# Patient Record
Sex: MALE | Race: BLACK OR AFRICAN AMERICAN | NOT HISPANIC OR LATINO | Employment: FULL TIME | URBAN - METROPOLITAN AREA
[De-identification: names, ages, dates, MRNs, and addresses within clinical notes are randomized per-mention and may not be internally consistent; named-entity substitution may affect disease eponyms.]

---

## 2017-08-18 ENCOUNTER — HOSPITAL ENCOUNTER (OUTPATIENT)
Dept: OTHER | Facility: HOSPITAL | Age: 37
Setting detail: SPECIMEN
Discharge: HOME | End: 2017-08-18
Attending: NURSE PRACTITIONER | Admitting: NURSE PRACTITIONER

## 2017-08-18 LAB
ALBUMIN SERPL-MCNC: 4.3 G/DL (ref 3.5–4.8)
ALBUMIN/GLOB SERPL: 1.8 {RATIO} (ref 1–1.7)
ALP SERPL-CCNC: 72 IU/L (ref 32–91)
ALT SERPL-CCNC: 21 IU/L (ref 17–63)
ANION GAP SERPL CALC-SCNC: 14 MMOL/L (ref 10–20)
AST SERPL-CCNC: 25 IU/L (ref 15–41)
BILIRUB SERPL-MCNC: 0.6 MG/DL (ref 0.3–1.2)
BUN SERPL-MCNC: 13 MG/DL (ref 8–20)
BUN/CREAT SERPL: 11.8 (ref 6.2–20.3)
CALCIUM SERPL-MCNC: 9.3 MG/DL (ref 8.9–10.3)
CHLORIDE SERPL-SCNC: 105 MMOL/L (ref 101–111)
CHOLEST SERPL-MCNC: 182 MG/DL
CHOLEST/HDLC SERPL: 5.1 {RATIO}
CONV CO2: 26 MMOL/L (ref 22–32)
CONV LDL CHOLESTEROL DIRECT: 128 MG/DL (ref 0–100)
CONV TOTAL PROTEIN: 6.7 G/DL (ref 6.1–7.9)
CREAT UR-MCNC: 1.1 MG/DL (ref 0.7–1.2)
GLOBULIN UR ELPH-MCNC: 2.4 G/DL (ref 2.5–3.8)
GLUCOSE SERPL-MCNC: 98 MG/DL (ref 65–99)
HDLC SERPL-MCNC: 36 MG/DL
LDLC/HDLC SERPL: 3.6 {RATIO}
LIPID INTERPRETATION: ABNORMAL
POTASSIUM SERPL-SCNC: 4 MMOL/L (ref 3.6–5.1)
SODIUM SERPL-SCNC: 141 MMOL/L (ref 136–144)
TRIGL SERPL-MCNC: 137 MG/DL
VLDLC SERPL CALC-MCNC: 18.1 MG/DL

## 2018-10-31 ENCOUNTER — OFFICE VISIT (OUTPATIENT)
Dept: FAMILY MEDICINE CLINIC | Facility: CLINIC | Age: 38
End: 2018-10-31

## 2018-10-31 VITALS
WEIGHT: 234 LBS | DIASTOLIC BLOOD PRESSURE: 82 MMHG | BODY MASS INDEX: 33.5 KG/M2 | HEIGHT: 70 IN | HEART RATE: 65 BPM | OXYGEN SATURATION: 98 % | TEMPERATURE: 97.8 F | SYSTOLIC BLOOD PRESSURE: 140 MMHG

## 2018-10-31 DIAGNOSIS — I10 ESSENTIAL HYPERTENSION: Primary | ICD-10-CM

## 2018-10-31 PROCEDURE — 99203 OFFICE O/P NEW LOW 30 MIN: CPT | Performed by: NURSE PRACTITIONER

## 2018-10-31 RX ORDER — LISINOPRIL 10 MG/1
10 TABLET ORAL DAILY
Qty: 90 TABLET | Refills: 0 | Status: SHIPPED | OUTPATIENT
Start: 2018-10-31 | End: 2019-02-01 | Stop reason: SDUPTHER

## 2018-10-31 RX ORDER — HYDROCHLOROTHIAZIDE 12.5 MG/1
12.5 TABLET ORAL DAILY
Qty: 90 TABLET | Refills: 0 | Status: SHIPPED | OUTPATIENT
Start: 2018-10-31 | End: 2019-02-01 | Stop reason: SDUPTHER

## 2018-10-31 RX ORDER — LISINOPRIL 10 MG/1
TABLET ORAL
COMMUNITY
Start: 2018-09-21 | End: 2018-10-31 | Stop reason: SDUPTHER

## 2018-10-31 RX ORDER — HYDROCHLOROTHIAZIDE 12.5 MG/1
TABLET ORAL
COMMUNITY
Start: 2018-09-20 | End: 2018-10-31 | Stop reason: SDUPTHER

## 2018-10-31 NOTE — PROGRESS NOTES
Subjective   Jc Brannon is a 38 y.o. male.     Chief Complaint   Patient presents with   • Medication Problem     New pt. to establish care   Blood Pressure medication concerens     Mr. Brannon presents today to establish care at this practice. Mr. Brannon is mentally disabled and father is present in the office, but did not accompany him into the exam room. He was seeing a physician in [place], [place] but has moved to [place] recently. He needs refills on his antihypertensive medications. He reports taking HCTA 12.5 mg daily and Lisinopril 10 mg daily. He reports that one of his medications was recently increase to double the amount but does not know which one. He reports a medical history of hyperlipidemia and is not currently on any medications.        I have reviewed the patient's medical history in detail and updated the computerized patient record.    The following portions of the patient's history were reviewed and updated as appropriate: allergies, current medications, past family history, past medical history, past social history, past surgical history and problem list.       Current Outpatient Prescriptions:   •  hydrochlorothiazide (HYDRODIURIL) 12.5 MG tablet, Take 1 tablet by mouth Daily., Disp: 90 tablet, Rfl: 0  •  lisinopril (PRINIVIL,ZESTRIL) 10 MG tablet, Take 1 tablet by mouth Daily., Disp: 90 tablet, Rfl: 0    Review of Systems   Constitutional: Negative.    HENT: Negative.    Eyes: Negative.    Respiratory: Negative.    Cardiovascular: Negative.    Gastrointestinal: Negative.    Musculoskeletal: Negative.    Neurological: Negative.    Psychiatric/Behavioral: Negative.        Objective    Vitals:    10/31/18 0910   BP: 140/82   Pulse: 65   Temp: 97.8 °F (36.6 °C)   SpO2: 98%     Physical Exam   Constitutional: He is oriented to person, place, and time. He appears well-developed and well-nourished.   HENT:   Head: Normocephalic.   Right Ear: External ear normal.   Left Ear: External  ear normal.   Mouth/Throat: Oropharynx is clear and moist.   Neck: Normal range of motion. Neck supple. No JVD present. No tracheal deviation present. No thyromegaly present.   Cardiovascular: Normal rate, regular rhythm, normal heart sounds and intact distal pulses.    Pulmonary/Chest: Effort normal and breath sounds normal.   Musculoskeletal: Normal range of motion. He exhibits no edema.   Lymphadenopathy:     He has no cervical adenopathy.   Neurological: He is alert and oriented to person, place, and time.   Skin: Skin is warm and dry.   Psychiatric: He has a normal mood and affect. His behavior is normal. Judgment and thought content normal.   He has mental disability but is highly functioning.    Vitals reviewed.        Assessment/Plan   Jc was seen today for medication problem.    Diagnoses and all orders for this visit:    Essential hypertension  -     hydrochlorothiazide (HYDRODIURIL) 12.5 MG tablet; Take 1 tablet by mouth Daily.  -     lisinopril (PRINIVIL,ZESTRIL) 10 MG tablet; Take 1 tablet by mouth Daily.    1. I have asked for his medical records from his previous PCP to clarify his medication changes.   2. I called and verified his medications with at the [facility] in [place]. The HCTZ and the Lisinopril are what they have listed, last refill in September. I have reordered both medications for 90 days.   3. He is to return in January/February to follow up on his blood pressure control.

## 2019-02-01 ENCOUNTER — OFFICE VISIT (OUTPATIENT)
Dept: FAMILY MEDICINE CLINIC | Facility: CLINIC | Age: 39
End: 2019-02-01

## 2019-02-01 VITALS
RESPIRATION RATE: 18 BRPM | HEIGHT: 70 IN | DIASTOLIC BLOOD PRESSURE: 84 MMHG | HEART RATE: 68 BPM | WEIGHT: 234 LBS | OXYGEN SATURATION: 98 % | TEMPERATURE: 98.2 F | BODY MASS INDEX: 33.5 KG/M2 | SYSTOLIC BLOOD PRESSURE: 130 MMHG

## 2019-02-01 DIAGNOSIS — I10 ESSENTIAL HYPERTENSION: ICD-10-CM

## 2019-02-01 DIAGNOSIS — Z13.220 SCREENING FOR HYPERLIPIDEMIA: Primary | ICD-10-CM

## 2019-02-01 PROCEDURE — 99213 OFFICE O/P EST LOW 20 MIN: CPT | Performed by: NURSE PRACTITIONER

## 2019-02-01 RX ORDER — HYDROCHLOROTHIAZIDE 12.5 MG/1
12.5 TABLET ORAL DAILY
Qty: 90 TABLET | Refills: 0 | Status: SHIPPED | OUTPATIENT
Start: 2019-02-01 | End: 2020-04-10

## 2019-02-01 RX ORDER — LISINOPRIL 10 MG/1
10 TABLET ORAL DAILY
Qty: 90 TABLET | Refills: 0 | Status: SHIPPED | OUTPATIENT
Start: 2019-02-01

## 2019-02-01 NOTE — PROGRESS NOTES
"Lamar Brannon is a 38 y.o. male.     Chief Complaint   Patient presents with   • Hypertension     Hypertension   This is a chronic problem. The current episode started more than 1 year ago. The problem has been gradually improving since onset. The problem is controlled. Pertinent negatives include no anxiety, chest pain, headaches, palpitations, peripheral edema or shortness of breath. There are no associated agents to hypertension. Risk factors for coronary artery disease include male gender and obesity. Current antihypertension treatment includes ACE inhibitors and diuretics. The current treatment provides moderate improvement. There are no compliance problems.       I have reviewed the patient's medical history in detail and updated the computerized patient record.    The following portions of the patient's history were reviewed and updated as appropriate: allergies, current medications, past family history, past medical history, past social history, past surgical history and problem list.       Current Outpatient Medications:   •  hydrochlorothiazide (HYDRODIURIL) 12.5 MG tablet, Take 1 tablet by mouth Daily., Disp: 90 tablet, Rfl: 0  •  lisinopril (PRINIVIL,ZESTRIL) 10 MG tablet, Take 1 tablet by mouth Daily., Disp: 90 tablet, Rfl: 0    Review of Systems   Constitutional: Negative.    Respiratory: Negative.  Negative for shortness of breath.    Cardiovascular: Negative.  Negative for chest pain and palpitations.   Musculoskeletal: Negative.    Skin: Negative.    Neurological: Negative for headache.        Vitals:    02/01/19 1525   BP: 130/84   BP Location: Left arm   Patient Position: Sitting   Cuff Size: Adult   Pulse: 68   Resp: 18   Temp: 98.2 °F (36.8 °C)   TempSrc: Oral   SpO2: 98%   Weight: 106 kg (234 lb)   Height: 177.8 cm (70\")       Objective   Physical Exam   Constitutional: He is oriented to person, place, and time. He appears well-developed and well-nourished.   Neck: Normal range " of motion. Neck supple.   Cardiovascular: Normal rate, regular rhythm and normal heart sounds.   Pulmonary/Chest: Effort normal and breath sounds normal.   Lymphadenopathy:     He has no cervical adenopathy.   Neurological: He is alert and oriented to person, place, and time.   Skin: Skin is warm and dry.   Psychiatric:   No acute distress   Vitals reviewed.        Assessment/Plan   Jc was seen today for hypertension.    Diagnoses and all orders for this visit:    Screening for hyperlipidemia  -     Lipid Panel With / Chol / HDL Ratio; Future    Essential hypertension  -     hydrochlorothiazide (HYDRODIURIL) 12.5 MG tablet; Take 1 tablet by mouth Daily.  -     lisinopril (PRINIVIL,ZESTRIL) 10 MG tablet; Take 1 tablet by mouth Daily.  -     CBC (No Diff); Future  -     Comprehensive Metabolic Panel    1. Hypertension. His blood pressure today is 130/84. He is to continue with HCTZ 12.5 mg daily and Lisinopril 10 mg daily.  2. He is to return in 6 months for an annual physical exam with fasting labs the week before.

## 2019-07-16 DIAGNOSIS — Z12.5 SCREENING PSA (PROSTATE SPECIFIC ANTIGEN): ICD-10-CM

## 2019-07-16 DIAGNOSIS — Z80.42 FAMILY HX OF PROSTATE CANCER: ICD-10-CM

## 2019-07-16 DIAGNOSIS — Z13.220 SCREENING FOR HYPERLIPIDEMIA: ICD-10-CM

## 2019-07-16 DIAGNOSIS — I10 ESSENTIAL HYPERTENSION: Primary | ICD-10-CM

## 2019-07-21 ENCOUNTER — RESULTS ENCOUNTER (OUTPATIENT)
Dept: FAMILY MEDICINE CLINIC | Facility: CLINIC | Age: 39
End: 2019-07-21

## 2019-07-21 DIAGNOSIS — I10 ESSENTIAL HYPERTENSION: ICD-10-CM

## 2019-07-21 DIAGNOSIS — Z80.42 FAMILY HX OF PROSTATE CANCER: ICD-10-CM

## 2019-07-21 DIAGNOSIS — Z12.5 SCREENING PSA (PROSTATE SPECIFIC ANTIGEN): ICD-10-CM

## 2019-07-21 DIAGNOSIS — Z13.220 SCREENING FOR HYPERLIPIDEMIA: ICD-10-CM

## 2019-07-28 ENCOUNTER — RESULTS ENCOUNTER (OUTPATIENT)
Dept: FAMILY MEDICINE CLINIC | Facility: CLINIC | Age: 39
End: 2019-07-28

## 2019-07-28 DIAGNOSIS — Z13.220 SCREENING FOR HYPERLIPIDEMIA: ICD-10-CM

## 2019-07-28 LAB
ALBUMIN SERPL-MCNC: 4.5 G/DL (ref 3.5–5.5)
ALBUMIN/GLOB SERPL: 1.8 {RATIO} (ref 1.2–2.2)
ALP SERPL-CCNC: 74 IU/L (ref 39–117)
ALT SERPL-CCNC: 17 IU/L (ref 0–44)
AST SERPL-CCNC: 18 IU/L (ref 0–40)
BILIRUB SERPL-MCNC: 0.6 MG/DL (ref 0–1.2)
BUN SERPL-MCNC: 12 MG/DL (ref 6–24)
BUN/CREAT SERPL: 12 (ref 9–20)
CALCIUM SERPL-MCNC: 9.1 MG/DL (ref 8.7–10.2)
CHLORIDE SERPL-SCNC: 102 MMOL/L (ref 96–106)
CHOLEST SERPL-MCNC: 159 MG/DL (ref 100–199)
CO2 SERPL-SCNC: 24 MMOL/L (ref 20–29)
CREAT SERPL-MCNC: 1.03 MG/DL (ref 0.76–1.27)
ERYTHROCYTE [DISTWIDTH] IN BLOOD BY AUTOMATED COUNT: 12.3 % (ref 12.3–15.4)
GLOBULIN SER CALC-MCNC: 2.5 G/DL (ref 1.5–4.5)
GLUCOSE SERPL-MCNC: 107 MG/DL (ref 65–99)
HCT VFR BLD AUTO: 40.1 % (ref 37.5–51)
HDLC SERPL-MCNC: 38 MG/DL
HGB BLD-MCNC: 13.8 G/DL (ref 13–17.7)
LDLC SERPL CALC-MCNC: 110 MG/DL (ref 0–99)
MCH RBC QN AUTO: 31.6 PG (ref 26.6–33)
MCHC RBC AUTO-ENTMCNC: 34.4 G/DL (ref 31.5–35.7)
MCV RBC AUTO: 92 FL (ref 79–97)
PLATELET # BLD AUTO: 249 X10E3/UL (ref 150–450)
POTASSIUM SERPL-SCNC: 4.3 MMOL/L (ref 3.5–5.2)
PROT SERPL-MCNC: 7 G/DL (ref 6–8.5)
PSA SERPL-MCNC: 0.6 NG/ML (ref 0–4)
RBC # BLD AUTO: 4.37 X10E6/UL (ref 4.14–5.8)
SODIUM SERPL-SCNC: 141 MMOL/L (ref 134–144)
TRIGL SERPL-MCNC: 53 MG/DL (ref 0–149)
VLDLC SERPL CALC-MCNC: 11 MG/DL (ref 5–40)
WBC # BLD AUTO: 2.6 X10E3/UL (ref 3.4–10.8)

## 2019-08-02 ENCOUNTER — OFFICE VISIT (OUTPATIENT)
Dept: FAMILY MEDICINE CLINIC | Facility: CLINIC | Age: 39
End: 2019-08-02

## 2019-08-02 VITALS
WEIGHT: 233.3 LBS | BODY MASS INDEX: 33.4 KG/M2 | DIASTOLIC BLOOD PRESSURE: 82 MMHG | RESPIRATION RATE: 18 BRPM | SYSTOLIC BLOOD PRESSURE: 128 MMHG | TEMPERATURE: 98.3 F | HEART RATE: 68 BPM | HEIGHT: 70 IN | OXYGEN SATURATION: 98 %

## 2019-08-02 DIAGNOSIS — I10 ESSENTIAL HYPERTENSION: Primary | ICD-10-CM

## 2019-08-02 DIAGNOSIS — D72.819 LEUKOPENIA, UNSPECIFIED TYPE: ICD-10-CM

## 2019-08-02 PROCEDURE — 99213 OFFICE O/P EST LOW 20 MIN: CPT | Performed by: NURSE PRACTITIONER

## 2019-08-02 NOTE — PROGRESS NOTES
"Subjective   Jc Brannon is a 39 y.o. male.     Chief Complaint   Patient presents with   • Hypertension     Hypertension   This is a chronic problem. The current episode started more than 1 year ago. The problem is controlled. Pertinent negatives include no blurred vision, headaches, malaise/fatigue, palpitations, peripheral edema or shortness of breath. There are no associated agents to hypertension. Risk factors for coronary artery disease include male gender. Current antihypertension treatment includes ACE inhibitors and diuretics. The current treatment provides significant improvement. There are no compliance problems.       I have reviewed the patient's medical history in detail and updated the computerized patient record.    The following portions of the patient's history were reviewed and updated as appropriate: allergies, current medications, past family history, past medical history, past social history, past surgical history and problem list.       Current Outpatient Medications:   •  hydrochlorothiazide (HYDRODIURIL) 12.5 MG tablet, Take 1 tablet by mouth Daily., Disp: 90 tablet, Rfl: 0  •  lisinopril (PRINIVIL,ZESTRIL) 10 MG tablet, Take 1 tablet by mouth Daily., Disp: 90 tablet, Rfl: 0    Review of Systems   Constitutional: Negative for malaise/fatigue.   Eyes: Negative for blurred vision.   Respiratory: Negative for shortness of breath.    Cardiovascular: Negative for palpitations.        Vitals:    08/02/19 1535   BP: 128/82   BP Location: Right arm   Patient Position: Sitting   Cuff Size: Adult   Pulse: 68   Resp: 18   Temp: 98.3 °F (36.8 °C)   TempSrc: Oral   SpO2: 98%   Weight: 106 kg (233 lb 4.8 oz)   Height: 177.8 cm (70\")       Objective   Physical Exam   Constitutional: He is oriented to person, place, and time. He appears well-developed and well-nourished.   Cardiovascular: Normal rate, regular rhythm, normal heart sounds and intact distal pulses.   Pulmonary/Chest: Effort normal and " breath sounds normal.   Neurological: He is alert and oriented to person, place, and time.   Skin: Skin is warm and dry.   Psychiatric:   No acute distress   Vitals reviewed.        Assessment/Plan   Jc was seen today for hypertension.    Diagnoses and all orders for this visit:    Essential hypertension    Leukopenia, unspecified type  -     CBC & Differential; Future      Mr. Brannon presents today to follow up on his blood pressure with lab review. He appears to be doing well.  His blood pressure 128/82. He is to continue taking HCTZ 12.5 mg daily and Lisinopril 10 mg daily.   I have reviewed his labs with him today. His labs are essentially normal. His HDL is low and LDL is high, and his fasting glucose is 107.   His WBC is 2.6, he does have a history of Leukopenia. I will do another CBC in 6 months.

## 2019-10-24 DIAGNOSIS — I10 ESSENTIAL HYPERTENSION: ICD-10-CM

## 2019-10-25 RX ORDER — HYDROCHLOROTHIAZIDE 12.5 MG/1
TABLET ORAL
Qty: 30 TABLET | Refills: 4 | Status: SHIPPED | OUTPATIENT
Start: 2019-10-25 | End: 2020-02-06 | Stop reason: SDUPTHER

## 2019-12-11 ENCOUNTER — OFFICE VISIT (OUTPATIENT)
Dept: FAMILY MEDICINE CLINIC | Facility: CLINIC | Age: 39
End: 2019-12-11

## 2019-12-11 VITALS
WEIGHT: 233 LBS | BODY MASS INDEX: 33.36 KG/M2 | RESPIRATION RATE: 18 BRPM | OXYGEN SATURATION: 98 % | HEIGHT: 70 IN | HEART RATE: 68 BPM | SYSTOLIC BLOOD PRESSURE: 130 MMHG | DIASTOLIC BLOOD PRESSURE: 80 MMHG

## 2019-12-11 DIAGNOSIS — S39.012A STRAIN OF LUMBAR REGION, INITIAL ENCOUNTER: Primary | ICD-10-CM

## 2019-12-11 PROCEDURE — 99213 OFFICE O/P EST LOW 20 MIN: CPT | Performed by: NURSE PRACTITIONER

## 2019-12-11 NOTE — PATIENT INSTRUCTIONS
Muscle Strain  A muscle strain is an injury that happens when a muscle is stretched longer than normal. This can happen during a fall, sports, or lifting. This can tear some muscle fibers. Usually, recovery from muscle strain takes 1-2 weeks. Complete healing normally takes 5-6 weeks.  This condition is first treated with PRICE therapy. This involves:  · Protecting your muscle from being injured again.  · Resting your injured muscle.  · Icing your injured muscle.  · Applying pressure (compression) to your injured muscle. This may be done with a splint or elastic bandage.  · Raising (elevating) your injured muscle.  Your doctor may also recommend medicine for pain.  Follow these instructions at home:  If you have a splint:  · Wear the splint as told by your doctor. Take it off only as told by your doctor.  · Loosen the splint if your fingers or toes tingle, get numb, or turn cold and blue.  · Keep the splint clean.  · If the splint is not waterproof:  ? Do not let it get wet.  ? Cover it with a watertight covering when you take a bath or a shower.  Managing pain, stiffness, and swelling    · If directed, put ice on your injured area.  ? If you have a removable splint, take it off as told by your doctor.  ? Put ice in a plastic bag.  ? Place a towel between your skin and the bag.  ? Leave the ice on for 20 minutes, 2-3 times a day.  · Move your fingers or toes often. This helps to avoid stiffness and lessen swelling.  · Raise your injured area above the level of your heart while you are sitting or lying down.  · Wear an elastic bandage as told by your doctor. Make sure it is not too tight.  General instructions  · Take over-the-counter and prescription medicines only as told by your doctor.  · Limit your activity. Rest your injured muscle as told by your doctor. Your doctor may say that gentle movements are okay.  · If physical therapy was prescribed, do exercises as told by your doctor.  · Do not put pressure on any  part of the splint until it is fully hardened. This may take many hours.  · Do not use any products that contain nicotine or tobacco, such as cigarettes and e-cigarettes. These can delay bone healing. If you need help quitting, ask your doctor.  · Warm up before you exercise. This helps to prevent more muscle strains.  · Ask your doctor when it is safe to drive if you have a splint.  · Keep all follow-up visits as told by your doctor. This is important.  Contact a doctor if:  · You have more pain or swelling in your injured area.  Get help right away if:  · You have any of these problems in your injured area:  ? You have numbness.  ? You have tingling.  ? You lose a lot of strength.  Summary  · A muscle strain is an injury that happens when a muscle is stretched longer than normal.  · This condition is first treated with PRICE therapy. This includes protecting, resting, icing, adding pressure, and raising your injury.  · Limit your activity. Rest your injured muscle as told by your doctor. Your doctor may say that gentle movements are okay.  · Warm up before you exercise. This helps to prevent more muscle strains.  This information is not intended to replace advice given to you by your health care provider. Make sure you discuss any questions you have with your health care provider.  Document Released: 09/26/2009 Document Revised: 01/24/2018 Document Reviewed: 01/24/2018  [employer] Interactive Patient Education © 2019 [employer]

## 2019-12-11 NOTE — PROGRESS NOTES
"Subjective   Jc Brannon is a 39 y.o. male.     Chief Complaint   Patient presents with   • Muscle Pain     Back      Back Pain   This is a new problem. The current episode started today. The problem occurs intermittently. The problem is unchanged. The pain is present in the lumbar spine. The quality of the pain is described as aching. The pain does not radiate. The pain is at a severity of 5/10. The pain is mild. The pain is the same all the time. The symptoms are aggravated by position. Pertinent negatives include no leg pain, tingling or weakness. He has tried nothing for the symptoms.      I have reviewed the patient's medical history in detail and updated the computerized patient record.    The following portions of the patient's history were reviewed and updated as appropriate: allergies, current medications, past family history, past medical history, past social history and past surgical history.       Current Outpatient Medications:   •  hydrochlorothiazide (HYDRODIURIL) 12.5 MG tablet, Take 1 tablet by mouth Daily., Disp: 90 tablet, Rfl: 0  •  hydroCHLOROthiazide (HYDRODIURIL) 12.5 MG tablet, TAKE 1 TABLET BY MOUTH ONCE DAILY, Disp: 30 tablet, Rfl: 4  •  lisinopril (PRINIVIL,ZESTRIL) 10 MG tablet, Take 1 tablet by mouth Daily., Disp: 90 tablet, Rfl: 0    Review of Systems   Constitutional: Negative.    Respiratory: Negative.    Cardiovascular: Negative.    Musculoskeletal: Positive for back pain.   Skin: Negative.    Neurological: Negative.  Negative for tingling and weakness.        Vitals:    12/11/19 1540   BP: 130/80   BP Location: Left arm   Patient Position: Sitting   Cuff Size: Adult   Pulse: 68   Resp: 18   SpO2: 98%   Weight: 106 kg (233 lb)   Height: 177.8 cm (70\")       Objective   Physical Exam   Constitutional: He is oriented to person, place, and time. He appears well-developed and well-nourished.   Musculoskeletal:        Lumbar back: He exhibits decreased range of motion and tenderness. " He exhibits no swelling, no edema, no pain and no spasm.   Neurological: He is alert and oriented to person, place, and time.   Skin: Skin is warm and dry.   Psychiatric:   No acute distress   Vitals reviewed.        Assessment/Plan   Jc was seen today for muscle pain.    Diagnoses and all orders for this visit:    Strain of lumbar region, initial encounter      Mr. Brannon presents today with concerns about back pain. He has been encouraged to take Ibuprofen for his pain, and alternate heat with ice. He has also been encouraged to stay active so that he does not have back stiffness.

## 2020-01-26 ENCOUNTER — RESULTS ENCOUNTER (OUTPATIENT)
Dept: FAMILY MEDICINE CLINIC | Facility: CLINIC | Age: 40
End: 2020-01-26

## 2020-01-26 DIAGNOSIS — D72.819 LEUKOPENIA, UNSPECIFIED TYPE: ICD-10-CM

## 2020-02-05 ENCOUNTER — OFFICE VISIT (OUTPATIENT)
Dept: FAMILY MEDICINE CLINIC | Facility: CLINIC | Age: 40
End: 2020-02-05

## 2020-02-05 VITALS
WEIGHT: 232.1 LBS | DIASTOLIC BLOOD PRESSURE: 76 MMHG | RESPIRATION RATE: 18 BRPM | HEIGHT: 70 IN | SYSTOLIC BLOOD PRESSURE: 138 MMHG | OXYGEN SATURATION: 99 % | TEMPERATURE: 97.5 F | HEART RATE: 62 BPM | BODY MASS INDEX: 33.23 KG/M2

## 2020-02-05 DIAGNOSIS — I10 ESSENTIAL HYPERTENSION: Primary | ICD-10-CM

## 2020-02-05 PROCEDURE — 99213 OFFICE O/P EST LOW 20 MIN: CPT | Performed by: NURSE PRACTITIONER

## 2020-02-05 NOTE — PROGRESS NOTES
"Lamar Brannon is a 39 y.o. male.     Chief Complaint   Patient presents with   • Hypertension     Hypertension   This is a chronic problem. The current episode started more than 1 year ago. The problem has been waxing and waning since onset. Pertinent negatives include no anxiety, chest pain, headaches, malaise/fatigue, palpitations, peripheral edema or shortness of breath. There are no associated agents to hypertension. Risk factors for coronary artery disease include obesity and male gender. Past treatments include diuretics and ACE inhibitors. Current antihypertension treatment includes ACE inhibitors and diuretics. The current treatment provides moderate improvement. There are no compliance problems.       I have reviewed the patient's medical history in detail and updated the computerized patient record.    The following portions of the patient's history were reviewed and updated as appropriate: allergies, current medications, past family history, past medical history, past social history, past surgical history and problem list.      Current Outpatient Medications:   •  hydrochlorothiazide (HYDRODIURIL) 12.5 MG tablet, Take 1 tablet by mouth Daily., Disp: 90 tablet, Rfl: 0  •  lisinopril (PRINIVIL,ZESTRIL) 10 MG tablet, Take 1 tablet by mouth Daily., Disp: 90 tablet, Rfl: 0     Review of Systems   Constitutional: Negative for malaise/fatigue.   Respiratory: Negative for shortness of breath.    Cardiovascular: Negative for chest pain and palpitations.       Objective    Vitals:    02/05/20 1515 02/05/20 1530   BP: 140/88 138/76   BP Location: Left arm    Patient Position: Sitting    Cuff Size: Large Adult    Pulse: 62    Resp: 18    Temp: 97.5 °F (36.4 °C)    TempSrc: Oral    SpO2: 99%    Weight: 105 kg (232 lb 1.6 oz)    Height: 177.8 cm (70\")      Physical Exam   Constitutional: He is oriented to person, place, and time. He appears well-developed and well-nourished.   Cardiovascular: Normal " rate, regular rhythm, normal heart sounds and intact distal pulses.   Pulmonary/Chest: Effort normal and breath sounds normal.   Neurological: He is alert and oriented to person, place, and time.   Skin: Skin is warm and dry.   Psychiatric:   No acute distress   Vitals reviewed.        Assessment/Plan   Jc was seen today for hypertension.    Diagnoses and all orders for this visit:    Essential hypertension    Mr. Peterson presents today to follow up on his blood pressure. His blood pressure today in the office is 140/88 and 138/76. He is to continue taking Lisinopril 10 mg daily and HCTZ 12.5 mg daily.   He is to continue to eat a low carb diet and exercise daily.   Follow up in June for an annual physical exam with fasting labs the week before.

## 2020-02-13 LAB
BASOPHILS # BLD AUTO: 0 X10E3/UL (ref 0–0.2)
BASOPHILS NFR BLD AUTO: 0 %
EOSINOPHIL # BLD AUTO: 0.1 X10E3/UL (ref 0–0.4)
EOSINOPHIL NFR BLD AUTO: 2 %
ERYTHROCYTE [DISTWIDTH] IN BLOOD BY AUTOMATED COUNT: 12.8 % (ref 11.6–15.4)
HCT VFR BLD AUTO: 40.4 % (ref 37.5–51)
HGB BLD-MCNC: 13.5 G/DL (ref 13–17.7)
IMM GRANULOCYTES # BLD AUTO: 0 X10E3/UL (ref 0–0.1)
IMM GRANULOCYTES NFR BLD AUTO: 0 %
LYMPHOCYTES # BLD AUTO: 2.6 X10E3/UL (ref 0.7–3.1)
LYMPHOCYTES NFR BLD AUTO: 59 %
MCH RBC QN AUTO: 30.8 PG (ref 26.6–33)
MCHC RBC AUTO-ENTMCNC: 33.4 G/DL (ref 31.5–35.7)
MCV RBC AUTO: 92 FL (ref 79–97)
MONOCYTES # BLD AUTO: 0.5 X10E3/UL (ref 0.1–0.9)
MONOCYTES NFR BLD AUTO: 10 %
NEUTROPHILS # BLD AUTO: 1.3 X10E3/UL (ref 1.4–7)
NEUTROPHILS NFR BLD AUTO: 29 %
PLATELET # BLD AUTO: 245 X10E3/UL (ref 150–450)
RBC # BLD AUTO: 4.38 X10E6/UL (ref 4.14–5.8)
WBC # BLD AUTO: 4.5 X10E3/UL (ref 3.4–10.8)

## 2020-02-14 ENCOUNTER — TELEPHONE (OUTPATIENT)
Dept: FAMILY MEDICINE CLINIC | Facility: CLINIC | Age: 40
End: 2020-02-14

## 2020-02-14 NOTE — TELEPHONE ENCOUNTER
I called and LMTRC yesterday morning. He called back in the afternoon and s/w KK.       ----- Message from WIL Patino sent at 2/13/2020  8:48 AM EST -----  Please call him and let him know his labs are normal

## 2020-04-09 DIAGNOSIS — I10 ESSENTIAL HYPERTENSION: ICD-10-CM

## 2020-04-10 RX ORDER — HYDROCHLOROTHIAZIDE 12.5 MG/1
TABLET ORAL
Qty: 30 TABLET | Refills: 3 | Status: SHIPPED | OUTPATIENT
Start: 2020-04-10 | End: 2020-08-17

## 2020-05-19 DIAGNOSIS — D72.819 LEUKOPENIA, UNSPECIFIED TYPE: ICD-10-CM

## 2020-05-19 DIAGNOSIS — I10 ESSENTIAL HYPERTENSION: Primary | ICD-10-CM

## 2020-05-19 DIAGNOSIS — E78.00 ELEVATED LDL CHOLESTEROL LEVEL: ICD-10-CM

## 2020-05-25 ENCOUNTER — RESULTS ENCOUNTER (OUTPATIENT)
Dept: FAMILY MEDICINE CLINIC | Facility: CLINIC | Age: 40
End: 2020-05-25

## 2020-05-25 DIAGNOSIS — E78.00 ELEVATED LDL CHOLESTEROL LEVEL: ICD-10-CM

## 2020-05-25 DIAGNOSIS — D72.819 LEUKOPENIA, UNSPECIFIED TYPE: ICD-10-CM

## 2020-05-25 DIAGNOSIS — I10 ESSENTIAL HYPERTENSION: ICD-10-CM

## 2020-05-27 LAB
ALBUMIN SERPL-MCNC: 4.9 G/DL (ref 3.5–5.2)
ALBUMIN/GLOB SERPL: 2.1 G/DL
ALP SERPL-CCNC: 67 U/L (ref 39–117)
ALT SERPL-CCNC: 22 U/L (ref 1–41)
AST SERPL-CCNC: 19 U/L (ref 1–40)
BILIRUB SERPL-MCNC: 0.6 MG/DL (ref 0.2–1.2)
BUN SERPL-MCNC: 15 MG/DL (ref 6–20)
BUN/CREAT SERPL: 13.2 (ref 7–25)
CALCIUM SERPL-MCNC: 9.3 MG/DL (ref 8.6–10.5)
CHLORIDE SERPL-SCNC: 103 MMOL/L (ref 98–107)
CHOLEST SERPL-MCNC: 175 MG/DL (ref 0–200)
CHOLEST/HDLC SERPL: 4.61 {RATIO}
CO2 SERPL-SCNC: 26.7 MMOL/L (ref 22–29)
CREAT SERPL-MCNC: 1.14 MG/DL (ref 0.76–1.27)
ERYTHROCYTE [DISTWIDTH] IN BLOOD BY AUTOMATED COUNT: 11.8 % (ref 12.3–15.4)
GLOBULIN SER CALC-MCNC: 2.3 GM/DL
GLUCOSE SERPL-MCNC: 106 MG/DL (ref 65–99)
HCT VFR BLD AUTO: 41.7 % (ref 37.5–51)
HDLC SERPL-MCNC: 38 MG/DL (ref 40–60)
HGB BLD-MCNC: 13.7 G/DL (ref 13–17.7)
LDLC SERPL CALC-MCNC: 113 MG/DL (ref 0–100)
MCH RBC QN AUTO: 30.9 PG (ref 26.6–33)
MCHC RBC AUTO-ENTMCNC: 32.9 G/DL (ref 31.5–35.7)
MCV RBC AUTO: 94.1 FL (ref 79–97)
PLATELET # BLD AUTO: 223 10*3/MM3 (ref 140–450)
POTASSIUM SERPL-SCNC: 3.9 MMOL/L (ref 3.5–5.2)
PROT SERPL-MCNC: 7.2 G/DL (ref 6–8.5)
RBC # BLD AUTO: 4.43 10*6/MM3 (ref 4.14–5.8)
SODIUM SERPL-SCNC: 140 MMOL/L (ref 136–145)
TRIGL SERPL-MCNC: 122 MG/DL (ref 0–150)
VLDLC SERPL CALC-MCNC: 24.4 MG/DL
WBC # BLD AUTO: 2.91 10*3/MM3 (ref 3.4–10.8)

## 2020-06-01 ENCOUNTER — OFFICE VISIT (OUTPATIENT)
Dept: FAMILY MEDICINE CLINIC | Facility: CLINIC | Age: 40
End: 2020-06-01

## 2020-06-01 VITALS
SYSTOLIC BLOOD PRESSURE: 136 MMHG | OXYGEN SATURATION: 99 % | HEART RATE: 77 BPM | HEIGHT: 70 IN | BODY MASS INDEX: 31.92 KG/M2 | WEIGHT: 223 LBS | DIASTOLIC BLOOD PRESSURE: 80 MMHG | TEMPERATURE: 98.7 F

## 2020-06-01 DIAGNOSIS — Z00.00 ANNUAL PHYSICAL EXAM: Primary | ICD-10-CM

## 2020-06-01 PROCEDURE — 99396 PREV VISIT EST AGE 40-64: CPT | Performed by: NURSE PRACTITIONER

## 2020-06-01 NOTE — PROGRESS NOTES
Subjective   Jc Brannon is a 39 y.o. male.     Chief Complaint   Patient presents with   • Annual Exam     Mr. Brannon presents today for his annual physical exam with lab review. He appears to be doing well and voices no concerns today.        I have reviewed the patient's medical history in detail and updated the computerized patient record.    The following portions of the patient's history were reviewed and updated as appropriate: allergies, current medications, past family history, past medical history, past social history, past surgical history and problem list.      Current Outpatient Medications:   •  hydroCHLOROthiazide (HYDRODIURIL) 12.5 MG tablet, TAKE ONE TABLET BY MOUTH DAILY, Disp: 30 tablet, Rfl: 3  •  lisinopril (PRINIVIL,ZESTRIL) 10 MG tablet, Take 1 tablet by mouth Daily., Disp: 90 tablet, Rfl: 0     Review of Systems   Constitutional: Negative.    Respiratory: Negative.    Cardiovascular: Negative.    Gastrointestinal: Negative.    Musculoskeletal: Negative.    Neurological: Negative.    All other systems reviewed and are negative.      Objective    Vitals:    06/01/20 1540   BP: 136/80   Pulse: 77   Temp: 98.7 °F (37.1 °C)   SpO2: 99%     Physical Exam   Constitutional: He is oriented to person, place, and time. He appears well-developed and well-nourished.   HENT:   Right Ear: Tympanic membrane normal.   Left Ear: Tympanic membrane normal.   Neck: Normal range of motion. Neck supple. No thyromegaly present.   Cardiovascular: Normal rate, regular rhythm and normal heart sounds.   Pulmonary/Chest: Effort normal and breath sounds normal.   Lymphadenopathy:     He has no cervical adenopathy.   Neurological: He is alert and oriented to person, place, and time.   Skin: Skin is warm and dry.   Psychiatric:   No acute distress   Vitals reviewed.        Assessment/Plan   Jc was seen today for annual exam.    Diagnoses and all orders for this visit:    Annual physical exam      Mr. Brannon'  physical assessment is within normal parameters today.   His blood pressure is stable at 136/80.   I have reviewed his labs with him today. All of his labs are essentially normal. His fasting glucose is slightly elevated and so is his LDL. His HDL is low.   His is to continue all medications as prescribed.  We discussed eating a healthy low carb diet and getting regular exercise.   He is to follow up as needed and in 1 year for his next annual exam.

## 2020-08-15 DIAGNOSIS — I10 ESSENTIAL HYPERTENSION: ICD-10-CM

## 2020-08-17 RX ORDER — HYDROCHLOROTHIAZIDE 12.5 MG/1
TABLET ORAL
Qty: 30 TABLET | Refills: 10 | Status: SHIPPED | OUTPATIENT
Start: 2020-08-17 | End: 2021-07-15

## 2021-03-22 ENCOUNTER — TELEPHONE (OUTPATIENT)
Dept: FAMILY MEDICINE CLINIC | Facility: CLINIC | Age: 41
End: 2021-03-22

## 2021-03-31 ENCOUNTER — OFFICE VISIT (OUTPATIENT)
Dept: FAMILY MEDICINE CLINIC | Facility: CLINIC | Age: 41
End: 2021-03-31

## 2021-03-31 VITALS
OXYGEN SATURATION: 98 % | TEMPERATURE: 98 F | WEIGHT: 238 LBS | HEIGHT: 70 IN | BODY MASS INDEX: 34.07 KG/M2 | SYSTOLIC BLOOD PRESSURE: 148 MMHG | HEART RATE: 83 BPM | DIASTOLIC BLOOD PRESSURE: 88 MMHG

## 2021-03-31 DIAGNOSIS — R25.1 TREMOR OF BOTH HANDS: Primary | ICD-10-CM

## 2021-03-31 PROCEDURE — 99212 OFFICE O/P EST SF 10 MIN: CPT | Performed by: NURSE PRACTITIONER

## 2021-03-31 NOTE — PROGRESS NOTES
"Chief Complaint  Tremors (in both hands)    Subjective          Jc Brannon presents to [facility]  Mr. Brannon presents today with concerns that he has tremors in both hands. He reports that a month or so ago while he was at the eye doctor his father noticed that Jc's hands were shaking. He has not had any other episodes since then.       I have reviewed the patient's medical history in detail and updated the computerized patient record.    Current Outpatient Medications:   •  hydroCHLOROthiazide (HYDRODIURIL) 12.5 MG tablet, TAKE ONE TABLET BY MOUTH DAILY, Disp: 30 tablet, Rfl: 10  •  lisinopril (PRINIVIL,ZESTRIL) 10 MG tablet, Take 1 tablet by mouth Daily., Disp: 90 tablet, Rfl: 0     Objective   Vital Signs:   /88 (BP Location: Right arm, Patient Position: Sitting, Cuff Size: Adult)   Pulse 83   Temp 98 °F (36.7 °C) (Infrared)   Ht 177.8 cm (70\")   Wt 108 kg (238 lb)   SpO2 98%   BMI 34.15 kg/m²     Physical Exam  Constitutional:       Appearance: Normal appearance.   Cardiovascular:      Rate and Rhythm: Normal rate and regular rhythm.      Pulses: Normal pulses.      Heart sounds: Normal heart sounds.   Pulmonary:      Effort: Pulmonary effort is normal.      Breath sounds: Normal breath sounds.   Musculoskeletal:      Right hand: Normal.      Left hand: Normal.   Skin:     General: Skin is warm and dry.   Neurological:      Mental Status: He is alert and oriented to person, place, and time.        Result Review :                 Assessment and Plan    Diagnoses and all orders for this visit:    1. Tremor of both hands (Primary)    Mr. Brannon appears to be doing well today.  His exam did not reveal any noticeable tremors and he did not demonstrate any tremors with the use of his fine motor skills.   I have discussed with him that sometimes people shake for no reason and that also some people have what is called essential tremors and are not caused by a serious " illness. I asked him to ask his parents if anyone in his family had tremors as they got older. He said he will.     Follow Up   Return for Next scheduled follow up.  Patient was given instructions and counseling regarding his condition or for health maintenance advice. Please see specific information pulled into the AVS if appropriate.

## 2021-05-14 DIAGNOSIS — Z11.59 ENCOUNTER FOR HEPATITIS C SCREENING TEST FOR LOW RISK PATIENT: ICD-10-CM

## 2021-05-14 DIAGNOSIS — Z00.00 ANNUAL PHYSICAL EXAM: Primary | ICD-10-CM

## 2021-06-08 ENCOUNTER — OFFICE VISIT (OUTPATIENT)
Dept: FAMILY MEDICINE CLINIC | Facility: CLINIC | Age: 41
End: 2021-06-08

## 2021-06-08 VITALS
WEIGHT: 229 LBS | HEART RATE: 86 BPM | DIASTOLIC BLOOD PRESSURE: 88 MMHG | OXYGEN SATURATION: 98 % | BODY MASS INDEX: 32.78 KG/M2 | HEIGHT: 70 IN | SYSTOLIC BLOOD PRESSURE: 142 MMHG

## 2021-06-08 DIAGNOSIS — Z00.00 ANNUAL PHYSICAL EXAM: Primary | ICD-10-CM

## 2021-06-08 PROCEDURE — 99396 PREV VISIT EST AGE 40-64: CPT | Performed by: NURSE PRACTITIONER

## 2021-06-08 NOTE — PROGRESS NOTES
"Chief Complaint  Annual Exam (Labs?)    Subjective          Jc Brnanon presents to [facility]  Mr. Brannon presents today for his annual physical exam with lab review.         Current Outpatient Medications:   •  hydroCHLOROthiazide (HYDRODIURIL) 12.5 MG tablet, TAKE ONE TABLET BY MOUTH DAILY, Disp: 30 tablet, Rfl: 10  •  lisinopril (PRINIVIL,ZESTRIL) 10 MG tablet, Take 1 tablet by mouth Daily., Disp: 90 tablet, Rfl: 0     Review of Systems   Constitutional: Negative.    HENT: Negative.    Eyes: Negative.    Respiratory: Negative.    Cardiovascular: Negative.    Gastrointestinal: Negative.    Genitourinary: Negative.    Musculoskeletal: Negative.    Skin: Negative.    Neurological: Negative.    Psychiatric/Behavioral: Negative.      Objective   Vital Signs:   /88 (BP Location: Right arm, Patient Position: Sitting, Cuff Size: Adult)   Pulse 86   Ht 177.8 cm (70\")   Wt 104 kg (229 lb)   SpO2 98%   BMI 32.86 kg/m²       Physical Exam  Vitals reviewed.   Constitutional:       Appearance: Normal appearance.   HENT:      Right Ear: Tympanic membrane normal.      Left Ear: Tympanic membrane normal.   Neck:      Vascular: No carotid bruit.   Cardiovascular:      Rate and Rhythm: Normal rate and regular rhythm.      Pulses: Normal pulses.      Heart sounds: Normal heart sounds.   Pulmonary:      Effort: Pulmonary effort is normal.      Breath sounds: Normal breath sounds.   Abdominal:      General: Bowel sounds are normal.      Palpations: Abdomen is soft.   Musculoskeletal:      Cervical back: Normal range of motion. No rigidity or tenderness.      Right lower leg: No edema.      Left lower leg: No edema.   Lymphadenopathy:      Cervical: No cervical adenopathy.   Skin:     General: Skin is warm and dry.   Neurological:      Mental Status: He is alert and oriented to person, place, and time.   Psychiatric:      Comments: No acute distress        Result Review :     Common labs  "   Common Labsle 5/25/21 5/25/21 5/25/21    0848 0848 0848   Glucose  97    BUN  12    Creatinine  1.09    eGFR Non  Am  74    eGFR African Am  90    Sodium  138    Potassium  3.8    Chloride  102    Calcium  9.4    Total Protein  6.8    Albumin  4.60    Total Bilirubin  0.6    Alkaline Phosphatase  77    AST (SGOT)  28    ALT (SGPT)  41    WBC 3.11 (A)     Hemoglobin 14.0     Hematocrit 43.1     Platelets 213     Total Cholesterol   159   Triglycerides   98   HDL Cholesterol   41   LDL Cholesterol    100   (A) Abnormal value       Comments are available for some flowsheets but are not being displayed.                     Assessment and Plan    Diagnoses and all orders for this visit:    1. Annual physical exam (Primary)    Mr. Brannon appears to be doing well today.  His physical exam is within normal parameters for him today.  I have reviewed his labs with him today and answered all of his questions. I have reassured him that his labs are within goal and that he has nothing to worry about.   His leukopenia is improving.     Follow Up   Return in about 1 year (around 6/8/2022), or if symptoms worsen or fail to improve, for Annual physical, Fasting labs 1 week prior to next f/u.  Patient was given instructions and counseling regarding his condition or for health maintenance advice. Please see specific information pulled into the AVS if appropriate.

## 2021-07-15 DIAGNOSIS — I10 ESSENTIAL HYPERTENSION: ICD-10-CM

## 2021-07-16 RX ORDER — HYDROCHLOROTHIAZIDE 12.5 MG/1
TABLET ORAL
Qty: 30 TABLET | Refills: 10 | Status: SHIPPED | OUTPATIENT
Start: 2021-07-16 | End: 2022-05-23

## 2021-07-19 ENCOUNTER — OFFICE VISIT (OUTPATIENT)
Dept: FAMILY MEDICINE CLINIC | Facility: CLINIC | Age: 41
End: 2021-07-19

## 2021-07-19 VITALS
WEIGHT: 238 LBS | BODY MASS INDEX: 34.07 KG/M2 | HEIGHT: 70 IN | DIASTOLIC BLOOD PRESSURE: 82 MMHG | SYSTOLIC BLOOD PRESSURE: 132 MMHG | OXYGEN SATURATION: 96 % | HEART RATE: 91 BPM

## 2021-07-19 DIAGNOSIS — M25.469 KNEE SWELLING: Primary | ICD-10-CM

## 2021-07-19 PROCEDURE — 99213 OFFICE O/P EST LOW 20 MIN: CPT | Performed by: NURSE PRACTITIONER

## 2021-07-19 NOTE — PROGRESS NOTES
"Chief Complaint  Knee Pain (LT swelling when lifting weights)    Subjective          Jc Brannon presents to [facility]  Knee Pain   The incident occurred at the gym. There was no injury mechanism. The pain is present in the left knee. The patient is experiencing no pain. Associated symptoms include muscle weakness. Pertinent negatives include no inability to bear weight, loss of motion, loss of sensation, numbness or tingling. He reports no foreign bodies present. Nothing aggravates the symptoms. He has tried nothing for the symptoms. The treatment provided no relief.     I have reviewed the patient's medical history in detail and updated the computerized patient record.      Current Outpatient Medications:   •  hydroCHLOROthiazide (HYDRODIURIL) 12.5 MG tablet, TAKE ONE TABLET BY MOUTH DAILY, Disp: 30 tablet, Rfl: 10  •  lisinopril (PRINIVIL,ZESTRIL) 10 MG tablet, Take 1 tablet by mouth Daily., Disp: 90 tablet, Rfl: 0     Objective   Vital Signs:   /82 (BP Location: Right arm, Patient Position: Sitting, Cuff Size: Adult)   Pulse 91   Ht 177.8 cm (70\")   Wt 108 kg (238 lb)   SpO2 96%   BMI 34.15 kg/m²       Physical Exam  Vitals reviewed.   Constitutional:       Appearance: Normal appearance.   Cardiovascular:      Rate and Rhythm: Normal rate and regular rhythm.      Pulses: Normal pulses.      Heart sounds: Normal heart sounds.   Pulmonary:      Effort: Pulmonary effort is normal.      Breath sounds: Normal breath sounds.   Skin:     General: Skin is warm and dry.   Neurological:      Mental Status: He is alert and oriented to person, place, and time.   Psychiatric:      Comments: No acute distress        Result Review :                 Assessment and Plan    Diagnoses and all orders for this visit:    1. Knee swelling (Primary)    Mr. West is concerned today because he was having some left knee swelling after working out at the GYM 2 to 3 weeks ago. The swelling " has since resolved. He has decided not to go back to the GYM.    We will monitor his knee for now since he is no longer symptomatic with edema or pain. He may take NSAIDS for the pain/edema.    Follow Up   Return if symptoms worsen or fail to improve, for Next scheduled follow up.  Patient was given instructions and counseling regarding his condition or for health maintenance advice. Please see specific information pulled into the AVS if appropriate.

## 2022-02-22 ENCOUNTER — OFFICE VISIT (OUTPATIENT)
Dept: FAMILY MEDICINE CLINIC | Facility: CLINIC | Age: 42
End: 2022-02-22

## 2022-02-22 VITALS
HEART RATE: 74 BPM | DIASTOLIC BLOOD PRESSURE: 96 MMHG | WEIGHT: 239 LBS | BODY MASS INDEX: 34.22 KG/M2 | OXYGEN SATURATION: 99 % | HEIGHT: 70 IN | SYSTOLIC BLOOD PRESSURE: 162 MMHG

## 2022-02-22 DIAGNOSIS — Z73.6 LIMITATION OF ACTIVITIES DUE TO MUSCULOSKELETAL DISORDER: Primary | ICD-10-CM

## 2022-02-22 DIAGNOSIS — M79.9 LIMITATION OF ACTIVITIES DUE TO MUSCULOSKELETAL DISORDER: Primary | ICD-10-CM

## 2022-02-22 PROCEDURE — 99213 OFFICE O/P EST LOW 20 MIN: CPT | Performed by: NURSE PRACTITIONER

## 2022-02-22 NOTE — PROGRESS NOTES
"Chief Complaint  Work Related Problems (restrictions for areas of work that he feels unsafe)    Subjective          Jc Brannon presents to [facility]  Mr. 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 He has had 2 foot fractures due to equipment sliding off the truck while loading the object to the conveyer belt. He is concerned that this could happen again.     I have reviewed the patient's medical history in detail and updated the computerized patient record.    Current Outpatient Medications:   •  hydroCHLOROthiazide (HYDRODIURIL) 12.5 MG tablet, TAKE ONE TABLET BY MOUTH DAILY, Disp: 30 tablet, Rfl: 10  •  lisinopril (PRINIVIL,ZESTRIL) 10 MG tablet, Take 1 tablet by mouth Daily., Disp: 90 tablet, Rfl: 0     Objective   Vital Signs:   /96   Pulse 74   Ht 177.8 cm (70\")   Wt 108 kg (239 lb)   SpO2 99%   BMI 34.29 kg/m²     Physical Exam  Vitals reviewed.   Constitutional:       Appearance: Normal appearance.   Cardiovascular:      Rate and Rhythm: Normal rate and regular rhythm.      Pulses: Normal pulses.      Heart sounds: Normal heart sounds.   Pulmonary:      Effort: Pulmonary effort is normal.      Breath sounds: Normal breath sounds.   Skin:     General: Skin is warm and dry.   Neurological:      Mental Status: He is alert and oriented to person, place, and time.        Result Review :                 Assessment and Plan    Diagnoses and all orders for this visit:    1. Limitation of activities due to musculoskeletal disorder (Primary)  -     Ambulatory Referral to Occupational Medicine    Mr. Brannon appears to be doing well today.  I am referring him to Occupational Medicine for further evaluations to see if he needs any weight lifting restrictions at work. Mr. Brannon is agreeable to be evaluated.     Follow Up   No follow-ups on file.  Patient was given instructions and counseling regarding " his condition or for health maintenance advice. Please see specific information pulled into the AVS if appropriate.

## 2022-05-21 DIAGNOSIS — I10 ESSENTIAL HYPERTENSION: ICD-10-CM

## 2022-05-23 RX ORDER — HYDROCHLOROTHIAZIDE 12.5 MG/1
TABLET ORAL
Qty: 30 TABLET | Refills: 5 | Status: SHIPPED | OUTPATIENT
Start: 2022-05-23 | End: 2022-11-22

## 2022-05-25 DIAGNOSIS — D72.819 LEUKOPENIA, UNSPECIFIED TYPE: Primary | ICD-10-CM

## 2022-05-25 DIAGNOSIS — I10 ESSENTIAL HYPERTENSION: ICD-10-CM

## 2022-05-25 DIAGNOSIS — Z00.00 ANNUAL PHYSICAL EXAM: ICD-10-CM

## 2022-05-27 ENCOUNTER — OFFICE VISIT (OUTPATIENT)
Dept: FAMILY MEDICINE CLINIC | Facility: CLINIC | Age: 42
End: 2022-05-27

## 2022-05-27 VITALS
HEART RATE: 80 BPM | HEIGHT: 70 IN | OXYGEN SATURATION: 98 % | DIASTOLIC BLOOD PRESSURE: 86 MMHG | BODY MASS INDEX: 33.07 KG/M2 | SYSTOLIC BLOOD PRESSURE: 128 MMHG | WEIGHT: 231 LBS

## 2022-05-27 DIAGNOSIS — F41.9 ANXIETY: Primary | ICD-10-CM

## 2022-05-27 DIAGNOSIS — Z02.89 ENCOUNTER TO OBTAIN EXCUSE FROM WORK: ICD-10-CM

## 2022-05-27 PROCEDURE — 99212 OFFICE O/P EST SF 10 MIN: CPT | Performed by: NURSE PRACTITIONER

## 2022-05-27 NOTE — PROGRESS NOTES
"Chief Complaint  No chief complaint on file.    Subjective          Jc Brannon presents to [facility]  Mr. Brannon presents today accompanied by his father. 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 He has a history of foot fracture x 2 when he has previously worked in this area. This increases his anxiety and he is easily distracted due to the increased anxiety.     I have reviewed the patient's medical history in detail and updated the computerized patient record.    Current Outpatient Medications:   •  hydroCHLOROthiazide (HYDRODIURIL) 12.5 MG tablet, TAKE ONE TABLET BY MOUTH DAILY, Disp: 30 tablet, Rfl: 5  •  lisinopril (PRINIVIL,ZESTRIL) 10 MG tablet, Take 1 tablet by mouth Daily., Disp: 90 tablet, Rfl: 0     Objective   Vital Signs:  /86 (BP Location: Right arm, Patient Position: Sitting, Cuff Size: Adult)   Pulse 80   Ht 177.8 cm (70\")   Wt 105 kg (231 lb)   SpO2 98%   BMI 33.15 kg/m²         Physical Exam  Vitals reviewed.   Constitutional:       Appearance: Normal appearance.   Neurological:      Mental Status: He is alert and oriented to person, place, and time.        Result Review :                 Assessment and Plan    Diagnoses and all orders for this visit:    1. Anxiety (Primary)    2. Encounter to obtain excuse from work    Mr. Brannon appears to be doing well today.  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 I have provided him with a letter excusing him from working in that area.            Follow Up   Return if symptoms worsen or fail to improve, for Next scheduled follow up.  Patient was given instructions and counseling regarding his condition or for health maintenance advice. Please see specific information pulled into the AVS if appropriate.       "

## 2022-06-10 ENCOUNTER — OFFICE VISIT (OUTPATIENT)
Dept: FAMILY MEDICINE CLINIC | Facility: CLINIC | Age: 42
End: 2022-06-10

## 2022-06-10 VITALS
SYSTOLIC BLOOD PRESSURE: 130 MMHG | WEIGHT: 230 LBS | HEART RATE: 77 BPM | BODY MASS INDEX: 32.93 KG/M2 | OXYGEN SATURATION: 97 % | DIASTOLIC BLOOD PRESSURE: 82 MMHG | HEIGHT: 70 IN

## 2022-06-10 DIAGNOSIS — Z00.00 ANNUAL PHYSICAL EXAM: Primary | ICD-10-CM

## 2022-06-10 PROCEDURE — 99396 PREV VISIT EST AGE 40-64: CPT | Performed by: NURSE PRACTITIONER

## 2022-06-10 NOTE — PROGRESS NOTES
"Chief Complaint  Annual Exam (& review labs)    Subjective        Jc Brannon presents to [facility]  Mr Brannon presents today for an annual physical exam with lab review.     I have reviewed the patient's medical history in detail and updated the computerized patient record.    Current Outpatient Medications:   •  hydroCHLOROthiazide (HYDRODIURIL) 12.5 MG tablet, TAKE ONE TABLET BY MOUTH DAILY, Disp: 30 tablet, Rfl: 5  •  lisinopril (PRINIVIL,ZESTRIL) 10 MG tablet, Take 1 tablet by mouth Daily., Disp: 90 tablet, Rfl: 0     Review of Systems   Constitutional: Negative.    HENT: Negative.    Eyes: Negative.    Respiratory: Negative.    Cardiovascular: Negative.    Gastrointestinal: Negative.    Genitourinary: Negative.    Musculoskeletal: Negative.    Skin: Negative.    Neurological: Negative.    Psychiatric/Behavioral: Negative.      Objective   Vital Signs:  /82 (BP Location: Left arm, Patient Position: Sitting, Cuff Size: Adult)   Pulse 77   Ht 177.8 cm (70\")   Wt 104 kg (230 lb)   SpO2 97%   BMI 33.00 kg/m²   Estimated body mass index is 33 kg/m² as calculated from the following:    Height as of this encounter: 177.8 cm (70\").    Weight as of this encounter: 104 kg (230 lb).          Physical Exam  Vitals reviewed.   Constitutional:       Appearance: Normal appearance.   HENT:      Right Ear: Tympanic membrane normal.      Left Ear: Tympanic membrane normal.   Neck:      Vascular: No carotid bruit.   Cardiovascular:      Rate and Rhythm: Normal rate and regular rhythm.      Pulses: Normal pulses.      Heart sounds: Normal heart sounds.   Pulmonary:      Effort: Pulmonary effort is normal.      Breath sounds: Normal breath sounds.   Abdominal:      General: Bowel sounds are normal.      Palpations: Abdomen is soft.   Musculoskeletal:         General: Normal range of motion.      Cervical back: Normal range of motion and neck supple.   Skin:     General: Skin is warm " and dry.   Neurological:      Mental Status: He is alert and oriented to person, place, and time.   Psychiatric:      Comments: No acute distress.         Result Review :    Common labs    Common Labsle 6/4/22 6/4/22 6/4/22    0818 0818 0818   Glucose  101 (A)    BUN  12    Creatinine  1.06    Sodium  140    Potassium  3.7    Chloride  100    Calcium  9.2    Total Protein  7.2    Albumin  5.0    Total Bilirubin  0.8    Alkaline Phosphatase  76    AST (SGOT)  20    ALT (SGPT)  20    WBC 3.0 (A)     Hemoglobin 14.1     Hematocrit 42.4     Platelets 230     Total Cholesterol   177   Triglycerides   57   HDL Cholesterol   42   LDL Cholesterol    124 (A)   (A) Abnormal value                      Assessment and Plan   Diagnoses and all orders for this visit:    1. Annual physical exam (Primary)    Mr. Brannon appears to be doing well today.  His physical exam is within normal parameters for him today.  I have reviewed his labs with him today, which are essentially normal.  He is to continue all medications as prescribed.          Follow Up   Return in about 1 year (around 6/10/2023), or if symptoms worsen or fail to improve, for Annual physical, Fasting labs 1 week prior to next f/u.  Patient was given instructions and counseling regarding his condition or for health maintenance advice. Please see specific information pulled into the AVS if appropriate.

## 2022-07-22 ENCOUNTER — OFFICE VISIT (OUTPATIENT)
Dept: FAMILY MEDICINE CLINIC | Facility: CLINIC | Age: 42
End: 2022-07-22

## 2022-07-22 VITALS
BODY MASS INDEX: 32.35 KG/M2 | WEIGHT: 226 LBS | HEIGHT: 70 IN | HEART RATE: 86 BPM | OXYGEN SATURATION: 99 % | SYSTOLIC BLOOD PRESSURE: 152 MMHG | DIASTOLIC BLOOD PRESSURE: 88 MMHG

## 2022-09-20 ENCOUNTER — OFFICE VISIT (OUTPATIENT)
Dept: FAMILY MEDICINE CLINIC | Facility: CLINIC | Age: 42
End: 2022-09-20

## 2022-09-20 VITALS
SYSTOLIC BLOOD PRESSURE: 138 MMHG | HEART RATE: 76 BPM | HEIGHT: 70 IN | BODY MASS INDEX: 32.21 KG/M2 | WEIGHT: 225 LBS | DIASTOLIC BLOOD PRESSURE: 88 MMHG | OXYGEN SATURATION: 98 %

## 2022-09-20 DIAGNOSIS — M79.605 PAIN IN LEFT LEG: Primary | ICD-10-CM

## 2022-09-20 PROCEDURE — 99213 OFFICE O/P EST LOW 20 MIN: CPT | Performed by: NURSE PRACTITIONER

## 2022-09-20 NOTE — PROGRESS NOTES
"Chief Complaint  Pain (Leg pain)    Subjective        Jc Brannon presents to [facility]  History of Present Illness  Woke up last week with left leg feeling tight and tender. He denies any injury. Since last week the tightness and soreness has resolved.     I have reviewed the patient's medical history in detail and updated the computerized patient record.    Current Outpatient Medications:   •  hydroCHLOROthiazide (HYDRODIURIL) 12.5 MG tablet, TAKE ONE TABLET BY MOUTH DAILY, Disp: 30 tablet, Rfl: 5  •  lisinopril (PRINIVIL,ZESTRIL) 10 MG tablet, Take 1 tablet by mouth Daily., Disp: 90 tablet, Rfl: 0     Objective   Vital Signs:  /88 (BP Location: Right arm, Patient Position: Sitting, Cuff Size: Adult)   Pulse 76   Ht 177.8 cm (70\")   Wt 102 kg (225 lb)   SpO2 98%   BMI 32.28 kg/m²   Estimated body mass index is 32.28 kg/m² as calculated from the following:    Height as of this encounter: 177.8 cm (70\").    Weight as of this encounter: 102 kg (225 lb).          Physical Exam  Constitutional:       Appearance: Normal appearance.   Cardiovascular:      Rate and Rhythm: Normal rate and regular rhythm.      Pulses: Normal pulses.      Heart sounds: Normal heart sounds.   Musculoskeletal:         General: No swelling, tenderness, deformity or signs of injury. Normal range of motion.      Right lower leg: No edema.      Left lower leg: No edema.   Skin:     General: Skin is warm and dry.   Neurological:      Mental Status: He is alert and oriented to person, place, and time.   Psychiatric:      Comments: No acute distress        Result Review :                Assessment and Plan   Diagnoses and all orders for this visit:    1. Pain in left leg (Primary)    Mr. Brannon appears to be doing well today.  He presents with left lower leg pain which has resolved.  The examination of his left lower leg does not s/s of DVT or muscle spasm.           Follow Up   Return if symptoms " worsen or fail to improve, for Next scheduled follow up.  Patient was given instructions and counseling regarding his condition or for health maintenance advice. Please see specific information pulled into the AVS if appropriate.

## 2022-11-22 ENCOUNTER — TELEPHONE (OUTPATIENT)
Dept: FAMILY MEDICINE CLINIC | Facility: CLINIC | Age: 42
End: 2022-11-22

## 2022-11-22 DIAGNOSIS — I10 ESSENTIAL HYPERTENSION: ICD-10-CM

## 2022-11-22 RX ORDER — HYDROCHLOROTHIAZIDE 12.5 MG/1
TABLET ORAL
Qty: 30 TABLET | Refills: 5 | Status: SHIPPED | OUTPATIENT
Start: 2022-11-22

## 2023-02-02 ENCOUNTER — TELEPHONE (OUTPATIENT)
Dept: FAMILY MEDICINE CLINIC | Facility: CLINIC | Age: 43
End: 2023-02-02

## 2023-02-02 NOTE — TELEPHONE ENCOUNTER
Caller: Jc Brannon    Relationship to patient: Self    Best call back number: [phone]    Patient is needing: ASKING WHEN HE CAN GET 5TH COVID SHOT. IF NO ANSWER, LEAVE MESSAGE ON VOICE MAIL

## 2023-02-08 ENCOUNTER — TELEPHONE (OUTPATIENT)
Dept: FAMILY MEDICINE CLINIC | Facility: CLINIC | Age: 43
End: 2023-02-08
Payer: COMMERCIAL

## 2023-02-08 NOTE — TELEPHONE ENCOUNTER
PATIENT CALLED AND WAS CONCERNED BECAUSE HE PUT VASELINE ON HIS FOOT AND HE HAD A COUPLE SORES ON THE FOOT AFRAID IT WOULD HURT IT I TOLD HIM IT WAS OK AND SHOULD NOT AFFECT IT BUT TO LET US KNOW IF IT SWELLS AND TURNS RED OR PAINFUL HE SEEMED TO BE IN UNDERSTANDING

## 2023-02-27 ENCOUNTER — TELEPHONE (OUTPATIENT)
Dept: FAMILY MEDICINE CLINIC | Facility: CLINIC | Age: 43
End: 2023-02-27
Payer: COMMERCIAL

## 2023-02-27 NOTE — TELEPHONE ENCOUNTER
Caller: Jc Brannon    Relationship to patient: Self    Best call back number: [phone]    Patient is needing: PATIENT ASKS IF [facility] APPROVES FOR HIM TO GET 5TH COVID VACCINE.  PLEASE ADVISE

## 2023-05-24 DIAGNOSIS — I10 ESSENTIAL HYPERTENSION: ICD-10-CM

## 2023-05-25 RX ORDER — HYDROCHLOROTHIAZIDE 12.5 MG/1
TABLET ORAL
Qty: 30 TABLET | Refills: 5 | Status: SHIPPED | OUTPATIENT
Start: 2023-05-25

## 2023-06-02 DIAGNOSIS — Z00.00 ANNUAL PHYSICAL EXAM: Primary | ICD-10-CM

## 2023-06-02 DIAGNOSIS — D72.819 LEUKOPENIA, UNSPECIFIED TYPE: ICD-10-CM

## 2023-06-10 LAB
ALBUMIN SERPL-MCNC: 4.8 G/DL (ref 4–5)
ALBUMIN/GLOB SERPL: 2 {RATIO} (ref 1.2–2.2)
ALP SERPL-CCNC: 79 IU/L (ref 44–121)
ALT SERPL-CCNC: 19 IU/L (ref 0–44)
AST SERPL-CCNC: 19 IU/L (ref 0–40)
BILIRUB SERPL-MCNC: 0.6 MG/DL (ref 0–1.2)
BUN SERPL-MCNC: 13 MG/DL (ref 6–24)
BUN/CREAT SERPL: 12 (ref 9–20)
CALCIUM SERPL-MCNC: 9.2 MG/DL (ref 8.7–10.2)
CHLORIDE SERPL-SCNC: 100 MMOL/L (ref 96–106)
CHOLEST SERPL-MCNC: 172 MG/DL (ref 100–199)
CHOLEST/HDLC SERPL: 4.1 RATIO (ref 0–5)
CO2 SERPL-SCNC: 27 MMOL/L (ref 20–29)
CREAT SERPL-MCNC: 1.1 MG/DL (ref 0.76–1.27)
EGFRCR SERPLBLD CKD-EPI 2021: 85 ML/MIN/1.73
ERYTHROCYTE [DISTWIDTH] IN BLOOD BY AUTOMATED COUNT: 11.7 % (ref 11.6–15.4)
GLOBULIN SER CALC-MCNC: 2.4 G/DL (ref 1.5–4.5)
GLUCOSE SERPL-MCNC: 103 MG/DL (ref 70–99)
HCT VFR BLD AUTO: 42.1 % (ref 37.5–51)
HDLC SERPL-MCNC: 42 MG/DL
HGB BLD-MCNC: 14.3 G/DL (ref 13–17.7)
LDLC SERPL CALC-MCNC: 117 MG/DL (ref 0–99)
MCH RBC QN AUTO: 31 PG (ref 26.6–33)
MCHC RBC AUTO-ENTMCNC: 34 G/DL (ref 31.5–35.7)
MCV RBC AUTO: 91 FL (ref 79–97)
PLATELET # BLD AUTO: 207 X10E3/UL (ref 150–450)
POTASSIUM SERPL-SCNC: 3.7 MMOL/L (ref 3.5–5.2)
PROT SERPL-MCNC: 7.2 G/DL (ref 6–8.5)
RBC # BLD AUTO: 4.61 X10E6/UL (ref 4.14–5.8)
SODIUM SERPL-SCNC: 141 MMOL/L (ref 134–144)
TRIGL SERPL-MCNC: 67 MG/DL (ref 0–149)
VLDLC SERPL CALC-MCNC: 13 MG/DL (ref 5–40)
WBC # BLD AUTO: 3 X10E3/UL (ref 3.4–10.8)

## 2023-06-29 ENCOUNTER — TELEPHONE (OUTPATIENT)
Dept: FAMILY MEDICINE CLINIC | Facility: CLINIC | Age: 43
End: 2023-06-29

## 2023-07-28 ENCOUNTER — TELEPHONE (OUTPATIENT)
Dept: GASTROENTEROLOGY | Facility: CLINIC | Age: 43
End: 2023-07-28
Payer: COMMERCIAL

## 2023-07-28 DIAGNOSIS — Z12.11 ENCOUNTER FOR SCREENING FOR MALIGNANT NEOPLASM OF COLON: Primary | ICD-10-CM

## 2023-07-28 RX ORDER — SODIUM CHLORIDE, SODIUM LACTATE, POTASSIUM CHLORIDE, CALCIUM CHLORIDE 600; 310; 30; 20 MG/100ML; MG/100ML; MG/100ML; MG/100ML
30 INJECTION, SOLUTION INTRAVENOUS CONTINUOUS
OUTPATIENT
Start: 2023-07-28

## 2023-07-28 NOTE — TELEPHONE ENCOUNTER
Screening colonoscopy-- no personal hx of polyps-- family hx of polyps and colon ca-- no ASA or blood thinners-- medications:    hydroCHLOROthiazide (HYDRODIURIL) 12.5 MG tablet       OA form scanned into media

## 2023-08-17 ENCOUNTER — TELEPHONE (OUTPATIENT)
Dept: GASTROENTEROLOGY | Facility: CLINIC | Age: 43
End: 2023-08-17
Payer: COMMERCIAL

## 2023-08-17 NOTE — TELEPHONE ENCOUNTER
Hub staff attempted to follow warm transfer process and was unsuccessful     Caller: Jc Brannon    Relationship to patient: Self    Best call back number: [phone]     Patient is needing: PT RETURNING CALL TO SCHEDULE SCOPE. CALL BACK ANYTIME. OK TO M.

## 2023-08-17 NOTE — TELEPHONE ENCOUNTER
Hub staff attempted to follow warm transfer process and was unsuccessful     Caller: Jc Brannon    Relationship to patient: Self    Best call back number: [phone]     Patient is needing: PT RETURNING CALL TO SCHEDULE SCOPE. CALL BACK ANYTIME. OK TO M.

## 2023-08-23 ENCOUNTER — TELEPHONE (OUTPATIENT)
Dept: GASTROENTEROLOGY | Facility: CLINIC | Age: 43
End: 2023-08-23
Payer: COMMERCIAL

## 2023-08-24 ENCOUNTER — TELEPHONE (OUTPATIENT)
Dept: GASTROENTEROLOGY | Facility: CLINIC | Age: 43
End: 2023-08-24

## 2023-08-24 NOTE — TELEPHONE ENCOUNTER
Hub staff attempted to follow warm transfer process and was unsuccessful     Caller: Jc Brannon    Relationship to patient: Self    Best call back number: [phone]     Patient is needing: RETURNING CALL FROM [facility] TO SCHEDULE SCOPE. PLEASE CALL BACK ASAP TO ADVISE WITH SCHEDULING.

## 2023-08-25 ENCOUNTER — TELEPHONE (OUTPATIENT)
Dept: GASTROENTEROLOGY | Facility: CLINIC | Age: 43
End: 2023-08-25
Payer: COMMERCIAL

## 2023-08-25 NOTE — TELEPHONE ENCOUNTER
Hub staff attempted to follow warm transfer process and was unsuccessful     Caller: Jc Brannon    Relationship to patient: Self    Best call back number: [phone]     Patient is needing: PT RETURNING CALL TO SCHEDULE SCOPE.

## 2023-08-25 NOTE — TELEPHONE ENCOUNTER
Jc Brannon     Relationship to patient: Self     Best call back number: [phone]      Patient is needing: PT RETURNING CALL TO SCHEDULE SCOPE.

## 2023-08-28 ENCOUNTER — TELEPHONE (OUTPATIENT)
Dept: GASTROENTEROLOGY | Facility: CLINIC | Age: 43
End: 2023-08-28
Payer: COMMERCIAL

## 2023-08-31 ENCOUNTER — TELEPHONE (OUTPATIENT)
Dept: GASTROENTEROLOGY | Facility: CLINIC | Age: 43
End: 2023-08-31

## 2023-08-31 NOTE — TELEPHONE ENCOUNTER
Hub staff attempted to follow warm transfer process and was unsuccessful     Caller: Jc Brannon    Relationship to patient: Self    Best call back number: [phone]     Patient is needing: PT RETURNING CALL TO SCHEDULE SCOPE.

## 2023-08-31 NOTE — TELEPHONE ENCOUNTER
Hub staff attempted to follow warm transfer process and was unsuccessful     Caller: Jc Brannon    Relationship to patient: Self    Best call back number: [phone]     Patient is needing: PT RETURNING CALL TO SCHEDULE SCOPE.

## 2023-09-01 ENCOUNTER — TELEPHONE (OUTPATIENT)
Dept: GASTROENTEROLOGY | Facility: CLINIC | Age: 43
End: 2023-09-01

## 2023-09-01 NOTE — TELEPHONE ENCOUNTER
Hub staff attempted to follow warm transfer process and was unsuccessful      Caller: Jc Brannon     Relationship to patient: Self     Best call back number: [phone]      Patient is needing: RETURNED CALL FROM [facility] TO SCHEDULE SCOPE. PLEASE CALL BACK ASAP.

## 2023-09-01 NOTE — TELEPHONE ENCOUNTER
Hub staff attempted to follow warm transfer process and was unsuccessful     Caller: Jc Brannon    Relationship to patient: Self    Best call back number: [phone]     Patient is needing: RETURNED CALL FROM [facility] TO SCHEDULE SCOPE. PLEASE CALL BACK ASAP.

## 2023-09-05 NOTE — TELEPHONE ENCOUNTER
Hub staff attempted to follow warm transfer process and was unsuccessful     Caller: Jc Brannon    Relationship to patient: Self    Best call back number: [phone]     Patient is needing: PT CALLING TO SCHEDULE SCOPE CALL BACK ANYTIME BUT FRIDAY IS BETTER FOR PT.  OK TO LVM.

## 2023-09-14 ENCOUNTER — TELEPHONE (OUTPATIENT)
Dept: FAMILY MEDICINE CLINIC | Facility: CLINIC | Age: 43
End: 2023-09-14
Payer: COMMERCIAL

## 2023-09-14 ENCOUNTER — TELEPHONE (OUTPATIENT)
Dept: GASTROENTEROLOGY | Facility: CLINIC | Age: 43
End: 2023-09-14
Payer: COMMERCIAL

## 2023-09-14 NOTE — TELEPHONE ENCOUNTER
LVM for patient to call (confirm if paperwork for colonoscopy was completed and/or if patient intends to get procedure - not showing he has responded to any msgs)  - HUB transfer to Evelyn @ [facility].

## 2023-09-15 ENCOUNTER — TELEPHONE (OUTPATIENT)
Dept: GASTROENTEROLOGY | Facility: CLINIC | Age: 43
End: 2023-09-15
Payer: COMMERCIAL

## 2023-09-18 ENCOUNTER — TELEPHONE (OUTPATIENT)
Dept: FAMILY MEDICINE CLINIC | Facility: CLINIC | Age: 43
End: 2023-09-18
Payer: COMMERCIAL

## 2023-09-18 NOTE — TELEPHONE ENCOUNTER
Spoke to patient 9-14-23, he returned his paperwork to [facility] approx 6 weeks ago and tried to reach their office for an appointment but has never heard back from them.  I sent a chat to their office for an update but has not gotten a response back yet.

## 2023-09-21 ENCOUNTER — TELEPHONE (OUTPATIENT)
Dept: GASTROENTEROLOGY | Facility: CLINIC | Age: 43
End: 2023-09-21
Payer: COMMERCIAL

## 2023-09-22 ENCOUNTER — TELEPHONE (OUTPATIENT)
Dept: GASTROENTEROLOGY | Facility: CLINIC | Age: 43
End: 2023-09-22
Payer: COMMERCIAL

## 2023-09-29 ENCOUNTER — TELEPHONE (OUTPATIENT)
Dept: FAMILY MEDICINE CLINIC | Facility: CLINIC | Age: 43
End: 2023-09-29
Payer: COMMERCIAL

## 2023-09-29 NOTE — TELEPHONE ENCOUNTER
LVM for patient to call Evelyn @ [facility] regarding contact for colonoscopy scheduling.  Hub please transfer.  Thanks, Evelyn

## 2023-10-03 ENCOUNTER — TELEPHONE (OUTPATIENT)
Dept: GASTROENTEROLOGY | Facility: CLINIC | Age: 43
End: 2023-10-03
Payer: COMMERCIAL

## 2023-10-04 ENCOUNTER — TELEPHONE (OUTPATIENT)
Dept: GASTROENTEROLOGY | Facility: CLINIC | Age: 43
End: 2023-10-04
Payer: COMMERCIAL

## 2023-10-06 ENCOUNTER — TELEPHONE (OUTPATIENT)
Dept: GASTROENTEROLOGY | Facility: CLINIC | Age: 43
End: 2023-10-06

## 2023-10-06 NOTE — TELEPHONE ENCOUNTER
Hub staff attempted to follow warm transfer process and was unsuccessful     Caller: Jc Brannon    Relationship to patient: Self    Best call back number: [phone]    Patient is needing: PATIENT RETURNING A CALL FROM [facility] ABOUT GETTING HIS SCOPE SCHEDULED. PLEASE CALL PATIENT BACK.

## 2023-10-09 ENCOUNTER — TELEPHONE (OUTPATIENT)
Dept: GASTROENTEROLOGY | Facility: CLINIC | Age: 43
End: 2023-10-09
Payer: COMMERCIAL

## 2023-10-09 ENCOUNTER — TELEPHONE (OUTPATIENT)
Dept: FAMILY MEDICINE CLINIC | Facility: CLINIC | Age: 43
End: 2023-10-09
Payer: COMMERCIAL

## 2023-10-09 NOTE — TELEPHONE ENCOUNTER
Jc still trying to reach gastro - told him to call and leave msg only able to get  phone calls after 4 pm - he is at work.

## 2023-10-09 NOTE — TELEPHONE ENCOUNTER
Hub staff attempted to follow warm transfer process and was unsuccessful     Caller: Jc Brannon    Relationship to patient: Self    Best call back number: [phone]    Patient is needing: RETURNING MISSED CALLS TO SCHEDULE SCOPE. PLEASE RETURN PTS CALL  BEST TIME TO REACH PT IS 3PM OR LATER DAILY

## 2023-10-09 NOTE — TELEPHONE ENCOUNTER
Hub staff attempted to follow warm transfer process and was unsuccessful      Caller: Jc Brannon     Relationship to patient: Self     Best call back number: [phone]     Patient is needing: PATIENT RETURNING A CALL FROM [facility] ABOUT GETTING HIS SCOPE SCHEDULED. PLEASE CALL PATIENT BACK.

## 2023-10-09 NOTE — TELEPHONE ENCOUNTER
Hub staff attempted to follow warm transfer process and was unsuccessful      Caller: Jc Brannon     Relationship to patient: Self     Best call back number: [phone]     Patient is needing: RETURNING MISSED CALLS TO SCHEDULE SCOPE. PLEASE RETURN PTS CALL  BEST TIME TO REACH PT IS 3PM OR LATER DAILY

## 2023-10-10 ENCOUNTER — TELEPHONE (OUTPATIENT)
Dept: GASTROENTEROLOGY | Facility: CLINIC | Age: 43
End: 2023-10-10
Payer: COMMERCIAL

## 2023-10-10 PROBLEM — Z12.11 ENCOUNTER FOR SCREENING FOR MALIGNANT NEOPLASM OF COLON: Status: ACTIVE | Noted: 2023-07-28

## 2023-10-10 NOTE — TELEPHONE ENCOUNTER
VINOD HAYS PT SCHEDULED 12/19/23@7:00AM [employer] PREP PACKET MAILED TO ADDRESS ON FILE VERIFIED BY PATIENT..OK FOR HUB TO READ

## 2023-10-12 ENCOUNTER — TELEPHONE (OUTPATIENT)
Dept: GASTROENTEROLOGY | Facility: CLINIC | Age: 43
End: 2023-10-12
Payer: COMMERCIAL

## 2023-10-12 NOTE — TELEPHONE ENCOUNTER
Provider is on call on 12/19 which is the date this was sarah on pt needs appt on date provider is scoping lvm to sarah procedure     Provider is on call on 12/19 and cannot do this scope on that day

## 2023-10-17 ENCOUNTER — TELEPHONE (OUTPATIENT)
Dept: GASTROENTEROLOGY | Facility: CLINIC | Age: 43
End: 2023-10-17
Payer: COMMERCIAL

## 2023-10-17 NOTE — TELEPHONE ENCOUNTER
PT IS CALLING YOU BACK TO RESCHEDULE THE PT'S 10/19 PROCEDURE. HE WOULD LIKE A CALL BACK. THANK YOU

## 2023-10-18 ENCOUNTER — TELEPHONE (OUTPATIENT)
Dept: GASTROENTEROLOGY | Facility: CLINIC | Age: 43
End: 2023-10-18
Payer: COMMERCIAL

## 2023-10-23 ENCOUNTER — TELEPHONE (OUTPATIENT)
Dept: GASTROENTEROLOGY | Facility: CLINIC | Age: 43
End: 2023-10-23
Payer: COMMERCIAL

## 2023-10-23 ENCOUNTER — TELEPHONE (OUTPATIENT)
Dept: GASTROENTEROLOGY | Facility: CLINIC | Age: 43
End: 2023-10-23

## 2023-10-23 NOTE — TELEPHONE ENCOUNTER
Caller: Jc Brannon    Relationship: Self    Best call back number: [phone]    What is the best time to reach you: ANYTIME    Who are you requesting to speak with (clinical staff, provider,  specific staff member):   What was the call regarding: PATIENT IS NEEDING TO RESCHEDULE HIS COLONOSCOPY WHICH WAS SCHEDULED ON 12/19/23 AND HE WOULD LIKE IT TO BE SCOOTED UP OR AT LEAST DONE IN 2023. PLEASE CALL PATIENT BACK.

## 2023-10-23 NOTE — TELEPHONE ENCOUNTER
Caller: Jc Brannon    Relationship: Self    Best call back number: [phone]    What is the best time to reach you: ANYTIME    Who are you requesting to speak with (clinical staff, provider,  specific staff member):   What was the call regarding: PATIENT IS NEEDING TO RESCHEDULE HIS COLONOSCOPY WHICH WAS SCHEDULED ON 12/19/23 AND HE WOULD LIKE IT TO BE SCOOTED UP OR AT LEAST DONE IN 2023. PLEASE CALL PATIENT BACK.

## 2023-10-26 ENCOUNTER — TELEPHONE (OUTPATIENT)
Dept: FAMILY MEDICINE CLINIC | Facility: CLINIC | Age: 43
End: 2023-10-26
Payer: COMMERCIAL

## 2023-10-26 NOTE — TELEPHONE ENCOUNTER
Patient said he was scheduled for colonoscopy on 12-19-23 but has received a call it must be rescheduled. I told patient to continue calling and leaving msg to new appointment.  Patient and office are missing each others calls.  Patient must have call after 4:00 - best time to reach him after work.

## 2023-10-26 NOTE — TELEPHONE ENCOUNTER
Hub staff attempted to follow warm transfer process and was unsuccessful     Caller: Jc Brannon    Relationship to patient: Self    Best call back number: [phone]    Patient is needing: PATIENT IS RETURNING CALL TO SCHEDULE SCOPE.  PLEASE CALL BACK AS SOON AS POSSIBLE.

## 2023-10-27 ENCOUNTER — TELEPHONE (OUTPATIENT)
Dept: GASTROENTEROLOGY | Facility: CLINIC | Age: 43
End: 2023-10-27

## 2023-10-27 NOTE — TELEPHONE ENCOUNTER
Hub staff attempted to follow warm transfer process and was unsuccessful      Caller: Jc Brannon     Relationship to patient: Self     Best call back number: [phone]     Patient is needing: PATIENT IS RETURNING CALL TO SCHEDULE SCOPE.  PLEASE CALL BACK AS SOON AS POSSIBLE.

## 2023-10-31 ENCOUNTER — TELEPHONE (OUTPATIENT)
Dept: GASTROENTEROLOGY | Facility: CLINIC | Age: 43
End: 2023-10-31

## 2023-10-31 NOTE — TELEPHONE ENCOUNTER
Caller: DEE RIGGS    Relationship to patient:     Best call back number: [phone]    Chief complaint: NEEDS TO RESCHEDULE COLONOSCOPY    Type of visit: COLONOSCOPY    Requested date: REQUESTING JANUARY     If rescheduling, when is the original appointment: 12.19    Additional notes

## 2023-11-01 ENCOUNTER — TELEPHONE (OUTPATIENT)
Dept: GASTROENTEROLOGY | Facility: CLINIC | Age: 43
End: 2023-11-01
Payer: COMMERCIAL

## 2023-11-01 NOTE — TELEPHONE ENCOUNTER
Caller: DEE RIGGS     Relationship to patient:      Best call back number: [phone]     Chief complaint: NEEDS TO RESCHEDULE COLONOSCOPY     Type of visit: COLONOSCOPY     Requested date: REQUESTING JANUARY      If rescheduling, when is the original appointment: 12.19     Additional notes

## 2023-11-10 ENCOUNTER — OFFICE VISIT (OUTPATIENT)
Dept: FAMILY MEDICINE CLINIC | Facility: CLINIC | Age: 43
End: 2023-11-10
Payer: COMMERCIAL

## 2023-11-10 VITALS
HEIGHT: 70 IN | OXYGEN SATURATION: 99 % | SYSTOLIC BLOOD PRESSURE: 138 MMHG | DIASTOLIC BLOOD PRESSURE: 84 MMHG | BODY MASS INDEX: 32.25 KG/M2 | WEIGHT: 225.3 LBS | HEART RATE: 87 BPM

## 2023-11-10 DIAGNOSIS — R21 RASH: Primary | ICD-10-CM

## 2023-11-10 PROCEDURE — 99212 OFFICE O/P EST SF 10 MIN: CPT | Performed by: NURSE PRACTITIONER

## 2023-11-10 RX ORDER — AMOXICILLIN 500 MG/1
CAPSULE ORAL
COMMUNITY
Start: 2023-11-07

## 2023-11-10 RX ORDER — IBUPROFEN 800 MG/1
TABLET ORAL
COMMUNITY
Start: 2023-11-07

## 2023-11-10 RX ORDER — TRAMADOL HYDROCHLORIDE 50 MG/1
TABLET ORAL
COMMUNITY
Start: 2023-11-07

## 2023-11-10 NOTE — PROGRESS NOTES
"Chief Complaint  Rash (Abdomen, back, lower legs.)    Subjective        Jc Brannon presents to [facility]  Rash  This is a new problem. The current episode started in the past 7 days. The affected locations include the torso, back, abdomen and groin. The rash is characterized by redness and itchiness. Associated with: was using deoderant on all of his body, Past treatments include moisturizer. The treatment provided significant relief.       I have reviewed the patient's medical history in detail and updated the computerized patient record.       Current Outpatient Medications:     amoxicillin (AMOXIL) 500 MG capsule, , Disp: , Rfl:     hydroCHLOROthiazide (HYDRODIURIL) 12.5 MG tablet, TAKE ONE TABLET BY MOUTH DAILY, Disp: 30 tablet, Rfl: 5    ibuprofen (ADVIL,MOTRIN) 800 MG tablet, , Disp: , Rfl:     lisinopril (PRINIVIL,ZESTRIL) 10 MG tablet, Take 1 tablet by mouth Daily., Disp: 90 tablet, Rfl: 0    silver sulfadiazine (SILVADENE, SSD) 1 % cream, , Disp: , Rfl:     traMADol (ULTRAM) 50 MG tablet, , Disp: , Rfl:      Objective   Vital Signs:  /84 (BP Location: Right arm, Patient Position: Sitting, Cuff Size: Adult)   Pulse 87   Ht 177.8 cm (70\")   Wt 102 kg (225 lb 4.8 oz)   SpO2 99%   BMI 32.33 kg/m²   Estimated body mass index is 32.33 kg/m² as calculated from the following:    Height as of this encounter: 177.8 cm (70\").    Weight as of this encounter: 102 kg (225 lb 4.8 oz).       Physical Exam  Vitals reviewed.   Constitutional:       Appearance: Normal appearance.   Musculoskeletal:         General: Normal range of motion.   Skin:     General: Skin is warm and dry.      Findings: Rash (is resolving,) present.   Neurological:      Mental Status: He is alert and oriented to person, place, and time.   Psychiatric:      Comments: No acute        Result Review :                   Assessment and Plan   Diagnoses and all orders for this visit:    1. Rash " (Primary)    Mr. Brannon does not appear to be in any acute distress.  His rash is resolving. He is to continue to use the moisturizing lotion as recommended by his pharmacist.          Follow Up   Return if symptoms worsen or fail to improve, for Next scheduled follow up.  Patient was given instructions and counseling regarding his condition or for health maintenance advice. Please see specific information pulled into the AVS if appropriate.

## 2023-11-14 ENCOUNTER — TELEPHONE (OUTPATIENT)
Dept: GASTROENTEROLOGY | Facility: CLINIC | Age: 43
End: 2023-11-14
Payer: COMMERCIAL

## 2023-11-21 ENCOUNTER — TELEPHONE (OUTPATIENT)
Dept: GASTROENTEROLOGY | Facility: CLINIC | Age: 43
End: 2023-11-21

## 2023-11-21 ENCOUNTER — OFFICE VISIT (OUTPATIENT)
Dept: FAMILY MEDICINE CLINIC | Facility: CLINIC | Age: 43
End: 2023-11-21
Payer: COMMERCIAL

## 2023-11-21 VITALS
DIASTOLIC BLOOD PRESSURE: 86 MMHG | HEART RATE: 85 BPM | BODY MASS INDEX: 32.3 KG/M2 | SYSTOLIC BLOOD PRESSURE: 144 MMHG | OXYGEN SATURATION: 98 % | WEIGHT: 225.6 LBS | HEIGHT: 70 IN

## 2023-11-21 DIAGNOSIS — F41.8 ANXIETY ABOUT HEALTH: Primary | ICD-10-CM

## 2023-11-21 PROCEDURE — 99212 OFFICE O/P EST SF 10 MIN: CPT | Performed by: NURSE PRACTITIONER

## 2023-11-21 NOTE — PROGRESS NOTES
"Chief Complaint  f/u on procedure on foot    Subjective        Jc Brannon presents to [facility]  History of Present Illness  Was told by the person who does his pedicures that he has a nail fungus and he should see someone for it      I have reviewed the patient's medical history in detail and updated the computerized patient record.     Current Outpatient Medications:     hydroCHLOROthiazide (HYDRODIURIL) 12.5 MG tablet, TAKE ONE TABLET BY MOUTH DAILY, Disp: 30 tablet, Rfl: 5    ibuprofen (ADVIL,MOTRIN) 800 MG tablet, , Disp: , Rfl:     lisinopril (PRINIVIL,ZESTRIL) 10 MG tablet, Take 1 tablet by mouth Daily., Disp: 90 tablet, Rfl: 0    silver sulfadiazine (SILVADENE, SSD) 1 % cream, , Disp: , Rfl:     traMADol (ULTRAM) 50 MG tablet, , Disp: , Rfl:        Objective   Vital Signs:  /86 (BP Location: Right arm, Patient Position: Sitting, Cuff Size: Adult)   Pulse 85   Ht 177.8 cm (70\")   Wt 102 kg (225 lb 9.6 oz)   SpO2 98%   BMI 32.37 kg/m²   Estimated body mass index is 32.37 kg/m² as calculated from the following:    Height as of this encounter: 177.8 cm (70\").    Weight as of this encounter: 102 kg (225 lb 9.6 oz).       Physical Exam  Vitals reviewed.   Constitutional:       Appearance: Normal appearance.   Skin:     General: Skin is warm and dry.      Comments: Skin is very dry, no nail fungus noted.    Neurological:      Mental Status: He is alert and oriented to person, place, and time.        Result Review :                   Assessment and Plan   Diagnoses and all orders for this visit:    1. Anxiety about health (Primary)    Mr. Brannon does not appear to be in any acute distress.  He does not have a nail fungus or fungus around his nail beds. I have explained that he is a dark skin man and that it is normal for him. We also discussed his dry skin and I have reassured him that he does not have a rash. He can use lotion to help relieve his dry skin.      "     Follow Up   No follow-ups on file.  Patient was given instructions and counseling regarding his condition or for health maintenance advice. Please see specific information pulled into the AVS if appropriate.

## 2023-11-24 DIAGNOSIS — I10 ESSENTIAL HYPERTENSION: ICD-10-CM

## 2023-11-27 RX ORDER — HYDROCHLOROTHIAZIDE 12.5 MG/1
12.5 TABLET ORAL DAILY
Qty: 30 TABLET | Refills: 5 | Status: SHIPPED | OUTPATIENT
Start: 2023-11-27

## 2023-12-07 ENCOUNTER — TELEPHONE (OUTPATIENT)
Dept: GASTROENTEROLOGY | Facility: CLINIC | Age: 43
End: 2023-12-07

## 2023-12-11 NOTE — TELEPHONE ENCOUNTER
Hub staff attempted to follow warm transfer process and was unsuccessful     Caller: Jc Brannon    Relationship to patient: Self    Best call back number: [phone]    Patient is needing: PATIENT NEEDING TO SCHEDULE SCOPE.  PLEASE REACH OUT TO SCHEDULE. THANK YOU

## 2023-12-12 ENCOUNTER — TELEPHONE (OUTPATIENT)
Dept: GASTROENTEROLOGY | Facility: CLINIC | Age: 43
End: 2023-12-12

## 2023-12-12 NOTE — TELEPHONE ENCOUNTER
Hub staff attempted to follow warm transfer process and was unsuccessful      Caller: Jc Brannon     Relationship to patient: Self     Best call back number: [phone]     Patient is needing: PATIENT NEEDING TO SCHEDULE SCOPE.  PLEASE REACH OUT TO SCHEDULE. THANK YOU

## 2023-12-12 NOTE — TELEPHONE ENCOUNTER
Caller: Jc Brannon    Relationship: Self    Best call back number: [phone]    What is the best time to reach you: ANYTIME    Who are you requesting to speak with (clinical staff, provider,  specific staff member): FRONT    What was the call regarding: PATIENT WAS SCHEDULED ON 12/19/23 FOR A COLONOSCOPY AND HAD TO BE CANCELED DUE TO DOBOZI BEING OUT. PLEASE CALL PATIENT BACK TO RESCHEDULE.

## 2023-12-12 NOTE — TELEPHONE ENCOUNTER
Hub staff attempted to follow warm transfer process and was unsuccessful      Caller: Jc Brannon     Relationship to patient: Self     Best call back number: [phone]     Patient is needing: PATIENT NEEDING TO SCHEDULE SCOPE.  PLEASE REACH OUT TO SCHEDULE. THANK YOU

## 2023-12-12 NOTE — TELEPHONE ENCOUNTER
Caller: Jc Brannon    Relationship: Self    Best call back number: [phone]    What is the best time to reach you: ANYTIME    Who are you requesting to speak with (clinical staff, provider,  specific staff member): FRONT    What was the call regarding: PATIENT WAS SCHEDULED ON 12/19/23 FOR A COLONOSCOPY AND HAD TO BE CANCELED DUE TO DOBOZI BEING OUT. PLEASE CALL PATIENT BACK TO RESCHEDULE.

## 2023-12-19 ENCOUNTER — TELEPHONE (OUTPATIENT)
Dept: GASTROENTEROLOGY | Facility: CLINIC | Age: 43
End: 2023-12-19

## 2023-12-19 ENCOUNTER — OFFICE VISIT (OUTPATIENT)
Dept: FAMILY MEDICINE CLINIC | Facility: CLINIC | Age: 43
End: 2023-12-19
Payer: COMMERCIAL

## 2023-12-19 VITALS
HEIGHT: 70 IN | OXYGEN SATURATION: 98 % | HEART RATE: 80 BPM | DIASTOLIC BLOOD PRESSURE: 108 MMHG | BODY MASS INDEX: 31.73 KG/M2 | WEIGHT: 221.6 LBS | SYSTOLIC BLOOD PRESSURE: 170 MMHG

## 2023-12-19 DIAGNOSIS — L50.9 URTICARIA: Primary | ICD-10-CM

## 2023-12-19 PROCEDURE — 99213 OFFICE O/P EST LOW 20 MIN: CPT | Performed by: NURSE PRACTITIONER

## 2023-12-19 RX ORDER — HYDROXYZINE PAMOATE 25 MG/1
25 CAPSULE ORAL 3 TIMES DAILY PRN
Qty: 90 CAPSULE | Refills: 0 | Status: SHIPPED | OUTPATIENT
Start: 2023-12-19

## 2023-12-19 NOTE — TELEPHONE ENCOUNTER
Caller: Jc Brannon     Relationship: Self     Best call back number: [phone]     What is the best time to reach you: ANYTIME PATIENT DOES NOT GO BACK TO WORK UNTIL NEXT TUESDAY.      Who are you requesting to speak with (clinical staff, provider,  specific staff member): FRONT     What was the call regarding: PATIENT WAS SCHEDULED ON 12/19/23 FOR A COLONOSCOPY AND HAD TO BE CANCELED DUE TO DOBOZI BEING OUT. PLEASE CALL PATIENT BACK TO RESCHEDULE.

## 2023-12-19 NOTE — PROGRESS NOTES
"Chief Complaint  HIVES    Subjective        Jc Brannon presents to [facility]  Urticaria  This is a chronic problem. The current episode started 1 to 4 weeks ago. The problem has been waxing and waning since onset. The affected locations include the left axilla, left arm, right axilla, right arm and chest. The rash is characterized by itchiness. (anxiety) Treatments tried: went to urgent care 3 weeks ago was given benadry. The treatment provided mild relief.       I have reviewed the patient's medical history in detail and updated the computerized patient record.       Current Outpatient Medications:     hydroCHLOROthiazide (HYDRODIURIL) 12.5 MG tablet, TAKE 1 TABLET BY MOUTH DAILY, Disp: 30 tablet, Rfl: 5    ibuprofen (ADVIL,MOTRIN) 800 MG tablet, , Disp: , Rfl:     lisinopril (PRINIVIL,ZESTRIL) 10 MG tablet, Take 1 tablet by mouth Daily., Disp: 90 tablet, Rfl: 0    silver sulfadiazine (SILVADENE, SSD) 1 % cream, , Disp: , Rfl:     traMADol (ULTRAM) 50 MG tablet, , Disp: , Rfl:     hydrOXYzine pamoate (VISTARIL) 25 MG capsule, Take 1 capsule by mouth 3 (Three) Times a Day As Needed for Anxiety or Itching., Disp: 90 capsule, Rfl: 0     Objective   Vital Signs:  BP (!) 170/108 (BP Location: Left arm, Patient Position: Sitting, Cuff Size: Adult)   Pulse 80   Ht 177.8 cm (70\")   Wt 101 kg (221 lb 9.6 oz)   SpO2 98%   BMI 31.80 kg/m²   Estimated body mass index is 31.8 kg/m² as calculated from the following:    Height as of this encounter: 177.8 cm (70\").    Weight as of this encounter: 101 kg (221 lb 9.6 oz).       Physical Exam  Vitals reviewed.   Skin:     General: Skin is warm and dry.   Neurological:      Mental Status: He is alert and oriented to person, place, and time.   Psychiatric:         Mood and Affect: Mood is anxious.         Speech: Speech is rapid and pressured.         Behavior: Behavior is hyperactive.         Thought Content: Thought content is paranoid.       "   Cognition and Memory: Cognition is impaired.        Result Review :                   Assessment and Plan   Diagnoses and all orders for this visit:    1. Urticaria (Primary)    Other orders  -     hydrOXYzine pamoate (VISTARIL) 25 MG capsule; Take 1 capsule by mouth 3 (Three) Times a Day As Needed for Anxiety or Itching.  Dispense: 90 capsule; Refill: 0    Mr. Brannon presents today with concerns that he has hives. I do not see any hives on his body. We have discussed that he needs to stop using scented body wash and deodorant. He also needs to use a hydrating body lotion daily to keep his skin for itching. I have prescribed hydroxyzine 25 mg to take 3 times a day as needed for itching. This will also help with his anxiety and his blood pressure which is elevated due to his anxiety.          Follow Up   Return if symptoms worsen or fail to improve, for Next scheduled follow up.  Patient was given instructions and counseling regarding his condition or for health maintenance advice. Please see specific information pulled into the AVS if appropriate.

## 2023-12-19 NOTE — TELEPHONE ENCOUNTER
Caller: Jc Brannon     Relationship: Self     Best call back number: [phone]     What is the best time to reach you: ANYTIME PATIENT DOES NOT GO BACK TO WORK UNTIL NEXT TUESDAY.      Who are you requesting to speak with (clinical staff, provider,  specific staff member): FRONT     What was the call regarding: PATIENT WAS SCHEDULED ON 12/19/23 FOR A COLONOSCOPY AND HAD TO BE CANCELED DUE TO DOBOZI BEING OUT. PLEASE CALL PATIENT BACK TO RESCHEDULE.

## 2023-12-19 NOTE — PATIENT INSTRUCTIONS
Unscented Dove deodorant and body wash. Use Eucerine lotion to your body to stop the dryness and itching.

## 2023-12-21 ENCOUNTER — TELEPHONE (OUTPATIENT)
Dept: GASTROENTEROLOGY | Facility: CLINIC | Age: 43
End: 2023-12-21
Payer: COMMERCIAL

## 2023-12-21 DIAGNOSIS — Z12.11 ENCOUNTER FOR SCREENING FOR MALIGNANT NEOPLASM OF COLON: Primary | ICD-10-CM

## 2023-12-22 ENCOUNTER — TELEPHONE (OUTPATIENT)
Dept: GASTROENTEROLOGY | Facility: CLINIC | Age: 43
End: 2023-12-22
Payer: COMMERCIAL

## 2023-12-22 NOTE — TELEPHONE ENCOUNTER
COLONOSCOPY on 4/23/2024  arrive at  9:30 . Sent prep instructions to pt mail address ....[email or web address]

## 2024-01-05 ENCOUNTER — TELEPHONE (OUTPATIENT)
Dept: GASTROENTEROLOGY | Facility: CLINIC | Age: 44
End: 2024-01-05
Payer: COMMERCIAL

## 2024-01-05 NOTE — TELEPHONE ENCOUNTER
Caller: Jc Brannon    Relationship: Self    Best call back number: [phone]      What was the call regarding: PT IS NEEDING ANOTHER COPY OF COLONOSCOPY PAPERWORK TO BE SENT IN THE MAIL. ADDRESS ON FILE VERIFIED

## 2024-04-05 ENCOUNTER — TELEPHONE (OUTPATIENT)
Dept: FAMILY MEDICINE CLINIC | Facility: CLINIC | Age: 44
End: 2024-04-05
Payer: COMMERCIAL

## 2024-04-05 NOTE — TELEPHONE ENCOUNTER
4-5-24/fady kim, moved physical from 7-5-24 to 7-8-24 @ 2:00 PM. If date is not convenient, call to reschedule appt.

## 2024-04-17 ENCOUNTER — TELEPHONE (OUTPATIENT)
Dept: GASTROENTEROLOGY | Facility: CLINIC | Age: 44
End: 2024-04-17

## 2024-04-17 NOTE — TELEPHONE ENCOUNTER
Hub staff attempted to follow warm transfer process and was unsuccessful     Caller: DEE RIGGS    Relationship to patient: SELF     Best call back number: [phone]    Patient is needing: PT IS WANTING TO VERIFY HIS MEDICATIONS BEFORE HIS COLONOSCOPY ON 4/23/24 AND HE HAS OTHER QUESTIONS PLEASE CALL PT AND ADVISE

## 2024-04-23 ENCOUNTER — ANESTHESIA EVENT (OUTPATIENT)
Dept: GASTROENTEROLOGY | Facility: HOSPITAL | Age: 44
End: 2024-04-23
Payer: COMMERCIAL

## 2024-04-23 ENCOUNTER — HOSPITAL ENCOUNTER (OUTPATIENT)
Facility: HOSPITAL | Age: 44
Setting detail: HOSPITAL OUTPATIENT SURGERY
Discharge: HOME | End: 2024-04-23
Attending: INTERNAL MEDICINE | Admitting: INTERNAL MEDICINE
Payer: COMMERCIAL

## 2024-04-23 ENCOUNTER — ANESTHESIA (OUTPATIENT)
Dept: GASTROENTEROLOGY | Facility: HOSPITAL | Age: 44
End: 2024-04-23
Payer: COMMERCIAL

## 2024-04-23 VITALS
DIASTOLIC BLOOD PRESSURE: 90 MMHG | WEIGHT: 219 LBS | HEART RATE: 100 BPM | HEIGHT: 69 IN | SYSTOLIC BLOOD PRESSURE: 130 MMHG | BODY MASS INDEX: 32.44 KG/M2 | RESPIRATION RATE: 16 BRPM | OXYGEN SATURATION: 96 %

## 2024-04-23 DIAGNOSIS — Z12.11 ENCOUNTER FOR SCREENING FOR MALIGNANT NEOPLASM OF COLON: ICD-10-CM

## 2024-04-23 PROCEDURE — 25010000002 PROPOFOL 1000 MG/100ML EMULSION: Performed by: NURSE ANESTHETIST, CERTIFIED REGISTERED

## 2024-04-23 PROCEDURE — 25010000002 PROPOFOL 200 MG/20ML EMULSION: Performed by: NURSE ANESTHETIST, CERTIFIED REGISTERED

## 2024-04-23 PROCEDURE — 45380 COLONOSCOPY AND BIOPSY: CPT | Performed by: INTERNAL MEDICINE

## 2024-04-23 PROCEDURE — 25810000003 LACTATED RINGERS PER 1000 ML: Performed by: INTERNAL MEDICINE

## 2024-04-23 PROCEDURE — 88305 TISSUE EXAM BY PATHOLOGIST: CPT | Performed by: INTERNAL MEDICINE

## 2024-04-23 RX ORDER — PROPOFOL 10 MG/ML
INJECTION, EMULSION INTRAVENOUS AS NEEDED
Status: DISCONTINUED | OUTPATIENT
Start: 2024-04-23 | End: 2024-04-23 | Stop reason: SURG

## 2024-04-23 RX ORDER — LIDOCAINE HYDROCHLORIDE 20 MG/ML
INJECTION, SOLUTION INFILTRATION; PERINEURAL AS NEEDED
Status: DISCONTINUED | OUTPATIENT
Start: 2024-04-23 | End: 2024-04-23 | Stop reason: SURG

## 2024-04-23 RX ORDER — PROPOFOL 10 MG/ML
INJECTION, EMULSION INTRAVENOUS CONTINUOUS PRN
Status: DISCONTINUED | OUTPATIENT
Start: 2024-04-23 | End: 2024-04-23 | Stop reason: SURG

## 2024-04-23 RX ORDER — SODIUM CHLORIDE, SODIUM LACTATE, POTASSIUM CHLORIDE, CALCIUM CHLORIDE 600; 310; 30; 20 MG/100ML; MG/100ML; MG/100ML; MG/100ML
30 INJECTION, SOLUTION INTRAVENOUS CONTINUOUS PRN
Status: DISCONTINUED | OUTPATIENT
Start: 2024-04-23 | End: 2024-04-23 | Stop reason: HOSPADM

## 2024-04-23 RX ADMIN — PROPOFOL 200 MCG/KG/MIN: 10 INJECTION, EMULSION INTRAVENOUS at 10:57

## 2024-04-23 RX ADMIN — LIDOCAINE HYDROCHLORIDE 60 MG: 20 INJECTION, SOLUTION INFILTRATION; PERINEURAL at 10:57

## 2024-04-23 RX ADMIN — SODIUM CHLORIDE, POTASSIUM CHLORIDE, SODIUM LACTATE AND CALCIUM CHLORIDE 30 ML/HR: 600; 310; 30; 20 INJECTION, SOLUTION INTRAVENOUS at 10:12

## 2024-04-23 RX ADMIN — PROPOFOL INJECTABLE EMULSION 100 MG: 10 INJECTION, EMULSION INTRAVENOUS at 10:57

## 2024-04-23 NOTE — ANESTHESIA POSTPROCEDURE EVALUATION
Patient: Jc Brannon    Procedure Summary       Date: 04/23/24 Room / Location: [facility] ENDOSCOPY 10 / [facility] ENDOSCOPY    Anesthesia Start: 1051 Anesthesia Stop: 1121    Procedure: COLONOSCOPY into cecum with biopsy Diagnosis:       Encounter for screening for malignant neoplasm of colon      (Encounter for screening for malignant neoplasm of colon [Z12.11])    Surgeons: David Tena MD Provider: Aris Madison MD    Anesthesia Type: MAC ASA Status: 2            Anesthesia Type: MAC    Vitals  Vitals Value Taken Time   /90 04/23/24 1140   Temp     Pulse 100 04/23/24 1140   Resp 16 04/23/24 1140   SpO2 96 % 04/23/24 1140           Post Anesthesia Care and Evaluation    Patient location during evaluation: PACU  Patient participation: complete - patient participated  Level of consciousness: awake and alert  Pain management: adequate    Airway patency: patent  Anesthetic complications: No anesthetic complications    Cardiovascular status: acceptable  Respiratory status: acceptable  Hydration status: acceptable    Comments: --------------------            04/23/24               1140     --------------------   BP:       130/90     Pulse:     100       Resp:       16       SpO2:      96%      --------------------

## 2024-04-23 NOTE — ANESTHESIA PREPROCEDURE EVALUATION
Anesthesia Evaluation     Patient summary reviewed and Nursing notes reviewed   NPO Solid Status: > 8 hours  NPO Liquid Status: > 2 hours           Airway   Mallampati: II  TM distance: >3 FB  Neck ROM: full  No difficulty expected  Dental - normal exam     Pulmonary - negative pulmonary ROS and normal exam   Cardiovascular - normal exam    (+) hypertension, cardiac stents , hyperlipidemia      Neuro/Psych- negative ROS  GI/Hepatic/Renal/Endo - negative ROS     Musculoskeletal (-) negative ROS    Abdominal    Substance History - negative use     OB/GYN          Other                    Anesthesia Plan    ASA 2     MAC     intravenous induction     Anesthetic plan, risks, benefits, and alternatives have been provided, discussed and informed consent has been obtained with: patient.    CODE STATUS:

## 2024-04-23 NOTE — DISCHARGE INSTRUCTIONS
For the next 24 hours patient needs to be with a responsible adult.    For 24 hours DO NOT drive, operate machinery, appliances, drink alcohol, make important decisions or sign legal documents.    Start with a light or bland diet if you are feeling sick to your stomach otherwise advance to regular diet as tolerated.    Follow recommendations on procedure report if provided by your doctor.    Call Dr Tena for problems [phone]    Problems may include but not limited to: large amounts of bleeding, trouble breathing, repeated vomiting, severe unrelieved pain, fever or chills.

## 2024-04-23 NOTE — H&P
"[facility]  Pre Procedure History & Physical    Chief Complaint:   Time for my colonoscopy    Subjective     HPI:   43 y.o. male presenting to endoscopy unit today for screening colonoscopy.    Past Medical History:   Past Medical History:   Diagnosis Date    Anemia     Hyperlipidemia     Hypertension     Mental disability without special needs        Family History:  Family History   Problem Relation Age of Onset    Cancer Mother         Stomach    Cancer Father         prostate    Glaucoma Father     Hypertension Maternal Aunt     Heart disease Maternal Aunt     Hypertension Maternal Uncle     Glaucoma Paternal Grandfather        Social History:   reports that he has never smoked. He has never been exposed to tobacco smoke. He has never used smokeless tobacco. He reports that he does not drink alcohol and does not use drugs.    Medications:   Medications Prior to Admission   Medication Sig Dispense Refill Last Dose    hydroCHLOROthiazide (HYDRODIURIL) 12.5 MG tablet TAKE 1 TABLET BY MOUTH DAILY 30 tablet 5 4/23/2024 at 0500    ibuprofen (ADVIL,MOTRIN) 800 MG tablet    Past Week    traMADol (ULTRAM) 50 MG tablet    Past Week    hydrOXYzine pamoate (VISTARIL) 25 MG capsule Take 1 capsule by mouth 3 (Three) Times a Day As Needed for Anxiety or Itching. 90 capsule 0 More than a month    lisinopril (PRINIVIL,ZESTRIL) 10 MG tablet Take 1 tablet by mouth Daily. 90 tablet 0 More than a month    silver sulfadiazine (SILVADENE, SSD) 1 % cream    More than a month       Allergies:  Latex      Objective     Blood pressure 150/92, pulse 76, resp. rate 12, height 175.3 cm (69\"), weight 99.3 kg (219 lb), SpO2 98%.  Physical Exam:   General: patient awake, alert and cooperative    Assessment & Plan     Diagnosis:  Encounter for screening for colon cancer    Anticipated Surgical Procedure:  Colonoscopy    The risks, benefits, and alternatives of this procedure have been discussed with the patient or the " responsible party- the patient understands and agrees to proceed.

## 2024-04-24 ENCOUNTER — TELEPHONE (OUTPATIENT)
Dept: GASTROENTEROLOGY | Facility: CLINIC | Age: 44
End: 2024-04-24
Payer: COMMERCIAL

## 2024-04-24 DIAGNOSIS — K63.89 COLONIC MASS: Primary | ICD-10-CM

## 2024-04-24 NOTE — TELEPHONE ENCOUNTER
Hub staff attempted to follow warm transfer process and was unsuccessful     Caller: Jc Riggs    Relationship to patient: Father    Best call back number: [phone]    Patient is needing: HAD C/S 04/23/24, AND WAS ADVISED THERE WAS A MASS IN COLON.  JC RIGGS SR. WANTED TO SEE TIME FRAME FOR WHEN THEY WOULD KNOW IF MASS WOULD SPREAD OR NOT.

## 2024-04-24 NOTE — TELEPHONE ENCOUNTER
David Tena MD Stowers, Sharon G, RN  Caller: Unspecified (Today, 10:32 AM)  CT C/A/P has been ordered as has referral to colorectal surgery, should hear something about both in next few days.

## 2024-04-28 DIAGNOSIS — C20 RECTAL ADENOCARCINOMA: Primary | ICD-10-CM

## 2024-04-29 ENCOUNTER — TELEPHONE (OUTPATIENT)
Dept: GASTROENTEROLOGY | Facility: CLINIC | Age: 44
End: 2024-04-29

## 2024-04-29 ENCOUNTER — TELEPHONE (OUTPATIENT)
Dept: SURGERY | Facility: CLINIC | Age: 44
End: 2024-04-29
Payer: COMMERCIAL

## 2024-04-29 LAB
LAB AP CASE REPORT: NORMAL
LAB AP DIAGNOSIS COMMENT: NORMAL
Lab: NORMAL
PATH REPORT.ADDENDUM SPEC: NORMAL
PATH REPORT.FINAL DX SPEC: NORMAL
PATH REPORT.GROSS SPEC: NORMAL

## 2024-04-29 NOTE — TELEPHONE ENCOUNTER
Caller: DEE RIGGS    Relationship: SELF    Best call back number: [phone],    [phone] DAD'S NUMBER      What is the best time to reach you:     Who are you requesting to speak with (clinical staff, provider,  specific staff member): CLINICAL    Do you know the name of the person who called: RUI    What was the call regarding: ABOUT MASS AND STEPS MOVING FORWARD    Is it okay if the provider responds through MyChart: NO

## 2024-04-29 NOTE — TELEPHONE ENCOUNTER
Unable to contact patient to inform CT and MRI are scheduled for 5/2 at 430 pm at [address].  Left detail message to call back.

## 2024-04-30 ENCOUNTER — TELEPHONE (OUTPATIENT)
Dept: GASTROENTEROLOGY | Facility: CLINIC | Age: 44
End: 2024-04-30
Payer: COMMERCIAL

## 2024-04-30 NOTE — TELEPHONE ENCOUNTER
----- Message from Edelmira H sent at 4/30/2024 10:49 AM EDT -----  Per Dr. Tena;   Regarding: Results  Please advise patient's father Dr Isaac's office has been trying to reach them to schedule imaging and office visit with Dr Isaac.  The biopsies of the colon mass did confirm cancer as I suspected.  Please provide him with Dr Isaac's office number if needed.

## 2024-04-30 NOTE — TELEPHONE ENCOUNTER
Called patient. He states he has talked with Dr. Isaac's office but will have his father call them. He does have a CT scan scheduled for 5/3 and CT scan ordered.     Called patient's father to confirm the above. He states they do have the appointments and have talked with Dr. Isaac's staff.

## 2024-05-02 ENCOUNTER — HOSPITAL ENCOUNTER (OUTPATIENT)
Dept: CT IMAGING | Facility: HOSPITAL | Age: 44
Discharge: HOME | End: 2024-05-02
Payer: COMMERCIAL

## 2024-05-02 ENCOUNTER — HOSPITAL ENCOUNTER (OUTPATIENT)
Dept: MRI IMAGING | Facility: HOSPITAL | Age: 44
Discharge: HOME | End: 2024-05-02
Payer: COMMERCIAL

## 2024-05-02 DIAGNOSIS — C20 RECTAL ADENOCARCINOMA: ICD-10-CM

## 2024-05-02 DIAGNOSIS — K63.89 COLONIC MASS: ICD-10-CM

## 2024-05-02 LAB — CREAT BLDA-MCNC: 1.1 MG/DL (ref 0.6–1.3)

## 2024-05-02 PROCEDURE — 0 GADOBENATE DIMEGLUMINE 529 MG/ML SOLUTION: Performed by: SURGERY

## 2024-05-02 PROCEDURE — 74177 CT ABD & PELVIS W/CONTRAST: CPT

## 2024-05-02 PROCEDURE — 71260 CT THORAX DX C+: CPT

## 2024-05-02 PROCEDURE — A9577 INJ MULTIHANCE: HCPCS | Performed by: SURGERY

## 2024-05-02 PROCEDURE — 72197 MRI PELVIS W/O & W/DYE: CPT

## 2024-05-02 PROCEDURE — 0 DIATRIZOATE MEGLUMINE & SODIUM PER 1 ML: Performed by: INTERNAL MEDICINE

## 2024-05-02 PROCEDURE — 25510000001 IOPAMIDOL 61 % SOLUTION: Performed by: INTERNAL MEDICINE

## 2024-05-02 PROCEDURE — 82565 ASSAY OF CREATININE: CPT

## 2024-05-02 RX ADMIN — GADOBENATE DIMEGLUMINE 20 ML: 529 INJECTION, SOLUTION INTRAVENOUS at 18:36

## 2024-05-02 RX ADMIN — DIATRIZOATE MEGLUMINE AND DIATRIZOATE SODIUM 30 ML: 660; 100 LIQUID ORAL; RECTAL at 15:50

## 2024-05-02 RX ADMIN — IOPAMIDOL 85 ML: 612 INJECTION, SOLUTION INTRAVENOUS at 17:00

## 2024-05-03 ENCOUNTER — LAB (OUTPATIENT)
Dept: LAB | Facility: HOSPITAL | Age: 44
End: 2024-05-03
Payer: COMMERCIAL

## 2024-05-03 ENCOUNTER — OFFICE VISIT (OUTPATIENT)
Dept: SURGERY | Facility: CLINIC | Age: 44
End: 2024-05-03
Payer: COMMERCIAL

## 2024-05-03 VITALS
BODY MASS INDEX: 32.32 KG/M2 | SYSTOLIC BLOOD PRESSURE: 130 MMHG | DIASTOLIC BLOOD PRESSURE: 90 MMHG | HEIGHT: 69 IN | WEIGHT: 218.2 LBS

## 2024-05-03 DIAGNOSIS — C20 RECTAL ADENOCARCINOMA: Primary | ICD-10-CM

## 2024-05-03 PROBLEM — Z12.11 ENCOUNTER FOR SCREENING FOR MALIGNANT NEOPLASM OF COLON: Status: RESOLVED | Noted: 2023-07-28 | Resolved: 2024-05-03

## 2024-05-03 PROCEDURE — 36415 COLL VENOUS BLD VENIPUNCTURE: CPT | Performed by: SURGERY

## 2024-05-03 NOTE — H&P (VIEW-ONLY)
Colorectal & General Surgery  Consultation    Patient: Jc Brannon Jr.  YOB: 1980  MRN: [id number]      Assessment  Jc Brannon Jr. is a 43 y.o. 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     My recommendation is to proceed with total neoadjuvant therapy with induction chemotherapy followed by chemoradiation.    From a local regional standpoint, he has a significant tumor that straddles the peritoneal reflection in his mid rectum with significant mesorectal lymphadenopathy.  Given this presentation, he would benefit from total neoadjuvant therapy prior to undergoing resection of his primary tumor with total mesorectal excision.    From a systemic standpoint, CT scan of the chest, abdomen, pelvis is still pending a final read from the radiologist, but there appear to be a few tiny liver lesions that are indeterminate for metastatic disease.  I have ordered an MRI of the abdomen to further evaluate the liver.  Regardless of that outcome, I am in favor of induction chemotherapy, which would be the appropriate step whether these lesions are metastatic disease or not.    From a functional standpoint, he has no symptoms concerning for obstruction at this time and does not require diversion prior to beginning systemic therapy.  No surgical indication is present at this time.    I had an extensive conversation with Jc Mendenhall and Jc Seals today about all of this.  They are very anxious and concerned about his diagnosis, reasonably so.  I do think that it will take a few visits with our multidisciplinary team before they truly understand the nature of the disease, the treatment algorithm, and the next steps.  However, they are very motivated to do everything they can to treat this cancer.    I have placed referrals as listed below, ordered an MRI, ordered circulating tumor, NGS, hereditary, and routine labs.    I also discussed the role of a port  for systemic chemotherapy.  We discussed the risk, benefits, alternatives the procedure.  Given that they were relatively overwhelmed during our visit today, I have not scheduled port placement and will await consultation with the medical oncology team.  We will be able to get him in for a port relatively quickly after that appointment so as to not delay his therapy.    Plan  Referral to medical oncology  Referral to radiation oncology  Referral to oncology nurse navigator  MRI abdomen with and without contrast to evaluate indeterminate liver lesions on CT scan  [facility], [employer], and [employer] sent today  CBC, CMP, CEA, and prealbumin ordered today  Will plan for port placement once he sees medical oncology  Follow-up in the office to be determined by clinical course once systemic therapy initiated    Referring Provider: David Tena MD     Reason for Consultation: Rectal cancer    History of Present Illness   Jc Brannon Jr. is a 43 y.o. male who presents to the office today for initial consultation regarding his newly diagnosed rectal cancer.    He denies any symptoms of his rectal cancer, including medic easy, melena, tenesmus, changes in his bowel habits, unintentional weight loss, rectal pain, incontinence.  He says that he was scheduled for his routine colorectal cancer screening at age 43 and during that examination was found to have a rectal mass.  This was biopsy-proven rectal adenocarcinoma, invasive.  CT scan of the chest, abdomen, pelvis as well as MRI of the pelvis were subsequently completed for staging.    He has no prior history of abdominal surgery.    He does have family history of colon cancer in his uncle, prostate cancer in his father, and stomach cancer in his mother.  There is no known genetic abnormality within his family lineage.    Past Medical History   Past Medical History:   Diagnosis Date    Anemia     Hyperlipidemia     Hypertension     Mental disability without  special needs         Past Surgical History   Past Surgical History:   Procedure Laterality Date    COLONOSCOPY N/A 4/23/2024    Procedure: COLONOSCOPY into cecum with biopsy;  Surgeon: David Tena MD;  Location: [facility] ENDOSCOPY;  Service: Gastroenterology;  Laterality: N/A;  rectal mass    LEG SURGERY Left     ACL       Social History  Social History     Socioeconomic History    Marital status: Single   Tobacco Use    Smoking status: Never     Passive exposure: Never    Smokeless tobacco: Never   Vaping Use    Vaping status: Never Used   Substance and Sexual Activity    Alcohol use: No    Drug use: No    Sexual activity: Defer       Family History  Family History   Problem Relation Age of Onset    Cancer Mother         Stomach    Cancer Father         prostate    Glaucoma Father     Hypertension Maternal Aunt     Heart disease Maternal Aunt     Hypertension Maternal Uncle     Glaucoma Paternal Grandfather        Colorectal cancer family history: Colon cancer in his uncle    Review of Systems  Negative except as documented in the HPI.     Allergies  Allergies   Allergen Reactions    Latex Rash       Medications    Current Outpatient Medications:     hydroCHLOROthiazide (HYDRODIURIL) 12.5 MG tablet, TAKE 1 TABLET BY MOUTH DAILY, Disp: 30 tablet, Rfl: 5    traMADol (ULTRAM) 50 MG tablet, , Disp: , Rfl:   No current facility-administered medications for this visit.    Vital Signs  Vitals:    05/03/24 0949   BP: 130/90        Physical Exam  Constitutional: Resting comfortably, no acute distress  Neck: Supple, trachea midline  Respiratory: No increased work of breathing, Symmetric excursion  Cardiovascular: Well pefursed, no jugular venous distention evident   Abdominal: Soft, non-tender, non-distended  Lymphatics: No cervical or suprascapular adenopathy  Skin: Warm, dry, no rash on visualized skin surfaces  Musculoskeletal: Symmetric strength, no obvious gross abnormalities  Psychiatric: Alert and oriented  ×3, distressed  Rectal examination deferred due to his significant distress during the appointment today.    Laboratory Results  I have personally reviewed CBC performed on April 30, 2024 outside facility demonstrates WBC 3, hemoglobin 13, platelets 233.  CMP with creatinine 1.03, potassium 3.4, bicarb 31, albumin 4.7, AST 21, ALT 25, total bilirubin 0.6.  INR 1.1.    I have also personally reviewed the pathology result from the rectal biopsy which demonstrates invasive moderately differentiated adenocarcinoma.    Radiology  I have personally reviewed CT scan of the chest, abdomen, pelvis, formal read pending from radiologist, but on my review demonstrates no obvious metastasis within the chest.  Rectal tumors evident at the peritoneal reflection and below with significant mesorectal lymphadenopathy.  There are scattered indeterminate lesions within the liver as well as 1 hepatic cyst.  Indeterminant whether these are metastatic lesions at this time given the limits of CT imaging capability.    I have also personally reviewed the MRI of his pelvis, which demonstrates a relatively large rectal tumor within his mid rectum straddling the peritoneal reflection.  There is significant mesorectal lymphadenopathy.  Formal read of the MRI is also pending.    Endoscopy  I have personally reviewed colonoscopy report from Dr. Hair performed on April 23, 2024, which demonstrates rectal mass reportedly 10 cm from the anal verge.    I spent 90 minutes caring for Jc on this date of service. This time includes time spent by me in the following activities:preparing for the visit, reviewing tests, obtaining and/or reviewing a separately obtained history, performing a medically appropriate examination and/or evaluation , counseling and educating the patient/family/caregiver, ordering medications, tests, or procedures, referring and communicating with other health care professionals , documenting information in the medical record,  independently interpreting results and communicating that information with the patient/family/caregiver, and care coordination     See Isaac MD  Colorectal & General Surgery  [facility]    [address], [address]  [place], [place], [address]  P: [phone]  F: [phone]

## 2024-05-03 NOTE — PROGRESS NOTES
Colorectal & General Surgery  Consultation    Patient: Jc Brannon Jr.  YOB: 1980  MRN: [id number]      Assessment  Jc Brannon Jr. is a 43 y.o. 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     My recommendation is to proceed with total neoadjuvant therapy with induction chemotherapy followed by chemoradiation.    From a local regional standpoint, he has a significant tumor that straddles the peritoneal reflection in his mid rectum with significant mesorectal lymphadenopathy.  Given this presentation, he would benefit from total neoadjuvant therapy prior to undergoing resection of his primary tumor with total mesorectal excision.    From a systemic standpoint, CT scan of the chest, abdomen, pelvis is still pending a final read from the radiologist, but there appear to be a few tiny liver lesions that are indeterminate for metastatic disease.  I have ordered an MRI of the abdomen to further evaluate the liver.  Regardless of that outcome, I am in favor of induction chemotherapy, which would be the appropriate step whether these lesions are metastatic disease or not.    From a functional standpoint, he has no symptoms concerning for obstruction at this time and does not require diversion prior to beginning systemic therapy.  No surgical indication is present at this time.    I had an extensive conversation with Jc Mendenhall and Jc Seals today about all of this.  They are very anxious and concerned about his diagnosis, reasonably so.  I do think that it will take a few visits with our multidisciplinary team before they truly understand the nature of the disease, the treatment algorithm, and the next steps.  However, they are very motivated to do everything they can to treat this cancer.    I have placed referrals as listed below, ordered an MRI, ordered circulating tumor, NGS, hereditary, and routine labs.    I also discussed the role of a port  for systemic chemotherapy.  We discussed the risk, benefits, alternatives the procedure.  Given that they were relatively overwhelmed during our visit today, I have not scheduled port placement and will await consultation with the medical oncology team.  We will be able to get him in for a port relatively quickly after that appointment so as to not delay his therapy.    Plan  Referral to medical oncology  Referral to radiation oncology  Referral to oncology nurse navigator  MRI abdomen with and without contrast to evaluate indeterminate liver lesions on CT scan  [facility] Signatera, [employer], and [employer] sent today  CBC, CMP, CEA, and prealbumin ordered today  Will plan for port placement once he sees medical oncology  Follow-up in the office to be determined by clinical course once systemic therapy initiated    Referring Provider: David Tena MD     Reason for Consultation: Rectal cancer    History of Present Illness   Jc Brannon Jr. is a 43 y.o. male who presents to the office today for initial consultation regarding his newly diagnosed rectal cancer.    He denies any symptoms of his rectal cancer, including medic easy, melena, tenesmus, changes in his bowel habits, unintentional weight loss, rectal pain, incontinence.  He says that he was scheduled for his routine colorectal cancer screening at age 43 and during that examination was found to have a rectal mass.  This was biopsy-proven rectal adenocarcinoma, invasive.  CT scan of the chest, abdomen, pelvis as well as MRI of the pelvis were subsequently completed for staging.    He has no prior history of abdominal surgery.    He does have family history of colon cancer in his uncle, prostate cancer in his father, and stomach cancer in his mother.  There is no known genetic abnormality within his family lineage.    Past Medical History   Past Medical History:   Diagnosis Date    Anemia     Hyperlipidemia     Hypertension     Mental disability without  special needs         Past Surgical History   Past Surgical History:   Procedure Laterality Date    COLONOSCOPY N/A 4/23/2024    Procedure: COLONOSCOPY into cecum with biopsy;  Surgeon: David Tena MD;  Location: [facility] ENDOSCOPY;  Service: Gastroenterology;  Laterality: N/A;  rectal mass    LEG SURGERY Left     ACL       Social History  Social History     Socioeconomic History    Marital status: Single   Tobacco Use    Smoking status: Never     Passive exposure: Never    Smokeless tobacco: Never   Vaping Use    Vaping status: Never Used   Substance and Sexual Activity    Alcohol use: No    Drug use: No    Sexual activity: Defer       Family History  Family History   Problem Relation Age of Onset    Cancer Mother         Stomach    Cancer Father         prostate    Glaucoma Father     Hypertension Maternal Aunt     Heart disease Maternal Aunt     Hypertension Maternal Uncle     Glaucoma Paternal Grandfather        Colorectal cancer family history: Colon cancer in his uncle    Review of Systems  Negative except as documented in the HPI.     Allergies  Allergies   Allergen Reactions    Latex Rash       Medications    Current Outpatient Medications:     hydroCHLOROthiazide (HYDRODIURIL) 12.5 MG tablet, TAKE 1 TABLET BY MOUTH DAILY, Disp: 30 tablet, Rfl: 5    traMADol (ULTRAM) 50 MG tablet, , Disp: , Rfl:   No current facility-administered medications for this visit.    Vital Signs  Vitals:    05/03/24 0949   BP: 130/90        Physical Exam  Constitutional: Resting comfortably, no acute distress  Neck: Supple, trachea midline  Respiratory: No increased work of breathing, Symmetric excursion  Cardiovascular: Well pefursed, no jugular venous distention evident   Abdominal: Soft, non-tender, non-distended  Lymphatics: No cervical or suprascapular adenopathy  Skin: Warm, dry, no rash on visualized skin surfaces  Musculoskeletal: Symmetric strength, no obvious gross abnormalities  Psychiatric: Alert and oriented  ×3, distressed  Rectal examination deferred due to his significant distress during the appointment today.    Laboratory Results  I have personally reviewed CBC performed on April 30, 2024 outside facility demonstrates WBC 3, hemoglobin 13, platelets 233.  CMP with creatinine 1.03, potassium 3.4, bicarb 31, albumin 4.7, AST 21, ALT 25, total bilirubin 0.6.  INR 1.1.    I have also personally reviewed the pathology result from the rectal biopsy which demonstrates invasive moderately differentiated adenocarcinoma.    Radiology  I have personally reviewed CT scan of the chest, abdomen, pelvis, formal read pending from radiologist, but on my review demonstrates no obvious metastasis within the chest.  Rectal tumors evident at the peritoneal reflection and below with significant mesorectal lymphadenopathy.  There are scattered indeterminate lesions within the liver as well as 1 hepatic cyst.  Indeterminant whether these are metastatic lesions at this time given the limits of CT imaging capability.    I have also personally reviewed the MRI of his pelvis, which demonstrates a relatively large rectal tumor within his mid rectum straddling the peritoneal reflection.  There is significant mesorectal lymphadenopathy.  Formal read of the MRI is also pending.    Endoscopy  I have personally reviewed colonoscopy report from Dr. Hair performed on April 23, 2024, which demonstrates rectal mass reportedly 10 cm from the anal verge.    I spent 90 minutes caring for Jc on this date of service. This time includes time spent by me in the following activities:preparing for the visit, reviewing tests, obtaining and/or reviewing a separately obtained history, performing a medically appropriate examination and/or evaluation , counseling and educating the patient/family/caregiver, ordering medications, tests, or procedures, referring and communicating with other health care professionals , documenting information in the medical record,  independently interpreting results and communicating that information with the patient/family/caregiver, and care coordination     See Isaac MD  Colorectal & General Surgery  [facility]    [address], [address]  [place], [place], [address]  P: [phone]  F: [phone]

## 2024-05-07 ENCOUNTER — PATIENT OUTREACH (OUTPATIENT)
Dept: OTHER | Facility: HOSPITAL | Age: 44
End: 2024-05-07
Payer: COMMERCIAL

## 2024-05-07 LAB
LAB AP CASE REPORT: NORMAL
LAB AP DIAGNOSIS COMMENT: NORMAL
Lab: NORMAL
Lab: NORMAL
PATH REPORT.ADDENDUM SPEC: NORMAL
PATH REPORT.FINAL DX SPEC: NORMAL
PATH REPORT.GROSS SPEC: NORMAL

## 2024-05-08 ENCOUNTER — TELEPHONE (OUTPATIENT)
Dept: SURGERY | Facility: CLINIC | Age: 44
End: 2024-05-08
Payer: COMMERCIAL

## 2024-05-10 ENCOUNTER — TELEPHONE (OUTPATIENT)
Dept: RADIATION ONCOLOGY | Facility: HOSPITAL | Age: 44
End: 2024-05-10
Payer: COMMERCIAL

## 2024-05-10 NOTE — PROGRESS NOTES
Rectal Cancer    REASON FOR CONSULTATION:   Rectal adenocarcinoma                             REQUESTING PHYSICIAN: Ori Isaac,*  RECORDS OBTAINED:  Records of the patients history including those from the electronic medical record were reviewed and summarized in detail.    HISTORY OF PRESENT ILLNESS:  The patient is a 43 y.o. year old male with recently diagnosed rectal adenocarcinoma who presents to our clinic to establish care.  He underwent a screening colonoscopy in April 2024 due to strong family history of cancers.  Colonoscopy revealed an infiltrative nonobstructive medium-sized mass found in proximal rectum (10 to 12 cm from anal verge). Biopsy confirmed an infiltrating moderately differentiated adenocarcinoma.  MRI pelvis done on 5/2/2024 showed an approximately 4.5 cm circumferential low rectal mass, approximately 5 mm proximal to anorectal junction, extending 8 mm in the mesorectal fat.  Appears to be extension to MRF along the right wall and abutment of right levator ani muscles.  Several enlarged perirectal nodes, largest 2 x 1.2 cm.  At least 4 enhancing suspicious nodes.  cT3 cN2 MRF positive.  No definitive sphincter involvement.  CT chest abdomen pelvis revealed a 1.7 cm right hepatic lobe lesion and several low density liver lesions concerning for cyst versus metastatic disease.  MRI abdomen done on 5/16/2024 revealed T2 hyperintense nonenhancing liver lesions consistent with hepatic cyst and PUD cystic hamartomas.  No evidence of liver mets..  He has established care with colorectal surgeon Dr. Ori Isaac, and radiation oncologist Dr. Kolton Noyola, and is planned to undergo port placement on 24 May 2024    Today, he is here accompanied by his father.  He is quite anxious during his visit.  Denies any blood in stools, unintentional weight loss, fever, night sweats, fatigue.  His current job involves a lot of physical labor.  He denies any other concerns or complaints during this  visit    Family history significant for prostate cancer in father at 63, paternal uncle with colon cancer at 57, other paternal uncle with metastatic cancer, type unknown in his 50s, other paternal uncle with possible colon cancer and DVT/PE in his 70s.        Past Medical History:   Diagnosis Date    Anemia     Hyperlipidemia     Hypertension     Mental disability without special needs      Past Surgical History:   Procedure Laterality Date    COLONOSCOPY N/A 4/23/2024    Procedure: COLONOSCOPY into cecum with biopsy;  Surgeon: David Tena MD;  Location: [facility] ENDOSCOPY;  Service: Gastroenterology;  Laterality: N/A;  rectal mass    LEG SURGERY Left     ACL       MEDICATIONS    Current Outpatient Medications:     hydroCHLOROthiazide (HYDRODIURIL) 12.5 MG tablet, TAKE 1 TABLET BY MOUTH DAILY, Disp: 30 tablet, Rfl: 5    traMADol (ULTRAM) 50 MG tablet, , Disp: , Rfl:     ALLERGIES:     Allergies   Allergen Reactions    Latex Rash       SOCIAL HISTORY:       Social History     Socioeconomic History    Marital status: Single   Tobacco Use    Smoking status: Never     Passive exposure: Never    Smokeless tobacco: Never   Vaping Use    Vaping status: Never Used   Substance and Sexual Activity    Alcohol use: No    Drug use: No    Sexual activity: Defer         FAMILY HISTORY:  Family History   Problem Relation Age of Onset    Cancer Mother         Stomach    Cancer Father         prostate    Glaucoma Father     Hypertension Maternal Aunt     Heart disease Maternal Aunt     Hypertension Maternal Uncle     Glaucoma Paternal Grandfather        REVIEW OF SYSTEMS:  Review of Systems   Constitutional: Negative.    HENT: Negative.     Respiratory: Negative.     Cardiovascular: Negative.    Gastrointestinal: Negative.    Genitourinary: Negative.    Musculoskeletal: Negative.    Skin: Negative.    Neurological: Negative.    Hematological: Negative.               Vitals:    05/20/24 1044   BP: 166/91   Pulse: 95   Resp:  "16   Temp: 98 °F (36.7 °C)   TempSrc: Oral   SpO2: 96%   Weight: 98.3 kg (216 lb 12.8 oz)   Height: 175.3 cm (69.02\")   PainSc: 0-No pain         5/20/2024    10:47 AM   Current Status   ECOG score 0      PHYSICAL EXAM:    CONSTITUTIONAL:  Vital signs reviewed.  No distress, looks comfortable.  EYES:  Conjunctiva and lids unremarkable.  PERRLA  EARS,NOSE,MOUTH,THROAT:  Ears and nose appear unremarkable.  Lips, teeth, gums appear unremarkable.  RESPIRATORY:  Normal respiratory effort.  Lungs clear to auscultation bilaterally.  CARDIOVASCULAR:  Normal S1, S2.  No murmurs rubs or gallops.  No significant lower extremity edema.  GASTROINTESTINAL: Abdomen appears unremarkable.  Nontender.  No hepatomegaly.  No splenomegaly.  LYMPHATIC:  No cervical, supraclavicular, axillary lymphadenopathy.  NEURO: cranial nerves 2-12 grossly intact.  No focal deficits.  Appears to have equal strength all 4 extremities.  MUSCULOSKELETAL:  Unremarkable digits/nails.  No cyanosis or clubbing.  No apparent joint deformities.  SKIN:  Warm.  No rashes.  PSYCHIATRIC:  Normal judgment and insight.  Normal mood and affect.     RECENT LABS:        WBC   Date Value Ref Range Status   05/20/2024 3.22 (L) 3.40 - 10.80 10*3/mm3 Final   06/09/2023 3.0 (L) 3.4 - 10.8 x10E3/uL Final   06/04/2022 3.0 (L) 3.4 - 10.8 x10E3/uL Final   05/25/2021 3.11 (L) 3.40 - 10.80 10*3/mm3 Final   05/26/2020 2.91 (L) 3.40 - 10.80 10*3/mm3 Final   02/12/2020 4.5 3.4 - 10.8 x10E3/uL Final   07/27/2019 2.6 (L) 3.4 - 10.8 x10E3/uL Final     Comment:     **Verified by repeat analysis**     Hemoglobin   Date Value Ref Range Status   05/20/2024 13.4 13.0 - 17.7 g/dL Final   06/09/2023 14.3 13.0 - 17.7 g/dL Final   06/04/2022 14.1 13.0 - 17.7 g/dL Final   05/25/2021 14.0 13.0 - 17.7 g/dL Final   05/26/2020 13.7 13.0 - 17.7 g/dL Final   02/12/2020 13.5 13.0 - 17.7 g/dL Final   07/27/2019 13.8 13.0 - 17.7 g/dL Final     Platelets   Date Value Ref Range Status   05/20/2024 218 140 " - 450 10*3/mm3 Final   06/09/2023 207 150 - 450 x10E3/uL Final   06/04/2022 230 150 - 450 x10E3/uL Final   05/25/2021 213 140 - 450 10*3/mm3 Final   05/26/2020 223 140 - 450 10*3/mm3 Final   02/12/2020 245 150 - 450 x10E3/uL Final   07/27/2019 249 150 - 450 x10E3/uL Final     Path  04/23/2024  Final Diagnosis  1. Rectum, mass, biopsy:  A. Invasive moderately differentiated adenocarcinoma (see comment).  Electronically signed by Didi Villasenor MD on 4/25/2024 at 1107  .  Comment  Definitive depth of invasion cannot be ascertained in this fragmented specimen. The block will be forwarded to [facility] lab for  MSI studies with those results to follow separately as an addendum.  Gross Description  1. Large Intestine, Rectum.  The specimen is received in formalin labeled with the patient's name and further designated 'rectal mass biopsy' are  multiple small fragments of gray-tan tissue. The specimen is submitted for embedding as received.            Imaging  5/2/2024 CT chest abdomen pelvis with contrast  IMPRESSION:   1. Sigmorectal mass with numerous surrounding lymph nodes, raising suspicion for metastatic lymph nodes.     2. Hepatic cyst, and numerous liver low densities that are too small to characterize; these lesions could also be cysts, but in this clinical setting, possibility of any metastatic liver lesions is not excluded. Interval follow-up is advised to characterize change.     3. No pulmonary mass    05/02/2024 MRI pelvis w and wo contrast  IMPRESSION:     1. Sigmorectal mass with numerous surrounding lymph nodes, raising suspicion for metastatic lymph nodes.     2. Hepatic cyst, and numerous liver low densities that are too small to characterize; these lesions could also be cysts, but in this clinical setting, possibility of any metastatic liver lesions is not excluded. Interval follow-up is advised to characterize change.     3. No pulmonary mass    05/16/2024 MRI abdomen  MRI ABDOMEN W WO CONTRAST-      INDICATION: Concern for liver metastasis. Rectal cancer.     COMPARISON: CT abdomen/pelvis May 2, 2024     TECHNIQUE: MRI of the abdomen with T1, T2 and diffusion weighted  sequences. Postcontrast dynamic phase imaging.     FINDINGS:      Lung bases: Unremarkable.     ABDOMEN: Multiple T2 hyperintense, T1 hypointense, nonenhancing hepatic  cysts. For example, T2 hyperintense nonenhancing cyst in segment 5/8 of  the right hepatic lobe, series 8, axial image 21, measures 1.9 cm.  Additional smaller T2 hyperintense, T1 hypointense, nonenhancing hepatic  cysts/biliary cystic hamartomas. No solid appearing liver mass. Normal  gallbladder. No biliary ductal dilatation. Spleen is normal in size. No  pancreatic mass or pancreatic ductal dilatation seen. No adrenal  nodules. No renal mass or hydronephrosis.     Bowel: No obstruction.     Abdominal wall: Normal.     Retroperitoneum: No lymphadenopathy.     Vasculature: Patent. No abdominal aortic aneurysm.     Osseous structures: No bone lesion seen.     IMPRESSION:  T2 hyperintense nonenhancing liver lesions are consistent with hepatic  cysts and biliary cystic hamartomas. No convincing liver metastasis  identified    Assessment & Plan   Jc Brannon Jr., 43 y.o. male  with newly diagnosed rectal adenocarcinoma here to establish care.    *Rectal adenocarcinoma (cT3 cN2 cM0), LUCAS  -4/23/2024 rectal biopsy-invasive moderately differentiated adenocarcinoma, LUCAS.  -5/2/2024 MRI pelvis: Approximately 4.5 cm circumferential low rectal mass, approximately 5 mm proximal to anorectal junction, extending 8 mm in the mesorectal fat.  Appears to be extension to MRF along the right wall and abutment of right levator ani muscles.  Several enlarged perirectal nodes, largest 2 x 1.2 cm.  At least 4 enhancing suspicious nodes.  cT3 cN2 MRF positive.  No definitive sphincter involvement  - 5/2/2024 CT chest abdomen pelvis: 1.7 cm right hepatic cyst, and numerous low-density liver  lesions-cyst versus possible metastatic lesions.  No lung mass  -5/16/2024 MRI abdomen: T2 hyperintense nonenhancing liver lesions consistent with liver cyst and biliary cystic hamartomas.  No evidence of liver mets  -Plan for total neoadjuvant chemotherapy with FOLFIRINOX.    *Benign ethnic neutropenia  - White cell count ranged between 2.6-3.2 since at least July 2019.    -Most recent ANC from 5/20/2024 low at 1.4.  -Will plan for G-CSF support with chemotherapy.  With discontinue 5-FU bolus to prevent myelosuppression    *Anxiety  -Will refer to behavioral oncology    Plan  -I have reviewed the diagnosis, staging, and next steps in management with the patient in detail  -I have communicated with both Dr. Isaac as well as Dr. Noyola regarding the plan via secure chat  -Plan for total neoadjuvant chemotherapy with FOLFIRINOX followed by chemoradiation followed by surgery  - I have reviewed the possible side effects of chemotherapy including but not limited to nausea, vomiting, alopecia, cold neuropathy, cardiac vasospasm with 5-FU, diarrhea, neutropenic fever.  Recommended seeking medical attention if he were to spike a fever of 100.4  -He is scheduled for port placement with Dr. Isaac on 24 May 2024  -He will be scheduled for chemo education, and a referral to behavioral oncology will be placed  -Reminded him to get CEA drawn

## 2024-05-13 ENCOUNTER — CONSULT (OUTPATIENT)
Dept: RADIATION ONCOLOGY | Facility: HOSPITAL | Age: 44
End: 2024-05-13
Payer: COMMERCIAL

## 2024-05-13 VITALS
HEART RATE: 88 BPM | OXYGEN SATURATION: 99 % | SYSTOLIC BLOOD PRESSURE: 165 MMHG | BODY MASS INDEX: 31.96 KG/M2 | WEIGHT: 216.4 LBS | DIASTOLIC BLOOD PRESSURE: 108 MMHG

## 2024-05-13 DIAGNOSIS — C20 RECTAL ADENOCARCINOMA: Primary | ICD-10-CM

## 2024-05-13 PROCEDURE — G0463 HOSPITAL OUTPT CLINIC VISIT: HCPCS | Performed by: RADIOLOGY

## 2024-05-13 NOTE — PROGRESS NOTES
Radiation Oncology Consult Note    Name: Jc Brannon Jr.  YOB: 1980  MRN #: [id number]  Date of Service: 5/13/2024  Referring Provider: Ori Isaac MD  [address] [place] [address]  Primary Care Provider: Elvira Crooks APRN        DIAGNOSIS: Clinical stage III, T3N2M0 adenocarcinoma of the rectum    CHIEF COMPLAINT: Abnormal colonoscopy                                 REQUESTING PHYSICIAN:  Ori Isaac Md  [address] [place] [address]    RECORDS OBTAINED:  Records of the patients history including those obtained from the referring provider were reviewed and summarized in detail.    HISTORY OF PRESENT ILLNESS:  Jc Brannon Jr. is a 43 y.o. male who gives a history of cancer in his family.  Specifically, patient's father had prostate cancer treated with definitive radiotherapy in [facility].  Patient's uncle had colon cancer.  Patient lost his only brother to complications related to diabetes.  Mr. Brannon has no symptoms related to his current illness.  He denies any blood per rectum, obstructive symptoms, weight loss or pain.  He underwent screening colonoscopy because of a strong family history of cancer including his father.  Colonoscopy findings by  are described below.  Biopsy confirmed an infiltrating moderately differentiated adenocarcinoma.  Staging studies have demonstrated pericolonic and perirectal adenopathy consistent with local spread of disease.  There was some very small nondescript findings in the liver and these will be further evaluated with  abdominal MRI which is currently scheduled for 5/24/2024.    Both Jc Brannon Sr. in [place] met with Dr. Ori Isaac to discuss definitive treatment going forward.  It is assumed that patient will require full dose chemotherapy as well as chemoradiotherapy to the pelvis prior to surgical resection.  I am seeing Mr. Jc Brannon Jr. in this  regard today.    The following portions of the patient's history were reviewed and updated as appropriate: allergies, current medications, past family history, past medical history, past social history, past surgical history and problem list. Reviewed with the patient and remain unchanged.    PAST MEDICAL HISTORY:  he  has a past medical history of Anemia, Hyperlipidemia, Hypertension, and Mental disability without special needs.  MEDICATIONS:   Current Outpatient Medications:     hydroCHLOROthiazide (HYDRODIURIL) 12.5 MG tablet, TAKE 1 TABLET BY MOUTH DAILY, Disp: 30 tablet, Rfl: 5    traMADol (ULTRAM) 50 MG tablet, , Disp: , Rfl:   ALLERGIES:   Allergies   Allergen Reactions    Latex Rash     PAST SURGICAL HISTORY: he has a past surgical history that includes Leg Surgery (Left) and Colonoscopy (N/A, 4/23/2024).    -The perianal and digital rectal examinations were normal. Findings: - An infiltrative non-obstructing medium-sized mass was found in the proximal rectum (`10--12cm from anal verge) No bleeding was present. Biopsies were taken with a cold forceps for histology      PREVIOUS RADIOTHERAPY OR CHEMOTHERAPY: No  FAMILY HISTORY: his family history includes Cancer in his father and mother; Glaucoma in his father and paternal grandfather; Heart disease in his maternal aunt; Hypertension in his maternal aunt and maternal uncle.  SOCIAL HISTORY: he  reports that he has never smoked. He has never been exposed to tobacco smoke. He has never used smokeless tobacco. He reports that he does not drink alcohol and does not use drugs.  PAIN AND PAIN MANAGEMENT:   Vitals:    05/13/24 0953 05/13/24 0958   BP: (!) 175/105 (!) 165/108   Pulse: 88    SpO2: 99%    Weight: 98.2 kg (216 lb 6.4 oz)    PainSc: 0-No pain      NUTRITIONAL STATUS:  Otherwise no issues  KPS: 100: Normal; no evidence of disease  ECOG: (0) Fully active, able to carry on all predisease performance without restriction         Review of Systems:   Review of  Systems   All other systems reviewed and are negative.         Objective     Vitals:  Vitals:    05/13/24 0953 05/13/24 0958   BP: (!) 175/105 (!) 165/108   Pulse: 88    SpO2: 99%    Weight: 98.2 kg (216 lb 6.4 oz)    PainSc: 0-No pain          PHYSICAL EXAM:  Physical Exam  Constitutional:       Appearance: Normal appearance.   HENT:      Head: Normocephalic.      Nose: Nose normal.      Mouth/Throat:      Mouth: Mucous membranes are moist.   Eyes:      Extraocular Movements: Extraocular movements intact.      Pupils: Pupils are equal, round, and reactive to light.   Cardiovascular:      Rate and Rhythm: Normal rate and regular rhythm.   Pulmonary:      Effort: Pulmonary effort is normal.      Breath sounds: Normal breath sounds.   Abdominal:      General: Abdomen is flat. Bowel sounds are normal.      Palpations: Abdomen is soft.   Genitourinary:     Comments: Rectal examination today demonstrates no mucosal abnormalities.  The prostate is small without nodularity.  Musculoskeletal:         General: Normal range of motion.      Cervical back: Normal range of motion.   Skin:     General: Skin is warm.   Neurological:      General: No focal deficit present.      Mental Status: He is alert and oriented to person, place, and time.   Psychiatric:         Mood and Affect: Mood normal.         Behavior: Behavior normal.          PERTINENT IMAGING/PATHOLOGY/LABS (Medical Decision Making):     COORDINATION OF CARE: A copy of this note is sent to the referring provider.    PATHOLOGY (Reviewed):   Final Diagnosis   1. Rectum, mass, biopsy:               A. Invasive moderately differentiated adenocarcinoma (see comment).   Electronically signed by Didi Villasenor MD on 4/25/2024 at 1107   Comment    Definitive depth of invasion cannot be ascertained in this fragmented specimen. The block will be forwarded to [facility] for MSI studies with those results to follow separately as an addendum.   Gross Description    1. Large  Intestine, Rectum.  The specimen is received in formalin labeled with the patient's name and further designated 'rectal mass biopsy' are multiple small fragments of gray-tan tissue. The specimen is submitted for embedding as received.        IMAGING (Reviewed):   CT CHEST W CONTRAST DIAGNOSTIC-, CT ABDOMEN PELVIS W CONTRAST-     INDICATIONS: Distal colon mass. Radiation dose reduction techniques were  utilized, including automated exposure control and exposure modulation  based on body size.     TECHNIQUE: Enhanced CT of the chest, abdomen, pelvis     COMPARISON: None available     FINDINGS:     Chest CT:     The heart size is normal without pericardial effusion. A few  subcentimeter short axis mediastinal and biaxillary lymph nodes are seen  that are not significant by size criteria.     The airways appear clear.     No pleural effusion or pneumothorax.     The lungs show no focal pulmonary consolidation or mass.   Scarring/atelectasis is apparent in both lungs.     Abdomen pelvis CT:     A 1.7 cm right hepatic cyst is noted. Numerous small subcentimeter liver  low densities are present that are too small to characterize, could be  cysts, or potentially metastatic disease, interval follow-up can  characterize change.     The gallbladder is partly contracted.     Otherwise unremarkable appearance of the liver, spleen, adrenal glands,  pancreas, kidneys, bladder.     No bowel obstruction. The sigmorectal mass is better characterized on  the MRI exam of same date (please see separate report). Some stranding  is apparent in the fat around the lesion, numerous surrounding lymph  nodes are present that could be metastatic lymph nodes. For example, on  axial image 165, a 1.9 x 1.6 cm pericolonic lymph node is present. As  another example, on axial image 170, left perirectal lymph node measures  0.7 x 1.2 cm.     No free intraperitoneal gas or free fluid. Bilateral inguinal hernias of  fat are present.     Scattered small  mesenteric and para-aortic lymph nodes are seen that are  not significant by size criteria.     Abdominal aorta is not aneurysmal.      Degenerative changes are seen in the spine. Chronic appearing deformity  of the left scapula. No acute fracture is identified.     IMPRESSION:  1. Sigmorectal mass with numerous surrounding lymph nodes, raising  suspicion for metastatic lymph nodes.     2. Hepatic cyst, and numerous liver low densities that are too small to  characterize; these lesions could also be cysts, but in this clinical  setting, possibility of any metastatic liver lesions is not excluded.  Interval follow-up is advised to characterize change.     3. No pulmonary mass.     This report was finalized on 5/7/2024 2:17 PM by Dr. Gustavo Kim M.D on Workstation: [employer]    PELVIC MRI WITHOUT AND WITH IV CONTRAST: RECTAL MRI - (preliminary report)     HISTORY: 43-year-old male with recent diagnosis of rectal  adenocarcinoma. Initial staging.     TECHNIQUE: Routine rectal MRI was performed without and with IV  contrast. Compared with CT pelvis 05/02/2024.     FINDINGS: There is a circumferential solid low rectal mass which  measures approximately 4.5 cm in craniocaudad span and the inferior  margin is approximately 5 mm proximal to the anorectal junction.     The tumor extends approximately 8 mm into the mesorectal fat  predominantly along the right wall of the mass. There appears to be  extension to the mesorectal fascia along the right wall and abutment of  the right levator ani muscles. There are several enlarged perirectal  nodes and the largest is presacral measuring approximately 2.0 x 1.2 cm.  There are at least 4 enhancing suspicious nodes. There is no definite  extramural vascular invasion.     IMPRESSION:  1. Primary Tumor Location: Low rectal.     2. MRI Stage: T3 N2 MRF positive.     3. No definite sphincter involvement.       LABS (Reviewed):  Hematology WBC   Date Value Ref Range Status    06/09/2023 3.0 (L) 3.4 - 10.8 x10E3/uL Final     RBC   Date Value Ref Range Status   06/09/2023 4.61 4.14 - 5.80 x10E6/uL Final     Hemoglobin   Date Value Ref Range Status   06/09/2023 14.3 13.0 - 17.7 g/dL Final     Hematocrit   Date Value Ref Range Status   06/09/2023 42.1 37.5 - 51.0 % Final     Platelets   Date Value Ref Range Status   06/09/2023 207 150 - 450 x10E3/uL Final      Chemistry   Lab Results   Component Value Date    GLUCOSE 103 (H) 06/09/2023    BUN 13 06/09/2023    CREATININE 1.10 05/02/2024    EGFRIFNONA 74 05/25/2021    EGFRIFAFRI 90 05/25/2021    BCR 12 06/09/2023    K 3.7 06/09/2023    CO2 27 06/09/2023    CALCIUM 9.2 06/09/2023    PROTENTOTREF 7.2 06/09/2023    ALBUMIN 4.8 06/09/2023    LABIL2 2.0 06/09/2023    AST 19 06/09/2023    ALT 19 06/09/2023       Assessment & Plan     ASSESSMENT :Clinical stage III, T2N1MX adenocarcinoma of the rectum    Diagnoses and all orders for this visit:    1. Rectal adenocarcinoma (Primary)         PLAN:   Currently, Mr. Brannon has a pending MRI of the abdomen to look more closely at the liver.  The CT scan demonstrated an obvious 1.8 cm cyst in the right lobe but there were numerous other small hypoechoic areas which will need to be investigated.  That study is currently scheduled for 5/24/2024 but we will have that moved up as that is simply too long a delay.    Assuming no metastatic disease, Mr. Brannon will certainly a candidate for total neoadjuvant therapy.  He is meeting with medical oncology next week.  He will need a port placement and our plans will be to see him upon completion of combination chemotherapy to proceed with chemoradiotherapy to the pelvis.  We have discussed various side effects and complications associated with chemoradiotherapy in detail and patient understands and accepts these things.      I spent 60 minutes caring for Jc on this date of service. This time includes time spent by me in the following activities:preparing for  the visit, reviewing tests, obtaining and/or reviewing a separately obtained history, performing a medically appropriate examination and/or evaluation , ordering medications, tests, or procedures, referring and communicating with other health care professionals , and independently interpreting results and communicating that information with the patient/family/caregiver       CC: Ori Isaac* Elvira Crooks, WIL Noyola MD  5/13/2024  8:21 AM EDT

## 2024-05-15 ENCOUNTER — PREP FOR SURGERY (OUTPATIENT)
Age: 44
End: 2024-05-15
Payer: COMMERCIAL

## 2024-05-15 ENCOUNTER — TELEPHONE (OUTPATIENT)
Dept: SURGERY | Facility: CLINIC | Age: 44
End: 2024-05-15
Payer: COMMERCIAL

## 2024-05-15 DIAGNOSIS — C20 RECTAL ADENOCARCINOMA: Primary | ICD-10-CM

## 2024-05-16 ENCOUNTER — HOSPITAL ENCOUNTER (OUTPATIENT)
Dept: MRI IMAGING | Facility: HOSPITAL | Age: 44
Discharge: HOME | End: 2024-05-16
Admitting: SURGERY
Payer: COMMERCIAL

## 2024-05-16 DIAGNOSIS — C20 RECTAL ADENOCARCINOMA: ICD-10-CM

## 2024-05-16 LAB
Lab: NEGATIVE
Lab: NORMAL

## 2024-05-16 PROCEDURE — 74183 MRI ABD W/O CNTR FLWD CNTR: CPT

## 2024-05-16 PROCEDURE — 0 GADOBENATE DIMEGLUMINE 529 MG/ML SOLUTION: Performed by: SURGERY

## 2024-05-16 PROCEDURE — A9577 INJ MULTIHANCE: HCPCS | Performed by: SURGERY

## 2024-05-16 RX ADMIN — GADOBENATE DIMEGLUMINE 20 ML: 529 INJECTION, SOLUTION INTRAVENOUS at 10:37

## 2024-05-20 ENCOUNTER — TELEPHONE (OUTPATIENT)
Dept: ONCOLOGY | Facility: CLINIC | Age: 44
End: 2024-05-20

## 2024-05-20 ENCOUNTER — CONSULT (OUTPATIENT)
Dept: ONCOLOGY | Facility: CLINIC | Age: 44
End: 2024-05-20
Payer: COMMERCIAL

## 2024-05-20 ENCOUNTER — LAB (OUTPATIENT)
Dept: LAB | Facility: HOSPITAL | Age: 44
End: 2024-05-20
Payer: COMMERCIAL

## 2024-05-20 VITALS
WEIGHT: 216.8 LBS | RESPIRATION RATE: 16 BRPM | BODY MASS INDEX: 32.11 KG/M2 | HEIGHT: 69 IN | OXYGEN SATURATION: 96 % | DIASTOLIC BLOOD PRESSURE: 91 MMHG | TEMPERATURE: 98 F | HEART RATE: 95 BPM | SYSTOLIC BLOOD PRESSURE: 166 MMHG

## 2024-05-20 DIAGNOSIS — D70.8 BENIGN ETHNIC NEUTROPENIA: ICD-10-CM

## 2024-05-20 DIAGNOSIS — C20 RECTAL ADENOCARCINOMA: Primary | ICD-10-CM

## 2024-05-20 DIAGNOSIS — R79.89 ABNORMAL CBC: Primary | ICD-10-CM

## 2024-05-20 DIAGNOSIS — F41.9 ANXIETY: ICD-10-CM

## 2024-05-20 LAB
BASOPHILS # BLD AUTO: 0.01 10*3/MM3 (ref 0–0.2)
BASOPHILS NFR BLD AUTO: 0.3 % (ref 0–1.5)
DEPRECATED RDW RBC AUTO: 44 FL (ref 37–54)
EOSINOPHIL # BLD AUTO: 0.17 10*3/MM3 (ref 0–0.4)
EOSINOPHIL NFR BLD AUTO: 5.3 % (ref 0.3–6.2)
ERYTHROCYTE [DISTWIDTH] IN BLOOD BY AUTOMATED COUNT: 12.7 % (ref 12.3–15.4)
HCT VFR BLD AUTO: 40.4 % (ref 37.5–51)
HGB BLD-MCNC: 13.4 G/DL (ref 13–17.7)
IMM GRANULOCYTES # BLD AUTO: 0.03 10*3/MM3 (ref 0–0.05)
IMM GRANULOCYTES NFR BLD AUTO: 0.9 % (ref 0–0.5)
LYMPHOCYTES # BLD AUTO: 1.14 10*3/MM3 (ref 0.7–3.1)
LYMPHOCYTES NFR BLD AUTO: 35.4 % (ref 19.6–45.3)
MCH RBC QN AUTO: 31.1 PG (ref 26.6–33)
MCHC RBC AUTO-ENTMCNC: 33.2 G/DL (ref 31.5–35.7)
MCV RBC AUTO: 93.7 FL (ref 79–97)
MONOCYTES # BLD AUTO: 0.43 10*3/MM3 (ref 0.1–0.9)
MONOCYTES NFR BLD AUTO: 13.4 % (ref 5–12)
NEUTROPHILS NFR BLD AUTO: 1.44 10*3/MM3 (ref 1.7–7)
NEUTROPHILS NFR BLD AUTO: 44.7 % (ref 42.7–76)
NRBC BLD AUTO-RTO: 0 /100 WBC (ref 0–0.2)
PLATELET # BLD AUTO: 218 10*3/MM3 (ref 140–450)
PMV BLD AUTO: 10.6 FL (ref 6–12)
RBC # BLD AUTO: 4.31 10*6/MM3 (ref 4.14–5.8)
WBC NRBC COR # BLD AUTO: 3.22 10*3/MM3 (ref 3.4–10.8)

## 2024-05-20 PROCEDURE — 99204 OFFICE O/P NEW MOD 45 MIN: CPT | Performed by: STUDENT IN AN ORGANIZED HEALTH CARE EDUCATION/TRAINING PROGRAM

## 2024-05-20 PROCEDURE — 85025 COMPLETE CBC W/AUTO DIFF WBC: CPT

## 2024-05-20 PROCEDURE — 36415 COLL VENOUS BLD VENIPUNCTURE: CPT

## 2024-05-20 NOTE — DISCHARGE INSTRUCTIONS
POST OP RECOMMENDATIONS  Dr. See Isaac  [phone]  Discharge Instructions for Port Placement    Go home, rest and take it easy today; however, you should get up and move about several times today to reduce the risk of developing a blood clot in your legs.   You may experience some dizziness or memory loss from the anesthesia. This may last for the next 24 hours. Someone should plan on staying with you for the first 24 hours for your safety.   Do not make any important legal decisions or sign any legal papers for the next 24 hours.   Eat and drink lightly today. Start off with liquids, jello, soup, crackers or other bland foods at first. You may advance your diet tomorrow as tolerated as long as you do not experience any nausea or vomiting.   If present, you may remove your outer dressings in 3 days. The white tapes called steri-strips should stay in place. They will fall off on their own in 1-2 weeks. Do not worry if they come off sooner. Otherwise, glue covering your incisions will fall off in the next 1-2 weeks.  You may notice some bleeding/drainage on your outer dressings. A little bloody drainage is normal. If the bleeding/drainage is such that the bandage cannot absorb it, remove the dressing, apply clean gauze and apply firm pressure for a full 15 minutes. If the bleeding continues, please call me.   You may shower tomorrow. No tub baths until your incisions are completely healed.   You will have some pain and discomfort after surgery.  You will not be totally pain free, but tylenol and ibuprofen should make the pain more tolerable.  Unless you have been previously instructed to not take tylenol or ibuprofen, you can alternate these medications every 4 hours and take as instructed on each bottle. Please take any ibuprofen with food to avoid nausea and GI upset. If you are having severe pain that cannot be controlled by OTC pain medicine, please contact me.   You have also received a prescription for an  anti-nausea medicine. Please take this as prescribed for any nausea or vomiting. Nausea could be a result of the anesthesia. If you experience severe nausea and vomiting that cannot be controlled by the nausea medicine, please call me.   No driving for 24 hours and until you fell comfortable making sudden movements with your neck and arms.   If the port is to be used within 10-14 days of placement, no surgical follow-up is needed. Otherwise, you should call the office at [phone] to schedule a follow-up in about 1 week.   Remember to contact me for any of the following:   Fever > 101 degrees  Severe pain that cannot be controlled by taking your pain pills  Severe nausea or vomiting that cannot be controlled by taking your nausea pills  Significant bleeding of your incisions  Drainage that has a bad smell or is yellow or green in appearance  Any other questions or concerns

## 2024-05-20 NOTE — PAT
[facility] AA Review: Okay to proceed with scheduled surgery on 5/24/24-no EKG needed per Dr. Moe.     No answer on attempt to contact patient,  for patient with the following information:    - Nothing to eat after midnight the night before your procedure, water and black coffee okay up to 2 hours before arrival time.  - You will need to have someone drive you home after your PROCEDURE and remain with you for 24 hours after the PROCEDURE  - The date of your PROCEDURE, your ride will need to remain on site at [facility].  - Remove all jewelry and leave any valuables at home before arriving on the date of your procedure. Only bring your ID and insurance card.  - If you use tobacco, do not use on the day of surgery.   - You will need to arrive at 0900 on 5/24/24 for your PROCEDURE at [facility] located at [address] [place] [address], [address], [address].  -Please be aware that arrival times may be subject to change up until the day of surgery.   - Please contact [facility] PREOP at: [phone]  to confirm your medical history and home medications.

## 2024-05-21 NOTE — PRE-PROCEDURE NOTE
Patient returned call. Patient instructed to arrive at 9:00 AM on 5/24/24. Instructed nothing to eat or drink after midnight the night before his procedure. Patient instructed not to take hydrochlorothiazide the morning of his procedure. Verbalized understanding.

## 2024-05-23 ENCOUNTER — PATIENT OUTREACH (OUTPATIENT)
Dept: OTHER | Facility: HOSPITAL | Age: 44
End: 2024-05-23
Payer: COMMERCIAL

## 2024-05-23 DIAGNOSIS — I10 ESSENTIAL HYPERTENSION: ICD-10-CM

## 2024-05-23 RX ORDER — HYDROCHLOROTHIAZIDE 12.5 MG/1
12.5 TABLET ORAL DAILY
Qty: 30 TABLET | Refills: 5 | Status: SHIPPED | OUTPATIENT
Start: 2024-05-23

## 2024-05-23 NOTE — PROGRESS NOTES
Nurse Initial Navigator Assessment: Received referral from Dr. Isaac to follow patient regarding diagnosis and psychosocial support. Call placed to patient to complete initial assessment. Introduced self and explained role. Left voice message. Awaiting call back. Patient has new diagnosis of rectal cancer. Packet mailed to patient's house. Patient does not have an advanced directive on file. Provided navigation letter; Cancer Care Supportive Services; NCCN patient guidelines for rectal cancer; advanced care planning; [employer]; [employer] and online resources. No additional needs noted at this time. Encouraged patient to call with any questions or concerns. Continue to follow.…Clementine Tian RN, Oncology Nurse Navigator

## 2024-05-24 ENCOUNTER — ANESTHESIA EVENT (OUTPATIENT)
Age: 44
End: 2024-05-24
Payer: COMMERCIAL

## 2024-05-24 ENCOUNTER — ANESTHESIA (OUTPATIENT)
Age: 44
End: 2024-05-24
Payer: COMMERCIAL

## 2024-05-24 ENCOUNTER — APPOINTMENT (OUTPATIENT)
Age: 44
End: 2024-05-24
Payer: COMMERCIAL

## 2024-05-24 ENCOUNTER — HOSPITAL ENCOUNTER (OUTPATIENT)
Age: 44
Setting detail: HOSPITAL OUTPATIENT SURGERY
Discharge: HOME | End: 2024-05-24
Attending: SURGERY | Admitting: SURGERY
Payer: COMMERCIAL

## 2024-05-24 VITALS
SYSTOLIC BLOOD PRESSURE: 142 MMHG | RESPIRATION RATE: 15 BRPM | HEART RATE: 79 BPM | TEMPERATURE: 97.9 F | DIASTOLIC BLOOD PRESSURE: 96 MMHG | OXYGEN SATURATION: 100 %

## 2024-05-24 DIAGNOSIS — C20 RECTAL ADENOCARCINOMA: ICD-10-CM

## 2024-05-24 PROCEDURE — 25010000002 PROPOFOL 10 MG/ML EMULSION

## 2024-05-24 PROCEDURE — 36561 INSERT TUNNELED CV CATH: CPT | Performed by: SURGERY

## 2024-05-24 PROCEDURE — 25810000003 LACTATED RINGERS PER 1000 ML: Performed by: ANESTHESIOLOGY

## 2024-05-24 PROCEDURE — 25010000002 CEFOXITIN PER 1 G: Performed by: SURGERY

## 2024-05-24 PROCEDURE — 25010000002 HEPARIN (PORCINE) PER 1000 UNITS: Performed by: SURGERY

## 2024-05-24 PROCEDURE — 25010000002 GLYCOPYRROLATE 1 MG/5ML SOLUTION

## 2024-05-24 PROCEDURE — 77001 FLUOROGUIDE FOR VEIN DEVICE: CPT | Performed by: SURGERY

## 2024-05-24 PROCEDURE — 76937 US GUIDE VASCULAR ACCESS: CPT | Performed by: SURGERY

## 2024-05-24 PROCEDURE — C1788 PORT, INDWELLING, IMP: HCPCS | Performed by: SURGERY

## 2024-05-24 DEVICE — SMARTPORT PLASTIC LOW PROFILE PORT WITH VORTEX TECHNOLOGY
Type: IMPLANTABLE DEVICE | Site: CLAVICLE | Status: FUNCTIONAL
Brand: BIOFLO VORTEX

## 2024-05-24 RX ORDER — GLYCOPYRROLATE 0.2 MG/ML
INJECTION INTRAMUSCULAR; INTRAVENOUS AS NEEDED
Status: DISCONTINUED | OUTPATIENT
Start: 2024-05-24 | End: 2024-05-24 | Stop reason: SURG

## 2024-05-24 RX ORDER — SODIUM CHLORIDE, SODIUM LACTATE, POTASSIUM CHLORIDE, CALCIUM CHLORIDE 600; 310; 30; 20 MG/100ML; MG/100ML; MG/100ML; MG/100ML
9 INJECTION, SOLUTION INTRAVENOUS CONTINUOUS
Status: DISCONTINUED | OUTPATIENT
Start: 2024-05-24 | End: 2024-05-24 | Stop reason: HOSPADM

## 2024-05-24 RX ORDER — ACETAMINOPHEN 500 MG
1000 TABLET ORAL ONCE
Status: COMPLETED | OUTPATIENT
Start: 2024-05-24 | End: 2024-05-24

## 2024-05-24 RX ORDER — HYDROMORPHONE HYDROCHLORIDE 1 MG/ML
0.5 INJECTION, SOLUTION INTRAMUSCULAR; INTRAVENOUS; SUBCUTANEOUS
Status: DISCONTINUED | OUTPATIENT
Start: 2024-05-24 | End: 2024-05-24 | Stop reason: HOSPADM

## 2024-05-24 RX ORDER — LIDOCAINE HYDROCHLORIDE 20 MG/ML
INJECTION, SOLUTION INFILTRATION; PERINEURAL AS NEEDED
Status: DISCONTINUED | OUTPATIENT
Start: 2024-05-24 | End: 2024-05-24 | Stop reason: SURG

## 2024-05-24 RX ORDER — ONDANSETRON 2 MG/ML
4 INJECTION INTRAMUSCULAR; INTRAVENOUS ONCE AS NEEDED
Status: DISCONTINUED | OUTPATIENT
Start: 2024-05-24 | End: 2024-05-24 | Stop reason: HOSPADM

## 2024-05-24 RX ORDER — HYDRALAZINE HYDROCHLORIDE 20 MG/ML
5 INJECTION INTRAMUSCULAR; INTRAVENOUS
Status: DISCONTINUED | OUTPATIENT
Start: 2024-05-24 | End: 2024-05-24 | Stop reason: HOSPADM

## 2024-05-24 RX ORDER — FENTANYL CITRATE 50 UG/ML
50 INJECTION, SOLUTION INTRAMUSCULAR; INTRAVENOUS
Status: DISCONTINUED | OUTPATIENT
Start: 2024-05-24 | End: 2024-05-24 | Stop reason: HOSPADM

## 2024-05-24 RX ORDER — DIPHENHYDRAMINE HYDROCHLORIDE 50 MG/ML
12.5 INJECTION INTRAMUSCULAR; INTRAVENOUS
Status: DISCONTINUED | OUTPATIENT
Start: 2024-05-24 | End: 2024-05-24 | Stop reason: HOSPADM

## 2024-05-24 RX ORDER — BUPIVACAINE HYDROCHLORIDE AND EPINEPHRINE 5; 5 MG/ML; UG/ML
INJECTION, SOLUTION EPIDURAL; INTRACAUDAL; PERINEURAL AS NEEDED
Status: DISCONTINUED | OUTPATIENT
Start: 2024-05-24 | End: 2024-05-24 | Stop reason: HOSPADM

## 2024-05-24 RX ORDER — MIDAZOLAM HYDROCHLORIDE 1 MG/ML
1 INJECTION INTRAMUSCULAR; INTRAVENOUS
Status: DISCONTINUED | OUTPATIENT
Start: 2024-05-24 | End: 2024-05-24 | Stop reason: HOSPADM

## 2024-05-24 RX ORDER — FAMOTIDINE 10 MG/ML
20 INJECTION, SOLUTION INTRAVENOUS ONCE
Status: COMPLETED | OUTPATIENT
Start: 2024-05-24 | End: 2024-05-24

## 2024-05-24 RX ORDER — SODIUM CHLORIDE 0.9 % (FLUSH) 0.9 %
3 SYRINGE (ML) INJECTION EVERY 12 HOURS SCHEDULED
Status: DISCONTINUED | OUTPATIENT
Start: 2024-05-24 | End: 2024-05-24 | Stop reason: HOSPADM

## 2024-05-24 RX ORDER — PROPOFOL 10 MG/ML
VIAL (ML) INTRAVENOUS AS NEEDED
Status: DISCONTINUED | OUTPATIENT
Start: 2024-05-24 | End: 2024-05-24 | Stop reason: SURG

## 2024-05-24 RX ORDER — HYDROCODONE BITARTRATE AND ACETAMINOPHEN 5; 325 MG/1; MG/1
1 TABLET ORAL ONCE AS NEEDED
Status: DISCONTINUED | OUTPATIENT
Start: 2024-05-24 | End: 2024-05-24 | Stop reason: HOSPADM

## 2024-05-24 RX ORDER — LABETALOL HYDROCHLORIDE 5 MG/ML
5 INJECTION, SOLUTION INTRAVENOUS
Status: DISCONTINUED | OUTPATIENT
Start: 2024-05-24 | End: 2024-05-24 | Stop reason: HOSPADM

## 2024-05-24 RX ORDER — SODIUM CHLORIDE 0.9 % (FLUSH) 0.9 %
3-10 SYRINGE (ML) INJECTION AS NEEDED
Status: DISCONTINUED | OUTPATIENT
Start: 2024-05-24 | End: 2024-05-24 | Stop reason: HOSPADM

## 2024-05-24 RX ORDER — DEXMEDETOMIDINE HYDROCHLORIDE 100 UG/ML
INJECTION, SOLUTION INTRAVENOUS AS NEEDED
Status: DISCONTINUED | OUTPATIENT
Start: 2024-05-24 | End: 2024-05-24 | Stop reason: SURG

## 2024-05-24 RX ADMIN — PROPOFOL 50 MG: 10 INJECTION, EMULSION INTRAVENOUS at 08:03

## 2024-05-24 RX ADMIN — FAMOTIDINE 20 MG: 10 INJECTION, SOLUTION INTRAVENOUS at 07:48

## 2024-05-24 RX ADMIN — LIDOCAINE HYDROCHLORIDE 60 MG: 20 INJECTION, SOLUTION INFILTRATION; PERINEURAL at 08:03

## 2024-05-24 RX ADMIN — CEFOXITIN SODIUM 2 G: 2 POWDER, FOR SOLUTION INTRAVENOUS at 07:42

## 2024-05-24 RX ADMIN — SODIUM CHLORIDE, POTASSIUM CHLORIDE, SODIUM LACTATE AND CALCIUM CHLORIDE 9 ML/HR: 600; 310; 30; 20 INJECTION, SOLUTION INTRAVENOUS at 07:48

## 2024-05-24 RX ADMIN — PROPOFOL 150 MCG/KG/MIN: 10 INJECTION, EMULSION INTRAVENOUS at 08:03

## 2024-05-24 RX ADMIN — DEXMEDETOMIDINE HCL 10 MCG: 100 INJECTION INTRAVENOUS at 08:03

## 2024-05-24 RX ADMIN — GLYCOPYRROLATE 0.1 MG: 0.2 INJECTION INTRAMUSCULAR; INTRAVENOUS at 08:03

## 2024-05-24 RX ADMIN — ACETAMINOPHEN 1000 MG: 500 TABLET ORAL at 07:47

## 2024-05-24 NOTE — ANESTHESIA POSTPROCEDURE EVALUATION
Patient: Jc Brannon Jr.    Procedure Summary       Date: 05/24/24 Room / Location: [facility] ASC OR 03 / [facility] MAIN OR    Anesthesia Start: 0759 Anesthesia Stop: 0849    Procedure: INSERTION VENOUS ACCESS DEVICE Diagnosis:       Rectal adenocarcinoma      (Rectal adenocarcinoma [C20])    Surgeons: Ori Isaac MD Provider: Luan Morales MD    Anesthesia Type: MAC ASA Status: 3            Anesthesia Type: MAC    Vitals  Vitals Value Taken Time   /96 05/24/24 0916   Temp 36.6 °C (97.9 °F) 05/24/24 0849   Pulse 78 05/24/24 0928   Resp 15 05/24/24 0902   SpO2 99 % 05/24/24 0928   Vitals shown include unfiled device data.        Post Anesthesia Care and Evaluation    Patient location during evaluation: bedside  Patient participation: complete - patient participated  Level of consciousness: awake  Pain management: adequate    Airway patency: patent  Anesthetic complications: No anesthetic complications    Cardiovascular status: acceptable  Respiratory status: acceptable  Hydration status: acceptable    Comments: */96   Pulse 79   Temp 36.6 °C (97.9 °F) (Temporal)   Resp 15   SpO2 100%

## 2024-05-24 NOTE — OP NOTE
Colorectal & General Surgery  Operative Report    PREOPERATIVE DIAGNOSIS:  Rectal adenocarcinoma    POSTOPERATIVE DIAGNOSIS:  Same    PROCEDURE:  Powerport placement with fluoroscopic guidance  Ultrasound guided access of the right internal jugular vein    DATE OF PROCEDURE:   05/24/24     SURGEON:  See Isaac MD    ASSISTANT:  None    ANESTHESIA:  MAC    EBL:  Minimal    SPECIMEN:  None    DESCRIPTION:  All risks, benefits, and alternatives were explained and informed consent was obtained.  The patient was taken to the operating room under the care of the nursing staff.  The patient was placed on the operating room table in the supine position where anesthesia was induced.  The patient was then prepped and draped in the usual sterile fashion.  Local anesthesic was administered.  The right internal jugular vein was accessed with an 18-gauge needle under ultrasound guiddance, and a guidewire was inserted under fluoroscopic guidance into the superior vena cava.  A subcutaneous pocket was then created with electrocautery on the right chest wall. The port was placed in the pocket and secured in two points with 2-0 PDS. The tubing was then tunneled from the subcutaneous pocket to the location of the wire. The vein dilator and sheath were introduced, and the dilator and guidewire were removed.  The port tubing was inserted to the cavoatrial junction, and position of tip was confirmed with fluoroscopic guidance.  Venous blood was easily aspirated from the port, and the port was flushed with heparinized saline. There was good hemostasis noted. The skin was then closed with 3-0 Vicryl deep dermal and 4-0 monocryl subcuticular sutures. Dermabond was placed over the wound. The patient tolerated the procedure well and was transferred to the recovery area in stable condition. Recovery room CXR was ordered to confirm placement.    See Isaac M.D.  Colorectal & General Surgery  [facility] [address], [address]  [place], [place], [address]  P: [phone]  F: [phone]

## 2024-05-24 NOTE — ANESTHESIA PREPROCEDURE EVALUATION
Anesthesia Evaluation     no history of anesthetic complications:   NPO Solid Status: > 8 hours  NPO Liquid Status: > 2 hours           Airway   Mallampati: II  Neck ROM: full  no difficulty expected  Dental - normal exam     Pulmonary - negative pulmonary ROS and normal exam   (-) COPD, asthma, sleep apnea, not a smoker    PE comment: nonlabored  Cardiovascular - normal exam  Exercise tolerance: good (4-7 METS)    Rhythm: regular  Rate: normal    (+) hypertension, hyperlipidemia  (-) valvular problems/murmurs, past MI, CAD, dysrhythmias, angina      Neuro/Psych- negative ROS  (-) seizures, TIA, CVA  GI/Hepatic/Renal/Endo - negative ROS   (-) GERD, liver disease, no renal disease, diabetes, no thyroid disorder    Musculoskeletal (-) negative ROS    Abdominal    Substance History      OB/GYN          Other   blood dyscrasia anemia,   history of cancer (rectal cancer)    ROS/Med Hx Other: Rectal adenocarcinoma [C20]              Anesthesia Plan    ASA 3     MAC       Anesthetic plan, risks, benefits, and alternatives have been provided, discussed and informed consent has been obtained with: patient.    CODE STATUS:

## 2024-05-24 NOTE — LETTER
May 24, 2024     Patient: Jc Brannon .   YOB: 1980   Date of Visit: 5/15/2024       To Whom It May Concern:    It is my medical opinion that Jc Brannon may return to work on 05/29/2024 .           Sincerely,          Maegan VAIL RN

## 2024-05-28 ENCOUNTER — TELEPHONE (OUTPATIENT)
Dept: ONCOLOGY | Facility: CLINIC | Age: 44
End: 2024-05-28
Payer: COMMERCIAL

## 2024-05-28 ENCOUNTER — TELEPHONE (OUTPATIENT)
Dept: SURGERY | Facility: CLINIC | Age: 44
End: 2024-05-28

## 2024-05-28 NOTE — TELEPHONE ENCOUNTER
Caller: Jc Brannon Jr.    Relationship: Self    Best call back number: [phone] (home)       What form or medical record are you requesting: FMLA, COMPLETE HEALTHCARE PROVIDER FORM    Who is requesting this form or medical record from you: [employer] JENNIFER HINOJOSA    How would you like to receive the form or medical records (pick-up, mail, fax): FAX  If fax, what is the fax number: [phone]  If mail, what is the address:   If pick-up, provide patient with address and location details    Timeframe paperwork needed: ASAP BUT NEEDS PAPERWORK SENT BEFORE 6/10/24

## 2024-05-28 NOTE — TELEPHONE ENCOUNTER
Caller: Jc Brannon Jr.    Relationship: Self    Best call back number: [phone]    What was the call regarding: JC CALLED TO SPEAK WITH DR. CASTRO. HE DID NOT SAY WHAT IT WAS REGARDING, BUT REQUESTS THAT DR. CASTRO HERSELF CALL HIM BACK.

## 2024-05-28 NOTE — TELEPHONE ENCOUNTER
Caller: Jc Brannon Jr.    Relationship: Self    Best call back number: [phone]     What is the best time to reach you: ASAP    Who are you requesting to speak with (clinical staff, provider,  specific staff member): SCHEDULING        What was the call regarding: PATIENT IS ASKING TO SPEAK TO SOMEONE TO FIND OUT WHY HIS APPT WITH DR CASTRO AND CHEMO NEEDED TO BE MOVED TO 6/3.  PLEASE CALL TO ADVISE.  HE IS CONCERNED, HE THOUGHT CHEMO NEEDED TO START 5/30 AND HAD ARRANGED TO BE OFF WORK.

## 2024-05-29 ENCOUNTER — PATIENT OUTREACH (OUTPATIENT)
Dept: OTHER | Facility: HOSPITAL | Age: 44
End: 2024-05-29
Payer: COMMERCIAL

## 2024-05-29 ENCOUNTER — TELEPHONE (OUTPATIENT)
Dept: PSYCHIATRY | Facility: HOSPITAL | Age: 44
End: 2024-05-29

## 2024-05-29 ENCOUNTER — OFFICE VISIT (OUTPATIENT)
Dept: ONCOLOGY | Facility: CLINIC | Age: 44
End: 2024-05-29
Payer: COMMERCIAL

## 2024-05-29 VITALS
WEIGHT: 218.1 LBS | BODY MASS INDEX: 32.3 KG/M2 | OXYGEN SATURATION: 97 % | SYSTOLIC BLOOD PRESSURE: 157 MMHG | HEIGHT: 69 IN | DIASTOLIC BLOOD PRESSURE: 94 MMHG | RESPIRATION RATE: 18 BRPM | TEMPERATURE: 98.5 F | HEART RATE: 86 BPM

## 2024-05-29 DIAGNOSIS — C20 RECTAL ADENOCARCINOMA: Primary | ICD-10-CM

## 2024-05-29 RX ORDER — ONDANSETRON HYDROCHLORIDE 8 MG/1
8 TABLET, FILM COATED ORAL 3 TIMES DAILY PRN
Qty: 30 TABLET | Refills: 5 | Status: SHIPPED | OUTPATIENT
Start: 2024-05-29

## 2024-05-29 RX ORDER — OLANZAPINE 5 MG/1
TABLET ORAL
Qty: 18 TABLET | Refills: 0 | Status: SHIPPED | OUTPATIENT
Start: 2024-05-29

## 2024-05-29 RX ORDER — PROCHLORPERAZINE MALEATE 10 MG
10 TABLET ORAL EVERY 6 HOURS PRN
Qty: 30 TABLET | Refills: 5 | Status: SHIPPED | OUTPATIENT
Start: 2024-05-29

## 2024-05-29 RX ORDER — LIDOCAINE AND PRILOCAINE 25; 25 MG/G; MG/G
CREAM TOPICAL
Qty: 30 G | Refills: 3 | Status: SHIPPED | OUTPATIENT
Start: 2024-05-29

## 2024-05-29 RX ORDER — LOPERAMIDE HYDROCHLORIDE 2 MG/1
TABLET ORAL
Qty: 30 TABLET | Refills: 5 | Status: SHIPPED | OUTPATIENT
Start: 2024-05-29

## 2024-05-29 NOTE — PROGRESS NOTES
TREATMENT  PREPARATION    Jc Brannon Jr.  [id number]  1980    Chief Complaint: Treatment preparation and needs assessment    History of present illness:  Jc Brannon Jr. is a 43 y.o. year old male who is here today for treatment preparation and needs assessment.  The patient has been diagnosed with   Encounter Diagnosis   Name Primary?    Rectal adenocarcinoma Yes    and is scheduled to begin treatment with:     Oncology History:    Oncology/Hematology History   Rectal adenocarcinoma   5/2/2024 Cancer Staged    Staging form: Colon And Rectum, AJCC 8th Edition  - Clinical stage from 5/2/2024: cT3, cN2, cM0 - Signed by Sonali Pak MD on 5/20/2024     5/3/2024 Initial Diagnosis    Rectal adenocarcinoma     6/3/2024 -  Chemotherapy    OP COLORECTAL FOLFIRINOX (Fluorouracil cont. Infusion / Leucovorin / OXALIplatin / Irinotecan)         The current medication list and allergy list were reviewed and reconciled.     Past Medical History, Past Surgical History, Social History, Family History have been reviewed and are without significant changes except as mentioned.    Physical Exam:    Vitals:    05/29/24 1145   BP: 157/94   Pulse: 86   Resp: 18   Temp: 98.5 °F (36.9 °C)   SpO2: 97%     Vitals:    05/29/24 1145   PainSc: 0-No pain        ECOG score: 0             Physical Exam  HENT:      Head: Normocephalic and atraumatic.   Eyes:      Extraocular Movements: Extraocular movements intact.      Conjunctiva/sclera: Conjunctivae normal.   Pulmonary:      Effort: Pulmonary effort is normal. No respiratory distress.   Neurological:      General: No focal deficit present.      Mental Status: He is alert and oriented to person, place, and time.   Psychiatric:         Mood and Affect: Mood normal.         Behavior: Behavior normal.           NEEDS ASSESSMENTS    Genetics  The patient's new diagnosis and family history have been reviewed for genetic counseling needs. The patient will be referred..     Psychosocial  and Barriers to care  The patient has completed a PHQ-9 Depression Screening and the Distress Thermometer (DT) today.  PHQ-9 results show PHQ-2 Total Score:   PHQ-9 Total Score: PHQ-9 Total Score:       The patient scored their distress today as Distress Level: 0-No distress on a scale of 0-10 with 0 being no distress and 10 being extreme distress. Problems marked by the patient as being an issue for them within the last week include   .      Results were reviewed along with psychosocial resources offered by our cancer center.  Our Supportive Oncology team will be flagged for a score of 4 or above, and a same day call will be made for a score of 9 or 10.  A mental health referral is offered at that time. Patients who score less than 4 have been educated on our support services and can be referred to our  upon request.  The patient has already been be referred to our .       Nutrition  The patient has completed the malnutrition screening today. They scored Malnutrition Screening Tool  Have you recently lost weight without trying?  If yes, how much weight have you lost?: 0--> No  Have you been eating poorly because of a decreased appetite?: 0--> No  MST score: 0   with a score of 0-1 meaning not at risk in a score of 2 or greater meaning at risk.  Patients with a score of 3 or higher will be referred to our oncology dietitian for support. Patients beginning at risk treatment regimens or who have dietary concerns will also be referred to our oncology dietitian. The patient will be referred.    Functional Assessment  Persons who are age 70 or greater will be screened for qualification of a comprehensive geriatric assessment by our survivorship nurse practitioner.  Older adults with cancer face unique challenges. These may include an increased risk of drug reactions, financial burdens, and caregiver stress. The patient scored   . Patients scoring 14 or lower will referred for an older adult  "functional assessment with the survivorship advanced practice registered nurse to ensure all needed support is provided as patients plan for their treatments. NOT APPLICABLE    Intravenous Access Assessment  The patient and I discussed planned intravenous chemo/biotherapy as well as other IV treatments that are often needed throughout the course of treatment. These may include, but are not limited to blood transfusions, antibiotics, and IV hydration. Discussed that depending on selected treatment and vein assessment, patient may require venous access device (VAD) which could include but not limited to a Mediport or PICC line. Risks and benefits of VADs reviewed. The patient will be treated via Port.    Reproductive/Sexual Activity   People should avoid becoming pregnant and should not get a partner pregnant while undergoing chemo/biotherapy.  People of childbearing age should use effective contraception during active therapy. The best recommendation for all people is to use a barrier method for a minimum of 1 week after the last infusion of chemo/biotherapy to prevent your partner being exposed to byproducts from treatment medications in bodily fluids. Effective contraception should be discussed with your oncology team to make sure it is safe to take based on your diagnosis. Possible options include oral contraceptives, barrier methods. Chemo/biotherapy can change your ability to reproduce children in the future.  There are options for fertility preservation. NOT APPLICABLE    Advanced Care Planning  Advance Care Planning   The patient and I discussed advanced care planning, \"Conversations that Matter\".   This service is offered for development of advance directives with a certified ACP facilitator.  The patient does not have an up-to-date advanced directive. This document is not on file with our office. The patient is not interested in an appointment with one of our facilitators to create or update their advanced " directives.    Have you reviewed your Advance Directive and is it valid for this stay?: No  Patient Requests Assistance on Advance Directives: Patient Declined          Smoking cessation  Tobacco Use: Low Risk  (5/29/2024)    Patient History     Smoking Tobacco Use: Never     Smokeless Tobacco Use: Never     Passive Exposure: Never       Patient and I discussed their tobacco use history. Referral will not be made for smoking cessation.      Palliative Care  When appropriate, the patient and I discussed the availability palliative care services and when appropriate Hospice care. Palliative care is not the same as Hospice care which was explained to the patient.NOT APPLICABLE.    Survivorship   When appropriate, we discussed that we will refer the patient to survivorship clinic to discuss next steps following completion of planned treatment.  Reviewed this visit will include assessment of your physical, psychological, functional, and spiritual needs as a survivor and the need at attend this visit when scheduled.    TREATMENT EDUCATION    Today I met with the patient to discuss the chemo/biotherapy regimen recommended for treatment of Rectal adenocarcinoma  .  The patient was given explanation of treatment premed side effects including office policy that prohibits patients to drive if sedating medications are administered, MD explanation given regarding benefits, side effects, toxicities and goals of treatment.  The patient received a Chemotherapy/Biotherapy Plan Summary including diagnosis and explanation of specific treatment plan.    SIDE EFFECTS:  Common side effects were discussed with the patient and/or significant other.  Discussion included where applicable hair loss/discoloration, anemia/fatigue, infection/chills/fever, appetite, bleeding risk/precautions, constipation, diarrhea, mouth sores, taste alteration, loss of appetite, nausea/vomiting, peripheral neuropathy, skin/nail changes, rash, muscle  aches/weakness, photosensitivity, weight gain/loss, hearing loss, dizziness, menopausal symptoms, menstrual irregularity, sterility, high blood pressure, heart damage, liver damage, lung damage, kidney damage, DVT/PE risk, fluid retention, pleural/pericardial effusion, somnolence, electrolyte/LFT imbalance, vein exercises and/or the possible need for vascular access/port placement.  The patient was advised that although uncommon, leakage of an infused medication from the vein or venous access device may lead to skin breakdown and/or other tissue damage.  The patient was advised that he/she may have pain, bleeding, and/or bruising from the insertion of a needle in their vein or venous access device (port).  The patient was further advised that, in spite of proper technique, infection with redness and irritation may rarely occur at the site where the needle was inserted.  The patient was advised that if complications occur, additional medical treatment is available.  Finally, where applicable we have reviewed rare but potential immune mediated side effects including shortness of breath, cough, chest pain (pneumonitis), abdominal pain, diarrhea (colitis), thyroiditis (hypothyroid or hyperthyroid), hepatitis and liver dysfunction, nephritis and renal dysfunction.    Discussion also included side effects specific to drugs in the treatment plan, specifically:    Treatment Plans       Name Type Hold Status Plan Dates Plan Provider       Active    OP COLORECTAL FOLFIRINOX (Fluorouracil cont. Infusion / Leucovorin / OXALIplatin / Irinotecan) ONCOLOGY TREATMENT On Automatic Hold  5/26/2024 - Present Sonali Pak MD                     Questions answered and additional information discussed on topics including:  Anemia, Thrombocytopenia, Neutropenia, Nutrition and appetite changes, Constipation, Diarrhea, Nausea & vomiting, Mouth sores, Alopecia, Nervous system changes, Skin & nail changes, Organ toxicities, and Home care        Assessment and Plan:    Diagnoses and all orders for this visit:    1. Rectal adenocarcinoma (Primary)      No orders of the defined types were placed in this encounter.        The patient and I have reviewed their diagnosis and scheduled treatment plan. Needs assessment was completed where applicable including genetics, psychosocial needs, barriers to care, VAD evaluation, advanced care planning, survivorship, and palliative care services where indicated. Referrals have been ordered as appropriate based upon evaluation today and patient desires.   Chemo/biotherapy teaching was completed today and consent obtained. See separate documentation for further details.  Adequate time was given to answer questions.  Patient made aware of their care team members and contact information if they have questions or problems throughout the treatment course.  Discussion held and written information provided describing frequency of office visits and ongoing monitoring throughout the treatment plan.     Reviewed with patient any prescribed medication sent to pharmacy.  Education provided regarding proper storage, safe handling, and proper disposal of unused medication.  Proper handling of body fluids and waste discussed and written information provided.  If appropriate, patient had pretreatment labs drawn today.    Learning assessment completed at initial patient encounter. See separate flowsheet. Chemo/biotherapy education comprehension assessed at today's visit.    I spent 80 minutes caring for Jc on this date of service. This time includes time spent by me in the following activities: preparing for the visit, reviewing tests, obtaining and/or reviewing a separately obtained history, counseling and educating the patient/family/caregiver, ordering medications, tests, or procedures, documenting information in the medical record, and care coordination.     Sofy Leonard, APRN   05/29/24

## 2024-05-29 NOTE — PROGRESS NOTES
[facility] Hematology/Oncology Treatment Plan Summary    Name: Jc Brannon Jr.  [facility]# [id number]  MD: Dr. Pak    Diagnosis:     ICD-10-CM ICD-9-CM   1. Rectal adenocarcinoma  C20 154.1     Stage: III      Goal of treatment: neoadjuvant    Treatment Medication(s):   5-FU  Irinotecan  Oxaliplatin  Leucovorin    Frequency: every 2 weeks    Number of cycles: likely 6 followed by chemo+radiation then surgery and possibly further chemotherapy after surgery.    Starting on: 6/3/2024    Items for home use: Senokot-S (for constipation), Milk of Magnesia (for constipation), Imodium AD (for diarrhea), Tylenol (for fever and/or pain), and Thermometer    Rx written for: [x] Nausea    [] Pre-Treatment   1.  Emla cream: Apply nickel size amount over Mediport site 30 minutes prior to access.  Do not rub in.  2.  Ondansetron 8 m tab every 8 hours as needed for nausea.  3.  Olanzapine 5 mg: Take 1 tablet nightly for 3 nights beginning the night of chemotherapy  4.  Compazine 10 m tab every 6 hours as needed for nausea (use this first in the initial 3 days following chemotherapy)  5.  Imodium 4 m-2 tabs every 2 hours as needed for diarrhea.    NOTE: You will receive a white blood cell booster shot called Neulasta.  This could cause bone pain.  Taking Claritin can possibly help with the pain.    Completing Provider: WIL Fuller           Date/time: 2024      Please note: You will be seen by a provider frequently with your treatment plan. This plan may change depending on many factors, if so, this will be discussed with you by your physician.  Last update 2022.]

## 2024-05-29 NOTE — TELEPHONE ENCOUNTER
Patient had an appt today 5- with Jennie DE LA TORRE with supportive onc.Patient did not show.  Called patient regarding missed appt.  Patient gave phone to his father unaware of the appt.  Patient has an appt today for chemo education will stop by and reschedule./SP

## 2024-05-29 NOTE — TELEPHONE ENCOUNTER
Called the patient this a.m.  He wanted to confirm if his chemotherapy on Monday, and was wondering why it was postponed from 5/30/2024.  Explained to him that 5-FU pump needs to be disconnected after 48 hours, and usually not done over the weekend, which is why his chemo has been rescheduled for Monday.  Patient voiced understanding and was in agreement.

## 2024-05-31 ENCOUNTER — OFFICE VISIT (OUTPATIENT)
Dept: PSYCHIATRY | Facility: HOSPITAL | Age: 44
End: 2024-05-31
Payer: COMMERCIAL

## 2024-05-31 DIAGNOSIS — F43.23 ADJUSTMENT DISORDER WITH MIXED ANXIETY AND DEPRESSED MOOD: Primary | ICD-10-CM

## 2024-05-31 LAB
Lab: NORMAL
Lab: NORMAL

## 2024-05-31 PROCEDURE — 90792 PSYCH DIAG EVAL W/MED SRVCS: CPT | Performed by: NURSE PRACTITIONER

## 2024-05-31 RX ORDER — GABAPENTIN 100 MG/1
100 CAPSULE ORAL 3 TIMES DAILY
Qty: 90 CAPSULE | Refills: 2 | Status: SHIPPED | OUTPATIENT
Start: 2024-05-31 | End: 2025-05-31

## 2024-05-31 NOTE — PROGRESS NOTES
In Person  Provider Location: [facility]    Chief Complaint: Anxiety    Subjective  Patient ID: Jc Brannon Jr. is a 43 y.o. male who presents for initial consultation through the [facility] at the request of WIL Cary     PHQ-9 Total Score: 6   Over the last two weeks, how often have you been bothered by the following problems?  Feeling nervous, anxious or on edge: Several days  Not being able to stop or control worrying: Several days  Worrying too much about different things: Several days  Trouble Relaxing: More than half the days  Being so restless that it is hard to sit still: Not at all  Becoming easily annoyed or irritable: Not at all  Feeling afraid as if something awful might happen: Several days  POLI 7 Total Score: 6    HPI:  Pt is seen for initial consultation in the behavioral oncology clinic alongside new diagnosis of rectal adenocarcinoma (T3N2M0) in preparation of FOLFIRI. Pt is seen alongside his father with his permission.Clementine Tian, nurse navigator, also joins session with patient permission.    Pt reports hx of colonoscopy due to early colon cancer in family, unsuspecting of cancerous findings. Since this time, reports excessive anxiety, funbling wade things in the kitchen, fearful of death with existential distress, sleeplessness. Despite rumination, pt reports ability to fall asleep when tired, stay asleep. To bed 7-8, up at 4 AM. Energy and appetite acceptable. Minimal weight loss. Denies GI symptoms. Mood is described as happy and ready to initiate treatment. Reports concern for losing hair. Wants to preserve life. Ready to do what he needs to do.     Behavioral health history reviewed; pt reports situational anxiety, difficulty managing the unknown. Recognizes these are both contributing to current distress. Did have injury at work, undergoign some counseling for anxiety following this. Has seen a  psychiatrist in the past with diagnosis of ADHD. Unsure if ever on medication for this, stating his mother was opposed. Otherwise confirms some challenges with learning in elementary years, greatly assisted by transition to specialized school. 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 Pt lives, drives, and functions independently. Appreciates close support of father, who also highlights various strengths in patient. Both acknowledge intrusive anxiety during visit today, although father states this is not baseline presentation nor has it been patient norm as of recently. Feels this is exacerbated during this visit specifically.    Pt reports liking sports, goes to [employer] on weekends.  Currently feels hopeful regarding diagnosis and treatment plan while endorsing persistent existential distress. Studies Welsh. Like sto go to library, research things, read bible, go out with close friend, work, stay busy, go to flea market    Social History  Marital Status: Not   Children: No children  Support Community: Father, present for apt,  Religious: [employer], regular participation  Highest Level of Education: Some college  Career: [employer]; still working 7-3. 14 years  Tobacco Use: Denied  Alcohol Use: does not drink  Marijuana/ Other drug Use: denied.  Lives by self, manages finances, drives independently    Medical History  Psychiatric History: outpatient, ADHD, benefited by counseling. Unsure if every on medication for this.   Does repoert familial tremor, not noticed in this visit, worsened by stress.    Family History  Family Psychiatric History: Cousin with ADHD  Family Cancer History: Father with prostate cancer, paternal uncles with colon and other cancers; pt shares seeing various loved ones die of cancer.    The following portions of the patient's history were reviewed and updated as appropriate: He  has a past medical history of Anemia, Hyperlipidemia,  Hypertension, Mental disability without special needs, and Rectal adenocarcinoma (2024).    He  has a past surgical history that includes Leg Surgery (Left); Colonoscopy (N/A, 4/23/2024); and Venous Access Device (Port) (N/A, 5/24/2024).  His family history includes Cancer in his father and mother; Glaucoma in his father and paternal grandfather; Heart disease in his maternal aunt; Hypertension in his maternal aunt and maternal uncle.  He  reports that he has never smoked. He has never been exposed to tobacco smoke. He has never used smokeless tobacco. He reports that he does not drink alcohol and does not use drugs.  Current Outpatient Medications   Medication Sig Dispense Refill    gabapentin (Neurontin) 100 MG capsule Take 1 capsule by mouth 3 (Three) Times a Day. 90 capsule 2    hydroCHLOROthiazide 12.5 MG tablet Take 1 tablet by mouth Daily. 30 tablet 5    lidocaine-prilocaine (EMLA) 2.5-2.5 % cream Apply nickel sized amount over Mediport site 30 minutes prior to access.  Do not rub in. 30 g 3    loperamide (IMODIUM A-D) 2 MG tablet Take 2 tablets (4 mg) at the onset of diarrhea and 2 mg every 2 hours as needed to a max of 16 mg/day (or until patient has been diarrhea free for 12 hours) 30 tablet 5    OLANZapine (zyPREXA) 5 MG tablet Take one tablet every night for 3 days following chemotherapy 18 tablet 0    ondansetron (ZOFRAN) 8 MG tablet Take 1 tablet by mouth 3 (Three) Times a Day As Needed for Nausea or Vomiting. 30 tablet 5    prochlorperazine (COMPAZINE) 10 MG tablet Take 1 tablet by mouth Every 6 (Six) Hours As Needed for Nausea or Vomiting. 30 tablet 5     No current facility-administered medications for this visit.     Current Outpatient Medications on File Prior to Visit   Medication Sig    hydroCHLOROthiazide 12.5 MG tablet Take 1 tablet by mouth Daily.    lidocaine-prilocaine (EMLA) 2.5-2.5 % cream Apply nickel sized amount over Mediport site 30 minutes prior to access.  Do not rub in.     loperamide (IMODIUM A-D) 2 MG tablet Take 2 tablets (4 mg) at the onset of diarrhea and 2 mg every 2 hours as needed to a max of 16 mg/day (or until patient has been diarrhea free for 12 hours)    OLANZapine (zyPREXA) 5 MG tablet Take one tablet every night for 3 days following chemotherapy    ondansetron (ZOFRAN) 8 MG tablet Take 1 tablet by mouth 3 (Three) Times a Day As Needed for Nausea or Vomiting.    prochlorperazine (COMPAZINE) 10 MG tablet Take 1 tablet by mouth Every 6 (Six) Hours As Needed for Nausea or Vomiting.     No current facility-administered medications on file prior to visit.     He is allergic to latex..    Objective   Mental Status Exam  Appearance:  clean and casually dressed, appropriate  Attitude toward clinician:  cooperative and agreeable   Speech:    Rate:  rapid    Volume:  normal  Motor:  no abnormal movements present  Mood:  Worried, happy, ready to take action  Affect:  anxious and mood congruent  Thought Processes:  linear, logical, and goal directed  Thought Content:  normal  Suicidal Thoughts:  absent  Homicidal Thoughts:  absent  Perceptual Disturbance: no perceptual disturbance  Attention and Concentration:  good  Insight and Judgement:  good  Memory:  memory appears to be intact    Lab Review:   Lab on 05/20/2024   Component Date Value    WBC 05/20/2024 3.22 (L)     RBC 05/20/2024 4.31     Hemoglobin 05/20/2024 13.4     Hematocrit 05/20/2024 40.4     MCV 05/20/2024 93.7     MCH 05/20/2024 31.1     MCHC 05/20/2024 33.2     RDW 05/20/2024 12.7     RDW-SD 05/20/2024 44.0     MPV 05/20/2024 10.6     Platelets 05/20/2024 218     Neutrophil % 05/20/2024 44.7     Lymphocyte % 05/20/2024 35.4     Monocyte % 05/20/2024 13.4 (H)     Eosinophil % 05/20/2024 5.3     Basophil % 05/20/2024 0.3     Immature Grans % 05/20/2024 0.9 (H)     Neutrophils, Absolute 05/20/2024 1.44 (L)     Lymphocytes, Absolute 05/20/2024 1.14     Monocytes, Absolute 05/20/2024 0.43     Eosinophils, Absolute  05/20/2024 0.17     Basophils, Absolute 05/20/2024 0.01     Immature Grans, Absolute 05/20/2024 0.03     nRBC 05/20/2024 0.0    Office Visit on 05/03/2024   Component Date Value    REPORT SUMMARY 05/03/2024 NEGATIVE     FOOTNOTES 05/03/2024 See Notes     REPORT SUMMARY 05/03/2024 See Notes     FOOTNOTES 05/03/2024 See Notes    Hospital Outpatient Visit on 05/02/2024   Component Date Value    Creatinine 05/02/2024 1.10    Admission on 04/23/2024, Discharged on 04/23/2024   Component Date Value    Addendum 3 04/23/2024                      Value:This result contains rich text formatting which cannot be displayed here.    Addendum 2 04/23/2024                      Value:This result contains rich text formatting which cannot be displayed here.    Addendum 04/23/2024                      Value:This result contains rich text formatting which cannot be displayed here.    Case Report 04/23/2024                      Value:Surgical Pathology Report                         Case: [id number]                                  Authorizing Provider:  David Tena MD  Collected:           04/23/2024 11:14 AM          Ordering Location:     [facility]  Received:            04/23/2024 12:05 PM                                 ENDO SUITES                                                                  Pathologist:           Didi Villasenor MD                                                          Specimen:    Large Intestine, Rectum, mass                                                              Final Diagnosis 04/23/2024                      Value:This result contains rich text formatting which cannot be displayed here.    Comment 04/23/2024                      Value:This result contains rich text formatting which cannot be displayed here.    Gross Description 04/23/2024                      Value:This result contains rich text formatting which cannot be displayed here.   Labs reviewed    Plan of  Care  Pt with hx ADHD, current rumination, differential dx of POLI.  Will initaite gabapentin 100 mg tid. Questions and negative attributions regarding medications per father addressed at length.  Supported patient engagement as able.  Clementine to see patient on Monday during treatment.  FU scheduled with me in 2 weeks.    Diagnoses and all orders for this visit:    1. Adjustment disorder with mixed anxiety and depressed mood (Primary)  -     gabapentin (Neurontin) 100 MG capsule; Take 1 capsule by mouth 3 (Three) Times a Day.  Dispense: 90 capsule; Refill: 2    Differential diagnosis POLI, ADHD. May consider treatment in the future, specifically considering non stimulant for ADHD, although patient and father with negative attributions regarding medication mgmt at this time.

## 2024-05-31 NOTE — PROGRESS NOTES
Follow-up    SUBJECTIVE:    Interval history  Since his last clinic visit, he has had the chemo education visit as well as visit with supportive oncology.  He was prescribed gabapentin 100 mg 3 times daily for his anxiety.  He has not started it yet.  Father reports anxiety is currently okay.  They asked multiple times if starting gabapentin is a must.  He sprained left knee while walking, and has a bandage on it.  Had multiple questions regarding whether fan and air conditioning would be okay, and if it is okay for him to go into the refrigerator after chemotherapy.  Father had questions as to whether he would feel the chemotherapy going in his body.    HISTORY OF PRESENT ILLNESS:  The patient is a 43 y.o. year old male with recently diagnosed rectal adenocarcinoma who presents to our clinic to establish care.  Onc history is outlined below    Family history significant for prostate cancer in father at 63, paternal uncle with colon cancer at 57, other paternal uncle with metastatic cancer, type unknown in his 50s, other paternal uncle with possible colon cancer and DVT/PE in his 70s.    Oncology/Hematology History   Rectal adenocarcinoma   4/23/2024 Procedure         4/23/2024 [employer]         5/2/2024 Imaging    CT CAP    A 1.7 cm right hepatic cyst is noted. Numerous small subcentimeter liver  low densities are present that are too small to characterize, could be  cysts, or potentially metastatic disease, interval follow-up can  characterize change.    No bowel obstruction. The sigmorectal mass is better characterized on  the MRI exam of same date (please see separate report). Some stranding  is apparent in the fat around the lesion, numerous surrounding lymph  nodes are present that could be metastatic lymph nodes. For example, on  axial image 165, a 1.9 x 1.6 cm pericolonic lymph node is present. As  another example, on axial image 170, left perirectal lymph node measures  0.7 x 1.2  cm.    IMPRESSION:        1. Sigmorectal mass with numerous surrounding lymph nodes, raising  suspicion for metastatic lymph nodes.     2. Hepatic cyst, and numerous liver low densities that are too small to  characterize; these lesions could also be cysts, but in this clinical  setting, possibility of any metastatic liver lesions is not excluded.  Interval follow-up is advised to characterize change.     3. No pulmonary mass.     5/2/2024 Imaging    MRI Pelvis    FINDINGS: There is a circumferential solid low rectal mass which  measures approximately 4.5 cm in craniocaudad span and the inferior  margin is approximately 5 mm proximal to the anorectal junction.     The tumor extends approximately 8 mm into the mesorectal fat  predominantly along the right wall of the mass. There appears to be  extension to the mesorectal fascia along the right wall and abutment of  the right levator ani muscles. There are several enlarged perirectal  nodes and the largest is presacral measuring approximately 2.0 x 1.2 cm.  There are at least 4 enhancing suspicious nodes. There is no definite  extramural vascular invasion.     IMPRESSION:  1. Primary Tumor Location: Low rectal     2. MRI Stage: T3 N2 MRF positive     3. No definite sphincter involvement     5/2/2024 Cancer Staged    Staging form: Colon And Rectum, AJCC 8th Edition  - Clinical stage from 5/2/2024: cT3, cN2, cM0 - Signed by Sonali Pak MD on 6/3/2024     5/3/2024 Initial Diagnosis    Rectal adenocarcinoma     5/16/2024 Imaging    MRI abdo    IMPRESSION:  T2 hyperintense nonenhancing liver lesions are consistent with hepatic  cysts and biliary cystic hamartomas. No convincing liver metastasis  identified     5/24/2024 Procedure    PORT placement (Dr Isaac)     6/3/2024 -  Chemotherapy    OP COLORECTAL FOLFIRINOX (Fluorouracil cont. Infusion / Leucovorin / OXALIplatin / Irinotecan)         The current medication list and allergy list were reviewed and reconciled.     "  Past Medical History, Past Surgical History, Social History, Family History have been reviewed and are without significant changes except as mentioned.      REVIEW OF SYSTEMS:  Review of Systems   Constitutional:  Negative for activity change, appetite change, chills, fatigue, fever and unexpected weight change.   Cardiovascular:  Negative for chest pain, palpitations and leg swelling.   Gastrointestinal:  Negative for blood in stool, diarrhea, nausea and rectal pain.   Genitourinary:  Negative for hematuria.   Musculoskeletal: Negative.         Sprained  left knee, has bandage on it   Skin: Negative.    Neurological: Negative.    Hematological: Negative.    Psychiatric/Behavioral:          Has generalized anxiety disorder, and ADHD.       Objective:       Vitals:    06/03/24 0852   BP: 164/95   Pulse: 90   Resp: 18   Temp: 98.7 °F (37.1 °C)   TempSrc: Oral   SpO2: 98%   Weight: 99.5 kg (219 lb 6.4 oz)   Height: 175.3 cm (69.02\")   PainSc: 0-No pain           6/3/2024     8:52 AM   Current Status   ECOG score 0      PHYSICAL EXAM:    CONSTITUTIONAL:  Vital signs reviewed.  No distress, looks comfortable.  RESPIRATORY: Bilateral lungs clear to auscultation.  No wheezing or rhonchi   CARDIOVASCULAR: Normal S1-S2.  No murmur rubs or gallop GASTROINTESTINAL: Soft, nontender, bowel sounds present  NEURO:  No focal deficits.  Appears to have equal strength all 4 extremities.  SKIN:  Warm.  No rashes.     RECENT LABS:        WBC   Date Value Ref Range Status   06/03/2024 2.75 (L) 3.40 - 10.80 10*3/mm3 Final   05/20/2024 3.22 (L) 3.40 - 10.80 10*3/mm3 Final   06/09/2023 3.0 (L) 3.4 - 10.8 x10E3/uL Final   06/04/2022 3.0 (L) 3.4 - 10.8 x10E3/uL Final   05/25/2021 3.11 (L) 3.40 - 10.80 10*3/mm3 Final   05/26/2020 2.91 (L) 3.40 - 10.80 10*3/mm3 Final   02/12/2020 4.5 3.4 - 10.8 x10E3/uL Final   07/27/2019 2.6 (L) 3.4 - 10.8 x10E3/uL Final     Comment:     **Verified by repeat analysis**     Hemoglobin   Date Value Ref Range " Status   06/03/2024 12.9 (L) 13.0 - 17.7 g/dL Final   05/20/2024 13.4 13.0 - 17.7 g/dL Final   06/09/2023 14.3 13.0 - 17.7 g/dL Final   06/04/2022 14.1 13.0 - 17.7 g/dL Final   05/25/2021 14.0 13.0 - 17.7 g/dL Final   05/26/2020 13.7 13.0 - 17.7 g/dL Final   02/12/2020 13.5 13.0 - 17.7 g/dL Final   07/27/2019 13.8 13.0 - 17.7 g/dL Final     Platelets   Date Value Ref Range Status   06/03/2024 190 140 - 450 10*3/mm3 Final   05/20/2024 218 140 - 450 10*3/mm3 Final   06/09/2023 207 150 - 450 x10E3/uL Final   06/04/2022 230 150 - 450 x10E3/uL Final   05/25/2021 213 140 - 450 10*3/mm3 Final   05/26/2020 223 140 - 450 10*3/mm3 Final   02/12/2020 245 150 - 450 x10E3/uL Final   07/27/2019 249 150 - 450 x10E3/uL Final     Path  04/23/2024  Final Diagnosis  1. Rectum, mass, biopsy:  A. Invasive moderately differentiated adenocarcinoma (see comment).  Electronically signed by Didi Vilalsenor MD on 4/25/2024 at 1107  .  Comment  Definitive depth of invasion cannot be ascertained in this fragmented specimen. The block will be forwarded to [facility] lab for  MSI studies with those results to follow separately as an addendum.  Gross Description  1. Large Intestine, Rectum.  The specimen is received in formalin labeled with the patient's name and further designated 'rectal mass biopsy' are  multiple small fragments of gray-tan tissue. The specimen is submitted for embedding as received.              Assessment & Plan     *Rectal adenocarcinoma (cT3 cN2 cM0), LUCAS, TMB low  -4/23/2024 rectal biopsy-invasive moderately differentiated adenocarcinoma, LUCAS.  -5/2/2024 MRI pelvis: Approximately 4.5 cm circumferential low rectal mass, approximately 5 mm proximal to anorectal junction, extending 8 mm in the mesorectal fat.  Appears to be extension to MRF along the right wall and abutment of right levator ani muscles.  Several enlarged perirectal nodes, largest 2 x 1.2 cm.  At least 4 enhancing suspicious nodes.  cT3 cN2 MRF positive.  No  definitive sphincter involvement  - 5/2/2024 CT chest abdomen pelvis: 1.7 cm right hepatic cyst, and numerous low-density liver lesions-cyst versus possible metastatic lesions.  No lung mass  -5/16/2024 MRI abdomen: T2 hyperintense nonenhancing liver lesions consistent with liver cyst and biliary cystic hamartomas.  No evidence of liver mets  -Tumor genomic profile-APC (I269fs), APC ([id number]), FANCD2 (Q823), TP53. TMB 1 mut/Mb. LUCAS  - 6/3/2024 baseline CEA: 1.89  -Plan for total neoadjuvant chemotherapy with FOLFIRINOX.  - 6/3/2024: Cycle 1 day 1 FOLFIRINOX (5-FU bolus eliminated to prevent further myelosuppression given baseline neutropenia) with G-CSF support    *Benign ethnic neutropenia  - White cell count ranged between 2.6-3.2 since at least July 2019.    -6/3/2024 WBC 2.75, ANC 1.4  -G-CSF support with chemotherapy.  With discontinue 5-FU bolus to prevent myelosuppression    *Anemia  -6/3/2024 hemoglobin 12.9  -No active bleeding    *Anxiety  -Prescribed Gabapentin 100 TID by Supportive Oncology  -Father reports anxiety is currently okay.  They asked multiple times if starting gabapentin is a must.  We discussed that if anxiety becomes an issue, would recommend starting gabapentin as prescribed.  Also educated that this is not a as needed medication.  They voiced understanding    Plan  -Labs and exam okay for cycle 1 day 1 FOLFIRINOX today.  ANC 1400 noted.  5-FU bolus has been eliminated to prevent further myelosuppression.  If ANC drops further, would consider decreasing irinotecan dose to 150.  G-CSF planned for day 3.  - Extensively discussed that gabapentin was prescribed by supportive oncology for his generalized anxiety, however, if patient and father feel that anxiety currently is well-controlled, it is okay to hold off on it for now.  Further educated that it is not an as needed medication, and would need a few days to be effective  -Reeducated on avoiding cold for at least first 2 to 3 days after  chemotherapy due to risk of cold neuropathy from oxaliplatin.  Patient and father had multiple questions regarding whether Fan and air conditioner at work and at home would be okay, answered all questions.      Patient is on chemotherapy, which requires frequent monitoring

## 2024-06-01 ENCOUNTER — NURSE TRIAGE (OUTPATIENT)
Dept: CALL CENTER | Facility: HOSPITAL | Age: 44
End: 2024-06-01
Payer: COMMERCIAL

## 2024-06-01 NOTE — TELEPHONE ENCOUNTER
"Patient's father has questions about a new medication that was prescribed for anxiety before chemotherapy that starts on Monday. He read on the bottle of gabapentin, that it may cause blurred vision or dizziness and it has him concerned.   I reviewed the patient's chart and talked to him about the medication. I explained that this medication does have the potential to cause those things, but he has been prescribed the lowest dose. I suggested that if he wanted to try it before Monday, that he should just make sure that he does not have plans to go anywhere directly after taking the medication. An alternative would be to wait until Monday and if he is anxious he can take it then. The father said that he will wait until Monday to try this medication.   Reason for Disposition  • Health Information question, no triage required and triager able to answer question    Additional Information  • Negative: [1] Caller is not with the adult (patient) AND [2] reporting urgent symptoms  • Negative: Lab result questions  • Negative: Medication questions  • Negative: Caller can't be reached by phone  • Negative: Caller has already spoken to PCP or another triager  • Negative: RN needs further essential information from caller in order to complete triage  • Negative: Requesting regular office appointment  • Negative: [1] Caller requesting NON-URGENT health information AND [2] PCP's office is the best resource    Answer Assessment - Initial Assessment Questions  1. REASON FOR CALL or QUESTION: \"What is your reason for calling today?\" or \"How can I best help you?\" or \"What question do you have that I can help answer?\"      Information about gabapentin    Protocols used: Information Only Call-ADULT-    "

## 2024-06-03 ENCOUNTER — NUTRITION (OUTPATIENT)
Dept: OTHER | Facility: HOSPITAL | Age: 44
End: 2024-06-03
Payer: COMMERCIAL

## 2024-06-03 ENCOUNTER — INFUSION (OUTPATIENT)
Dept: ONCOLOGY | Facility: HOSPITAL | Age: 44
End: 2024-06-03
Payer: COMMERCIAL

## 2024-06-03 ENCOUNTER — OFFICE VISIT (OUTPATIENT)
Dept: ONCOLOGY | Facility: CLINIC | Age: 44
End: 2024-06-03
Payer: COMMERCIAL

## 2024-06-03 ENCOUNTER — PATIENT OUTREACH (OUTPATIENT)
Dept: OTHER | Facility: HOSPITAL | Age: 44
End: 2024-06-03
Payer: COMMERCIAL

## 2024-06-03 VITALS
DIASTOLIC BLOOD PRESSURE: 95 MMHG | BODY MASS INDEX: 32.5 KG/M2 | TEMPERATURE: 98.7 F | WEIGHT: 219.4 LBS | HEIGHT: 69 IN | HEART RATE: 90 BPM | OXYGEN SATURATION: 98 % | RESPIRATION RATE: 18 BRPM | SYSTOLIC BLOOD PRESSURE: 164 MMHG

## 2024-06-03 DIAGNOSIS — C20 RECTAL ADENOCARCINOMA: Primary | ICD-10-CM

## 2024-06-03 DIAGNOSIS — F41.9 ANXIETY: ICD-10-CM

## 2024-06-03 DIAGNOSIS — D70.8 BENIGN ETHNIC NEUTROPENIA: ICD-10-CM

## 2024-06-03 DIAGNOSIS — D64.9 NORMOCYTIC ANEMIA: ICD-10-CM

## 2024-06-03 DIAGNOSIS — C20 RECTAL ADENOCARCINOMA: ICD-10-CM

## 2024-06-03 LAB
ALBUMIN SERPL-MCNC: 4.2 G/DL (ref 3.5–5.2)
ALBUMIN/GLOB SERPL: 1.8 G/DL
ALP SERPL-CCNC: 78 U/L (ref 39–117)
ALT SERPL W P-5'-P-CCNC: 12 U/L (ref 1–41)
ANION GAP SERPL CALCULATED.3IONS-SCNC: 11.3 MMOL/L (ref 5–15)
AST SERPL-CCNC: 19 U/L (ref 1–40)
BASOPHILS # BLD AUTO: 0.01 10*3/MM3 (ref 0–0.2)
BASOPHILS NFR BLD AUTO: 0.4 % (ref 0–1.5)
BILIRUB SERPL-MCNC: 0.3 MG/DL (ref 0–1.2)
BUN SERPL-MCNC: 12 MG/DL (ref 6–20)
BUN/CREAT SERPL: 12.2 (ref 7–25)
CALCIUM SPEC-SCNC: 8.9 MG/DL (ref 8.6–10.5)
CEA SERPL-MCNC: 1.89 NG/ML
CHLORIDE SERPL-SCNC: 104 MMOL/L (ref 98–107)
CO2 SERPL-SCNC: 25.7 MMOL/L (ref 22–29)
CREAT SERPL-MCNC: 0.98 MG/DL (ref 0.76–1.27)
DEPRECATED RDW RBC AUTO: 43.9 FL (ref 37–54)
EGFRCR SERPLBLD CKD-EPI 2021: 96.9 ML/MIN/1.73
EOSINOPHIL # BLD AUTO: 0.15 10*3/MM3 (ref 0–0.4)
EOSINOPHIL NFR BLD AUTO: 5.5 % (ref 0.3–6.2)
ERYTHROCYTE [DISTWIDTH] IN BLOOD BY AUTOMATED COUNT: 12.6 % (ref 12.3–15.4)
GLOBULIN UR ELPH-MCNC: 2.4 GM/DL
GLUCOSE SERPL-MCNC: 142 MG/DL (ref 65–99)
HCT VFR BLD AUTO: 39.3 % (ref 37.5–51)
HGB BLD-MCNC: 12.9 G/DL (ref 13–17.7)
IMM GRANULOCYTES # BLD AUTO: 0 10*3/MM3 (ref 0–0.05)
IMM GRANULOCYTES NFR BLD AUTO: 0 % (ref 0–0.5)
LYMPHOCYTES # BLD AUTO: 0.87 10*3/MM3 (ref 0.7–3.1)
LYMPHOCYTES NFR BLD AUTO: 31.6 % (ref 19.6–45.3)
MCH RBC QN AUTO: 31.1 PG (ref 26.6–33)
MCHC RBC AUTO-ENTMCNC: 32.8 G/DL (ref 31.5–35.7)
MCV RBC AUTO: 94.7 FL (ref 79–97)
MONOCYTES # BLD AUTO: 0.32 10*3/MM3 (ref 0.1–0.9)
MONOCYTES NFR BLD AUTO: 11.6 % (ref 5–12)
NEUTROPHILS NFR BLD AUTO: 1.4 10*3/MM3 (ref 1.7–7)
NEUTROPHILS NFR BLD AUTO: 50.9 % (ref 42.7–76)
NRBC BLD AUTO-RTO: 0 /100 WBC (ref 0–0.2)
PLATELET # BLD AUTO: 190 10*3/MM3 (ref 140–450)
PMV BLD AUTO: 9.6 FL (ref 6–12)
POTASSIUM SERPL-SCNC: 3.5 MMOL/L (ref 3.5–5.2)
PROT SERPL-MCNC: 6.6 G/DL (ref 6–8.5)
RBC # BLD AUTO: 4.15 10*6/MM3 (ref 4.14–5.8)
SODIUM SERPL-SCNC: 141 MMOL/L (ref 136–145)
WBC NRBC COR # BLD AUTO: 2.75 10*3/MM3 (ref 3.4–10.8)

## 2024-06-03 PROCEDURE — 0 DEXTROSE 5 % SOLUTION 250 ML FLEX CONT: Performed by: STUDENT IN AN ORGANIZED HEALTH CARE EDUCATION/TRAINING PROGRAM

## 2024-06-03 PROCEDURE — 25010000002 IRINOTECAN PER 20 MG: Performed by: STUDENT IN AN ORGANIZED HEALTH CARE EDUCATION/TRAINING PROGRAM

## 2024-06-03 PROCEDURE — 0 DEXTROSE 5 % SOLUTION: Performed by: STUDENT IN AN ORGANIZED HEALTH CARE EDUCATION/TRAINING PROGRAM

## 2024-06-03 PROCEDURE — 25010000002 OXALIPLATIN PER 0.5 MG: Performed by: STUDENT IN AN ORGANIZED HEALTH CARE EDUCATION/TRAINING PROGRAM

## 2024-06-03 PROCEDURE — 25010000002 DEXAMETHASONE SODIUM PHOSPHATE 100 MG/10ML SOLUTION: Performed by: STUDENT IN AN ORGANIZED HEALTH CARE EDUCATION/TRAINING PROGRAM

## 2024-06-03 PROCEDURE — 25810000003 SODIUM CHLORIDE 0.9 % SOLUTION 250 ML FLEX CONT: Performed by: STUDENT IN AN ORGANIZED HEALTH CARE EDUCATION/TRAINING PROGRAM

## 2024-06-03 PROCEDURE — 96417 CHEMO IV INFUS EACH ADDL SEQ: CPT

## 2024-06-03 PROCEDURE — G0498 CHEMO EXTEND IV INFUS W/PUMP: HCPCS

## 2024-06-03 PROCEDURE — 85025 COMPLETE CBC W/AUTO DIFF WBC: CPT

## 2024-06-03 PROCEDURE — 96368 THER/DIAG CONCURRENT INF: CPT

## 2024-06-03 PROCEDURE — 96367 TX/PROPH/DG ADDL SEQ IV INF: CPT

## 2024-06-03 PROCEDURE — 0 DEXTROSE 5 % SOLUTION 500 ML FLEX CONT: Performed by: STUDENT IN AN ORGANIZED HEALTH CARE EDUCATION/TRAINING PROGRAM

## 2024-06-03 PROCEDURE — 25010000002 FLUOROURACIL PER 500 MG: Performed by: STUDENT IN AN ORGANIZED HEALTH CARE EDUCATION/TRAINING PROGRAM

## 2024-06-03 PROCEDURE — 25010000002 ATROPINE SULFATE 0.4 MG/ML SOLUTION: Performed by: STUDENT IN AN ORGANIZED HEALTH CARE EDUCATION/TRAINING PROGRAM

## 2024-06-03 PROCEDURE — 96415 CHEMO IV INFUSION ADDL HR: CPT

## 2024-06-03 PROCEDURE — 96413 CHEMO IV INFUSION 1 HR: CPT

## 2024-06-03 PROCEDURE — 99214 OFFICE O/P EST MOD 30 MIN: CPT | Performed by: STUDENT IN AN ORGANIZED HEALTH CARE EDUCATION/TRAINING PROGRAM

## 2024-06-03 PROCEDURE — 82378 CARCINOEMBRYONIC ANTIGEN: CPT | Performed by: SURGERY

## 2024-06-03 PROCEDURE — 25010000002 LEUCOVORIN CALCIUM PER 50 MG: Performed by: STUDENT IN AN ORGANIZED HEALTH CARE EDUCATION/TRAINING PROGRAM

## 2024-06-03 PROCEDURE — 96375 TX/PRO/DX INJ NEW DRUG ADDON: CPT

## 2024-06-03 PROCEDURE — 25010000002 FOSAPREPITANT PER 1 MG: Performed by: STUDENT IN AN ORGANIZED HEALTH CARE EDUCATION/TRAINING PROGRAM

## 2024-06-03 PROCEDURE — 80053 COMPREHEN METABOLIC PANEL: CPT

## 2024-06-03 PROCEDURE — 25010000002 PALONOSETRON PER 25 MCG: Performed by: STUDENT IN AN ORGANIZED HEALTH CARE EDUCATION/TRAINING PROGRAM

## 2024-06-03 RX ORDER — PALONOSETRON 0.05 MG/ML
0.25 INJECTION, SOLUTION INTRAVENOUS ONCE
Status: COMPLETED | OUTPATIENT
Start: 2024-06-03 | End: 2024-06-03

## 2024-06-03 RX ORDER — ATROPINE SULFATE 0.4 MG/ML
0.25 INJECTION, SOLUTION INTRAMUSCULAR; INTRAVENOUS; SUBCUTANEOUS
Status: DISCONTINUED | OUTPATIENT
Start: 2024-06-03 | End: 2024-06-03 | Stop reason: HOSPADM

## 2024-06-03 RX ORDER — FAMOTIDINE 10 MG/ML
20 INJECTION, SOLUTION INTRAVENOUS AS NEEDED
Status: CANCELLED | OUTPATIENT
Start: 2024-06-03

## 2024-06-03 RX ORDER — DEXTROSE MONOHYDRATE 50 MG/ML
20 INJECTION, SOLUTION INTRAVENOUS ONCE
Status: COMPLETED | OUTPATIENT
Start: 2024-06-03 | End: 2024-06-03

## 2024-06-03 RX ORDER — DEXTROSE MONOHYDRATE 50 MG/ML
20 INJECTION, SOLUTION INTRAVENOUS ONCE
Status: CANCELLED | OUTPATIENT
Start: 2024-06-03

## 2024-06-03 RX ORDER — DIPHENHYDRAMINE HYDROCHLORIDE 50 MG/ML
50 INJECTION INTRAMUSCULAR; INTRAVENOUS AS NEEDED
Status: CANCELLED | OUTPATIENT
Start: 2024-06-03

## 2024-06-03 RX ORDER — PALONOSETRON 0.05 MG/ML
0.25 INJECTION, SOLUTION INTRAVENOUS ONCE
Status: CANCELLED | OUTPATIENT
Start: 2024-06-03

## 2024-06-03 RX ORDER — ATROPINE SULFATE 1 MG/ML
0.25 INJECTION, SOLUTION INTRAMUSCULAR; INTRAVENOUS; SUBCUTANEOUS
Status: CANCELLED | OUTPATIENT
Start: 2024-06-03

## 2024-06-03 RX ADMIN — DEXTROSE MONOHYDRATE 20 ML/HR: 50 INJECTION, SOLUTION INTRAVENOUS at 09:48

## 2024-06-03 RX ADMIN — DEXTROSE MONOHYDRATE 355 MG: 5 INJECTION, SOLUTION INTRAVENOUS at 12:41

## 2024-06-03 RX ADMIN — FOSAPREPITANT 100 ML: 150 INJECTION, POWDER, LYOPHILIZED, FOR SOLUTION INTRAVENOUS at 09:48

## 2024-06-03 RX ADMIN — ATROPINE SULFATE 0.25 MG: 0.4 INJECTION, SOLUTION INTRAVENOUS at 12:38

## 2024-06-03 RX ADMIN — DEXAMETHASONE SODIUM PHOSPHATE 12 MG: 10 INJECTION, SOLUTION INTRAMUSCULAR; INTRAVENOUS at 10:21

## 2024-06-03 RX ADMIN — DEXTROSE MONOHYDRATE 180 MG: 50 INJECTION, SOLUTION INTRAVENOUS at 10:41

## 2024-06-03 RX ADMIN — LEUCOVORIN CALCIUM 860 MG: 350 INJECTION, POWDER, LYOPHILIZED, FOR SUSPENSION INTRAMUSCULAR; INTRAVENOUS at 12:11

## 2024-06-03 RX ADMIN — PALONOSETRON HYDROCHLORIDE 0.25 MG: 0.25 INJECTION INTRAVENOUS at 09:48

## 2024-06-03 RX ADMIN — FLUOROURACIL 5140 MG: 50 INJECTION, SOLUTION INTRAVENOUS at 14:27

## 2024-06-03 NOTE — PATIENT INSTRUCTIONS
COME BACK WEDNESDAY 6/5/2024 AT 1230PM TO HAVE CHEMO BALL REMOVED.     CALL [phone] IF YOU HAVE ANY CONCERNS

## 2024-06-03 NOTE — PROGRESS NOTES
"OUTPATIENT ONCOLOGY NUTRITION ASSESSMENT    Patient Name: Jc Brannon Jr.  YOB: 1980  MRN: [id number]  Assessment Date: 6/3/2024    COMMENTS: Met with patient in the infusion area today along with his father.   Explained RD role and the importance of adequate nutrition and hydration during treatment.  The patient stated he has family and friends who have had cancer and who have told him about  some of the side effects they experienced.  Reassured patient that everyone is different and what they experienced may be quite different than what he experiences. He is asking if he will feel well enough to go to the fair in August. Answered questions about cold sensitivity due to Oxaliplatin. He is being followed by [facility] for anxiety.    Provided the [employer] booklet \"Nutrition during Cancer Treatment\" as a resource as well as RD contact info for any questions. Will follow throughout treatment course and be available as needed.           Reason for Assessment New assessment, Education      Diagnosis/Problem   Rectal cancer   Treatment Plan Chemotherapy   Frequency    Goal of cancer treatment Neoadjuvant   Comments:        Encounter Information        Nutrition/Diet History:  Good appetite   Oral Nutrition Supplements:    Factors/Symptoms Affecting Intake: No factors at this time   Comments:      Medical/Surgical History Past Medical History:   Diagnosis Date    Anemia     Hyperlipidemia     Hypertension     Mental disability without special needs     Rectal adenocarcinoma 2024       Past Surgical History:   Procedure Laterality Date    COLONOSCOPY N/A 4/23/2024    Procedure: COLONOSCOPY into cecum with biopsy;  Surgeon: David Tena MD;  Location: [facility] ENDOSCOPY;  Service: Gastroenterology;  Laterality: N/A;  rectal mass    LEG SURGERY Left     ACL    VENOUS ACCESS DEVICE (PORT) INSERTION N/A 5/24/2024    Procedure: INSERTION VENOUS ACCESS DEVICE;  Surgeon: Poncho" "Ori Jackson MD;  Location: [facility] MAIN OR;  Service: General;  Laterality: N/A;            Anthropometrics        Current Height Ht Readings from Last 1 Encounters:   06/03/24 175.3 cm (69.02\")      Current Weight Wt Readings from Last 1 Encounters:   06/03/24 99.5 kg (219 lb 6.4 oz)      BMI  32   Ideal Body Weight (IBW) 160 lb   Usual Body Weight (UBW) 225 lb   Weight Change/Trend Stable   Weight History Wt Readings from Last 30 Encounters:   06/03/24 0852 99.5 kg (219 lb 6.4 oz)   05/29/24 1145 98.9 kg (218 lb 1.6 oz)   05/20/24 1044 98.3 kg (216 lb 12.8 oz)   05/13/24 0953 98.2 kg (216 lb 6.4 oz)   05/03/24 0949 99 kg (218 lb 3.2 oz)   04/23/24 0952 99.3 kg (219 lb)   12/19/23 1127 101 kg (221 lb 9.6 oz)   11/21/23 1552 102 kg (225 lb 9.6 oz)   11/10/23 1606 102 kg (225 lb 4.8 oz)   07/17/23 1119 104 kg (228 lb 12.8 oz)   06/30/23 1152 105 kg (232 lb 6.4 oz)   09/20/22 1523 102 kg (225 lb)   07/22/22 1535 103 kg (226 lb)   06/10/22 1609 104 kg (230 lb)   05/27/22 1549 105 kg (231 lb)   02/22/22 1604 108 kg (239 lb)   07/19/21 1608 108 kg (238 lb)   06/08/21 1536 104 kg (229 lb)   03/31/21 1329 108 kg (238 lb)   06/01/20 1540 101 kg (223 lb)   02/05/20 1515 105 kg (232 lb 1.6 oz)   12/11/19 1540 106 kg (233 lb)   08/02/19 1535 106 kg (233 lb 4.8 oz)   02/01/19 1525 106 kg (234 lb)   10/31/18 0910 106 kg (234 lb)          Medications           Current medications: OLANZapine, gabapentin, hydroCHLOROthiazide, lidocaine-prilocaine, loperamide, ondansetron, and prochlorperazine                 Tests/Procedures        Tests/Procedures Chemotherapy     Labs       Pertinent Labs    Results from last 7 days   Lab Units 06/03/24  0816   SODIUM mmol/L 141   POTASSIUM mmol/L 3.5   CHLORIDE mmol/L 104   CO2 mmol/L 25.7   BUN mg/dL 12   CREATININE mg/dL 0.98   CALCIUM mg/dL 8.9   BILIRUBIN mg/dL 0.3   ALK PHOS U/L 78   ALT (SGPT) U/L 12   AST (SGOT) U/L 19   GLUCOSE mg/dL 142*     Results from last 7 days   Lab Units " "06/03/24  0816   HEMOGLOBIN g/dL 12.9*   HEMATOCRIT % 39.3   WBC 10*3/mm3 2.75*   ALBUMIN g/dL 4.2     Results from last 7 days   Lab Units 06/03/24  0816   PLATELETS 10*3/mm3 190     No results found for: \"COVID19\"  No results found for: \"HGBA1C\"       Physical Findings        Physical Appearance alert, anxious     Edema  not assessed   Gastrointestinal None   Tubes/Drains implantable port   Oral/Mouth Cavity WNL   Skin        Estimated/Assessed Needs        Energy Requirements    Height for Calculation      Weight for Calculation 100 kg   Method for Estimation  20 kcal/kg   EST Needs (kcal/day) 2000 kcals/d       Protein Requirements    Weight for Calculation 100 kg   EST Protein Needs (g/kg) 0.8 gm/kg, 1.0 gm/kg   EST Daily Needs (g/day)  g/d       Fluid Requirements     Method for Estimation 1 mL/kcal    Estimated Needs (mL/day) 2000 ml           PES STATEMENT / NUTRITION DIAGNOSIS      Nutrition Dx Problem Problem:    NutritionDiagnosisProblem: No Nutrition Diagnosis at this Time    Etiology:  Medical diagnosis: Rectal cancer  Factors affecting nutrition: Reported/Observed By    Signs/Symptoms:  Information Deficit    Comment:      NUTRITION INTERVENTION / PLAN OF CARE      Intervention Goal(s) Maintain nutrition status, Provide information, Maintain intake, Maintain weight, and No significant weight loss         RD Intervention/Action Encouraged intake         Recommendations:       PO Diet Continue current diet as tolerated      Supplements       Snacks       Other    --      Monitor/Evaluation PO intake, Pertinent labs, Weight, Symptoms   Education Education provided   --    RD to follow     Electronically signed by:  Dolores Vincent RD, SALMA  06/03/24 14:07 EDT    "

## 2024-06-03 NOTE — PROGRESS NOTES
Nurse Initial Navigator Assessment: Received referral from Dr. Isaac to follow patient regarding diagnosis and psychosocial support. Met with patient, his father along with Jennie/behavioral health WIL in cancer center to complete initial assessment. Introduced self and explained role. Patient has diagnosis of rectal adenocarcinoma. Patient and his father have met with Dr. Isaac, Dr. Noyola and Dr. Pak to discuss plan of care and treatment plan. He will receive total neoadjuvant chemotherapy with FOLFIRINOX Q 2 weeks x6 cycles followed by chemoradiation followed by surgery. Patient has had port placed and chemotherapy education. He will start treatment on Monday. Patient and family verbalized understanding regarding plan of care and treatment plan at this time. However, they will need reinforcement, teach-back and reassurance.   Patient lives alone. States he has a good support system consisting of family, friends and Nondenominational members. Denies difficulty with transportation or affording medication at this time. Patient is employed at [employer]. May need assistance with completing short-term disability/FMLA paperwork. Patient is IADLs and mobility. Referral placed to [facility]ie/behavioral health [facility] per Dr. Pak due to anxiety and fear surrounding diagnosis and treatment. Patient states he has had several family members who have been diagnosed with cancer and have . Jennie allowed patient to verbalize feelings and concerns and provided therapeutic communication. Plan to start patient on Gabapentin 100 mg three times a day for anxiety. Patient denies sleep disturbance. Says his mood and energy are good. Reports appetite good and no recent weight loss/gain.  No additional concerns voiced or psychosocial needs noted at this time.   Medical/Psychiatric History   Cancer Diagnosis: rectal adenocarcinoma; neoadjuvant chemotherapy with FOLFIRINOX Q 2 weeks x6 cycles followed by chemoradiation followed by surgery  Psychiatric  History: Yes, history of ADHD; anxiety related to cancer diagnosis  Past Medications: Unsure?  Currently seeing Mental Health Professional: Jennie/behavioral health APRN following  Medication/Counseling Interest? Yes, but family hesitant about starting Gabapentin  Social History  Support Community: Limited  Substance Use: smoking--never; ETOH--never; Drugs--never   Family Psychiatric History: cousin---ADHD  Impactful cancer experience: mother---stomach cancer; father---prostate cancer; paternal uncle--colon cancer; paternal uncle---possible colon cancer?; paternal uncle---metastatic cancer  Plan of Care  Discussed support services offered [facility] and in the community. Patient does not have an advanced directive on file. Provided navigation letter; Cancer Care Supportive Services; NCCN patient guidelines for rectal cancer; [place]  [employer] (KAAAC); [place] Cancer Link; [employer]; [employer] and online resources. No additional needs noted at this time. Encouraged patient to call with any questions or concerns. Continue to follow.…Clementine Tian RN, Oncology Nurse Navigator

## 2024-06-04 ENCOUNTER — TELEPHONE (OUTPATIENT)
Dept: ONCOLOGY | Facility: CLINIC | Age: 44
End: 2024-06-04
Payer: COMMERCIAL

## 2024-06-04 NOTE — TELEPHONE ENCOUNTER
Caller: Jc Brannon SR    Relationship: Father    Best call back number: [phone]    What is the best time to reach you: ANYTIME    Who are you requesting to speak with (clinical staff, provider,  specific staff member): CLINICAL      What was the call regarding: PT'S DAD CALLED TO LET DR CASTRO NOW THAT BALL DID SHRINK UP.    PLEASE CALL BACK IF ANY QUESTIONS

## 2024-06-04 NOTE — TELEPHONE ENCOUNTER
Spoke with pt's father. Let him know we received this message that the 5fu ball was infusing appropriately and confirmed appt for his unhook tomorrow.

## 2024-06-05 ENCOUNTER — INFUSION (OUTPATIENT)
Dept: ONCOLOGY | Facility: HOSPITAL | Age: 44
End: 2024-06-05
Payer: COMMERCIAL

## 2024-06-05 DIAGNOSIS — C20 RECTAL ADENOCARCINOMA: Primary | ICD-10-CM

## 2024-06-05 LAB
NTRA SIGNATERA MTM READOUT: 1.42 MTM/ML
NTRA SIGNATERA TEST RESULT: POSITIVE

## 2024-06-05 PROCEDURE — 25010000002 PEGFILGRASTIM 6 MG/0.6ML PREFILLED SYRINGE KIT: Performed by: NURSE PRACTITIONER

## 2024-06-05 PROCEDURE — 96377 APPLICATON ON-BODY INJECTOR: CPT

## 2024-06-05 PROCEDURE — 25010000002 HEPARIN LOCK FLUSH PER 10 UNITS: Performed by: NURSE PRACTITIONER

## 2024-06-05 RX ORDER — HEPARIN SODIUM (PORCINE) LOCK FLUSH IV SOLN 100 UNIT/ML 100 UNIT/ML
500 SOLUTION INTRAVENOUS AS NEEDED
Status: DISCONTINUED | OUTPATIENT
Start: 2024-06-05 | End: 2024-06-05 | Stop reason: HOSPADM

## 2024-06-05 RX ORDER — SODIUM CHLORIDE 0.9 % (FLUSH) 0.9 %
10 SYRINGE (ML) INJECTION AS NEEDED
OUTPATIENT
Start: 2024-06-05

## 2024-06-05 RX ORDER — HEPARIN SODIUM (PORCINE) LOCK FLUSH IV SOLN 100 UNIT/ML 100 UNIT/ML
500 SOLUTION INTRAVENOUS AS NEEDED
OUTPATIENT
Start: 2024-06-05

## 2024-06-05 RX ORDER — SODIUM CHLORIDE 0.9 % (FLUSH) 0.9 %
10 SYRINGE (ML) INJECTION AS NEEDED
Status: DISCONTINUED | OUTPATIENT
Start: 2024-06-05 | End: 2024-06-05 | Stop reason: HOSPADM

## 2024-06-05 RX ADMIN — Medication 10 ML: at 12:56

## 2024-06-05 RX ADMIN — Medication 500 UNITS: at 12:55

## 2024-06-05 RX ADMIN — PEGFILGRASTIM 6 MG: KIT SUBCUTANEOUS at 12:47

## 2024-06-05 NOTE — PROGRESS NOTES
Patient and father given verbal and written instructions re onpro.  All questions answered and v/u.

## 2024-06-07 NOTE — PROGRESS NOTES
Follow-up    SUBJECTIVE:    06/12/2024, Interval history  He received cycle 1 of neoadjuvant FOLFIRINOX on 6/3/2024, Neulasta was given on 6/5/2024.  He tolerated chemo quite well.  Had 1 episode of emesis, took antiemetics.  No further episodes of nausea or vomiting.  Denies diarrhea or fever.  No neuropathy.  Eating well.  Had some bone pain after Neulasta shot, took Advil.  He was reeducated by infusion RN regarding taking Claritin for bone pain after Neulasta shot.  No ER visits or hospitalizations.    HISTORY OF PRESENT ILLNESS:  The patient is a 43 y.o. year old male with recently diagnosed rectal adenocarcinoma who presents to our clinic to establish care.  Onc history is outlined below    Family history significant for prostate cancer in father at 63, paternal uncle with colon cancer at 57, other paternal uncle with metastatic cancer, type unknown in his 50s, other paternal uncle with possible colon cancer and DVT/PE in his 70s.    Oncology/Hematology History   Rectal adenocarcinoma   4/23/2024 Procedure         4/23/2024 [facility]         5/2/2024 Imaging    CT CAP    A 1.7 cm right hepatic cyst is noted. Numerous small subcentimeter liver  low densities are present that are too small to characterize, could be  cysts, or potentially metastatic disease, interval follow-up can  characterize change.    No bowel obstruction. The sigmorectal mass is better characterized on  the MRI exam of same date (please see separate report). Some stranding  is apparent in the fat around the lesion, numerous surrounding lymph  nodes are present that could be metastatic lymph nodes. For example, on  axial image 165, a 1.9 x 1.6 cm pericolonic lymph node is present. As  another example, on axial image 170, left perirectal lymph node measures  0.7 x 1.2 cm.    IMPRESSION:        1. Sigmorectal mass with numerous surrounding lymph nodes, raising  suspicion for metastatic lymph nodes.     2. Hepatic cyst, and numerous  liver low densities that are too small to  characterize; these lesions could also be cysts, but in this clinical  setting, possibility of any metastatic liver lesions is not excluded.  Interval follow-up is advised to characterize change.     3. No pulmonary mass.     5/2/2024 Imaging    MRI Pelvis    FINDINGS: There is a circumferential solid low rectal mass which  measures approximately 4.5 cm in craniocaudad span and the inferior  margin is approximately 5 mm proximal to the anorectal junction.     The tumor extends approximately 8 mm into the mesorectal fat  predominantly along the right wall of the mass. There appears to be  extension to the mesorectal fascia along the right wall and abutment of  the right levator ani muscles. There are several enlarged perirectal  nodes and the largest is presacral measuring approximately 2.0 x 1.2 cm.  There are at least 4 enhancing suspicious nodes. There is no definite  extramural vascular invasion.     IMPRESSION:  1. Primary Tumor Location: Low rectal     2. MRI Stage: T3 N2 MRF positive     3. No definite sphincter involvement     5/2/2024 Cancer Staged    Staging form: Colon And Rectum, AJCC 8th Edition  - Clinical stage from 5/2/2024: cT3, cN2, cM0 - Signed by Sonali Pak MD on 6/3/2024     5/3/2024 Initial Diagnosis    Rectal adenocarcinoma     5/16/2024 Imaging    MRI abdo    IMPRESSION:  T2 hyperintense nonenhancing liver lesions are consistent with hepatic  cysts and biliary cystic hamartomas. No convincing liver metastasis  identified     5/24/2024 Procedure    PORT placement (Dr Isaac)     6/3/2024 -  Chemotherapy    OP COLORECTAL FOLFIRINOX (Fluorouracil cont. Infusion / Leucovorin / OXALIplatin / Irinotecan)     6/5/2024 -  Chemotherapy    OP CENTRAL VENOUS ACCESS DEVICE Access, Care, and Maintenance (CVAD)         The current medication list and allergy list were reviewed and reconciled.      Past Medical History, Past Surgical History, Social History,  "Family History have been reviewed and are without significant changes except as mentioned.      REVIEW OF SYSTEMS:  See interval history    Objective:       Vitals:    06/12/24 0821   BP: 139/87   Pulse: 81   Resp: 16   Temp: 98.1 °F (36.7 °C)   TempSrc: Oral   SpO2: 98%   Weight: 99.4 kg (219 lb 3.2 oz)   Height: 175.3 cm (69.02\")   PainSc: 0-No pain             6/12/2024     8:20 AM   Current Status   ECOG score 0      PHYSICAL EXAM:    CONSTITUTIONAL:  Vital signs reviewed.  No distress, looks comfortable.  RESPIRATORY: Bilateral lungs clear to auscultation.  No wheezing or rhonchi   CARDIOVASCULAR: Normal S1-S2.  No murmur rubs or gallop GASTROINTESTINAL: Soft, nontender, bowel sounds present  NEURO:  No focal deficits.  Appears to have equal strength all 4 extremities.  SKIN:  Warm.  No rashes.    I have reexamined the patient and the results are consistent with the previously documented exam. Sonali Pak MD        RECENT LABS:        WBC   Date Value Ref Range Status   06/12/2024 9.90 3.40 - 10.80 10*3/mm3 Final   06/03/2024 2.75 (L) 3.40 - 10.80 10*3/mm3 Final   05/20/2024 3.22 (L) 3.40 - 10.80 10*3/mm3 Final   06/09/2023 3.0 (L) 3.4 - 10.8 x10E3/uL Final   06/04/2022 3.0 (L) 3.4 - 10.8 x10E3/uL Final   05/25/2021 3.11 (L) 3.40 - 10.80 10*3/mm3 Final   05/26/2020 2.91 (L) 3.40 - 10.80 10*3/mm3 Final   02/12/2020 4.5 3.4 - 10.8 x10E3/uL Final   07/27/2019 2.6 (L) 3.4 - 10.8 x10E3/uL Final     Comment:     **Verified by repeat analysis**     Hemoglobin   Date Value Ref Range Status   06/12/2024 12.3 (L) 13.0 - 17.7 g/dL Final   06/03/2024 12.9 (L) 13.0 - 17.7 g/dL Final   05/20/2024 13.4 13.0 - 17.7 g/dL Final   06/09/2023 14.3 13.0 - 17.7 g/dL Final   06/04/2022 14.1 13.0 - 17.7 g/dL Final   05/25/2021 14.0 13.0 - 17.7 g/dL Final   05/26/2020 13.7 13.0 - 17.7 g/dL Final   02/12/2020 13.5 13.0 - 17.7 g/dL Final   07/27/2019 13.8 13.0 - 17.7 g/dL Final     Platelets   Date Value Ref Range Status   06/12/2024 " 220 140 - 450 10*3/mm3 Final   06/03/2024 190 140 - 450 10*3/mm3 Final   05/20/2024 218 140 - 450 10*3/mm3 Final   06/09/2023 207 150 - 450 x10E3/uL Final   06/04/2022 230 150 - 450 x10E3/uL Final   05/25/2021 213 140 - 450 10*3/mm3 Final   05/26/2020 223 140 - 450 10*3/mm3 Final   02/12/2020 245 150 - 450 x10E3/uL Final   07/27/2019 249 150 - 450 x10E3/uL Final         Assessment & Plan     *Rectal adenocarcinoma (cT3 cN2 cM0), LUCAS, TMB low  -4/23/2024 rectal biopsy-invasive moderately differentiated adenocarcinoma, LUCAS.  -5/2/2024 MRI pelvis: Approximately 4.5 cm circumferential low rectal mass, approximately 5 mm proximal to anorectal junction, extending 8 mm in the mesorectal fat.  Appears to be extension to MRF along the right wall and abutment of right levator ani muscles.  Several enlarged perirectal nodes, largest 2 x 1.2 cm.  At least 4 enhancing suspicious nodes.  cT3 cN2 MRF positive.  No definitive sphincter involvement  - 5/2/2024 CT chest abdomen pelvis: 1.7 cm right hepatic cyst, and numerous low-density liver lesions-cyst versus possible metastatic lesions.  No lung mass  -5/16/2024 MRI abdomen: T2 hyperintense nonenhancing liver lesions consistent with liver cyst and biliary cystic hamartomas.  No evidence of liver mets  -Tumor genomic profile-APC (I269fs), APC ([id number]), FANCD2 (Q823), TP53. TMB 1 mut/Mb. LUCAS  - 6/3/2024 baseline CEA: 1.89  -Plan for total neoadjuvant chemotherapy with FOLFIRINOX.  - 6/3/2024: Cycle 1 day 1 FOLFIRINOX (5-FU bolus eliminated to prevent further myelosuppression given baseline neutropenia) with G-CSF support    *Elevated alk phos  -6/12/2024: Alk phos elevated at 123.  Rest of LFTs unremarkable.  -Will monitor for now.  If continues to be elevated, will obtain GGT to determine the source-hepatobiliary versus bone    *Benign ethnic neutropenia  - White cell count ranged between 2.6-3.2 since at least July 2019.    - 6/12/2024: WBC 9.9, ANC 7.3 s/p Neulasta on  6/5/2024    *Anemia  - 6/12/2024 hemoglobin stable at 12.3  -No active bleeding  - Continue to monitor    *Anxiety  -Prescribed Gabapentin 100 TID by [facility]    Plan  -Tolerated cycle 1 quite well.  -Plan for cycle 2-day 1 FOLFIRINOX on 6/17/2024      NP visit on cycle 2-day 1  MD visit on cycle 3-day 1    Patient is on chemotherapy, which requires frequent monitoring

## 2024-06-08 ENCOUNTER — NURSE TRIAGE (OUTPATIENT)
Dept: CALL CENTER | Facility: HOSPITAL | Age: 44
End: 2024-06-08
Payer: COMMERCIAL

## 2024-06-08 NOTE — TELEPHONE ENCOUNTER
"Caller states on Chemotherapy and now has vomited this morning . Caller states he called earlier and has already took his zofran. Caller reports has had Chemotherapy this week and not sure about his blood counts Caller advised per guideline. Caller also has his Oncologist number and will call MD now. Advised to call back as needed.          Reason for Disposition   [1] Neutropenia known or suspected (e.g., recent cancer chemotherapy) AND [2] vomiting    Additional Information   Negative: Shock suspected (e.g., cold/pale/clammy skin, too weak to stand, low BP, rapid pulse)   Negative: Difficult to awaken or acting confused (e.g., disoriented, slurred speech)   Negative: Sounds like a life-threatening emergency to the triager   Negative: Chest pain   Negative: Vomiting occurs only while coughing   Negative: Constipation is main symptom   Negative: Diarrhea is main symptom   Negative: [1] Vomiting AND [2] contains red blood or black (\"coffee ground\") material  (Exception: Few red streaks in vomit that only happened once.)   Negative: [1] Vomiting AND [2] recent head injury (within last 3 days)   Negative: [1] Vomiting AND [2] recent abdominal injury (within last 3 days)   Negative: [1] Vomiting AND [2] insulin-dependent diabetes (Type I) AND [3] glucose > 400 mg/dl (22 mmol/l)   Negative: [1] Neutropenia known or suspected (e.g., recent cancer chemotherapy) AND [2] fever > 100.4 F (38.0 C)   Negative: Shock suspected (e.g., cold/pale/clammy skin, too weak to stand, low BP, rapid pulse)   Negative: Difficult to awaken or acting confused (e.g., disoriented, slurred speech)   Negative: Sounds like a life-threatening emergency to the triager   Negative: Vomiting occurs only while coughing   Negative: [1] Pregnant < 20 Weeks AND [2] nausea/vomiting began in early pregnancy (i.e., 4-8 weeks pregnant)   Negative: Chest pain   Negative: Headache is main symptom   Vomiting (or Nausea) in a cancer patient who is currently (or " "recently) receiving chemotherapy or radiation therapy, or cancer patient who has metastatic or end-stage cancer and is receiving palliative care    Answer Assessment - Initial Assessment Questions  1. SEVERITY: \"How bad is the vomiting?\" \"How many times have you vomited in the past 24 hours?\" \"How bad is your nausea?\"     - MILD:  1 - 2 times/day     - MODERATE: 3 - 5 times/day, decreased oral intake without significant weight loss or symptoms of dehydration     - SEVERE: 6 or more times/day, vomits everything or nearly everything, with significant weight loss, symptoms of dehydration       Mild   2. ONSET: \"When did the nausea and/or vomiting begin?\"       This morning   3. MEDICINES FOR NAUSEA: \"Do you have any anti-nausea medicine?\" \"What medicines have you taken and when did you last take the medicine?\"      States he just took   4. ORAL INTAKE: If vomiting, \"Have you been able to keep any foods or fluids down?\" \"How much fluids have you had in the past 24 hours?\"      States vomiting just started   5. HYDRATION: \"Any signs of dehydration?\" (e.g., dry mouth [not just dry lips], too weak to stand, dizziness, new weight loss) \"When did you last urinate?\"      No c/o   6. ABDOMEN PAIN: \"Are you having any abdomen pain?\" If Yes, ask: \"How bad is it?\" \"What   does it feel like?\" (e.g., cramps, dull, intermittent, constant)       Denies   7. DIARRHEA: \"Is there any diarrhea?\" If Yes, ask: \"How many times today?\"       No c/o   8. CANCER: \"What type of cancer do you have?\"         9. CANCER - TREATMENT: \"What cancer treatments have you received?\" \"When did you last receive them?\" (e.g., chemotherapy, immunotherapy, radiation, or recent surgery). If triager has access to the patient's medical record, triager should review treatments and administration dates.      Chemotherapy on the third   10. CANCER - NEUTROPENIA RISK: \"Have you received chemotherapy recently? If Yes, ask: \"When was it and what was your last WBC and " "ANC (absolute neutrophil count)?\" \"Were you told that your white cell count was low?\" If triager has access to the patient's medical record, triager should review most recent labs. An ANC less than 1,000 - 1,500 means that the neutrophils are low and the immune system is weak.          11. CAUSE: \"What do you think is causing your nausea and/or vomiting?\" (e.g., chemotherapy, new medications, the cancer itself, other family members with similar symptoms)        Chemotherapy   12. PREVIOUS NAUSEA AND VOMITING: \" If you have had nausea before, what have you done in the past to make it better?\"          13. OTHER SYMPTOMS: \"Do you have any other symptoms?\" (e.g., fever, headache, vertigo,   vomiting blood, coffee ground vomit, abdominal swelling, jaundice)        No c/o of other symptoms    Answer Assessment - Initial Assessment Questions  1. VOMITING SEVERITY: \"How many times have you vomited in the past 24 hours?\"      - MILD:  1 - 2 times/day     - MODERATE: 3 - 5 times/day, decreased oral intake without significant weight loss or symptoms of dehydration     - SEVERE: 6 or more times/day, vomits everything or nearly everything, with significant weight loss, symptoms of dehydration       Mild   2. ONSET: \"When did the vomiting begin?\"       This morning   3. FLUIDS: \"What fluids or food have you vomited up today?\" \"Have you been able to keep any fluids down?\"      Just started   4. ABDOMEN PAIN: \"Are your having any abdomen pain?\" If Yes : \"How bad is it and what does it feel like?\" (e.g., crampy, dull, intermittent, constant)       Denies   5. DIARRHEA: \"Is there any diarrhea?\" If Yes, ask: \"How many times today?\"       No c/o   6. CONTACTS: \"Is there anyone else in the family with the same symptoms?\"         7. CAUSE: \"What do you think is causing your vomiting?\"      Chemotherapy   8. HYDRATION STATUS: \"Any signs of dehydration?\" (e.g., dry mouth [not only dry lips], too weak to stand) \"When did you last " "urinate?\"      Just started and only once   9. OTHER SYMPTOMS: \"Do you have any other symptoms?\" (e.g., fever, headache, vertigo, vomiting blood or coffee grounds, recent head injury)      No other complaints   10. PREGNANCY: \"Is there any chance you are pregnant?\" \"When was your last menstrual period?\"    Protocols used: Vomiting-ADULT-AH, Cancer - Nausea and Vomiting-ADULT-AH    "

## 2024-06-08 NOTE — TELEPHONE ENCOUNTER
Caller is on chemotherapy and has started vomiting, I suggested to take his prescription which read for every 6 hours.    Reason for Disposition   Caller has medicine question, adult has minor symptoms, caller declines triage, AND triager answers question    Additional Information   Negative: [1] Intentional drug overdose AND [2] suicidal thoughts or ideas   Negative: Drug overdose and triager unable to answer question   Negative: Caller requesting a renewal or refill of a medicine patient is currently taking   Negative: Caller requesting information unrelated to medicine   Negative: Caller requesting information about COVID-19 Vaccine   Negative: Caller requesting information about Emergency Contraception   Negative: Caller requesting information about Combined Birth Control Pills   Negative: Caller requesting information about Progestin Birth Control Pills   Negative: Caller requesting information about Post-Op pain or medicines   Negative: Caller requesting a prescription antibiotic (such as Penicillin) for Strep throat and has a positive culture result   Negative: Caller requesting a prescription anti-viral med (such as Tamiflu) and has influenza (flu) symptoms   Negative: Immunization reaction suspected   Negative: Rash while taking a medicine or within 3 days of stopping it   Negative: [1] Asthma and [2] having symptoms of asthma (cough, wheezing, etc.)   Negative: [1] Symptom of illness (e.g., headache, abdominal pain, earache, vomiting) AND [2] more than mild   Negative: Breastfeeding questions about mother's medicines and diet   Negative: MORE THAN A DOUBLE DOSE of a prescription or over-the-counter (OTC) drug   Negative: [1] DOUBLE DOSE (an extra dose or lesser amount) of prescription drug AND [2] any symptoms (e.g., dizziness, nausea, pain, sleepiness)   Negative: [1] DOUBLE DOSE (an extra dose or lesser amount) of over-the-counter (OTC) drug AND [2] any symptoms (e.g., dizziness, nausea, pain,  "sleepiness)   Negative: Took another person's prescription drug   Negative: [1] DOUBLE DOSE (an extra dose or lesser amount) of prescription drug AND [2] NO symptoms  (Exception: A double dose of antibiotics.)   Negative: Diabetes drug error or overdose (e.g., took wrong type of insulin or took extra dose)   Negative: [1] Prescription not at pharmacy AND [2] was prescribed by PCP recently (Exception: Triager has access to EMR and prescription is recorded there. Go to Home Care and confirm for pharmacy.)   Negative: [1] Pharmacy calling with prescription question AND [2] triager unable to answer question   Negative: [1] Caller has URGENT medicine question about med that PCP or specialist prescribed AND [2] triager unable to answer question   Negative: Medicine patch causing local rash or itching   Negative: [1] Caller has medicine question about med NOT prescribed by PCP AND [2] triager unable to answer question (e.g., compatibility with other med, storage)   Negative: Prescription request for new medicine (not a refill)   Negative: [1] Caller has NON-URGENT medicine question about med that PCP prescribed AND [2] triager unable to answer question   Negative: Caller wants to use a complementary or alternative medicine   Negative: [1] Prescription prescribed recently is not at pharmacy AND [2] triager has access to patient's EMR AND [3] prescription is recorded in the EMR   Negative: [1] DOUBLE DOSE (an extra dose or lesser amount) of over-the-counter (OTC) drug AND [2] NO symptoms   Negative: [1] DOUBLE DOSE (an extra dose or lesser amount) of antibiotic drug AND [2] NO symptoms   Negative: Caller has medicine question only, adult not sick, AND triager answers question    Answer Assessment - Initial Assessment Questions  1. NAME of MEDICINE: \"What medicine(s) are you calling about?\"      prochlorperazine  2. QUESTION: \"What is your question?\" (e.g., double dose of medicine, side effect)      Caller has two " "prescriptions for the same drug, same dosage, and same date.  One says take one tablet every 8 hours as needed for nausea or vomiting, the other says take one tablet every 6 hours as needed for nausea of vomiting.  Wants to know which one to take, he just vomited  3. PRESCRIBER: \"Who prescribed the medicine?\" Reason: if prescribed by specialist, call should be referred to that group.      Prescribed with chemotherapy  4. SYMPTOMS: \"Do you have any symptoms?\" If Yes, ask: \"What symptoms are you having?\"  \"How bad are the symptoms (e.g., mild, moderate, severe)      vomiting  5. PREGNANCY:  \"Is there any chance that you are pregnant?\" \"When was your last menstrual period?\"      na    Protocols used: Medication Question Call-ADULT-    "

## 2024-06-12 ENCOUNTER — OFFICE VISIT (OUTPATIENT)
Dept: ONCOLOGY | Facility: CLINIC | Age: 44
End: 2024-06-12
Payer: COMMERCIAL

## 2024-06-12 ENCOUNTER — INFUSION (OUTPATIENT)
Dept: ONCOLOGY | Facility: HOSPITAL | Age: 44
End: 2024-06-12
Payer: COMMERCIAL

## 2024-06-12 VITALS
DIASTOLIC BLOOD PRESSURE: 87 MMHG | SYSTOLIC BLOOD PRESSURE: 139 MMHG | BODY MASS INDEX: 32.47 KG/M2 | TEMPERATURE: 98.1 F | HEIGHT: 69 IN | HEART RATE: 81 BPM | WEIGHT: 219.2 LBS | OXYGEN SATURATION: 98 % | RESPIRATION RATE: 16 BRPM

## 2024-06-12 DIAGNOSIS — C20 RECTAL ADENOCARCINOMA: ICD-10-CM

## 2024-06-12 DIAGNOSIS — Z79.899 HIGH RISK MEDICATION USE: ICD-10-CM

## 2024-06-12 DIAGNOSIS — C20 RECTAL ADENOCARCINOMA: Primary | ICD-10-CM

## 2024-06-12 DIAGNOSIS — Z09 CHEMOTHERAPY FOLLOW-UP EXAMINATION: ICD-10-CM

## 2024-06-12 DIAGNOSIS — R74.8 ELEVATED ALKALINE PHOSPHATASE LEVEL: ICD-10-CM

## 2024-06-12 LAB
ALBUMIN SERPL-MCNC: 4.4 G/DL (ref 3.5–5.2)
ALBUMIN/GLOB SERPL: 1.9 G/DL
ALP SERPL-CCNC: 123 U/L (ref 39–117)
ALT SERPL W P-5'-P-CCNC: 20 U/L (ref 1–41)
ANION GAP SERPL CALCULATED.3IONS-SCNC: 7 MMOL/L (ref 5–15)
AST SERPL-CCNC: 15 U/L (ref 1–40)
BASOPHILS # BLD AUTO: 0.08 10*3/MM3 (ref 0–0.2)
BASOPHILS NFR BLD AUTO: 0.8 % (ref 0–1.5)
BILIRUB SERPL-MCNC: 0.2 MG/DL (ref 0–1.2)
BUN SERPL-MCNC: 11 MG/DL (ref 6–20)
BUN/CREAT SERPL: 11.8 (ref 7–25)
CALCIUM SPEC-SCNC: 9.1 MG/DL (ref 8.6–10.5)
CHLORIDE SERPL-SCNC: 103 MMOL/L (ref 98–107)
CO2 SERPL-SCNC: 30 MMOL/L (ref 22–29)
CREAT SERPL-MCNC: 0.93 MG/DL (ref 0.76–1.27)
DEPRECATED RDW RBC AUTO: 44.4 FL (ref 37–54)
EGFRCR SERPLBLD CKD-EPI 2021: 103.2 ML/MIN/1.73
EOSINOPHIL # BLD AUTO: 0.12 10*3/MM3 (ref 0–0.4)
EOSINOPHIL NFR BLD AUTO: 1.2 % (ref 0.3–6.2)
ERYTHROCYTE [DISTWIDTH] IN BLOOD BY AUTOMATED COUNT: 12.9 % (ref 12.3–15.4)
GLOBULIN UR ELPH-MCNC: 2.3 GM/DL
GLUCOSE SERPL-MCNC: 121 MG/DL (ref 65–99)
HCT VFR BLD AUTO: 36.9 % (ref 37.5–51)
HGB BLD-MCNC: 12.3 G/DL (ref 13–17.7)
IMM GRANULOCYTES # BLD AUTO: 0.14 10*3/MM3 (ref 0–0.05)
IMM GRANULOCYTES NFR BLD AUTO: 1.4 % (ref 0–0.5)
LYMPHOCYTES # BLD AUTO: 1.29 10*3/MM3 (ref 0.7–3.1)
LYMPHOCYTES NFR BLD AUTO: 13 % (ref 19.6–45.3)
MCH RBC QN AUTO: 31.7 PG (ref 26.6–33)
MCHC RBC AUTO-ENTMCNC: 33.3 G/DL (ref 31.5–35.7)
MCV RBC AUTO: 95.1 FL (ref 79–97)
MONOCYTES # BLD AUTO: 0.95 10*3/MM3 (ref 0.1–0.9)
MONOCYTES NFR BLD AUTO: 9.6 % (ref 5–12)
NEUTROPHILS NFR BLD AUTO: 7.32 10*3/MM3 (ref 1.7–7)
NEUTROPHILS NFR BLD AUTO: 74 % (ref 42.7–76)
NRBC BLD AUTO-RTO: 0 /100 WBC (ref 0–0.2)
PLATELET # BLD AUTO: 220 10*3/MM3 (ref 140–450)
PMV BLD AUTO: 9.8 FL (ref 6–12)
POTASSIUM SERPL-SCNC: 3.8 MMOL/L (ref 3.5–5.2)
PROT SERPL-MCNC: 6.7 G/DL (ref 6–8.5)
RBC # BLD AUTO: 3.88 10*6/MM3 (ref 4.14–5.8)
SODIUM SERPL-SCNC: 140 MMOL/L (ref 136–145)
WBC NRBC COR # BLD AUTO: 9.9 10*3/MM3 (ref 3.4–10.8)

## 2024-06-12 PROCEDURE — 85025 COMPLETE CBC W/AUTO DIFF WBC: CPT

## 2024-06-12 PROCEDURE — 25010000002 HEPARIN LOCK FLUSH PER 10 UNITS: Performed by: NURSE PRACTITIONER

## 2024-06-12 PROCEDURE — 36591 DRAW BLOOD OFF VENOUS DEVICE: CPT

## 2024-06-12 PROCEDURE — 80053 COMPREHEN METABOLIC PANEL: CPT | Performed by: STUDENT IN AN ORGANIZED HEALTH CARE EDUCATION/TRAINING PROGRAM

## 2024-06-12 RX ORDER — HEPARIN SODIUM (PORCINE) LOCK FLUSH IV SOLN 100 UNIT/ML 100 UNIT/ML
500 SOLUTION INTRAVENOUS AS NEEDED
Status: DISCONTINUED | OUTPATIENT
Start: 2024-06-12 | End: 2024-06-12 | Stop reason: HOSPADM

## 2024-06-12 RX ORDER — SODIUM CHLORIDE 0.9 % (FLUSH) 0.9 %
10 SYRINGE (ML) INJECTION AS NEEDED
Status: DISCONTINUED | OUTPATIENT
Start: 2024-06-12 | End: 2024-06-12 | Stop reason: HOSPADM

## 2024-06-12 RX ORDER — SODIUM CHLORIDE 0.9 % (FLUSH) 0.9 %
10 SYRINGE (ML) INJECTION AS NEEDED
OUTPATIENT
Start: 2024-06-12

## 2024-06-12 RX ORDER — HEPARIN SODIUM (PORCINE) LOCK FLUSH IV SOLN 100 UNIT/ML 100 UNIT/ML
500 SOLUTION INTRAVENOUS AS NEEDED
OUTPATIENT
Start: 2024-06-12

## 2024-06-12 RX ADMIN — Medication 500 UNITS: at 09:20

## 2024-06-12 RX ADMIN — Medication 10 ML: at 09:20

## 2024-06-14 ENCOUNTER — OFFICE VISIT (OUTPATIENT)
Dept: PSYCHIATRY | Facility: HOSPITAL | Age: 44
End: 2024-06-14
Payer: COMMERCIAL

## 2024-06-14 DIAGNOSIS — F43.23 ADJUSTMENT DISORDER WITH MIXED ANXIETY AND DEPRESSED MOOD: Primary | ICD-10-CM

## 2024-06-14 PROCEDURE — 99214 OFFICE O/P EST MOD 30 MIN: CPT | Performed by: NURSE PRACTITIONER

## 2024-06-14 NOTE — PROGRESS NOTES
[facility]  In Person Session    Subjective  Patient ID: Jc Brannon Jr. is a 44 y.o. male is seen face to face in the [facility].    CC: ANxiety    HPI/ Interval History:   Pt is seen in follow up regarding feelings of anxiety surrounding new dx of colon cancer, recently initiated treatment. Pt father is present, with patient permission.    Pt reports tolerating treatment well, stating treatment was not as scary as he thought it would be. Did present to ED once for nausea and vomiting, now understanding this feeling, available strategies for managing. Overall, is very pleased regarding tolerability, hopeful for continued ease through treatment. Does report some fatigue alongside treatment, alhtough this has resolved. Has returned to work, able to perform tasks well. Appetite is great. Is sleeping well. Energy has returned. Concentration and focus are good. Denies pain, neuropathy. Able to do what he needs to do, drive, etc. Continues to identify need to relax; pt wants cancer out of him. Appreciates vaildation from medical team that treatment is going as to be exoected. Appreciates positive feedback. Anticipates next treatment on Monday.    Pt and father deny any additional concerns at this time. Both agree chemo experience was good, much better than expected. Describes this as being a pleasant experience.  Father discussed election not to initiate gabapentin. At this time, pt and father prefer to avoid medications where possible.    Exam: As above; pt is noted to be clinically less anxious in today's visit. Speech remains fast, less pressured, ruminative.    Recent Labs Reviewed:  Infusion on 06/12/2024   Component Date Value    Glucose 06/12/2024 121 (H)     BUN 06/12/2024 11     Creatinine 06/12/2024 0.93     Sodium 06/12/2024 140     Potassium 06/12/2024 3.8     Chloride 06/12/2024 103     CO2 06/12/2024 30.0 (H)     Calcium 06/12/2024 9.1     Total Protein  06/12/2024 6.7     Albumin 06/12/2024 4.4     ALT (SGPT) 06/12/2024 20     AST (SGOT) 06/12/2024 15     Alkaline Phosphatase 06/12/2024 123 (H)     Total Bilirubin 06/12/2024 0.2     Globulin 06/12/2024 2.3     A/G Ratio 06/12/2024 1.9     BUN/Creatinine Ratio 06/12/2024 11.8     Anion Gap 06/12/2024 7.0     eGFR 06/12/2024 103.2     WBC 06/12/2024 9.90     RBC 06/12/2024 3.88 (L)     Hemoglobin 06/12/2024 12.3 (L)     Hematocrit 06/12/2024 36.9 (L)     MCV 06/12/2024 95.1     MCH 06/12/2024 31.7     MCHC 06/12/2024 33.3     RDW 06/12/2024 12.9     RDW-SD 06/12/2024 44.4     MPV 06/12/2024 9.8     Platelets 06/12/2024 220     Neutrophil % 06/12/2024 74.0     Lymphocyte % 06/12/2024 13.0 (L)     Monocyte % 06/12/2024 9.6     Eosinophil % 06/12/2024 1.2     Basophil % 06/12/2024 0.8     Immature Grans % 06/12/2024 1.4 (H)     Neutrophils, Absolute 06/12/2024 7.32 (H)     Lymphocytes, Absolute 06/12/2024 1.29     Monocytes, Absolute 06/12/2024 0.95 (H)     Eosinophils, Absolute 06/12/2024 0.12     Basophils, Absolute 06/12/2024 0.08     Immature Grans, Absolute 06/12/2024 0.14 (H)     nRBC 06/12/2024 0.0    Infusion on 06/03/2024   Component Date Value    CEA 06/03/2024 1.89     Glucose 06/03/2024 142 (H)     BUN 06/03/2024 12     Creatinine 06/03/2024 0.98     Sodium 06/03/2024 141     Potassium 06/03/2024 3.5     Chloride 06/03/2024 104     CO2 06/03/2024 25.7     Calcium 06/03/2024 8.9     Total Protein 06/03/2024 6.6     Albumin 06/03/2024 4.2     ALT (SGPT) 06/03/2024 12     AST (SGOT) 06/03/2024 19     Alkaline Phosphatase 06/03/2024 78     Total Bilirubin 06/03/2024 0.3     Globulin 06/03/2024 2.4     A/G Ratio 06/03/2024 1.8     BUN/Creatinine Ratio 06/03/2024 12.2     Anion Gap 06/03/2024 11.3     eGFR 06/03/2024 96.9     WBC 06/03/2024 2.75 (L)     RBC 06/03/2024 4.15     Hemoglobin 06/03/2024 12.9 (L)     Hematocrit 06/03/2024 39.3     MCV 06/03/2024 94.7     MCH 06/03/2024 31.1     MCHC 06/03/2024 32.8      RDW 06/03/2024 12.6     RDW-SD 06/03/2024 43.9     MPV 06/03/2024 9.6     Platelets 06/03/2024 190     Neutrophil % 06/03/2024 50.9     Lymphocyte % 06/03/2024 31.6     Monocyte % 06/03/2024 11.6     Eosinophil % 06/03/2024 5.5     Basophil % 06/03/2024 0.4     Immature Grans % 06/03/2024 0.0     Neutrophils, Absolute 06/03/2024 1.40 (L)     Lymphocytes, Absolute 06/03/2024 0.87     Monocytes, Absolute 06/03/2024 0.32     Eosinophils, Absolute 06/03/2024 0.15     Basophils, Absolute 06/03/2024 0.01     Immature Grans, Absolute 06/03/2024 0.00     nRBC 06/03/2024 0.0    Lab on 05/20/2024   Component Date Value    WBC 05/20/2024 3.22 (L)     RBC 05/20/2024 4.31     Hemoglobin 05/20/2024 13.4     Hematocrit 05/20/2024 40.4     MCV 05/20/2024 93.7     MCH 05/20/2024 31.1     MCHC 05/20/2024 33.2     RDW 05/20/2024 12.7     RDW-SD 05/20/2024 44.0     MPV 05/20/2024 10.6     Platelets 05/20/2024 218     Neutrophil % 05/20/2024 44.7     Lymphocyte % 05/20/2024 35.4     Monocyte % 05/20/2024 13.4 (H)     Eosinophil % 05/20/2024 5.3     Basophil % 05/20/2024 0.3     Immature Grans % 05/20/2024 0.9 (H)     Neutrophils, Absolute 05/20/2024 1.44 (L)     Lymphocytes, Absolute 05/20/2024 1.14     Monocytes, Absolute 05/20/2024 0.43     Eosinophils, Absolute 05/20/2024 0.17     Basophils, Absolute 05/20/2024 0.01     Immature Grans, Absolute 05/20/2024 0.03     nRBC 05/20/2024 0.0    Office Visit on 05/03/2024   Component Date Value    REPORT SUMMARY 05/03/2024 NEGATIVE     FOOTNOTES 05/03/2024 See Notes     SIGNATERA TEST RESULT 05/03/2024 POSITIVE (A)     SIGNATERA MTM READOUT 05/03/2024 1.42 (A)    Hospital Outpatient Visit on 05/02/2024   Component Date Value    Creatinine 05/02/2024 1.10    Admission on 04/23/2024, Discharged on 04/23/2024   Component Date Value    Addendum 3 04/23/2024                      Value:This result contains rich text formatting which cannot be displayed here.    Addendum 2 04/23/2024                       Value:This result contains rich text formatting which cannot be displayed here.    Addendum 04/23/2024                      Value:This result contains rich text formatting which cannot be displayed here.    Case Report 04/23/2024                      Value:Surgical Pathology Report                         Case: [id number]                                  Authorizing Provider:  David Tena MD  Collected:           04/23/2024 11:14 AM          Ordering Location:     [facility]  Received:            04/23/2024 12:05 PM                                 [facility]                                                                  Pathologist:           Didi Villasenor MD                                                          Specimen:    Large Intestine, Rectum, mass                                                              Final Diagnosis 04/23/2024                      Value:This result contains rich text formatting which cannot be displayed here.    Comment 04/23/2024                      Value:This result contains rich text formatting which cannot be displayed here.    Gross Description 04/23/2024                      Value:This result contains rich text formatting which cannot be displayed here.   Labs reviewed    Current Psychotropic Medications:  Gabapentin - not taking     Plan of Care/ Medical Decision Making  Considered pharmacological and non pharmacological strategies for managing anxiety. Revisited gabapentin, various concerns regarding. Demonstrated deep breathing technqiues, strength based approach.\  Supported patient in ongoing communication with myself and medical team regarding concerns.  Discussed available assistance regarding experience of SE, goals of maximizing QOL and tolerability alongside current treatment.  FU Scheduled in 4 weeks.    Diagnoses and all orders for this visit:    1. Adjustment disorder with mixed anxiety and depressed mood (Primary)    I spent  37 minutes caring for Jc on this date of service. This time includes time spent by me in the following activities: preparing for the visit, reviewing tests, obtaining and/or reviewing a separately obtained history, performing a medically appropriate examination and/or evaluation, counseling and educating the patient/family/caregiver, and documenting information in the medical record.          no

## 2024-06-14 NOTE — PROGRESS NOTES
Follow-up    SUBJECTIVE:    06/17/2024, Interval history:  He is here today to start Cycle 2 of neoadjuvant FOLFIRINOX. He is doing well. He had one episode of nausea of Saturday morning, but has otherwise been doing well. He denies vomiting or diarrhea.Denies neuropathy. He is maintaining his weight and eating well    HISTORY OF PRESENT ILLNESS:  The patient is a 43 y.o. year old male with recently diagnosed rectal adenocarcinoma who presents to our clinic to establish care.  Onc history is outlined below    Family history significant for prostate cancer in father at 63, paternal uncle with colon cancer at 57, other paternal uncle with metastatic cancer, type unknown in his 50s, other paternal uncle with possible colon cancer and DVT/PE in his 70s.    Oncology/Hematology History   Rectal adenocarcinoma   4/23/2024 Procedure         4/23/2024 [facility]         5/2/2024 Imaging    CT CAP    A 1.7 cm right hepatic cyst is noted. Numerous small subcentimeter liver  low densities are present that are too small to characterize, could be  cysts, or potentially metastatic disease, interval follow-up can  characterize change.    No bowel obstruction. The sigmorectal mass is better characterized on  the MRI exam of same date (please see separate report). Some stranding  is apparent in the fat around the lesion, numerous surrounding lymph  nodes are present that could be metastatic lymph nodes. For example, on  axial image 165, a 1.9 x 1.6 cm pericolonic lymph node is present. As  another example, on axial image 170, left perirectal lymph node measures  0.7 x 1.2 cm.    IMPRESSION:        1. Sigmorectal mass with numerous surrounding lymph nodes, raising  suspicion for metastatic lymph nodes.     2. Hepatic cyst, and numerous liver low densities that are too small to  characterize; these lesions could also be cysts, but in this clinical  setting, possibility of any metastatic liver lesions is not excluded.  Interval  follow-up is advised to characterize change.     3. No pulmonary mass.     5/2/2024 Imaging    MRI Pelvis    FINDINGS: There is a circumferential solid low rectal mass which  measures approximately 4.5 cm in craniocaudad span and the inferior  margin is approximately 5 mm proximal to the anorectal junction.     The tumor extends approximately 8 mm into the mesorectal fat  predominantly along the right wall of the mass. There appears to be  extension to the mesorectal fascia along the right wall and abutment of  the right levator ani muscles. There are several enlarged perirectal  nodes and the largest is presacral measuring approximately 2.0 x 1.2 cm.  There are at least 4 enhancing suspicious nodes. There is no definite  extramural vascular invasion.     IMPRESSION:  1. Primary Tumor Location: Low rectal     2. MRI Stage: T3 N2 MRF positive     3. No definite sphincter involvement     5/2/2024 Cancer Staged    Staging form: Colon And Rectum, AJCC 8th Edition  - Clinical stage from 5/2/2024: cT3, cN2, cM0 - Signed by Sonali Pak MD on 6/3/2024     5/3/2024 Initial Diagnosis    Rectal adenocarcinoma     5/16/2024 Imaging    MRI abdo    IMPRESSION:  T2 hyperintense nonenhancing liver lesions are consistent with hepatic  cysts and biliary cystic hamartomas. No convincing liver metastasis  identified     5/24/2024 Procedure    PORT placement (Dr Isaac)     6/3/2024 -  Chemotherapy    OP COLORECTAL FOLFIRINOX (Fluorouracil cont. Infusion / Leucovorin / OXALIplatin / Irinotecan)     6/5/2024 -  Chemotherapy    OP CENTRAL VENOUS ACCESS DEVICE Access, Care, and Maintenance (CVAD)         The current medication list and allergy list were reviewed and reconciled.      Past Medical History, Past Surgical History, Social History, Family History have been reviewed and are without significant changes except as mentioned.      REVIEW OF SYSTEMS:  See interval history    Objective:       Vitals:    06/17/24 0805   BP: 145/88  "  Pulse: 82   Resp: 16   Temp: 97.9 °F (36.6 °C)   TempSrc: Oral   SpO2: 98%   Weight: 99.2 kg (218 lb 9.6 oz)   Height: 175.3 cm (69.02\")   PainSc: 0-No pain         6/17/2024     8:05 AM   Current Status   ECOG score 0      PHYSICAL EXAM:    CONSTITUTIONAL:  Vital signs reviewed.  No distress, looks comfortable.  RESPIRATORY: Bilateral lungs clear to auscultation.  No wheezing or rhonchi. Benign port in the chest wall.   CARDIOVASCULAR: Normal S1-S2.  No murmur rubs or gallop   GASTROINTESTINAL: Soft, nontender, bowel sounds present  NEURO:  No focal deficits.  Appears to have equal strength all 4 extremities.  SKIN:  Warm.  No rashes.    I have reexamined the patient and the results are consistent with the previously documented exam. Le Wright, WIL        RECENT LABS:  Results from last 7 days   Lab Units 06/17/24  0746 06/12/24  0731   WBC 10*3/mm3 3.66 9.90   NEUTROS ABS 10*3/mm3 2.03 7.32*   HEMOGLOBIN g/dL 12.4* 12.3*   HEMATOCRIT % 37.6 36.9*   PLATELETS 10*3/mm3 195 220     Results from last 7 days   Lab Units 06/17/24  0746 06/12/24  0731   SODIUM mmol/L 140 140   POTASSIUM mmol/L 3.6 3.8   CHLORIDE mmol/L 103 103   CO2 mmol/L 25.8 30.0*   BUN mg/dL 12 11   CREATININE mg/dL 0.99 0.93   CALCIUM mg/dL 8.8 9.1   ALBUMIN g/dL 4.2 4.4   BILIRUBIN mg/dL 0.3 0.2   ALK PHOS U/L 107 123*   ALT (SGPT) U/L 14 20   AST (SGOT) U/L 19 15   GLUCOSE mg/dL 133* 121*             Assessment & Plan     *Rectal adenocarcinoma (cT3 cN2 cM0), LUCAS, TMB low  -4/23/2024 rectal biopsy-invasive moderately differentiated adenocarcinoma, LUCAS.  -5/2/2024 MRI pelvis: Approximately 4.5 cm circumferential low rectal mass, approximately 5 mm proximal to anorectal junction, extending 8 mm in the mesorectal fat.  Appears to be extension to MRF along the right wall and abutment of right levator ani muscles.  Several enlarged perirectal nodes, largest 2 x 1.2 cm.  At least 4 enhancing suspicious nodes.  cT3 cN2 MRF positive.  No " definitive sphincter involvement  - 5/2/2024 CT chest abdomen pelvis: 1.7 cm right hepatic cyst, and numerous low-density liver lesions-cyst versus possible metastatic lesions.  No lung mass  -5/16/2024 MRI abdomen: T2 hyperintense nonenhancing liver lesions consistent with liver cyst and biliary cystic hamartomas.  No evidence of liver mets  -Tumor genomic profile-APC (I269fs), APC ([id number]), FANCD2 (Q823), TP53. TMB 1 mut/Mb. LUCAS  - 6/3/2024 baseline CEA: 1.89  -Plan for total neoadjuvant chemotherapy with FOLFIRINOX.  - 6/3/2024: Cycle 1 day 1 FOLFIRINOX (5-FU bolus eliminated to prevent further myelosuppression given baseline neutropenia) with G-CSF support  -Proceed today, 6/17/2024 with cycle 2-day 1 FOLFIRINOX with G-CSF support    *Elevated alk phos  -6/12/2024: Alk phos elevated at 123.  Rest of LFTs unremarkable.  -Will monitor for now.  If continues to be elevated, will obtain GGT to determine the source-hepatobiliary versus bone  - 6/17/2024: Alk phos normal at 107 no need for additional labs at this time.,    *Benign ethnic neutropenia  - White cell count ranged between 2.6-3.2 since at least July 2019.    - 6/12/2024: WBC 9.9, ANC 7.3 s/p Neulasta on 6/5/2024  --6/17/2024: WBC 3.66, ANC 2.03    *Anemia  -No active bleeding  - 6/17/2024 Hemoglobin stalbe at 12.4    *Anxiety  -Prescribed Gabapentin 100 TID by [facility]  -Continues to follow with Jennie Camargo    Plan  Proceed today with cycle 2-day 1 FOLFIRINOX  Return Wednesday, 6/19/2024 for 5-FU mars [facility] follow-up in 2 weeks with CBC, CMP, cycle 3-day 1 FOLFIRINOX  The patient was instructed to call if he develops nausea vomiting, diarrhea needs to be seen prior to scheduled follow-up in 2 weeks.    The patient is on a high risk medication requiring close monitoring for toxicity    Le Wright, APRN  06/17/2024

## 2024-06-17 ENCOUNTER — OFFICE VISIT (OUTPATIENT)
Dept: ONCOLOGY | Facility: CLINIC | Age: 44
End: 2024-06-17
Payer: COMMERCIAL

## 2024-06-17 ENCOUNTER — INFUSION (OUTPATIENT)
Dept: ONCOLOGY | Facility: HOSPITAL | Age: 44
End: 2024-06-17
Payer: COMMERCIAL

## 2024-06-17 VITALS
TEMPERATURE: 97.9 F | BODY MASS INDEX: 32.38 KG/M2 | OXYGEN SATURATION: 98 % | WEIGHT: 218.6 LBS | HEART RATE: 82 BPM | HEIGHT: 69 IN | RESPIRATION RATE: 16 BRPM | SYSTOLIC BLOOD PRESSURE: 145 MMHG | DIASTOLIC BLOOD PRESSURE: 88 MMHG

## 2024-06-17 DIAGNOSIS — C20 RECTAL ADENOCARCINOMA: Primary | ICD-10-CM

## 2024-06-17 DIAGNOSIS — Z79.899 HIGH RISK MEDICATION USE: ICD-10-CM

## 2024-06-17 PROBLEM — Z12.11 ENCOUNTER FOR SCREENING FOR MALIGNANT NEOPLASM OF COLON: Status: ACTIVE | Noted: 2023-07-28

## 2024-06-17 LAB
ALBUMIN SERPL-MCNC: 4.2 G/DL (ref 3.5–5.2)
ALBUMIN/GLOB SERPL: 1.6 G/DL
ALP SERPL-CCNC: 107 U/L (ref 39–117)
ALT SERPL W P-5'-P-CCNC: 14 U/L (ref 1–41)
ANION GAP SERPL CALCULATED.3IONS-SCNC: 11.2 MMOL/L (ref 5–15)
AST SERPL-CCNC: 19 U/L (ref 1–40)
BASOPHILS # BLD AUTO: 0.01 10*3/MM3 (ref 0–0.2)
BASOPHILS NFR BLD AUTO: 0.3 % (ref 0–1.5)
BILIRUB SERPL-MCNC: 0.3 MG/DL (ref 0–1.2)
BUN SERPL-MCNC: 12 MG/DL (ref 6–20)
BUN/CREAT SERPL: 12.1 (ref 7–25)
CALCIUM SPEC-SCNC: 8.8 MG/DL (ref 8.6–10.5)
CHLORIDE SERPL-SCNC: 103 MMOL/L (ref 98–107)
CO2 SERPL-SCNC: 25.8 MMOL/L (ref 22–29)
CREAT SERPL-MCNC: 0.99 MG/DL (ref 0.76–1.27)
DEPRECATED RDW RBC AUTO: 46.5 FL (ref 37–54)
EGFRCR SERPLBLD CKD-EPI 2021: 95.7 ML/MIN/1.73
EOSINOPHIL # BLD AUTO: 0.11 10*3/MM3 (ref 0–0.4)
EOSINOPHIL NFR BLD AUTO: 3 % (ref 0.3–6.2)
ERYTHROCYTE [DISTWIDTH] IN BLOOD BY AUTOMATED COUNT: 13.4 % (ref 12.3–15.4)
GLOBULIN UR ELPH-MCNC: 2.6 GM/DL
GLUCOSE SERPL-MCNC: 133 MG/DL (ref 65–99)
HCT VFR BLD AUTO: 37.6 % (ref 37.5–51)
HGB BLD-MCNC: 12.4 G/DL (ref 13–17.7)
IMM GRANULOCYTES # BLD AUTO: 0.05 10*3/MM3 (ref 0–0.05)
IMM GRANULOCYTES NFR BLD AUTO: 1.4 % (ref 0–0.5)
LYMPHOCYTES # BLD AUTO: 1.13 10*3/MM3 (ref 0.7–3.1)
LYMPHOCYTES NFR BLD AUTO: 30.9 % (ref 19.6–45.3)
MCH RBC QN AUTO: 31.8 PG (ref 26.6–33)
MCHC RBC AUTO-ENTMCNC: 33 G/DL (ref 31.5–35.7)
MCV RBC AUTO: 96.4 FL (ref 79–97)
MONOCYTES # BLD AUTO: 0.33 10*3/MM3 (ref 0.1–0.9)
MONOCYTES NFR BLD AUTO: 9 % (ref 5–12)
NEUTROPHILS NFR BLD AUTO: 2.03 10*3/MM3 (ref 1.7–7)
NEUTROPHILS NFR BLD AUTO: 55.4 % (ref 42.7–76)
NRBC BLD AUTO-RTO: 0 /100 WBC (ref 0–0.2)
PLATELET # BLD AUTO: 195 10*3/MM3 (ref 140–450)
PMV BLD AUTO: 10.1 FL (ref 6–12)
POTASSIUM SERPL-SCNC: 3.6 MMOL/L (ref 3.5–5.2)
PROT SERPL-MCNC: 6.8 G/DL (ref 6–8.5)
RBC # BLD AUTO: 3.9 10*6/MM3 (ref 4.14–5.8)
SODIUM SERPL-SCNC: 140 MMOL/L (ref 136–145)
WBC NRBC COR # BLD AUTO: 3.66 10*3/MM3 (ref 3.4–10.8)

## 2024-06-17 PROCEDURE — 96375 TX/PRO/DX INJ NEW DRUG ADDON: CPT

## 2024-06-17 PROCEDURE — 0 DEXTROSE 5 % SOLUTION 500 ML FLEX CONT: Performed by: NURSE PRACTITIONER

## 2024-06-17 PROCEDURE — 25010000002 DEXAMETHASONE SODIUM PHOSPHATE 100 MG/10ML SOLUTION: Performed by: NURSE PRACTITIONER

## 2024-06-17 PROCEDURE — 25010000002 IRINOTECAN PER 20 MG: Performed by: NURSE PRACTITIONER

## 2024-06-17 PROCEDURE — 85025 COMPLETE CBC W/AUTO DIFF WBC: CPT

## 2024-06-17 PROCEDURE — 25010000002 OXALIPLATIN PER 0.5 MG: Performed by: NURSE PRACTITIONER

## 2024-06-17 PROCEDURE — 96413 CHEMO IV INFUSION 1 HR: CPT

## 2024-06-17 PROCEDURE — 25010000002 LEUCOVORIN CALCIUM PER 50 MG: Performed by: NURSE PRACTITIONER

## 2024-06-17 PROCEDURE — 25010000002 FOSAPREPITANT PER 1 MG: Performed by: NURSE PRACTITIONER

## 2024-06-17 PROCEDURE — 0 DEXTROSE 5 % SOLUTION 250 ML FLEX CONT: Performed by: NURSE PRACTITIONER

## 2024-06-17 PROCEDURE — 25010000002 ATROPINE SULFATE 0.4 MG/ML SOLUTION: Performed by: NURSE PRACTITIONER

## 2024-06-17 PROCEDURE — 25010000002 FLUOROURACIL PER 500 MG: Performed by: NURSE PRACTITIONER

## 2024-06-17 PROCEDURE — 99214 OFFICE O/P EST MOD 30 MIN: CPT | Performed by: NURSE PRACTITIONER

## 2024-06-17 PROCEDURE — 96368 THER/DIAG CONCURRENT INF: CPT

## 2024-06-17 PROCEDURE — 25010000002 PALONOSETRON PER 25 MCG: Performed by: NURSE PRACTITIONER

## 2024-06-17 PROCEDURE — 96417 CHEMO IV INFUS EACH ADDL SEQ: CPT

## 2024-06-17 PROCEDURE — 96415 CHEMO IV INFUSION ADDL HR: CPT

## 2024-06-17 PROCEDURE — 96367 TX/PROPH/DG ADDL SEQ IV INF: CPT

## 2024-06-17 PROCEDURE — 25810000003 SODIUM CHLORIDE 0.9 % SOLUTION 250 ML FLEX CONT: Performed by: NURSE PRACTITIONER

## 2024-06-17 PROCEDURE — 80053 COMPREHEN METABOLIC PANEL: CPT

## 2024-06-17 PROCEDURE — G0498 CHEMO EXTEND IV INFUS W/PUMP: HCPCS

## 2024-06-17 PROCEDURE — 0 DEXTROSE 5 % SOLUTION: Performed by: NURSE PRACTITIONER

## 2024-06-17 RX ORDER — FAMOTIDINE 10 MG/ML
20 INJECTION, SOLUTION INTRAVENOUS AS NEEDED
Status: CANCELLED | OUTPATIENT
Start: 2024-06-17

## 2024-06-17 RX ORDER — PALONOSETRON 0.05 MG/ML
0.25 INJECTION, SOLUTION INTRAVENOUS ONCE
Status: COMPLETED | OUTPATIENT
Start: 2024-06-17 | End: 2024-06-17

## 2024-06-17 RX ORDER — PALONOSETRON 0.05 MG/ML
0.25 INJECTION, SOLUTION INTRAVENOUS ONCE
Status: CANCELLED | OUTPATIENT
Start: 2024-06-17

## 2024-06-17 RX ORDER — DEXTROSE MONOHYDRATE 50 MG/ML
20 INJECTION, SOLUTION INTRAVENOUS ONCE
Status: CANCELLED | OUTPATIENT
Start: 2024-06-17

## 2024-06-17 RX ORDER — ATROPINE SULFATE 0.4 MG/ML
0.25 INJECTION, SOLUTION INTRAMUSCULAR; INTRAVENOUS; SUBCUTANEOUS
Status: DISCONTINUED | OUTPATIENT
Start: 2024-06-17 | End: 2024-06-17 | Stop reason: HOSPADM

## 2024-06-17 RX ORDER — DIPHENHYDRAMINE HYDROCHLORIDE 50 MG/ML
50 INJECTION INTRAMUSCULAR; INTRAVENOUS AS NEEDED
Status: CANCELLED | OUTPATIENT
Start: 2024-06-17

## 2024-06-17 RX ORDER — DEXTROSE MONOHYDRATE 50 MG/ML
20 INJECTION, SOLUTION INTRAVENOUS ONCE
Status: COMPLETED | OUTPATIENT
Start: 2024-06-17 | End: 2024-06-17

## 2024-06-17 RX ORDER — ATROPINE SULFATE 1 MG/ML
0.25 INJECTION, SOLUTION INTRAMUSCULAR; INTRAVENOUS; SUBCUTANEOUS
Status: CANCELLED | OUTPATIENT
Start: 2024-06-17

## 2024-06-17 RX ADMIN — DEXTROSE MONOHYDRATE 355 MG: 5 INJECTION, SOLUTION INTRAVENOUS at 12:13

## 2024-06-17 RX ADMIN — DEXAMETHASONE SODIUM PHOSPHATE 12 MG: 100 INJECTION INTRAMUSCULAR; INTRAVENOUS at 09:19

## 2024-06-17 RX ADMIN — LEUCOVORIN CALCIUM 860 MG: 350 INJECTION, POWDER, LYOPHILIZED, FOR SUSPENSION INTRAMUSCULAR; INTRAVENOUS at 11:42

## 2024-06-17 RX ADMIN — FOSAPREPITANT 100 ML: 150 INJECTION, POWDER, LYOPHILIZED, FOR SOLUTION INTRAVENOUS at 08:46

## 2024-06-17 RX ADMIN — OXALIPLATIN 180 MG: 5 INJECTION, SOLUTION INTRAVENOUS at 09:38

## 2024-06-17 RX ADMIN — ATROPINE SULFATE 0.25 MG: 0.4 INJECTION, SOLUTION INTRAVENOUS at 11:41

## 2024-06-17 RX ADMIN — DEXTROSE MONOHYDRATE 20 ML/HR: 50 INJECTION, SOLUTION INTRAVENOUS at 08:46

## 2024-06-17 RX ADMIN — PALONOSETRON HYDROCHLORIDE 0.25 MG: 0.25 INJECTION INTRAVENOUS at 08:46

## 2024-06-17 RX ADMIN — FLUOROURACIL 5140 MG: 50 INJECTION, SOLUTION INTRAVENOUS at 13:51

## 2024-06-19 ENCOUNTER — INFUSION (OUTPATIENT)
Dept: ONCOLOGY | Facility: HOSPITAL | Age: 44
End: 2024-06-19
Payer: COMMERCIAL

## 2024-06-19 DIAGNOSIS — C20 RECTAL ADENOCARCINOMA: Primary | ICD-10-CM

## 2024-06-19 PROCEDURE — 25010000002 PEGFILGRASTIM 6 MG/0.6ML PREFILLED SYRINGE KIT: Performed by: STUDENT IN AN ORGANIZED HEALTH CARE EDUCATION/TRAINING PROGRAM

## 2024-06-19 PROCEDURE — 96377 APPLICATON ON-BODY INJECTOR: CPT

## 2024-06-19 PROCEDURE — 25010000002 HEPARIN LOCK FLUSH PER 10 UNITS: Performed by: NURSE PRACTITIONER

## 2024-06-19 RX ORDER — HEPARIN SODIUM (PORCINE) LOCK FLUSH IV SOLN 100 UNIT/ML 100 UNIT/ML
500 SOLUTION INTRAVENOUS AS NEEDED
Status: DISCONTINUED | OUTPATIENT
Start: 2024-06-19 | End: 2024-06-19 | Stop reason: HOSPADM

## 2024-06-19 RX ORDER — SODIUM CHLORIDE 0.9 % (FLUSH) 0.9 %
10 SYRINGE (ML) INJECTION AS NEEDED
Status: DISCONTINUED | OUTPATIENT
Start: 2024-06-19 | End: 2024-06-19 | Stop reason: HOSPADM

## 2024-06-19 RX ORDER — SODIUM CHLORIDE 0.9 % (FLUSH) 0.9 %
10 SYRINGE (ML) INJECTION AS NEEDED
OUTPATIENT
Start: 2024-06-19

## 2024-06-19 RX ORDER — HEPARIN SODIUM (PORCINE) LOCK FLUSH IV SOLN 100 UNIT/ML 100 UNIT/ML
500 SOLUTION INTRAVENOUS AS NEEDED
OUTPATIENT
Start: 2024-06-19

## 2024-06-19 RX ADMIN — PEGFILGRASTIM 6 MG: KIT SUBCUTANEOUS at 11:34

## 2024-06-19 RX ADMIN — Medication 500 UNITS: at 11:34

## 2024-06-19 RX ADMIN — Medication 10 ML: at 11:34

## 2024-06-23 ENCOUNTER — NURSE TRIAGE (OUTPATIENT)
Dept: CALL CENTER | Facility: HOSPITAL | Age: 44
End: 2024-06-23
Payer: COMMERCIAL

## 2024-06-23 NOTE — TELEPHONE ENCOUNTER
Caller reported dressing had come off, wanting to know if needs to apply new dressing.   Caller reported nothing is attached to port at this time, area has healed, no open areas noted. Port is under skin    Reason for Disposition   [1] Dressing needs to be changed (e.g., wet, soiled, loose, or open to air) AND [2] knows how to perform dressing change    Additional Information   Negative: SEVERE difficulty breathing (e.g., struggling for each breath, speaks in single words)   Negative: Shock suspected (e.g., cold/pale/clammy skin, too weak to stand, low BP, rapid pulse)   Negative: Cracked or broken central line or PICC Line   Negative: Sounds like a life-threatening emergency to the triager   Negative: IV not running or running slowly   Negative: [1] Difficulty breathing AND [2] not severe   Negative: Moderate bleeding around IV site  (Exception: Mild bleeding that stops quickly with direct pressure.)   Negative: [1] Chest pain AND [2] has central or PICC line   Negative: [1] Chest or neck swelling AND [2] has a central or PICC line  (Exception: Mild puffiness or swelling just around IV site.)   Negative: [1] Arm or leg swelling AND [2] has a central or PICC line  (Exception: Mild puffiness or swelling just around IV site.)   Negative: Fever > 100.4 F (38.0 C)   Negative: Patient sounds very sick or weak to the triager   Negative: Central or PICC line has moved out of position (or can see cuff slipping out of skin; or more line externally exposed than before)   Negative: Pus or cloudy fluid from IV site   Negative: [1] Red streak at IV site AND [2] palpable cord   Negative: [1] Red streak at IV site AND [2] longer than 1 inch (2.5 cm)   Negative: [1] Skin redness AND [2] extends > 1 inch (2.5 cm) from IV site   Negative: Skin swelling at IV site  (Exception: IV recently removed and has small lump or small amount of skin swelling.)   Negative: [1] Raised bruise at IV site AND [2] getting larger (or size > 2 inches (5  "cm)   Negative: [1] Mild bleeding at IV site AND [2] not stopped after following Care Advice   Negative: IV fluid leaking out of insertion site (skin hole where IV catheter enters skin)   Negative: [1] Pain at IV site or shooting up arm AND [2] IV running slowly   Negative: [1] Dressing needs to be changed (e.g., wet, soiled, loose, or open to air) AND [2] unable or unwilling to perform dressing change   Negative: Central line (e.g., [place], [place], Port) or PICC line comes all the way out   Negative: [1] Caller has URGENT question AND [2] triager unable to answer question   Negative: [1] Small area of skin redness at IV site (<1 inch or 2.5 cm) AND [2] no fever, swelling   Negative: [1] Pain at IV site or shooting up arm AND [2] NO redness or swelling AND [3]  IV running normally   Negative: [1] Pain at IV site or shooting up arm AND [2] NO redness or swelling AND [3] IV has been removed   Negative: [1] Caller has NON-URGENT question AND [2] triager unable to answer question   Negative: Small painless lump at prior IV site   Negative: Small area of skin swelling (no redness or pain) at prior IV site   Negative: Minor bruising at prior IV site   Negative: Mild bleeding at IV site    Answer Assessment - Initial Assessment Questions  1. SYMPTOM:  \"What's the main symptom you're concerned about?\" (e.g., pain, redness, swelling, pus)      Dressing came off of site, wanting to know if needs to put another back on, does it need to be cleaned  2. ONSET: \"When did the this  start?\"      Just happened, took a shower and must have fallen off  3. IV TYPE: \"What kind of IV line do you have?\" (e.g., central line, PICC, peripheral IV)      Powerport for chemo therapy  4. IV LOCATION - SITE: \"Where does the IV enter your body?\"      chest  5. IV START DATE: \"When was this IV put in?\"      -  6. IV REASON: \"Why do you have this IV line?\"      Goes chemo  7. IV FUNCTION: \"Describe how the IV is running?\" (e.g., running normally, " "running slowly, not running, unable to flush)       N/a  8. PAIN: \"Is there any pain?\" If Yes, ask: \"How bad is the pain?\" (e.g., scale 1-10; or mild, moderate, severe) \"Describe the pain.\" (e.g., burning, throbbing, shooting, sharp, etc.)    - NONE (0): no pain    - MILD (1-3): doesn't interfere with normal activities     - MODERATE (4-7): interferes with normal activities or awakens from sleep     - SEVERE (8-10): excruciating pain, unable to do any normal activities       N/a  9. SWELLING: \"Is there any swelling at your IV site?\"       Did not report  10. FEVER: \"Do you have a fever?\" If Yes, ask: \"What is your temperature, how was it measured, and when did it start?\"         N/a  11. OTHER SYMPTOMS: \"Do you have any other symptoms?\" (e.g., shaking chills, weakness)        N/a  12. VISITING NURSE: \"Do you have a visiting nurse?\" (e.g., home health nurse, IV infusion nurse)        N/a  13. PUMP: \"Is it on a pump?\" If Yes,, ask: \"Is there an alarm and what is the message?\"        N/a    Protocols used: IV Site and Other Symptoms-ADULT-AH    "

## 2024-06-24 ENCOUNTER — TELEPHONE (OUTPATIENT)
Dept: FAMILY MEDICINE CLINIC | Facility: CLINIC | Age: 44
End: 2024-06-24
Payer: COMMERCIAL

## 2024-06-24 NOTE — TELEPHONE ENCOUNTER
He needs to call whom ever is maintaining his port. Also remind that if his questions are relate to the treatment of his cancer he needs to call the cancer doctor.

## 2024-06-24 NOTE — TELEPHONE ENCOUNTER
Called to inform patient that Elvira FRIEDMAN states he needs to call his cancer doctor and talk to them to see what he needs to do.

## 2024-06-27 ENCOUNTER — TELEPHONE (OUTPATIENT)
Dept: ONCOLOGY | Facility: CLINIC | Age: 44
End: 2024-06-27
Payer: COMMERCIAL

## 2024-06-27 NOTE — PROGRESS NOTES
Follow-up (2wk F/U RX)    SUBJECTIVE:    07/01/2024, Interval history  Here for cycle 3-day 1 neoadjuvant FOLFIRINOX.  However, ANC 0.95.  He has tolerated chemo quite well so far.  No nausea vomiting fever diarrhea neuropathy ER or hospitalizations    HISTORY OF PRESENT ILLNESS:  The patient is a 44 y.o. year old male with recently diagnosed rectal adenocarcinoma who presents to our clinic to establish care.  Onc history is outlined below    Family history significant for prostate cancer in father at 63, paternal uncle with colon cancer at 57, other paternal uncle with metastatic cancer, type unknown in his 50s, other paternal uncle with possible colon cancer and DVT/PE in his 70s.    Oncology/Hematology History   Rectal adenocarcinoma   4/23/2024 Procedure         4/23/2024 [place]         5/2/2024 Imaging    CT CAP    A 1.7 cm right hepatic cyst is noted. Numerous small subcentimeter liver  low densities are present that are too small to characterize, could be  cysts, or potentially metastatic disease, interval follow-up can  characterize change.    No bowel obstruction. The sigmorectal mass is better characterized on  the MRI exam of same date (please see separate report). Some stranding  is apparent in the fat around the lesion, numerous surrounding lymph  nodes are present that could be metastatic lymph nodes. For example, on  axial image 165, a 1.9 x 1.6 cm pericolonic lymph node is present. As  another example, on axial image 170, left perirectal lymph node measures  0.7 x 1.2 cm.    IMPRESSION:        1. Sigmorectal mass with numerous surrounding lymph nodes, raising  suspicion for metastatic lymph nodes.     2. Hepatic cyst, and numerous liver low densities that are too small to  characterize; these lesions could also be cysts, but in this clinical  setting, possibility of any metastatic liver lesions is not excluded.  Interval follow-up is advised to characterize change.     3. No pulmonary  mass.     5/2/2024 Imaging    MRI Pelvis    FINDINGS: There is a circumferential solid low rectal mass which  measures approximately 4.5 cm in craniocaudad span and the inferior  margin is approximately 5 mm proximal to the anorectal junction.     The tumor extends approximately 8 mm into the mesorectal fat  predominantly along the right wall of the mass. There appears to be  extension to the mesorectal fascia along the right wall and abutment of  the right levator ani muscles. There are several enlarged perirectal  nodes and the largest is presacral measuring approximately 2.0 x 1.2 cm.  There are at least 4 enhancing suspicious nodes. There is no definite  extramural vascular invasion.     IMPRESSION:  1. Primary Tumor Location: Low rectal     2. MRI Stage: T3 N2 MRF positive     3. No definite sphincter involvement     5/2/2024 Cancer Staged    Staging form: Colon And Rectum, AJCC 8th Edition  - Clinical stage from 5/2/2024: cT3, cN2, cM0 - Signed by Sonali Pak MD on 6/3/2024     5/3/2024 Initial Diagnosis    Rectal adenocarcinoma     5/16/2024 Imaging    MRI abdo    IMPRESSION:  T2 hyperintense nonenhancing liver lesions are consistent with hepatic  cysts and biliary cystic hamartomas. No convincing liver metastasis  identified     5/24/2024 Procedure    PORT placement (Dr Isaac)     6/3/2024 -  Chemotherapy    OP COLORECTAL FOLFIRINOX (Fluorouracil cont. Infusion / Leucovorin / OXALIplatin / Irinotecan)     6/5/2024 -  Chemotherapy    OP CENTRAL VENOUS ACCESS DEVICE Access, Care, and Maintenance (CVAD)         The current medication list and allergy list were reviewed and reconciled.      Past Medical History, Past Surgical History, Social History, Family History have been reviewed and are without significant changes except as mentioned.      REVIEW OF SYSTEMS:  See interval history    Objective:       Vitals:    07/01/24 0904 07/01/24 0918   BP: 165/96 154/99   Pulse: 88    Resp: 16    Temp: 97.9 °F  "(36.6 °C)    TempSrc: Oral    SpO2: 96%    Weight: 101 kg (222 lb 4.8 oz)    Height: 175.3 cm (69.02\")    PainSc: 0-No pain                7/1/2024     9:12 AM   Current Status   ECOG score 0      PHYSICAL EXAM:    CONSTITUTIONAL:  Vital signs reviewed.  No distress, looks comfortable.  RESPIRATORY: Bilateral lungs clear to auscultation.  No wheezing or rhonchi   CARDIOVASCULAR: Normal S1-S2.  No murmur rubs or gallop GASTROINTESTINAL: Soft, nontender, bowel sounds present  NEURO:  No focal deficits.  Appears to have equal strength all 4 extremities.  SKIN:  Warm.  No rashes.    I have reexamined the patient and the results are consistent with the previously documented exam. Sonali Pak MD        RECENT LABS:        WBC   Date Value Ref Range Status   07/01/2024 2.57 (L) 3.40 - 10.80 10*3/mm3 Final   06/17/2024 3.66 3.40 - 10.80 10*3/mm3 Final   06/12/2024 9.90 3.40 - 10.80 10*3/mm3 Final   06/03/2024 2.75 (L) 3.40 - 10.80 10*3/mm3 Final   05/20/2024 3.22 (L) 3.40 - 10.80 10*3/mm3 Final   06/09/2023 3.0 (L) 3.4 - 10.8 x10E3/uL Final   06/04/2022 3.0 (L) 3.4 - 10.8 x10E3/uL Final   05/25/2021 3.11 (L) 3.40 - 10.80 10*3/mm3 Final   05/26/2020 2.91 (L) 3.40 - 10.80 10*3/mm3 Final   02/12/2020 4.5 3.4 - 10.8 x10E3/uL Final   07/27/2019 2.6 (L) 3.4 - 10.8 x10E3/uL Final     Comment:     **Verified by repeat analysis**     Hemoglobin   Date Value Ref Range Status   07/01/2024 11.8 (L) 13.0 - 17.7 g/dL Final   06/17/2024 12.4 (L) 13.0 - 17.7 g/dL Final   06/12/2024 12.3 (L) 13.0 - 17.7 g/dL Final   06/03/2024 12.9 (L) 13.0 - 17.7 g/dL Final   05/20/2024 13.4 13.0 - 17.7 g/dL Final   06/09/2023 14.3 13.0 - 17.7 g/dL Final   06/04/2022 14.1 13.0 - 17.7 g/dL Final   05/25/2021 14.0 13.0 - 17.7 g/dL Final   05/26/2020 13.7 13.0 - 17.7 g/dL Final   02/12/2020 13.5 13.0 - 17.7 g/dL Final   07/27/2019 13.8 13.0 - 17.7 g/dL Final     Platelets   Date Value Ref Range Status   07/01/2024 250 140 - 450 10*3/mm3 Final   06/17/2024 " 195 140 - 450 10*3/mm3 Final   06/12/2024 220 140 - 450 10*3/mm3 Final   06/03/2024 190 140 - 450 10*3/mm3 Final   05/20/2024 218 140 - 450 10*3/mm3 Final   06/09/2023 207 150 - 450 x10E3/uL Final   06/04/2022 230 150 - 450 x10E3/uL Final   05/25/2021 213 140 - 450 10*3/mm3 Final   05/26/2020 223 140 - 450 10*3/mm3 Final   02/12/2020 245 150 - 450 x10E3/uL Final   07/27/2019 249 150 - 450 x10E3/uL Final         Assessment & Plan     *Rectal adenocarcinoma (cT3 cN2 cM0), LUCAS, TMB low  -4/23/2024 rectal biopsy-invasive moderately differentiated adenocarcinoma, LUCAS.  -5/2/2024 MRI pelvis: Approximately 4.5 cm circumferential low rectal mass, approximately 5 mm proximal to anorectal junction, extending 8 mm in the mesorectal fat.  Appears to be extension to MRF along the right wall and abutment of right levator ani muscles.  Several enlarged perirectal nodes, largest 2 x 1.2 cm.  At least 4 enhancing suspicious nodes.  cT3 cN2 MRF positive.  No definitive sphincter involvement  - 5/2/2024 CT chest abdomen pelvis: 1.7 cm right hepatic cyst, and numerous low-density liver lesions-cyst versus possible metastatic lesions.  No lung mass  -5/16/2024 MRI abdomen: T2 hyperintense nonenhancing liver lesions consistent with liver cyst and biliary cystic hamartomas.  No evidence of liver mets  -Tumor genomic profile-APC (I269fs), APC ([id number]), FANCD2 (Q823), TP53. TMB 1 mut/Mb. LUCAS  - 6/3/2024 baseline CEA: 1.89  -Plan for total neoadjuvant chemotherapy with FOLFIRINOX.  - 6/3/2024: Cycle 1 day 1 FOLFIRINOX (5-FU bolus eliminated to prevent further myelosuppression given baseline neutropenia) with G-CSF support  -6/17/2024: cycle 2-day 1 FOLFIRINOX with G-CSF support   -7/1/2024: Deferred Cycle 3-day 1 FOLFIRINOX by 1 week due to neutropenia (ANC 0.95).  Will reduce dosage of 5-FU, irinotecan, oxaliplatin by 25% starting cycle 3.    *History of elevated alk phos  - 6/12/2024: Isolated elevation in alk phos at 123.  Resolved since  6/17/2024  - No further interventions needed    *Benign ethnic neutropenia  - White cell count ranged between 2.6-3.2 since at least July 2019.    - 7/1/2024: WBC 2.5, ANC 0.95    *Anemia  - 7/1/2024: Hemoglobin 11.8. No active bleeding  - Continue to monitor      Plan  - Defer cycle 3-day 1 FOLFIRINOX by 1 week.  -Dose reduced FOLFIRINOX by 25% starting cycle 3    MD visit on cycle 3-day 1  NP visit on cycle 4-day 1   MD visit on cycle 5-day 1    Patient is on chemotherapy, which requires frequent monitoring

## 2024-06-27 NOTE — TELEPHONE ENCOUNTER
Caller: Jc Brannon Jr.    Relationship: Self    Best call back number: [phone]    What is the best time to reach you: ANY    Who are you requesting to speak with (clinical staff, provider,  specific staff member): CLINICAL     What was the call regarding: JC IS CALLING STATES THAT THERE WAS A SMALL BANDAGE THAT FELL OFF THAT WAS LOCATED ON THE TOP OF HIS PORT RIGHT SHOULDER     HE IS UNSURE WHAT HE NEEDS TO DO    PLEASE CALL AND ADVISE

## 2024-06-27 NOTE — TELEPHONE ENCOUNTER
Patient called and stated that his bandage fell off from his last chemo infusion on 6/17.  I told his this is ok, no need to do anything. Pt v/u

## 2024-06-28 ENCOUNTER — PATIENT OUTREACH (OUTPATIENT)
Dept: OTHER | Facility: HOSPITAL | Age: 44
End: 2024-06-28
Payer: COMMERCIAL

## 2024-06-28 NOTE — PROGRESS NOTES
Reviewed chart. Patient recently diagnosed rectal adenocarcinoma 4/23/24. Receiving neoadjuvant chemotherapy with FOLFIRINOX. Rec'd C1 6/32, C2 6/17.  Sees Dr. Pak 7/1 with next infusion planned      Called patient. Left message introducing myself as nurse navigator since Clementine is no longer in our department.  Explained that I was just touching base to see how he was doing with treatment. Wanted to know if he had any questions/concerns or resource/supportive care needs; requested cb. Provided my contact information.  If we can't connect, I will try to meet him in infusion on Monday

## 2024-06-29 ENCOUNTER — DOCUMENTATION (OUTPATIENT)
Dept: ONCOLOGY | Facility: CLINIC | Age: 44
End: 2024-06-29
Payer: COMMERCIAL

## 2024-06-29 NOTE — PROGRESS NOTES
ON CALL NOTE:    The patient called because he noticed a hard area on his chest where he believes his port is located.  He is unsure how long this area has been there or when it started.  He first noticed it today when he was taking his trash out.  We did review that this could just we the mediport device, and he believes this to be the case but wanted to double check with us to make sure this is not concerning.  There is no drainage, redness, or warmth from this area.  It is not painful.  He denies fever or chills.  Offered to have him load a picture in to [place] to assess the area but he is unsure of how to do this and does not think he will be able to figure that out.  He does have an appointment Monday and we can assess the area at that time so the area is not bothering him and he has no signs of infection.  If anything changes he understands he can call us back for further guidance.

## 2024-07-01 ENCOUNTER — NUTRITION (OUTPATIENT)
Dept: OTHER | Facility: HOSPITAL | Age: 44
End: 2024-07-01
Payer: COMMERCIAL

## 2024-07-01 ENCOUNTER — OFFICE VISIT (OUTPATIENT)
Dept: ONCOLOGY | Facility: CLINIC | Age: 44
End: 2024-07-01
Payer: COMMERCIAL

## 2024-07-01 ENCOUNTER — INFUSION (OUTPATIENT)
Dept: ONCOLOGY | Facility: HOSPITAL | Age: 44
End: 2024-07-01
Payer: COMMERCIAL

## 2024-07-01 VITALS
WEIGHT: 222.3 LBS | RESPIRATION RATE: 16 BRPM | DIASTOLIC BLOOD PRESSURE: 99 MMHG | TEMPERATURE: 97.9 F | HEIGHT: 69 IN | SYSTOLIC BLOOD PRESSURE: 154 MMHG | OXYGEN SATURATION: 96 % | HEART RATE: 88 BPM | BODY MASS INDEX: 32.92 KG/M2

## 2024-07-01 DIAGNOSIS — Z01.818 EXAMINATION PRIOR TO CHEMOTHERAPY: ICD-10-CM

## 2024-07-01 DIAGNOSIS — D70.1 CHEMOTHERAPY-INDUCED NEUTROPENIA: ICD-10-CM

## 2024-07-01 DIAGNOSIS — C20 RECTAL ADENOCARCINOMA: Primary | ICD-10-CM

## 2024-07-01 DIAGNOSIS — C20 RECTAL ADENOCARCINOMA: ICD-10-CM

## 2024-07-01 DIAGNOSIS — T45.1X5A CHEMOTHERAPY-INDUCED NEUTROPENIA: ICD-10-CM

## 2024-07-01 LAB
ALBUMIN SERPL-MCNC: 4.4 G/DL (ref 3.5–5.2)
ALBUMIN/GLOB SERPL: 2 G/DL
ALP SERPL-CCNC: 87 U/L (ref 39–117)
ALT SERPL W P-5'-P-CCNC: 13 U/L (ref 1–41)
ANION GAP SERPL CALCULATED.3IONS-SCNC: 7.5 MMOL/L (ref 5–15)
AST SERPL-CCNC: 21 U/L (ref 1–40)
BASOPHILS # BLD AUTO: 0.01 10*3/MM3 (ref 0–0.2)
BASOPHILS NFR BLD AUTO: 0.4 % (ref 0–1.5)
BILIRUB SERPL-MCNC: 0.3 MG/DL (ref 0–1.2)
BUN SERPL-MCNC: 10 MG/DL (ref 6–20)
BUN/CREAT SERPL: 11.8 (ref 7–25)
CALCIUM SPEC-SCNC: 8.9 MG/DL (ref 8.6–10.5)
CHLORIDE SERPL-SCNC: 104 MMOL/L (ref 98–107)
CO2 SERPL-SCNC: 28.5 MMOL/L (ref 22–29)
CREAT SERPL-MCNC: 0.85 MG/DL (ref 0.76–1.27)
DEPRECATED RDW RBC AUTO: 48.6 FL (ref 37–54)
EGFRCR SERPLBLD CKD-EPI 2021: 108.5 ML/MIN/1.73
EOSINOPHIL # BLD AUTO: 0.12 10*3/MM3 (ref 0–0.4)
EOSINOPHIL NFR BLD AUTO: 4.7 % (ref 0.3–6.2)
ERYTHROCYTE [DISTWIDTH] IN BLOOD BY AUTOMATED COUNT: 14 % (ref 12.3–15.4)
GLOBULIN UR ELPH-MCNC: 2.2 GM/DL
GLUCOSE SERPL-MCNC: 109 MG/DL (ref 65–99)
HCT VFR BLD AUTO: 35.1 % (ref 37.5–51)
HGB BLD-MCNC: 11.8 G/DL (ref 13–17.7)
IMM GRANULOCYTES # BLD AUTO: 0 10*3/MM3 (ref 0–0.05)
IMM GRANULOCYTES NFR BLD AUTO: 0 % (ref 0–0.5)
LYMPHOCYTES # BLD AUTO: 0.99 10*3/MM3 (ref 0.7–3.1)
LYMPHOCYTES NFR BLD AUTO: 38.5 % (ref 19.6–45.3)
MCH RBC QN AUTO: 32.1 PG (ref 26.6–33)
MCHC RBC AUTO-ENTMCNC: 33.6 G/DL (ref 31.5–35.7)
MCV RBC AUTO: 95.4 FL (ref 79–97)
MONOCYTES # BLD AUTO: 0.5 10*3/MM3 (ref 0.1–0.9)
MONOCYTES NFR BLD AUTO: 19.5 % (ref 5–12)
NEUTROPHILS NFR BLD AUTO: 0.95 10*3/MM3 (ref 1.7–7)
NEUTROPHILS NFR BLD AUTO: 36.9 % (ref 42.7–76)
NRBC BLD AUTO-RTO: 0 /100 WBC (ref 0–0.2)
PLATELET # BLD AUTO: 250 10*3/MM3 (ref 140–450)
PMV BLD AUTO: 9.2 FL (ref 6–12)
POTASSIUM SERPL-SCNC: 3.7 MMOL/L (ref 3.5–5.2)
PROT SERPL-MCNC: 6.6 G/DL (ref 6–8.5)
RBC # BLD AUTO: 3.68 10*6/MM3 (ref 4.14–5.8)
SODIUM SERPL-SCNC: 140 MMOL/L (ref 136–145)
WBC NRBC COR # BLD AUTO: 2.57 10*3/MM3 (ref 3.4–10.8)

## 2024-07-01 PROCEDURE — 36591 DRAW BLOOD OFF VENOUS DEVICE: CPT

## 2024-07-01 PROCEDURE — 80053 COMPREHEN METABOLIC PANEL: CPT

## 2024-07-01 PROCEDURE — 85025 COMPLETE CBC W/AUTO DIFF WBC: CPT

## 2024-07-01 PROCEDURE — 99214 OFFICE O/P EST MOD 30 MIN: CPT | Performed by: STUDENT IN AN ORGANIZED HEALTH CARE EDUCATION/TRAINING PROGRAM

## 2024-07-01 PROCEDURE — 25010000002 HEPARIN LOCK FLUSH PER 10 UNITS: Performed by: NURSE PRACTITIONER

## 2024-07-01 RX ORDER — HEPARIN SODIUM (PORCINE) LOCK FLUSH IV SOLN 100 UNIT/ML 100 UNIT/ML
500 SOLUTION INTRAVENOUS AS NEEDED
Status: DISCONTINUED | OUTPATIENT
Start: 2024-07-01 | End: 2024-07-01 | Stop reason: HOSPADM

## 2024-07-01 RX ORDER — SODIUM CHLORIDE 0.9 % (FLUSH) 0.9 %
10 SYRINGE (ML) INJECTION AS NEEDED
Status: DISCONTINUED | OUTPATIENT
Start: 2024-07-01 | End: 2024-07-01 | Stop reason: HOSPADM

## 2024-07-01 RX ORDER — SODIUM CHLORIDE 0.9 % (FLUSH) 0.9 %
10 SYRINGE (ML) INJECTION AS NEEDED
OUTPATIENT
Start: 2024-07-01

## 2024-07-01 RX ORDER — HEPARIN SODIUM (PORCINE) LOCK FLUSH IV SOLN 100 UNIT/ML 100 UNIT/ML
500 SOLUTION INTRAVENOUS AS NEEDED
OUTPATIENT
Start: 2024-07-01

## 2024-07-01 RX ADMIN — Medication 10 ML: at 09:34

## 2024-07-01 RX ADMIN — Medication 500 UNITS: at 09:34

## 2024-07-01 NOTE — PROGRESS NOTES
"OUTPATIENT ONCOLOGY NUTRITION ASSESSMENT    Patient Name: Jc Brannon Jr.  YOB: 1980  MRN: [id number]  Assessment Date: 7/1/2024    COMMENTS: Follow up in infusion area today, though not receiving treatment due to counts.  The patient reports that he has a few days of decreased appetite and that cold sensitivity is challenging but overall he is eating well. Weight is stable.   Next treatment will be reduced due to neutropenia. Will continue to follow up as needed.           Reason for Assessment Follow up     Diagnosis/Problem   Rectal cancer   Treatment Plan Chemotherapy FOLFIRINOX-- held today   Frequency    Goal of cancer treatment Neoadjuvant   Comments:        Encounter Information        Nutrition/Diet History:  Good appetite overall    Oral Nutrition Supplements:    Factors/Symptoms Affecting Intake: No factors at this time   Comments:      Medical/Surgical History Past Medical History:   Diagnosis Date    Anemia     Hyperlipidemia     Hypertension     Mental disability without special needs     Rectal adenocarcinoma 2024       Past Surgical History:   Procedure Laterality Date    COLONOSCOPY N/A 4/23/2024    Procedure: COLONOSCOPY into cecum with biopsy;  Surgeon: David Tena MD;  Location: [facility] ENDOSCOPY;  Service: Gastroenterology;  Laterality: N/A;  rectal mass    LEG SURGERY Left     ACL    VENOUS ACCESS DEVICE (PORT) INSERTION N/A 5/24/2024    Procedure: INSERTION VENOUS ACCESS DEVICE;  Surgeon: Ori Isaac MD;  Location: [facility];  Service: General;  Laterality: N/A;            Anthropometrics        Current Height Ht Readings from Last 1 Encounters:   07/01/24 175.3 cm (69.02\")      Current Weight Wt Readings from Last 1 Encounters:   07/01/24 101 kg (222 lb 4.8 oz)      Weight History Wt Readings from Last 30 Encounters:   07/01/24 0904 101 kg (222 lb 4.8 oz)   06/17/24 0805 99.2 kg (218 lb 9.6 oz)   06/12/24 0821 99.4 kg (219 lb 3.2 oz)   06/03/24 " "0852 99.5 kg (219 lb 6.4 oz)   05/29/24 1145 98.9 kg (218 lb 1.6 oz)   05/20/24 1044 98.3 kg (216 lb 12.8 oz)   05/13/24 0953 98.2 kg (216 lb 6.4 oz)   05/03/24 0949 99 kg (218 lb 3.2 oz)   04/23/24 0952 99.3 kg (219 lb)   12/19/23 1127 101 kg (221 lb 9.6 oz)   11/21/23 1552 102 kg (225 lb 9.6 oz)   11/10/23 1606 102 kg (225 lb 4.8 oz)   07/17/23 1119 104 kg (228 lb 12.8 oz)   06/30/23 1152 105 kg (232 lb 6.4 oz)   09/20/22 1523 102 kg (225 lb)   07/22/22 1535 103 kg (226 lb)   06/10/22 1609 104 kg (230 lb)   05/27/22 1549 105 kg (231 lb)   02/22/22 1604 108 kg (239 lb)   07/19/21 1608 108 kg (238 lb)   06/08/21 1536 104 kg (229 lb)   03/31/21 1329 108 kg (238 lb)   06/01/20 1540 101 kg (223 lb)   02/05/20 1515 105 kg (232 lb 1.6 oz)   12/11/19 1540 106 kg (233 lb)   08/02/19 1535 106 kg (233 lb 4.8 oz)   02/01/19 1525 106 kg (234 lb)   10/31/18 0910 106 kg (234 lb)          Medications           Current medications: OLANZapine, gabapentin, hydroCHLOROthiazide, lidocaine-prilocaine, loperamide, ondansetron, and prochlorperazine                 Tests/Procedures        Tests/Procedures Chemotherapy     Labs       Pertinent Labs    Results from last 7 days   Lab Units 07/01/24  0843   SODIUM mmol/L 140   POTASSIUM mmol/L 3.7   CHLORIDE mmol/L 104   CO2 mmol/L 28.5   BUN mg/dL 10   CREATININE mg/dL 0.85   CALCIUM mg/dL 8.9   BILIRUBIN mg/dL 0.3   ALK PHOS U/L 87   ALT (SGPT) U/L 13   AST (SGOT) U/L 21   GLUCOSE mg/dL 109*     Results from last 7 days   Lab Units 07/01/24  0843   HEMOGLOBIN g/dL 11.8*   HEMATOCRIT % 35.1*   WBC 10*3/mm3 2.57*   ALBUMIN g/dL 4.4     Results from last 7 days   Lab Units 07/01/24  0843   PLATELETS 10*3/mm3 250     No results found for: \"COVID19\"  No results found for: \"HGBA1C\"       Physical Findings        Physical Appearance alert     Edema  not assessed   Gastrointestinal None   Tubes/Drains implantable port   Oral/Mouth Cavity WNL   Skin        PES STATEMENT / NUTRITION DIAGNOSIS    "   Nutrition Dx Problem Problem:    NutritionDiagnosisProblem: Nutrition Appropriate for Condition at this Time    Etiology:  Medical diagnosis: Rectal cancer  Factors affecting nutrition: Reported/Observed By, Appetite, Food Habit/Preferences    Signs/Symptoms:  Report/Observation    Comment:      NUTRITION INTERVENTION / PLAN OF CARE      Intervention Goal(s) Maintain nutrition status, Meet estimated needs, Disease management/therapy, Tolerate PO , Maintain intake, and Maintain weight         RD Intervention/Action Encouraged intake, Follow Tx progress         Recommendations:       PO Diet Continue diet as tolerated      Supplements       Snacks       Other    --      Monitor/Evaluation PO intake, Pertinent labs, Weight, Symptoms, Follow up as needed   Education Education provided   --    Electronically signed by:  Dolores Vincent RD, LD  07/01/24 11:12 EDT

## 2024-07-01 NOTE — NURSING NOTE
Patient unable to get treatment today due to low white count. Will return in one week to resume treatment as long as counts acceptable.

## 2024-07-01 NOTE — PROGRESS NOTES
Follow-up (1wk F/U RX)    SUBJECTIVE:    07/08/2024, Interval history  Here for cycle 3-day 1 neoadjuvant FOLFIRINOX.  Cycle 3 was delayed due to neutropenia.  He is doing well.  Denies any nausea vomiting fever diarrhea neuropathy ER visits or hospitalization.    HISTORY OF PRESENT ILLNESS:  The patient is a 44 y.o. year old male with recently diagnosed rectal adenocarcinoma who presents to our clinic to establish care.  Onc history is outlined below    Family history significant for prostate cancer in father at 63, paternal uncle with colon cancer at 57, other paternal uncle with metastatic cancer, type unknown in his 50s, other paternal uncle with possible colon cancer and DVT/PE in his 70s.    Oncology/Hematology History   Rectal adenocarcinoma   4/23/2024 Procedure         4/23/2024 [employer]         5/2/2024 Imaging    CT CAP    A 1.7 cm right hepatic cyst is noted. Numerous small subcentimeter liver  low densities are present that are too small to characterize, could be  cysts, or potentially metastatic disease, interval follow-up can  characterize change.    No bowel obstruction. The sigmorectal mass is better characterized on  the MRI exam of same date (please see separate report). Some stranding  is apparent in the fat around the lesion, numerous surrounding lymph  nodes are present that could be metastatic lymph nodes. For example, on  axial image 165, a 1.9 x 1.6 cm pericolonic lymph node is present. As  another example, on axial image 170, left perirectal lymph node measures  0.7 x 1.2 cm.    IMPRESSION:        1. Sigmorectal mass with numerous surrounding lymph nodes, raising  suspicion for metastatic lymph nodes.     2. Hepatic cyst, and numerous liver low densities that are too small to  characterize; these lesions could also be cysts, but in this clinical  setting, possibility of any metastatic liver lesions is not excluded.  Interval follow-up is advised to characterize change.     3. No  pulmonary mass.     5/2/2024 Imaging    MRI Pelvis    FINDINGS: There is a circumferential solid low rectal mass which  measures approximately 4.5 cm in craniocaudad span and the inferior  margin is approximately 5 mm proximal to the anorectal junction.     The tumor extends approximately 8 mm into the mesorectal fat  predominantly along the right wall of the mass. There appears to be  extension to the mesorectal fascia along the right wall and abutment of  the right levator ani muscles. There are several enlarged perirectal  nodes and the largest is presacral measuring approximately 2.0 x 1.2 cm.  There are at least 4 enhancing suspicious nodes. There is no definite  extramural vascular invasion.     IMPRESSION:  1. Primary Tumor Location: Low rectal     2. MRI Stage: T3 N2 MRF positive     3. No definite sphincter involvement     5/2/2024 Cancer Staged    Staging form: Colon And Rectum, AJCC 8th Edition  - Clinical stage from 5/2/2024: cT3, cN2, cM0 - Signed by Sonali Pak MD on 6/3/2024     5/3/2024 Initial Diagnosis    Rectal adenocarcinoma     5/16/2024 Imaging    MRI abdo    IMPRESSION:  T2 hyperintense nonenhancing liver lesions are consistent with hepatic  cysts and biliary cystic hamartomas. No convincing liver metastasis  identified     5/24/2024 Procedure    PORT placement (Dr Isaac)     6/3/2024 -  Chemotherapy    OP COLORECTAL FOLFIRINOX (Fluorouracil cont. Infusion / Leucovorin / OXALIplatin / Irinotecan)     6/5/2024 -  Chemotherapy    OP CENTRAL VENOUS ACCESS DEVICE Access, Care, and Maintenance (CVAD)         The current medication list and allergy list were reviewed and reconciled.      Past Medical History, Past Surgical History, Social History, Family History have been reviewed and are without significant changes except as mentioned.      REVIEW OF SYSTEMS:  See interval history    Objective:       Vitals:    07/08/24 0857   BP: (!) 170/108   Pulse: 97   Resp: 16   Temp: 98.3 °F (36.8 °C)  "  TempSrc: Oral   SpO2: 97%   Weight: 102 kg (223 lb 14.4 oz)   Height: 175.3 cm (69.02\")   PainSc: 0-No pain                 7/8/2024     9:01 AM   Current Status   ECOG score 0      PHYSICAL EXAM:    CONSTITUTIONAL:  Vital signs reviewed.  No distress, looks comfortable.  RESPIRATORY: Bilateral lungs clear to auscultation.  No wheezing or rhonchi   CARDIOVASCULAR: Normal S1-S2.  No murmur rubs or gallop GASTROINTESTINAL: Soft, nontender, bowel sounds present  NEURO:  No focal deficits.  Appears to have equal strength all 4 extremities.  SKIN:  Warm.  No rashes.    I have reexamined the patient and the results are consistent with the previously documented exam. Sonali Pak MD        RECENT LABS:        WBC   Date Value Ref Range Status   07/08/2024 3.68 3.40 - 10.80 10*3/mm3 Final   07/01/2024 2.57 (L) 3.40 - 10.80 10*3/mm3 Final   06/17/2024 3.66 3.40 - 10.80 10*3/mm3 Final   06/12/2024 9.90 3.40 - 10.80 10*3/mm3 Final   06/03/2024 2.75 (L) 3.40 - 10.80 10*3/mm3 Final   05/20/2024 3.22 (L) 3.40 - 10.80 10*3/mm3 Final   06/09/2023 3.0 (L) 3.4 - 10.8 x10E3/uL Final   06/04/2022 3.0 (L) 3.4 - 10.8 x10E3/uL Final   05/25/2021 3.11 (L) 3.40 - 10.80 10*3/mm3 Final   05/26/2020 2.91 (L) 3.40 - 10.80 10*3/mm3 Final   02/12/2020 4.5 3.4 - 10.8 x10E3/uL Final   07/27/2019 2.6 (L) 3.4 - 10.8 x10E3/uL Final     Comment:     **Verified by repeat analysis**     Hemoglobin   Date Value Ref Range Status   07/08/2024 12.3 (L) 13.0 - 17.7 g/dL Final   07/01/2024 11.8 (L) 13.0 - 17.7 g/dL Final   06/17/2024 12.4 (L) 13.0 - 17.7 g/dL Final   06/12/2024 12.3 (L) 13.0 - 17.7 g/dL Final   06/03/2024 12.9 (L) 13.0 - 17.7 g/dL Final   05/20/2024 13.4 13.0 - 17.7 g/dL Final   06/09/2023 14.3 13.0 - 17.7 g/dL Final   06/04/2022 14.1 13.0 - 17.7 g/dL Final   05/25/2021 14.0 13.0 - 17.7 g/dL Final   05/26/2020 13.7 13.0 - 17.7 g/dL Final   02/12/2020 13.5 13.0 - 17.7 g/dL Final   07/27/2019 13.8 13.0 - 17.7 g/dL Final     Platelets   Date " Value Ref Range Status   07/08/2024 238 140 - 450 10*3/mm3 Final   07/01/2024 250 140 - 450 10*3/mm3 Final   06/17/2024 195 140 - 450 10*3/mm3 Final   06/12/2024 220 140 - 450 10*3/mm3 Final   06/03/2024 190 140 - 450 10*3/mm3 Final   05/20/2024 218 140 - 450 10*3/mm3 Final   06/09/2023 207 150 - 450 x10E3/uL Final   06/04/2022 230 150 - 450 x10E3/uL Final   05/25/2021 213 140 - 450 10*3/mm3 Final   05/26/2020 223 140 - 450 10*3/mm3 Final   02/12/2020 245 150 - 450 x10E3/uL Final   07/27/2019 249 150 - 450 x10E3/uL Final         Assessment & Plan     *Rectal adenocarcinoma (cT3 cN2 cM0), LUCAS, TMB low  -4/23/2024 rectal biopsy-invasive moderately differentiated adenocarcinoma, LUCAS.  -5/2/2024 MRI pelvis: Approximately 4.5 cm circumferential low rectal mass, approximately 5 mm proximal to anorectal junction, extending 8 mm in the mesorectal fat.  Appears to be extension to MRF along the right wall and abutment of right levator ani muscles.  Several enlarged perirectal nodes, largest 2 x 1.2 cm.  At least 4 enhancing suspicious nodes.  cT3 cN2 MRF positive.  No definitive sphincter involvement  - 5/2/2024 CT chest abdomen pelvis: 1.7 cm right hepatic cyst, and numerous low-density liver lesions-cyst versus possible metastatic lesions.  No lung mass  -5/16/2024 MRI abdomen: T2 hyperintense nonenhancing liver lesions consistent with liver cyst and biliary cystic hamartomas.  No evidence of liver mets  -Tumor genomic profile-APC ([id number]), APC ([id number]), FANCD2 (Q823), TP53. TMB 1 mut/Mb. LUCAS  - 6/3/2024 baseline CEA: 1.89  -Plan for total neoadjuvant chemotherapy with FOLFIRINOX.  - 6/3/2024: Cycle 1 day 1 FOLFIRINOX (5-FU bolus eliminated to prevent further myelosuppression given baseline neutropenia) with G-CSF support  -6/17/2024: cycle 2-day 1 FOLFIRINOX with G-CSF support  - 7/1/2024: Cycle 3-day 1 FOLFIRINOX was delayed by 1 week due to neutropenia.  ANC  -7/8/2024:Cycle 3-day 1 FOLFIRINOX, with 25% dose reduction.   It was deferred from last week due to neutropenia.      *History of elevated alk phos  - 6/12/2024: Isolated elevation in alk phos at 123.  Resolved since 6/17/2024  - No further interventions needed    *Benign ethnic neutropenia  - White cell count ranged between 2.6-3.2 since at least July 2019.    - 7/8/2024: WBC 3.6, ANC 1.85.    *Anemia  - 7/8/2024: Hemoglobin 12.3. No active bleeding  - Continue to monitor      Plan  - Labs and exam okay for cycle 3-day 1 FOLFIRINOX today      NP visit on cycle 4-day 1   MD visit on cycle 5-day 1    Patient is on chemotherapy, which requires frequent monitoring

## 2024-07-08 ENCOUNTER — INFUSION (OUTPATIENT)
Dept: ONCOLOGY | Facility: HOSPITAL | Age: 44
End: 2024-07-08
Payer: COMMERCIAL

## 2024-07-08 ENCOUNTER — OFFICE VISIT (OUTPATIENT)
Dept: ONCOLOGY | Facility: CLINIC | Age: 44
End: 2024-07-08
Payer: COMMERCIAL

## 2024-07-08 ENCOUNTER — PATIENT OUTREACH (OUTPATIENT)
Dept: OTHER | Facility: HOSPITAL | Age: 44
End: 2024-07-08
Payer: COMMERCIAL

## 2024-07-08 VITALS
RESPIRATION RATE: 16 BRPM | TEMPERATURE: 98.3 F | DIASTOLIC BLOOD PRESSURE: 108 MMHG | HEIGHT: 69 IN | BODY MASS INDEX: 33.16 KG/M2 | HEART RATE: 97 BPM | WEIGHT: 223.9 LBS | SYSTOLIC BLOOD PRESSURE: 170 MMHG | OXYGEN SATURATION: 97 %

## 2024-07-08 DIAGNOSIS — C20 RECTAL ADENOCARCINOMA: Primary | ICD-10-CM

## 2024-07-08 DIAGNOSIS — C20 RECTAL ADENOCARCINOMA: ICD-10-CM

## 2024-07-08 DIAGNOSIS — Z01.818 EXAMINATION PRIOR TO CHEMOTHERAPY: ICD-10-CM

## 2024-07-08 LAB
ALBUMIN SERPL-MCNC: 4.3 G/DL (ref 3.5–5.2)
ALBUMIN/GLOB SERPL: 1.7 G/DL
ALP SERPL-CCNC: 90 U/L (ref 39–117)
ALT SERPL W P-5'-P-CCNC: 20 U/L (ref 1–41)
ANION GAP SERPL CALCULATED.3IONS-SCNC: 9.5 MMOL/L (ref 5–15)
AST SERPL-CCNC: 25 U/L (ref 1–40)
BASOPHILS # BLD AUTO: 0.02 10*3/MM3 (ref 0–0.2)
BASOPHILS NFR BLD AUTO: 0.5 % (ref 0–1.5)
BILIRUB SERPL-MCNC: 0.2 MG/DL (ref 0–1.2)
BUN SERPL-MCNC: 15 MG/DL (ref 6–20)
BUN/CREAT SERPL: 15.5 (ref 7–25)
CALCIUM SPEC-SCNC: 9 MG/DL (ref 8.6–10.5)
CHLORIDE SERPL-SCNC: 102 MMOL/L (ref 98–107)
CO2 SERPL-SCNC: 28.5 MMOL/L (ref 22–29)
CREAT SERPL-MCNC: 0.97 MG/DL (ref 0.76–1.27)
DEPRECATED RDW RBC AUTO: 49.3 FL (ref 37–54)
EGFRCR SERPLBLD CKD-EPI 2021: 97.5 ML/MIN/1.73
EOSINOPHIL # BLD AUTO: 0.16 10*3/MM3 (ref 0–0.4)
EOSINOPHIL NFR BLD AUTO: 4.3 % (ref 0.3–6.2)
ERYTHROCYTE [DISTWIDTH] IN BLOOD BY AUTOMATED COUNT: 13.7 % (ref 12.3–15.4)
GLOBULIN UR ELPH-MCNC: 2.6 GM/DL
GLUCOSE SERPL-MCNC: 111 MG/DL (ref 65–99)
HCT VFR BLD AUTO: 36.5 % (ref 37.5–51)
HGB BLD-MCNC: 12.3 G/DL (ref 13–17.7)
IMM GRANULOCYTES # BLD AUTO: 0.01 10*3/MM3 (ref 0–0.05)
IMM GRANULOCYTES NFR BLD AUTO: 0.3 % (ref 0–0.5)
LYMPHOCYTES # BLD AUTO: 1.2 10*3/MM3 (ref 0.7–3.1)
LYMPHOCYTES NFR BLD AUTO: 32.6 % (ref 19.6–45.3)
MCH RBC QN AUTO: 32.5 PG (ref 26.6–33)
MCHC RBC AUTO-ENTMCNC: 33.7 G/DL (ref 31.5–35.7)
MCV RBC AUTO: 96.6 FL (ref 79–97)
MONOCYTES # BLD AUTO: 0.44 10*3/MM3 (ref 0.1–0.9)
MONOCYTES NFR BLD AUTO: 12 % (ref 5–12)
NEUTROPHILS NFR BLD AUTO: 1.85 10*3/MM3 (ref 1.7–7)
NEUTROPHILS NFR BLD AUTO: 50.3 % (ref 42.7–76)
NRBC BLD AUTO-RTO: 0 /100 WBC (ref 0–0.2)
PLATELET # BLD AUTO: 238 10*3/MM3 (ref 140–450)
PMV BLD AUTO: 10 FL (ref 6–12)
POTASSIUM SERPL-SCNC: 3.8 MMOL/L (ref 3.5–5.2)
PROT SERPL-MCNC: 6.9 G/DL (ref 6–8.5)
RBC # BLD AUTO: 3.78 10*6/MM3 (ref 4.14–5.8)
SODIUM SERPL-SCNC: 140 MMOL/L (ref 136–145)
WBC NRBC COR # BLD AUTO: 3.68 10*3/MM3 (ref 3.4–10.8)

## 2024-07-08 PROCEDURE — 80053 COMPREHEN METABOLIC PANEL: CPT

## 2024-07-08 PROCEDURE — 25010000002 ATROPINE SULFATE 0.4 MG/ML SOLUTION: Performed by: STUDENT IN AN ORGANIZED HEALTH CARE EDUCATION/TRAINING PROGRAM

## 2024-07-08 PROCEDURE — 0 DEXTROSE 5 % SOLUTION 500 ML FLEX CONT: Performed by: STUDENT IN AN ORGANIZED HEALTH CARE EDUCATION/TRAINING PROGRAM

## 2024-07-08 PROCEDURE — 25010000002 FOSAPREPITANT PER 1 MG: Performed by: STUDENT IN AN ORGANIZED HEALTH CARE EDUCATION/TRAINING PROGRAM

## 2024-07-08 PROCEDURE — 96368 THER/DIAG CONCURRENT INF: CPT

## 2024-07-08 PROCEDURE — 25010000002 IRINOTECAN PER 20 MG: Performed by: STUDENT IN AN ORGANIZED HEALTH CARE EDUCATION/TRAINING PROGRAM

## 2024-07-08 PROCEDURE — 25810000003 SODIUM CHLORIDE 0.9 % SOLUTION 250 ML FLEX CONT: Performed by: STUDENT IN AN ORGANIZED HEALTH CARE EDUCATION/TRAINING PROGRAM

## 2024-07-08 PROCEDURE — 96415 CHEMO IV INFUSION ADDL HR: CPT

## 2024-07-08 PROCEDURE — 96413 CHEMO IV INFUSION 1 HR: CPT

## 2024-07-08 PROCEDURE — 25010000002 FLUOROURACIL PER 500 MG: Performed by: STUDENT IN AN ORGANIZED HEALTH CARE EDUCATION/TRAINING PROGRAM

## 2024-07-08 PROCEDURE — 25010000002 DEXAMETHASONE PER 1 MG: Performed by: STUDENT IN AN ORGANIZED HEALTH CARE EDUCATION/TRAINING PROGRAM

## 2024-07-08 PROCEDURE — 99214 OFFICE O/P EST MOD 30 MIN: CPT | Performed by: STUDENT IN AN ORGANIZED HEALTH CARE EDUCATION/TRAINING PROGRAM

## 2024-07-08 PROCEDURE — G0498 CHEMO EXTEND IV INFUS W/PUMP: HCPCS

## 2024-07-08 PROCEDURE — 96367 TX/PROPH/DG ADDL SEQ IV INF: CPT

## 2024-07-08 PROCEDURE — 96417 CHEMO IV INFUS EACH ADDL SEQ: CPT

## 2024-07-08 PROCEDURE — 25010000002 OXALIPLATIN PER 0.5 MG: Performed by: STUDENT IN AN ORGANIZED HEALTH CARE EDUCATION/TRAINING PROGRAM

## 2024-07-08 PROCEDURE — 96375 TX/PRO/DX INJ NEW DRUG ADDON: CPT

## 2024-07-08 PROCEDURE — 85025 COMPLETE CBC W/AUTO DIFF WBC: CPT

## 2024-07-08 PROCEDURE — 0 DEXTROSE 5 % SOLUTION 250 ML FLEX CONT: Performed by: STUDENT IN AN ORGANIZED HEALTH CARE EDUCATION/TRAINING PROGRAM

## 2024-07-08 PROCEDURE — 25010000002 PALONOSETRON PER 25 MCG: Performed by: STUDENT IN AN ORGANIZED HEALTH CARE EDUCATION/TRAINING PROGRAM

## 2024-07-08 PROCEDURE — 25010000002 LEUCOVORIN CALCIUM PER 50 MG: Performed by: STUDENT IN AN ORGANIZED HEALTH CARE EDUCATION/TRAINING PROGRAM

## 2024-07-08 PROCEDURE — 0 DEXTROSE 5 % SOLUTION: Performed by: STUDENT IN AN ORGANIZED HEALTH CARE EDUCATION/TRAINING PROGRAM

## 2024-07-08 RX ORDER — DEXTROSE MONOHYDRATE 50 MG/ML
20 INJECTION, SOLUTION INTRAVENOUS ONCE
Status: CANCELLED | OUTPATIENT
Start: 2024-07-08

## 2024-07-08 RX ORDER — PALONOSETRON 0.05 MG/ML
0.25 INJECTION, SOLUTION INTRAVENOUS ONCE
Status: COMPLETED | OUTPATIENT
Start: 2024-07-08 | End: 2024-07-08

## 2024-07-08 RX ORDER — ATROPINE SULFATE 1 MG/ML
0.25 INJECTION, SOLUTION INTRAMUSCULAR; INTRAVENOUS; SUBCUTANEOUS
Status: CANCELLED | OUTPATIENT
Start: 2024-07-08

## 2024-07-08 RX ORDER — PALONOSETRON 0.05 MG/ML
0.25 INJECTION, SOLUTION INTRAVENOUS ONCE
Status: CANCELLED | OUTPATIENT
Start: 2024-07-08

## 2024-07-08 RX ORDER — DIPHENHYDRAMINE HYDROCHLORIDE 50 MG/ML
50 INJECTION INTRAMUSCULAR; INTRAVENOUS AS NEEDED
Status: CANCELLED | OUTPATIENT
Start: 2024-07-08

## 2024-07-08 RX ORDER — DEXTROSE MONOHYDRATE 50 MG/ML
20 INJECTION, SOLUTION INTRAVENOUS ONCE
Status: COMPLETED | OUTPATIENT
Start: 2024-07-08 | End: 2024-07-08

## 2024-07-08 RX ORDER — FAMOTIDINE 10 MG/ML
20 INJECTION, SOLUTION INTRAVENOUS AS NEEDED
Status: CANCELLED | OUTPATIENT
Start: 2024-07-08

## 2024-07-08 RX ORDER — ATROPINE SULFATE 0.4 MG/ML
0.25 INJECTION, SOLUTION INTRAVENOUS
Status: DISCONTINUED | OUTPATIENT
Start: 2024-07-08 | End: 2024-07-08 | Stop reason: HOSPADM

## 2024-07-08 RX ADMIN — FOSAPREPITANT 100 ML: 150 INJECTION, POWDER, LYOPHILIZED, FOR SOLUTION INTRAVENOUS at 09:34

## 2024-07-08 RX ADMIN — FLUOROURACIL 3850 MG: 50 INJECTION, SOLUTION INTRAVENOUS at 14:08

## 2024-07-08 RX ADMIN — LEUCOVORIN CALCIUM 860 MG: 350 INJECTION, POWDER, LYOPHILIZED, FOR SOLUTION INTRAMUSCULAR; INTRAVENOUS at 11:54

## 2024-07-08 RX ADMIN — DEXAMETHASONE SODIUM PHOSPHATE 12 MG: 10 INJECTION INTRAMUSCULAR; INTRAVENOUS at 10:07

## 2024-07-08 RX ADMIN — OXALIPLATIN 135 MG: 5 INJECTION, SOLUTION INTRAVENOUS at 10:29

## 2024-07-08 RX ADMIN — ATROPINE SULFATE 0.25 MG: 0.4 INJECTION, SOLUTION INTRAVENOUS at 12:31

## 2024-07-08 RX ADMIN — DEXTROSE MONOHYDRATE 265 MG: 5 INJECTION, SOLUTION INTRAVENOUS at 12:32

## 2024-07-08 RX ADMIN — DEXTROSE MONOHYDRATE 20 ML/HR: 50 INJECTION, SOLUTION INTRAVENOUS at 09:34

## 2024-07-08 RX ADMIN — PALONOSETRON 0.25 MG: 0.05 INJECTION, SOLUTION INTRAVENOUS at 09:34

## 2024-07-08 NOTE — PROGRESS NOTES
Met patient and family member in infusion today.  He is receiving Cycle 3-day 1 FOLFIRINOX, with 25% dose reduction (Cycle 3-day 1 FOLFIRINOX due 7/1 was delayed by 1 week due to neutropenia). Introduced myself as his navigator since Clementine is no longer in the department. The patient states he is tolerating treatment fairly well.  His family member inquired about appropriate diet while on treatment.  Consulted Dolores and provided patient with cookbook for patient's undergoing chemotherapy.    The patient denies any questions/concerns. I will continue to follow; encouraged patient to call as needed.

## 2024-07-10 ENCOUNTER — INFUSION (OUTPATIENT)
Dept: ONCOLOGY | Facility: HOSPITAL | Age: 44
End: 2024-07-10
Payer: COMMERCIAL

## 2024-07-10 DIAGNOSIS — C20 RECTAL ADENOCARCINOMA: Primary | ICD-10-CM

## 2024-07-10 PROCEDURE — 96377 APPLICATON ON-BODY INJECTOR: CPT

## 2024-07-10 PROCEDURE — 25010000002 PEGFILGRASTIM 6 MG/0.6ML PREFILLED SYRINGE KIT: Performed by: STUDENT IN AN ORGANIZED HEALTH CARE EDUCATION/TRAINING PROGRAM

## 2024-07-10 PROCEDURE — 25010000002 HEPARIN LOCK FLUSH PER 10 UNITS: Performed by: NURSE PRACTITIONER

## 2024-07-10 RX ORDER — HEPARIN SODIUM (PORCINE) LOCK FLUSH IV SOLN 100 UNIT/ML 100 UNIT/ML
500 SOLUTION INTRAVENOUS AS NEEDED
Status: DISCONTINUED | OUTPATIENT
Start: 2024-07-10 | End: 2024-07-10 | Stop reason: HOSPADM

## 2024-07-10 RX ORDER — HEPARIN SODIUM (PORCINE) LOCK FLUSH IV SOLN 100 UNIT/ML 100 UNIT/ML
500 SOLUTION INTRAVENOUS AS NEEDED
OUTPATIENT
Start: 2024-07-10

## 2024-07-10 RX ORDER — SODIUM CHLORIDE 0.9 % (FLUSH) 0.9 %
10 SYRINGE (ML) INJECTION AS NEEDED
OUTPATIENT
Start: 2024-07-10

## 2024-07-10 RX ORDER — SODIUM CHLORIDE 0.9 % (FLUSH) 0.9 %
10 SYRINGE (ML) INJECTION AS NEEDED
Status: DISCONTINUED | OUTPATIENT
Start: 2024-07-10 | End: 2024-07-10 | Stop reason: HOSPADM

## 2024-07-10 RX ADMIN — Medication 10 ML: at 11:35

## 2024-07-10 RX ADMIN — PEGFILGRASTIM 6 MG: KIT SUBCUTANEOUS at 11:34

## 2024-07-10 RX ADMIN — Medication 500 UNITS: at 11:34

## 2024-07-12 ENCOUNTER — OFFICE VISIT (OUTPATIENT)
Dept: PSYCHIATRY | Facility: HOSPITAL | Age: 44
End: 2024-07-12
Payer: COMMERCIAL

## 2024-07-12 DIAGNOSIS — F43.23 ADJUSTMENT DISORDER WITH MIXED ANXIETY AND DEPRESSED MOOD: Primary | ICD-10-CM

## 2024-07-12 PROCEDURE — 99214 OFFICE O/P EST MOD 30 MIN: CPT | Performed by: NURSE PRACTITIONER

## 2024-07-12 NOTE — PROGRESS NOTES
[facility]  In Person Session    Subjective  Patient ID: Jc Brannon Jr. is a 44 y.o. male is seen face to face in the [facility].    CC: Anxiety    HPI/ Interval History:   Pt is seen in follow up regarding ongoing anxiety associated with treatment for colon cancer.  Father remains present and a part of conversation with patient permission.  Both continued to deny significant side effects of treatment.  Does endorse some GI symptoms, managed with current interventions.  Continues with goal of limiting unnecessary medications, doing well with this at this time. Continues to work full time, go to [employer] on weekend, and otherwise avoids sunlight. Mood remains good. Denies concerns regarding worry, concern regarding treamtent. Continues to to report adequate concentration and focus. Has never initiated gabapentin,  appreciating ability to progress through treatment, anticipating competion of treamtnt, follow by radiation and surgery, and denying overwhelming or intrusive sx of anxiety..    Exam: As above    Recent Labs Reviewed:  Infusion on 07/08/2024   Component Date Value    SIGNATERA TEST RESULT 07/08/2024 NEGATIVE     SIGNATERA MTM READOUT 07/08/2024 0     Glucose 07/08/2024 111 (H)     BUN 07/08/2024 15     Creatinine 07/08/2024 0.97     Sodium 07/08/2024 140     Potassium 07/08/2024 3.8     Chloride 07/08/2024 102     CO2 07/08/2024 28.5     Calcium 07/08/2024 9.0     Total Protein 07/08/2024 6.9     Albumin 07/08/2024 4.3     ALT (SGPT) 07/08/2024 20     AST (SGOT) 07/08/2024 25     Alkaline Phosphatase 07/08/2024 90     Total Bilirubin 07/08/2024 0.2     Globulin 07/08/2024 2.6     A/G Ratio 07/08/2024 1.7     BUN/Creatinine Ratio 07/08/2024 15.5     Anion Gap 07/08/2024 9.5     eGFR 07/08/2024 97.5     WBC 07/08/2024 3.68     RBC 07/08/2024 3.78 (L)     Hemoglobin 07/08/2024 12.3 (L)     Hematocrit 07/08/2024 36.5 (L)     MCV 07/08/2024 96.6     MCH  07/08/2024 32.5     MCHC 07/08/2024 33.7     RDW 07/08/2024 13.7     RDW-SD 07/08/2024 49.3     MPV 07/08/2024 10.0     Platelets 07/08/2024 238     Neutrophil % 07/08/2024 50.3     Lymphocyte % 07/08/2024 32.6     Monocyte % 07/08/2024 12.0     Eosinophil % 07/08/2024 4.3     Basophil % 07/08/2024 0.5     Immature Grans % 07/08/2024 0.3     Neutrophils, Absolute 07/08/2024 1.85     Lymphocytes, Absolute 07/08/2024 1.20     Monocytes, Absolute 07/08/2024 0.44     Eosinophils, Absolute 07/08/2024 0.16     Basophils, Absolute 07/08/2024 0.02     Immature Grans, Absolute 07/08/2024 0.01     nRBC 07/08/2024 0.0    Infusion on 07/01/2024   Component Date Value    Glucose 07/01/2024 109 (H)     BUN 07/01/2024 10     Creatinine 07/01/2024 0.85     Sodium 07/01/2024 140     Potassium 07/01/2024 3.7     Chloride 07/01/2024 104     CO2 07/01/2024 28.5     Calcium 07/01/2024 8.9     Total Protein 07/01/2024 6.6     Albumin 07/01/2024 4.4     ALT (SGPT) 07/01/2024 13     AST (SGOT) 07/01/2024 21     Alkaline Phosphatase 07/01/2024 87     Total Bilirubin 07/01/2024 0.3     Globulin 07/01/2024 2.2     A/G Ratio 07/01/2024 2.0     BUN/Creatinine Ratio 07/01/2024 11.8     Anion Gap 07/01/2024 7.5     eGFR 07/01/2024 108.5     WBC 07/01/2024 2.57 (L)     RBC 07/01/2024 3.68 (L)     Hemoglobin 07/01/2024 11.8 (L)     Hematocrit 07/01/2024 35.1 (L)     MCV 07/01/2024 95.4     MCH 07/01/2024 32.1     MCHC 07/01/2024 33.6     RDW 07/01/2024 14.0     RDW-SD 07/01/2024 48.6     MPV 07/01/2024 9.2     Platelets 07/01/2024 250     Neutrophil % 07/01/2024 36.9 (L)     Lymphocyte % 07/01/2024 38.5     Monocyte % 07/01/2024 19.5 (H)     Eosinophil % 07/01/2024 4.7     Basophil % 07/01/2024 0.4     Immature Grans % 07/01/2024 0.0     Neutrophils, Absolute 07/01/2024 0.95 (L)     Lymphocytes, Absolute 07/01/2024 0.99     Monocytes, Absolute 07/01/2024 0.50     Eosinophils, Absolute 07/01/2024 0.12     Basophils, Absolute 07/01/2024 0.01      Immature Grans, Absolute 07/01/2024 0.00     nRBC 07/01/2024 0.0    Infusion on 06/17/2024   Component Date Value    Glucose 06/17/2024 133 (H)     BUN 06/17/2024 12     Creatinine 06/17/2024 0.99     Sodium 06/17/2024 140     Potassium 06/17/2024 3.6     Chloride 06/17/2024 103     CO2 06/17/2024 25.8     Calcium 06/17/2024 8.8     Total Protein 06/17/2024 6.8     Albumin 06/17/2024 4.2     ALT (SGPT) 06/17/2024 14     AST (SGOT) 06/17/2024 19     Alkaline Phosphatase 06/17/2024 107     Total Bilirubin 06/17/2024 0.3     Globulin 06/17/2024 2.6     A/G Ratio 06/17/2024 1.6     BUN/Creatinine Ratio 06/17/2024 12.1     Anion Gap 06/17/2024 11.2     eGFR 06/17/2024 95.7     WBC 06/17/2024 3.66     RBC 06/17/2024 3.90 (L)     Hemoglobin 06/17/2024 12.4 (L)     Hematocrit 06/17/2024 37.6     MCV 06/17/2024 96.4     MCH 06/17/2024 31.8     MCHC 06/17/2024 33.0     RDW 06/17/2024 13.4     RDW-SD 06/17/2024 46.5     MPV 06/17/2024 10.1     Platelets 06/17/2024 195     Neutrophil % 06/17/2024 55.4     Lymphocyte % 06/17/2024 30.9     Monocyte % 06/17/2024 9.0     Eosinophil % 06/17/2024 3.0     Basophil % 06/17/2024 0.3     Immature Grans % 06/17/2024 1.4 (H)     Neutrophils, Absolute 06/17/2024 2.03     Lymphocytes, Absolute 06/17/2024 1.13     Monocytes, Absolute 06/17/2024 0.33     Eosinophils, Absolute 06/17/2024 0.11     Basophils, Absolute 06/17/2024 0.01     Immature Grans, Absolute 06/17/2024 0.05     nRBC 06/17/2024 0.0    Infusion on 06/12/2024   Component Date Value    Glucose 06/12/2024 121 (H)     BUN 06/12/2024 11     Creatinine 06/12/2024 0.93     Sodium 06/12/2024 140     Potassium 06/12/2024 3.8     Chloride 06/12/2024 103     CO2 06/12/2024 30.0 (H)     Calcium 06/12/2024 9.1     Total Protein 06/12/2024 6.7     Albumin 06/12/2024 4.4     ALT (SGPT) 06/12/2024 20     AST (SGOT) 06/12/2024 15     Alkaline Phosphatase 06/12/2024 123 (H)     Total Bilirubin 06/12/2024 0.2     Globulin 06/12/2024 2.3     A/G  Ratio 06/12/2024 1.9     BUN/Creatinine Ratio 06/12/2024 11.8     Anion Gap 06/12/2024 7.0     eGFR 06/12/2024 103.2     WBC 06/12/2024 9.90     RBC 06/12/2024 3.88 (L)     Hemoglobin 06/12/2024 12.3 (L)     Hematocrit 06/12/2024 36.9 (L)     MCV 06/12/2024 95.1     MCH 06/12/2024 31.7     MCHC 06/12/2024 33.3     RDW 06/12/2024 12.9     RDW-SD 06/12/2024 44.4     MPV 06/12/2024 9.8     Platelets 06/12/2024 220     Neutrophil % 06/12/2024 74.0     Lymphocyte % 06/12/2024 13.0 (L)     Monocyte % 06/12/2024 9.6     Eosinophil % 06/12/2024 1.2     Basophil % 06/12/2024 0.8     Immature Grans % 06/12/2024 1.4 (H)     Neutrophils, Absolute 06/12/2024 7.32 (H)     Lymphocytes, Absolute 06/12/2024 1.29     Monocytes, Absolute 06/12/2024 0.95 (H)     Eosinophils, Absolute 06/12/2024 0.12     Basophils, Absolute 06/12/2024 0.08     Immature Grans, Absolute 06/12/2024 0.14 (H)     nRBC 06/12/2024 0.0    Infusion on 06/03/2024   Component Date Value    CEA 06/03/2024 1.89     Glucose 06/03/2024 142 (H)     BUN 06/03/2024 12     Creatinine 06/03/2024 0.98     Sodium 06/03/2024 141     Potassium 06/03/2024 3.5     Chloride 06/03/2024 104     CO2 06/03/2024 25.7     Calcium 06/03/2024 8.9     Total Protein 06/03/2024 6.6     Albumin 06/03/2024 4.2     ALT (SGPT) 06/03/2024 12     AST (SGOT) 06/03/2024 19     Alkaline Phosphatase 06/03/2024 78     Total Bilirubin 06/03/2024 0.3     Globulin 06/03/2024 2.4     A/G Ratio 06/03/2024 1.8     BUN/Creatinine Ratio 06/03/2024 12.2     Anion Gap 06/03/2024 11.3     eGFR 06/03/2024 96.9     WBC 06/03/2024 2.75 (L)     RBC 06/03/2024 4.15     Hemoglobin 06/03/2024 12.9 (L)     Hematocrit 06/03/2024 39.3     MCV 06/03/2024 94.7     MCH 06/03/2024 31.1     MCHC 06/03/2024 32.8     RDW 06/03/2024 12.6     RDW-SD 06/03/2024 43.9     MPV 06/03/2024 9.6     Platelets 06/03/2024 190     Neutrophil % 06/03/2024 50.9     Lymphocyte % 06/03/2024 31.6     Monocyte % 06/03/2024 11.6     Eosinophil %  06/03/2024 5.5     Basophil % 06/03/2024 0.4     Immature Grans % 06/03/2024 0.0     Neutrophils, Absolute 06/03/2024 1.40 (L)     Lymphocytes, Absolute 06/03/2024 0.87     Monocytes, Absolute 06/03/2024 0.32     Eosinophils, Absolute 06/03/2024 0.15     Basophils, Absolute 06/03/2024 0.01     Immature Grans, Absolute 06/03/2024 0.00     nRBC 06/03/2024 0.0    Lab on 05/20/2024   Component Date Value    WBC 05/20/2024 3.22 (L)     RBC 05/20/2024 4.31     Hemoglobin 05/20/2024 13.4     Hematocrit 05/20/2024 40.4     MCV 05/20/2024 93.7     MCH 05/20/2024 31.1     MCHC 05/20/2024 33.2     RDW 05/20/2024 12.7     RDW-SD 05/20/2024 44.0     MPV 05/20/2024 10.6     Platelets 05/20/2024 218     Neutrophil % 05/20/2024 44.7     Lymphocyte % 05/20/2024 35.4     Monocyte % 05/20/2024 13.4 (H)     Eosinophil % 05/20/2024 5.3     Basophil % 05/20/2024 0.3     Immature Grans % 05/20/2024 0.9 (H)     Neutrophils, Absolute 05/20/2024 1.44 (L)     Lymphocytes, Absolute 05/20/2024 1.14     Monocytes, Absolute 05/20/2024 0.43     Eosinophils, Absolute 05/20/2024 0.17     Basophils, Absolute 05/20/2024 0.01     Immature Grans, Absolute 05/20/2024 0.03     nRBC 05/20/2024 0.0    Labs reviewed; mildly anemic. CBC monitored per med onc alongside chemo    Current Psychotropic Medications:  No psychotropic medications     Plan of Care/ Medical Decision Making  Support patient in current dedication to ongoing behavioral activation.  Continued conversation supporting patient and father in current experience of anxiety, acceptance and tolerability of current treatment.  Patient continues to have gabapentin available, although is not using this.  Considered opportunities for follow-up; at this time, patient and father are aware I remain available as interested, although no follow-up has been scheduled.    Diagnoses and all orders for this visit:    1. Adjustment disorder with mixed anxiety and depressed mood (Primary)    I spent 37 minutes  caring for Jc on this date of service. This time includes time spent by me in the following activities: preparing for the visit, reviewing tests, obtaining and/or reviewing a separately obtained history, performing a medically appropriate examination and/or evaluation, counseling and educating the patient/family/caregiver, ordering medications, tests, or procedures, and documenting information in the medical record.

## 2024-07-17 LAB
NTRA SIGNATERA MTM READOUT: 0 MTM/ML
NTRA SIGNATERA TEST RESULT: NEGATIVE

## 2024-07-18 NOTE — PROGRESS NOTES
Follow-up    SUBJECTIVE:    07/08/2024, Interval history  Here for cycle 4-day 1 neoadjuvant FOLFIRINOX.  Cycle 3 was delayed due to neutropenia, so all subsequent cycles dose reduced by 25%.  He is seen today with his dad present.  He continues to tolerate treatment well.  Appetite adequate.  Bowels moving regularly.  Denies fever, chills, nausea, vomiting, neuropathy.  He has no new concerns today.    HISTORY OF PRESENT ILLNESS:  The patient is a 44 y.o. year old male with recently diagnosed rectal adenocarcinoma who presents to our clinic to establish care.  Onc history is outlined below    Family history significant for prostate cancer in father at 63, paternal uncle with colon cancer at 57, other paternal uncle with metastatic cancer, type unknown in his 50s, other paternal uncle with possible colon cancer and DVT/PE in his 70s.    Oncology/Hematology History   Rectal adenocarcinoma   4/23/2024 Procedure         4/23/2024 [facility]         5/2/2024 Imaging    CT CAP    A 1.7 cm right hepatic cyst is noted. Numerous small subcentimeter liver  low densities are present that are too small to characterize, could be  cysts, or potentially metastatic disease, interval follow-up can  characterize change.    No bowel obstruction. The sigmorectal mass is better characterized on  the MRI exam of same date (please see separate report). Some stranding  is apparent in the fat around the lesion, numerous surrounding lymph  nodes are present that could be metastatic lymph nodes. For example, on  axial image 165, a 1.9 x 1.6 cm pericolonic lymph node is present. As  another example, on axial image 170, left perirectal lymph node measures  0.7 x 1.2 cm.    IMPRESSION:        1. Sigmorectal mass with numerous surrounding lymph nodes, raising  suspicion for metastatic lymph nodes.     2. Hepatic cyst, and numerous liver low densities that are too small to  characterize; these lesions could also be cysts, but in this  clinical  setting, possibility of any metastatic liver lesions is not excluded.  Interval follow-up is advised to characterize change.     3. No pulmonary mass.     5/2/2024 Imaging    MRI Pelvis    FINDINGS: There is a circumferential solid low rectal mass which  measures approximately 4.5 cm in craniocaudad span and the inferior  margin is approximately 5 mm proximal to the anorectal junction.     The tumor extends approximately 8 mm into the mesorectal fat  predominantly along the right wall of the mass. There appears to be  extension to the mesorectal fascia along the right wall and abutment of  the right levator ani muscles. There are several enlarged perirectal  nodes and the largest is presacral measuring approximately 2.0 x 1.2 cm.  There are at least 4 enhancing suspicious nodes. There is no definite  extramural vascular invasion.     IMPRESSION:  1. Primary Tumor Location: Low rectal     2. MRI Stage: T3 N2 MRF positive     3. No definite sphincter involvement     5/2/2024 Cancer Staged    Staging form: Colon And Rectum, AJCC 8th Edition  - Clinical stage from 5/2/2024: cT3, cN2, cM0 - Signed by Sonali Pak MD on 6/3/2024     5/3/2024 Initial Diagnosis    Rectal adenocarcinoma     5/16/2024 Imaging    MRI abdo    IMPRESSION:  T2 hyperintense nonenhancing liver lesions are consistent with hepatic  cysts and biliary cystic hamartomas. No convincing liver metastasis  identified     5/24/2024 Procedure    PORT placement (Dr Isaac)     6/3/2024 -  Chemotherapy    OP COLORECTAL FOLFIRINOX (Fluorouracil cont. Infusion / Leucovorin / OXALIplatin / Irinotecan)     6/5/2024 -  Chemotherapy    OP CENTRAL VENOUS ACCESS DEVICE Access, Care, and Maintenance (CVAD)         The current medication list and allergy list were reviewed and reconciled.      Past Medical History, Past Surgical History, Social History, Family History have been reviewed and are without significant changes except as mentioned.      REVIEW OF  "SYSTEMS:  See interval history    Objective:       Vitals:    07/22/24 0852   BP: 156/85   Pulse: 91   Resp: 18   Temp: 98.2 °F (36.8 °C)   TempSrc: Oral   SpO2: 97%   Weight: 102 kg (223 lb 12.8 oz)   Height: 175.3 cm (69.02\")   PainSc: 0-No pain                   7/22/2024     8:53 AM   Current Status   ECOG score 0      PHYSICAL EXAM:    CONSTITUTIONAL:  Vital signs reviewed.  No distress, looks comfortable.  RESPIRATORY: Bilateral lungs clear to auscultation.  No wheezing or rhonchi   CARDIOVASCULAR: Normal S1-S2.  No murmur rubs or gallop GASTROINTESTINAL: Soft, nontender, bowel sounds present  NEURO:  No focal deficits.  Appears to have equal strength all 4 extremities.  SKIN:  Warm.  No rashes.    I have reexamined the patient and the results are consistent with the previously documented exam. WIL Espinoza        RECENT LABS:        WBC   Date Value Ref Range Status   07/22/2024 2.79 (L) 3.40 - 10.80 10*3/mm3 Final   07/08/2024 3.68 3.40 - 10.80 10*3/mm3 Final   07/01/2024 2.57 (L) 3.40 - 10.80 10*3/mm3 Final   06/17/2024 3.66 3.40 - 10.80 10*3/mm3 Final   06/12/2024 9.90 3.40 - 10.80 10*3/mm3 Final   06/03/2024 2.75 (L) 3.40 - 10.80 10*3/mm3 Final   05/20/2024 3.22 (L) 3.40 - 10.80 10*3/mm3 Final   06/09/2023 3.0 (L) 3.4 - 10.8 x10E3/uL Final   06/04/2022 3.0 (L) 3.4 - 10.8 x10E3/uL Final   05/25/2021 3.11 (L) 3.40 - 10.80 10*3/mm3 Final   05/26/2020 2.91 (L) 3.40 - 10.80 10*3/mm3 Final   02/12/2020 4.5 3.4 - 10.8 x10E3/uL Final   07/27/2019 2.6 (L) 3.4 - 10.8 x10E3/uL Final     Comment:     **Verified by repeat analysis**     Hemoglobin   Date Value Ref Range Status   07/22/2024 12.2 (L) 13.0 - 17.7 g/dL Final   07/08/2024 12.3 (L) 13.0 - 17.7 g/dL Final   07/01/2024 11.8 (L) 13.0 - 17.7 g/dL Final   06/17/2024 12.4 (L) 13.0 - 17.7 g/dL Final   06/12/2024 12.3 (L) 13.0 - 17.7 g/dL Final   06/03/2024 12.9 (L) 13.0 - 17.7 g/dL Final   05/20/2024 13.4 13.0 - 17.7 g/dL Final   06/09/2023 14.3 13.0 - " 17.7 g/dL Final   06/04/2022 14.1 13.0 - 17.7 g/dL Final   05/25/2021 14.0 13.0 - 17.7 g/dL Final   05/26/2020 13.7 13.0 - 17.7 g/dL Final   02/12/2020 13.5 13.0 - 17.7 g/dL Final   07/27/2019 13.8 13.0 - 17.7 g/dL Final     Platelets   Date Value Ref Range Status   07/22/2024 174 140 - 450 10*3/mm3 Final   07/08/2024 238 140 - 450 10*3/mm3 Final   07/01/2024 250 140 - 450 10*3/mm3 Final   06/17/2024 195 140 - 450 10*3/mm3 Final   06/12/2024 220 140 - 450 10*3/mm3 Final   06/03/2024 190 140 - 450 10*3/mm3 Final   05/20/2024 218 140 - 450 10*3/mm3 Final   06/09/2023 207 150 - 450 x10E3/uL Final   06/04/2022 230 150 - 450 x10E3/uL Final   05/25/2021 213 140 - 450 10*3/mm3 Final   05/26/2020 223 140 - 450 10*3/mm3 Final   02/12/2020 245 150 - 450 x10E3/uL Final   07/27/2019 249 150 - 450 x10E3/uL Final         Assessment & Plan     *Rectal adenocarcinoma (cT3 cN2 cM0), LUCAS, TMB low  -4/23/2024 rectal biopsy-invasive moderately differentiated adenocarcinoma, LUCAS.  -5/2/2024 MRI pelvis: Approximately 4.5 cm circumferential low rectal mass, approximately 5 mm proximal to anorectal junction, extending 8 mm in the mesorectal fat.  Appears to be extension to MRF along the right wall and abutment of right levator ani muscles.  Several enlarged perirectal nodes, largest 2 x 1.2 cm.  At least 4 enhancing suspicious nodes.  cT3 cN2 MRF positive.  No definitive sphincter involvement  - 5/2/2024 CT chest abdomen pelvis: 1.7 cm right hepatic cyst, and numerous low-density liver lesions-cyst versus possible metastatic lesions.  No lung mass  -5/16/2024 MRI abdomen: T2 hyperintense nonenhancing liver lesions consistent with liver cyst and biliary cystic hamartomas.  No evidence of liver mets  -Tumor genomic profile-APC (I269fs), APC ([id number]), FANCD2 (Q823), TP53. TMB 1 mut/Mb. LUCAS  - 6/3/2024 baseline CEA: 1.89  -Plan for total neoadjuvant chemotherapy with FOLFIRINOX.  - 6/3/2024: Cycle 1 day 1 FOLFIRINOX (5-FU bolus eliminated to  prevent further myelosuppression given baseline neutropenia) with G-CSF support  -6/17/2024: cycle 2-day 1 FOLFIRINOX with G-CSF support  - 7/1/2024: Cycle 3-day 1 FOLFIRINOX was delayed by 1 week due to neutropenia.  ANC  -7/8/2024:Cycle 3-day 1 FOLFIRINOX, with 25% dose reduction.  It was deferred from last week due to neutropenia.  -7/22/2024: Cycle 4 FOLFIRINOX with contiued 25% dose reduction.  Receiving Neulasta.  ANC 1.21, reviewed with MD #2, Dr. Welch, Dr. Pak's absence and we will proceed with treatment today as patient is already approved for Neulasta.      *History of elevated alk phos  - 6/12/2024: Isolated elevation in alk phos at 123.  Resolved since 6/17/2024  - No further interventions needed    *Benign ethnic neutropenia  - White cell count ranged between 2.6-3.2 since at least July 2019.    - 7/8/2024: WBC 3.6, ANC 1.85.  -7/22/2024: WBC 2.79 ANC 1.21    *Anemia  - 7/8/2024: Hemoglobin 12.3. No active bleeding  - 7/22/2024: Hemoglobin 12.2  Continue to monitor    Plan:   Proceed with cycle #4 FOLFIRINOX, continue previous 25% dose reduction.  Giving Neulasta.  Return in 2 weeks for MD follow-up and cycle #5 FOLFIRINOX.    Patient is on chemotherapy, which requires frequent monitoring

## 2024-07-22 ENCOUNTER — OFFICE VISIT (OUTPATIENT)
Dept: ONCOLOGY | Facility: CLINIC | Age: 44
End: 2024-07-22
Payer: COMMERCIAL

## 2024-07-22 ENCOUNTER — INFUSION (OUTPATIENT)
Dept: ONCOLOGY | Facility: HOSPITAL | Age: 44
End: 2024-07-22
Payer: COMMERCIAL

## 2024-07-22 VITALS
HEIGHT: 69 IN | SYSTOLIC BLOOD PRESSURE: 156 MMHG | HEART RATE: 91 BPM | OXYGEN SATURATION: 97 % | BODY MASS INDEX: 33.15 KG/M2 | TEMPERATURE: 98.2 F | RESPIRATION RATE: 18 BRPM | DIASTOLIC BLOOD PRESSURE: 85 MMHG | WEIGHT: 223.8 LBS

## 2024-07-22 DIAGNOSIS — C20 RECTAL ADENOCARCINOMA: ICD-10-CM

## 2024-07-22 DIAGNOSIS — C20 RECTAL ADENOCARCINOMA: Primary | ICD-10-CM

## 2024-07-22 DIAGNOSIS — T45.1X5A CHEMOTHERAPY-INDUCED NEUTROPENIA: ICD-10-CM

## 2024-07-22 DIAGNOSIS — Z79.899 HIGH RISK MEDICATION USE: ICD-10-CM

## 2024-07-22 DIAGNOSIS — D70.1 CHEMOTHERAPY-INDUCED NEUTROPENIA: ICD-10-CM

## 2024-07-22 LAB
ALBUMIN SERPL-MCNC: 4.4 G/DL (ref 3.5–5.2)
ALBUMIN/GLOB SERPL: 2.1 G/DL
ALP SERPL-CCNC: 119 U/L (ref 39–117)
ALT SERPL W P-5'-P-CCNC: 25 U/L (ref 1–41)
ANION GAP SERPL CALCULATED.3IONS-SCNC: 9.1 MMOL/L (ref 5–15)
AST SERPL-CCNC: 26 U/L (ref 1–40)
BASOPHILS # BLD AUTO: 0.01 10*3/MM3 (ref 0–0.2)
BASOPHILS NFR BLD AUTO: 0.4 % (ref 0–1.5)
BILIRUB SERPL-MCNC: 0.2 MG/DL (ref 0–1.2)
BUN SERPL-MCNC: 16 MG/DL (ref 6–20)
BUN/CREAT SERPL: 16 (ref 7–25)
CALCIUM SPEC-SCNC: 9 MG/DL (ref 8.6–10.5)
CHLORIDE SERPL-SCNC: 102 MMOL/L (ref 98–107)
CO2 SERPL-SCNC: 28.9 MMOL/L (ref 22–29)
CREAT SERPL-MCNC: 1 MG/DL (ref 0.76–1.27)
DEPRECATED RDW RBC AUTO: 48.7 FL (ref 37–54)
EGFRCR SERPLBLD CKD-EPI 2021: 94 ML/MIN/1.73
EOSINOPHIL # BLD AUTO: 0.16 10*3/MM3 (ref 0–0.4)
EOSINOPHIL NFR BLD AUTO: 5.7 % (ref 0.3–6.2)
ERYTHROCYTE [DISTWIDTH] IN BLOOD BY AUTOMATED COUNT: 13.6 % (ref 12.3–15.4)
GLOBULIN UR ELPH-MCNC: 2.1 GM/DL
GLUCOSE SERPL-MCNC: 119 MG/DL (ref 65–99)
HCT VFR BLD AUTO: 36.8 % (ref 37.5–51)
HGB BLD-MCNC: 12.2 G/DL (ref 13–17.7)
IMM GRANULOCYTES # BLD AUTO: 0.02 10*3/MM3 (ref 0–0.05)
IMM GRANULOCYTES NFR BLD AUTO: 0.7 % (ref 0–0.5)
LYMPHOCYTES # BLD AUTO: 0.94 10*3/MM3 (ref 0.7–3.1)
LYMPHOCYTES NFR BLD AUTO: 33.7 % (ref 19.6–45.3)
MCH RBC QN AUTO: 32.1 PG (ref 26.6–33)
MCHC RBC AUTO-ENTMCNC: 33.2 G/DL (ref 31.5–35.7)
MCV RBC AUTO: 96.8 FL (ref 79–97)
MONOCYTES # BLD AUTO: 0.45 10*3/MM3 (ref 0.1–0.9)
MONOCYTES NFR BLD AUTO: 16.1 % (ref 5–12)
NEUTROPHILS NFR BLD AUTO: 1.21 10*3/MM3 (ref 1.7–7)
NEUTROPHILS NFR BLD AUTO: 43.4 % (ref 42.7–76)
NRBC BLD AUTO-RTO: 0 /100 WBC (ref 0–0.2)
PLATELET # BLD AUTO: 174 10*3/MM3 (ref 140–450)
PMV BLD AUTO: 10.3 FL (ref 6–12)
POTASSIUM SERPL-SCNC: 3.9 MMOL/L (ref 3.5–5.2)
PROT SERPL-MCNC: 6.5 G/DL (ref 6–8.5)
RBC # BLD AUTO: 3.8 10*6/MM3 (ref 4.14–5.8)
SODIUM SERPL-SCNC: 140 MMOL/L (ref 136–145)
WBC NRBC COR # BLD AUTO: 2.79 10*3/MM3 (ref 3.4–10.8)

## 2024-07-22 PROCEDURE — 25010000002 LEUCOVORIN CALCIUM PER 50 MG: Performed by: NURSE PRACTITIONER

## 2024-07-22 PROCEDURE — 0 DEXTROSE 5 % SOLUTION 500 ML FLEX CONT: Performed by: NURSE PRACTITIONER

## 2024-07-22 PROCEDURE — 96417 CHEMO IV INFUS EACH ADDL SEQ: CPT

## 2024-07-22 PROCEDURE — 25010000002 IRINOTECAN PER 20 MG: Performed by: NURSE PRACTITIONER

## 2024-07-22 PROCEDURE — 0 DEXTROSE 5 % SOLUTION: Performed by: NURSE PRACTITIONER

## 2024-07-22 PROCEDURE — 99214 OFFICE O/P EST MOD 30 MIN: CPT | Performed by: NURSE PRACTITIONER

## 2024-07-22 PROCEDURE — 96413 CHEMO IV INFUSION 1 HR: CPT

## 2024-07-22 PROCEDURE — 25010000002 ATROPINE SULFATE 0.4 MG/ML SOLUTION: Performed by: NURSE PRACTITIONER

## 2024-07-22 PROCEDURE — 96367 TX/PROPH/DG ADDL SEQ IV INF: CPT

## 2024-07-22 PROCEDURE — 25010000002 FLUOROURACIL PER 500 MG: Performed by: NURSE PRACTITIONER

## 2024-07-22 PROCEDURE — 96366 THER/PROPH/DIAG IV INF ADDON: CPT

## 2024-07-22 PROCEDURE — 85025 COMPLETE CBC W/AUTO DIFF WBC: CPT

## 2024-07-22 PROCEDURE — 25010000002 FOSAPREPITANT PER 1 MG: Performed by: NURSE PRACTITIONER

## 2024-07-22 PROCEDURE — 96375 TX/PRO/DX INJ NEW DRUG ADDON: CPT

## 2024-07-22 PROCEDURE — 25810000003 SODIUM CHLORIDE 0.9 % SOLUTION 250 ML FLEX CONT: Performed by: NURSE PRACTITIONER

## 2024-07-22 PROCEDURE — 25010000002 OXALIPLATIN PER 0.5 MG: Performed by: NURSE PRACTITIONER

## 2024-07-22 PROCEDURE — 96368 THER/DIAG CONCURRENT INF: CPT

## 2024-07-22 PROCEDURE — G0498 CHEMO EXTEND IV INFUS W/PUMP: HCPCS

## 2024-07-22 PROCEDURE — 25010000002 PALONOSETRON PER 25 MCG: Performed by: NURSE PRACTITIONER

## 2024-07-22 PROCEDURE — 25010000002 DEXAMETHASONE PER 1 MG: Performed by: NURSE PRACTITIONER

## 2024-07-22 PROCEDURE — 96415 CHEMO IV INFUSION ADDL HR: CPT

## 2024-07-22 PROCEDURE — 80053 COMPREHEN METABOLIC PANEL: CPT

## 2024-07-22 PROCEDURE — 0 DEXTROSE 5 % SOLUTION 250 ML FLEX CONT: Performed by: NURSE PRACTITIONER

## 2024-07-22 RX ORDER — DEXTROSE MONOHYDRATE 50 MG/ML
20 INJECTION, SOLUTION INTRAVENOUS ONCE
Status: COMPLETED | OUTPATIENT
Start: 2024-07-22 | End: 2024-07-22

## 2024-07-22 RX ORDER — PALONOSETRON 0.05 MG/ML
0.25 INJECTION, SOLUTION INTRAVENOUS ONCE
Status: COMPLETED | OUTPATIENT
Start: 2024-07-22 | End: 2024-07-22

## 2024-07-22 RX ORDER — ATROPINE SULFATE 0.4 MG/ML
0.25 INJECTION, SOLUTION INTRAMUSCULAR; INTRAVENOUS; SUBCUTANEOUS
Status: DISCONTINUED | OUTPATIENT
Start: 2024-07-22 | End: 2024-07-22 | Stop reason: HOSPADM

## 2024-07-22 RX ORDER — FAMOTIDINE 10 MG/ML
20 INJECTION, SOLUTION INTRAVENOUS AS NEEDED
Status: CANCELLED | OUTPATIENT
Start: 2024-07-22

## 2024-07-22 RX ORDER — DEXTROSE MONOHYDRATE 50 MG/ML
20 INJECTION, SOLUTION INTRAVENOUS ONCE
Status: CANCELLED | OUTPATIENT
Start: 2024-07-22

## 2024-07-22 RX ORDER — DIPHENHYDRAMINE HYDROCHLORIDE 50 MG/ML
50 INJECTION INTRAMUSCULAR; INTRAVENOUS AS NEEDED
Status: CANCELLED | OUTPATIENT
Start: 2024-07-22

## 2024-07-22 RX ORDER — PALONOSETRON 0.05 MG/ML
0.25 INJECTION, SOLUTION INTRAVENOUS ONCE
Status: CANCELLED | OUTPATIENT
Start: 2024-07-22

## 2024-07-22 RX ORDER — ATROPINE SULFATE 1 MG/ML
0.25 INJECTION, SOLUTION INTRAMUSCULAR; INTRAVENOUS; SUBCUTANEOUS
Status: CANCELLED | OUTPATIENT
Start: 2024-07-22

## 2024-07-22 RX ADMIN — ATROPINE SULFATE 0.25 MG: 0.4 INJECTION, SOLUTION INTRAVENOUS at 12:38

## 2024-07-22 RX ADMIN — DEXTROSE MONOHYDRATE 265 MG: 5 INJECTION, SOLUTION INTRAVENOUS at 13:11

## 2024-07-22 RX ADMIN — LEUCOVORIN CALCIUM 860 MG: 350 INJECTION, POWDER, LYOPHILIZED, FOR SUSPENSION INTRAMUSCULAR; INTRAVENOUS at 12:39

## 2024-07-22 RX ADMIN — PALONOSETRON HYDROCHLORIDE 0.25 MG: 0.25 INJECTION INTRAVENOUS at 09:36

## 2024-07-22 RX ADMIN — DEXTROSE MONOHYDRATE 20 ML/HR: 50 INJECTION, SOLUTION INTRAVENOUS at 09:36

## 2024-07-22 RX ADMIN — FOSAPREPITANT 100 ML: 150 INJECTION, POWDER, LYOPHILIZED, FOR SOLUTION INTRAVENOUS at 09:59

## 2024-07-22 RX ADMIN — OXALIPLATIN 135 MG: 5 INJECTION, SOLUTION INTRAVENOUS at 10:35

## 2024-07-22 RX ADMIN — DEXAMETHASONE SODIUM PHOSPHATE 12 MG: 10 INJECTION INTRAMUSCULAR; INTRAVENOUS at 09:39

## 2024-07-22 RX ADMIN — FLUOROURACIL 3850 MG: 50 INJECTION, SOLUTION INTRAVENOUS at 14:58

## 2024-07-22 NOTE — PATIENT INSTRUCTIONS
Call this off ice at [phone] for any fever of 100.4,chills, or signs of infection.   Keep your hands moisturized. Apply moisturizer gently. Don't use hot water to wash your hands. Don't use any scented hand soaps. Avoid dirsct sunlight.

## 2024-07-22 NOTE — NURSING NOTE
Lab Results   Component Value Date    WBC 2.79 (L) 07/22/2024    HGB 12.2 (L) 07/22/2024    HCT 36.8 (L) 07/22/2024    MCV 96.8 07/22/2024     07/22/2024     Lab Results   Component Value Date    NEUTROABS 1.21 (L) 07/22/2024       Okay to treat per HEATH Perez.

## 2024-07-22 NOTE — NURSING NOTE
Palms dry. Instructed to keep lubricated with Aquaphor at least twice a day. Encouraged to use warm water instead of hot water to wash dishes. Verbalized understanding.

## 2024-07-24 ENCOUNTER — INFUSION (OUTPATIENT)
Dept: ONCOLOGY | Facility: HOSPITAL | Age: 44
End: 2024-07-24
Payer: COMMERCIAL

## 2024-07-24 DIAGNOSIS — C20 RECTAL ADENOCARCINOMA: Primary | ICD-10-CM

## 2024-07-24 PROCEDURE — 96377 APPLICATON ON-BODY INJECTOR: CPT

## 2024-07-24 PROCEDURE — 25010000002 HEPARIN LOCK FLUSH PER 10 UNITS: Performed by: NURSE PRACTITIONER

## 2024-07-24 PROCEDURE — 25010000002 PEGFILGRASTIM 6 MG/0.6ML PREFILLED SYRINGE KIT: Performed by: NURSE PRACTITIONER

## 2024-07-24 RX ORDER — SODIUM CHLORIDE 0.9 % (FLUSH) 0.9 %
10 SYRINGE (ML) INJECTION AS NEEDED
OUTPATIENT
Start: 2024-07-24

## 2024-07-24 RX ORDER — SODIUM CHLORIDE 0.9 % (FLUSH) 0.9 %
10 SYRINGE (ML) INJECTION AS NEEDED
Status: DISCONTINUED | OUTPATIENT
Start: 2024-07-24 | End: 2024-07-24 | Stop reason: HOSPADM

## 2024-07-24 RX ORDER — HEPARIN SODIUM (PORCINE) LOCK FLUSH IV SOLN 100 UNIT/ML 100 UNIT/ML
500 SOLUTION INTRAVENOUS AS NEEDED
Status: DISCONTINUED | OUTPATIENT
Start: 2024-07-24 | End: 2024-07-24 | Stop reason: HOSPADM

## 2024-07-24 RX ORDER — HEPARIN SODIUM (PORCINE) LOCK FLUSH IV SOLN 100 UNIT/ML 100 UNIT/ML
500 SOLUTION INTRAVENOUS AS NEEDED
OUTPATIENT
Start: 2024-07-24

## 2024-07-24 RX ADMIN — Medication 10 ML: at 12:46

## 2024-07-24 RX ADMIN — Medication 500 UNITS: at 12:46

## 2024-07-24 RX ADMIN — PEGFILGRASTIM 6 MG: KIT SUBCUTANEOUS at 12:42

## 2024-07-29 ENCOUNTER — TELEPHONE (OUTPATIENT)
Dept: ONCOLOGY | Facility: CLINIC | Age: 44
End: 2024-07-29
Payer: COMMERCIAL

## 2024-07-29 NOTE — PROGRESS NOTES
Follow-up    SUBJECTIVE:    08/05/2024, Interval history  Here for cycle 5-day 1 neoadjuvant FOLFIRINOX.  Tolerating chemotherapy quite well.  Had a couple episodes of vomiting.  Reports rash over bilateral upper extremities, lower extremities as well as abdomen is improving with that before application. reports nausea well-controlled with current antiemetics.  Denies any pain.  Has appointment with radiation oncology on 8 August 2024.  Denies any fever ER visits or hospitalization.  No neuropathy.  No diarrhea.    HISTORY OF PRESENT ILLNESS:  The patient is a 44 y.o. year old male with recently diagnosed rectal adenocarcinoma who presents to our clinic to establish care.  Onc history is outlined below    Family history significant for prostate cancer in father at 63, paternal uncle with colon cancer at 57, other paternal uncle with metastatic cancer, type unknown in his 50s, other paternal uncle with possible colon cancer and DVT/PE in his 70s.    Oncology/Hematology History   Rectal adenocarcinoma   4/23/2024 Procedure         4/23/2024 [employer]         5/2/2024 Imaging    CT CAP    A 1.7 cm right hepatic cyst is noted. Numerous small subcentimeter liver  low densities are present that are too small to characterize, could be  cysts, or potentially metastatic disease, interval follow-up can  characterize change.    No bowel obstruction. The sigmorectal mass is better characterized on  the MRI exam of same date (please see separate report). Some stranding  is apparent in the fat around the lesion, numerous surrounding lymph  nodes are present that could be metastatic lymph nodes. For example, on  axial image 165, a 1.9 x 1.6 cm pericolonic lymph node is present. As  another example, on axial image 170, left perirectal lymph node measures  0.7 x 1.2 cm.    IMPRESSION:        1. Sigmorectal mass with numerous surrounding lymph nodes, raising  suspicion for metastatic lymph nodes.     2. Hepatic cyst, and  numerous liver low densities that are too small to  characterize; these lesions could also be cysts, but in this clinical  setting, possibility of any metastatic liver lesions is not excluded.  Interval follow-up is advised to characterize change.     3. No pulmonary mass.     5/2/2024 Imaging    MRI Pelvis    FINDINGS: There is a circumferential solid low rectal mass which  measures approximately 4.5 cm in craniocaudad span and the inferior  margin is approximately 5 mm proximal to the anorectal junction.     The tumor extends approximately 8 mm into the mesorectal fat  predominantly along the right wall of the mass. There appears to be  extension to the mesorectal fascia along the right wall and abutment of  the right levator ani muscles. There are several enlarged perirectal  nodes and the largest is presacral measuring approximately 2.0 x 1.2 cm.  There are at least 4 enhancing suspicious nodes. There is no definite  extramural vascular invasion.     IMPRESSION:  1. Primary Tumor Location: Low rectal     2. MRI Stage: T3 N2 MRF positive     3. No definite sphincter involvement     5/2/2024 Cancer Staged    Staging form: Colon And Rectum, AJCC 8th Edition  - Clinical stage from 5/2/2024: cT3, cN2, cM0 - Signed by Sonali Pak MD on 6/3/2024     5/3/2024 Initial Diagnosis    Rectal adenocarcinoma     5/16/2024 Imaging    MRI abdo    IMPRESSION:  T2 hyperintense nonenhancing liver lesions are consistent with hepatic  cysts and biliary cystic hamartomas. No convincing liver metastasis  identified     5/24/2024 Procedure    PORT placement (Dr Isaac)     6/3/2024 -  Chemotherapy    OP COLORECTAL FOLFIRINOX (Fluorouracil cont. Infusion / Leucovorin / OXALIplatin / Irinotecan)     6/5/2024 -  Chemotherapy    OP CENTRAL VENOUS ACCESS DEVICE Access, Care, and Maintenance (CVAD)         The current medication list and allergy list were reviewed and reconciled.      Past Medical History, Past Surgical History, Social  History, Family History have been reviewed and are without significant changes except as mentioned.      REVIEW OF SYSTEMS:  See interval history    Objective:       Vitals:    08/05/24 0847   BP: (!) 168/104   Pulse: 90   Temp: 98.5 °F (36.9 °C)   TempSrc: Oral   SpO2: 97%   Weight: 101 kg (221 lb 11.2 oz)   PainSc: 0-No pain                     8/5/2024     8:45 AM   Current Status   ECOG score 0      PHYSICAL EXAM:    CONSTITUTIONAL:  Vital signs reviewed.  No distress, looks comfortable.  RESPIRATORY: Bilateral lungs clear to auscultation.  No wheezing or rhonchi   CARDIOVASCULAR: Normal S1-S2.  No murmur rubs or gallop GASTROINTESTINAL: Soft, nontender, bowel sounds present  NEURO:  No focal deficits.  Appears to have equal strength all 4 extremities.  SKIN: Rash over bilateral upper extremities, lower extremities and abdominal, improving    I have reexamined the patient and the results are consistent with the previously documented exam. Sonali Pak MD        RECENT LABS:        WBC   Date Value Ref Range Status   08/05/2024 2.64 (L) 3.40 - 10.80 10*3/mm3 Final   07/22/2024 2.79 (L) 3.40 - 10.80 10*3/mm3 Final   07/08/2024 3.68 3.40 - 10.80 10*3/mm3 Final   07/01/2024 2.57 (L) 3.40 - 10.80 10*3/mm3 Final   06/17/2024 3.66 3.40 - 10.80 10*3/mm3 Final   06/12/2024 9.90 3.40 - 10.80 10*3/mm3 Final   06/03/2024 2.75 (L) 3.40 - 10.80 10*3/mm3 Final   05/20/2024 3.22 (L) 3.40 - 10.80 10*3/mm3 Final   06/09/2023 3.0 (L) 3.4 - 10.8 x10E3/uL Final   06/04/2022 3.0 (L) 3.4 - 10.8 x10E3/uL Final   05/25/2021 3.11 (L) 3.40 - 10.80 10*3/mm3 Final   05/26/2020 2.91 (L) 3.40 - 10.80 10*3/mm3 Final   02/12/2020 4.5 3.4 - 10.8 x10E3/uL Final   07/27/2019 2.6 (L) 3.4 - 10.8 x10E3/uL Final     Comment:     **Verified by repeat analysis**     Hemoglobin   Date Value Ref Range Status   08/05/2024 12.2 (L) 13.0 - 17.7 g/dL Final   07/22/2024 12.2 (L) 13.0 - 17.7 g/dL Final   07/08/2024 12.3 (L) 13.0 - 17.7 g/dL Final    07/01/2024 11.8 (L) 13.0 - 17.7 g/dL Final   06/17/2024 12.4 (L) 13.0 - 17.7 g/dL Final   06/12/2024 12.3 (L) 13.0 - 17.7 g/dL Final   06/03/2024 12.9 (L) 13.0 - 17.7 g/dL Final   05/20/2024 13.4 13.0 - 17.7 g/dL Final   06/09/2023 14.3 13.0 - 17.7 g/dL Final   06/04/2022 14.1 13.0 - 17.7 g/dL Final   05/25/2021 14.0 13.0 - 17.7 g/dL Final   05/26/2020 13.7 13.0 - 17.7 g/dL Final   02/12/2020 13.5 13.0 - 17.7 g/dL Final   07/27/2019 13.8 13.0 - 17.7 g/dL Final     Platelets   Date Value Ref Range Status   08/05/2024 216 140 - 450 10*3/mm3 Final   07/22/2024 174 140 - 450 10*3/mm3 Final   07/08/2024 238 140 - 450 10*3/mm3 Final   07/01/2024 250 140 - 450 10*3/mm3 Final   06/17/2024 195 140 - 450 10*3/mm3 Final   06/12/2024 220 140 - 450 10*3/mm3 Final   06/03/2024 190 140 - 450 10*3/mm3 Final   05/20/2024 218 140 - 450 10*3/mm3 Final   06/09/2023 207 150 - 450 x10E3/uL Final   06/04/2022 230 150 - 450 x10E3/uL Final   05/25/2021 213 140 - 450 10*3/mm3 Final   05/26/2020 223 140 - 450 10*3/mm3 Final   02/12/2020 245 150 - 450 x10E3/uL Final   07/27/2019 249 150 - 450 x10E3/uL Final         Assessment & Plan     *Rectal adenocarcinoma (cT3 cN2 cM0), LUCAS, TMB low  -4/23/2024 rectal biopsy-invasive moderately differentiated adenocarcinoma, LUCAS.  -5/2/2024 MRI pelvis: Approximately 4.5 cm circumferential low rectal mass, approximately 5 mm proximal to anorectal junction, extending 8 mm in the mesorectal fat.  Appears to be extension to MRF along the right wall and abutment of right levator ani muscles.  Several enlarged perirectal nodes, largest 2 x 1.2 cm.  At least 4 enhancing suspicious nodes.  cT3 cN2 MRF positive.  No definitive sphincter involvement  - 5/2/2024 CT chest abdomen pelvis: 1.7 cm right hepatic cyst, and numerous low-density liver lesions-cyst versus possible metastatic lesions.  No lung mass  -5/16/2024 MRI abdomen: T2 hyperintense nonenhancing liver lesions consistent with liver cyst and biliary  cystic hamartomas.  No evidence of liver mets  -Tumor genomic profile-APC (I269fs), APC (A4829ul), FANCD2 (Q823), TP53. TMB 1 mut/Mb. LUCAS  - 6/3/2024 baseline CEA: 1.89  -Plan for total neoadjuvant chemotherapy with FOLFIRINOX.  - 6/3/2024: Cycle 1 day 1 FOLFIRINOX (5-FU bolus eliminated to prevent further myelosuppression given baseline neutropenia; 25% dose reduction starting cycle 3) with G-CSF support  -6/17/2024: cycle 2-day 1 FOLFIRINOX with G-CSF support  - 7/1/2024: Cycle 3-day 1 FOLFIRINOX was delayed by 1 week due to neutropenia.  ANC  -7/8/2024:Cycle 3-day 1 FOLFIRINOX, with 25% dose reduction.  It was deferred from last week due to neutropenia.  -07/22/2024: Cycle 4 FOLFIRINOX with contiued 25% dose reduction.  Receiving Neulasta.    -8/5/2024 for cycle 5-day 1 FOLFIRINOX with 25% dose reduction, with G-CSF support.  Labs and exam okay for treatment today    *History of elevated alk phos  - 6/12/2024: Isolated elevation in alk phos at 123.  Resolved since 6/17/2024  - No further interventions needed    *Benign ethnic neutropenia  - White cell count ranged between 2.6-3.2 since at least July 2019.    - 8/5/2024: WBC 2.6, ANC 1.15    *Anemia, mild  - 8/5/2024; Hb stable 12.2    *Skin rash, bilateral upper extremities, lower extremities and abdomen  -Patient reports improvement with above for application  -Likely secondary to chemotherapy    Plan:   Proceed with cycle 5 FOLFIRINOX, continue previous 25% dose reduction.  Return in 2 weeks for MD follow-up and cycle 6 FOLFIRINOX.  Has appointment on 8/8/2024 with Dr. Dennys Hi for concurrent chemoradiation consideration    Patient is on chemotherapy, which requires frequent monitoring

## 2024-07-29 NOTE — TELEPHONE ENCOUNTER
Called the patient to let him know per Clarice red that the form was faxed in last week. Patient was appreciative and v/u.

## 2024-07-29 NOTE — TELEPHONE ENCOUNTER
Caller: Jc Brannon SR    Relationship: Father    Best call back number: [phone]    What is the best time to reach you: ANYTIME    Who are you requesting to speak with (clinical staff, provider,  specific staff member): CLINICAL    What was the call regarding: JC SALVADOR CALLING TO CHECK TO SEE IF PATIENT'S PTO ASSISTANCE PROGRAM FORM HAD BEEN COMPLETED YET. THEY GAVE THIS TO SOMEONE AT HIS [facility] APPT LAST WEEK.    PLEASE CALL TO ADVISE.

## 2024-07-30 ENCOUNTER — TELEPHONE (OUTPATIENT)
Dept: ONCOLOGY | Facility: CLINIC | Age: 44
End: 2024-07-30

## 2024-07-30 NOTE — TELEPHONE ENCOUNTER
Caller: Jc Brannon SR    Relationship: Father    Best call back number: [phone]    What form or medical record are you requesting: SEE TE FROM YESTERDAY (7/29/24) - PAPERWORK NEEDS TO BE FAXED TO NUMBER BELOW AS OLD FAX NUMBER IS DOWN.    Who is requesting this form or medical record from you: [employer] -  [phone]    How would you like to receive the form or medical records (pick-up, mail, fax): FAX  If fax, what is the fax number:  [phone]    Timeframe paperwork needed: ASAP

## 2024-08-05 ENCOUNTER — INFUSION (OUTPATIENT)
Dept: ONCOLOGY | Facility: HOSPITAL | Age: 44
End: 2024-08-05
Payer: COMMERCIAL

## 2024-08-05 ENCOUNTER — OFFICE VISIT (OUTPATIENT)
Dept: ONCOLOGY | Facility: CLINIC | Age: 44
End: 2024-08-05
Payer: COMMERCIAL

## 2024-08-05 VITALS
DIASTOLIC BLOOD PRESSURE: 104 MMHG | BODY MASS INDEX: 32.72 KG/M2 | OXYGEN SATURATION: 97 % | WEIGHT: 221.7 LBS | HEART RATE: 90 BPM | TEMPERATURE: 98.5 F | SYSTOLIC BLOOD PRESSURE: 168 MMHG

## 2024-08-05 DIAGNOSIS — D70.8 OTHER NEUTROPENIA: ICD-10-CM

## 2024-08-05 DIAGNOSIS — R21 SKIN RASH: ICD-10-CM

## 2024-08-05 DIAGNOSIS — Z01.818 EXAMINATION PRIOR TO CHEMOTHERAPY: ICD-10-CM

## 2024-08-05 DIAGNOSIS — C20 RECTAL ADENOCARCINOMA: Primary | ICD-10-CM

## 2024-08-05 DIAGNOSIS — C20 RECTAL ADENOCARCINOMA: ICD-10-CM

## 2024-08-05 LAB
ALBUMIN SERPL-MCNC: 4.3 G/DL (ref 3.5–5.2)
ALBUMIN/GLOB SERPL: 2 G/DL
ALP SERPL-CCNC: 117 U/L (ref 39–117)
ALT SERPL W P-5'-P-CCNC: 29 U/L (ref 1–41)
ANION GAP SERPL CALCULATED.3IONS-SCNC: 12 MMOL/L (ref 5–15)
AST SERPL-CCNC: 28 U/L (ref 1–40)
BASOPHILS # BLD AUTO: 0.01 10*3/MM3 (ref 0–0.2)
BASOPHILS NFR BLD AUTO: 0.4 % (ref 0–1.5)
BILIRUB SERPL-MCNC: 0.3 MG/DL (ref 0–1.2)
BUN SERPL-MCNC: 13 MG/DL (ref 6–20)
BUN/CREAT SERPL: 12.5 (ref 7–25)
CALCIUM SPEC-SCNC: 8.6 MG/DL (ref 8.6–10.5)
CHLORIDE SERPL-SCNC: 103 MMOL/L (ref 98–107)
CO2 SERPL-SCNC: 28 MMOL/L (ref 22–29)
CREAT SERPL-MCNC: 1.04 MG/DL (ref 0.76–1.27)
DEPRECATED RDW RBC AUTO: 49.6 FL (ref 37–54)
EGFRCR SERPLBLD CKD-EPI 2021: 89.7 ML/MIN/1.73
EOSINOPHIL # BLD AUTO: 0.07 10*3/MM3 (ref 0–0.4)
EOSINOPHIL NFR BLD AUTO: 2.7 % (ref 0.3–6.2)
ERYTHROCYTE [DISTWIDTH] IN BLOOD BY AUTOMATED COUNT: 13.9 % (ref 12.3–15.4)
GLOBULIN UR ELPH-MCNC: 2.2 GM/DL
GLUCOSE SERPL-MCNC: 118 MG/DL (ref 65–99)
HCT VFR BLD AUTO: 36.5 % (ref 37.5–51)
HGB BLD-MCNC: 12.2 G/DL (ref 13–17.7)
IMM GRANULOCYTES # BLD AUTO: 0.03 10*3/MM3 (ref 0–0.05)
IMM GRANULOCYTES NFR BLD AUTO: 1.1 % (ref 0–0.5)
LYMPHOCYTES # BLD AUTO: 0.91 10*3/MM3 (ref 0.7–3.1)
LYMPHOCYTES NFR BLD AUTO: 34.5 % (ref 19.6–45.3)
MCH RBC QN AUTO: 32.6 PG (ref 26.6–33)
MCHC RBC AUTO-ENTMCNC: 33.4 G/DL (ref 31.5–35.7)
MCV RBC AUTO: 97.6 FL (ref 79–97)
MONOCYTES # BLD AUTO: 0.47 10*3/MM3 (ref 0.1–0.9)
MONOCYTES NFR BLD AUTO: 17.8 % (ref 5–12)
NEUTROPHILS NFR BLD AUTO: 1.15 10*3/MM3 (ref 1.7–7)
NEUTROPHILS NFR BLD AUTO: 43.5 % (ref 42.7–76)
NRBC BLD AUTO-RTO: 0 /100 WBC (ref 0–0.2)
PLATELET # BLD AUTO: 216 10*3/MM3 (ref 140–450)
PMV BLD AUTO: 10 FL (ref 6–12)
POTASSIUM SERPL-SCNC: 4 MMOL/L (ref 3.5–5.2)
PREALB SERPL-MCNC: 26.6 MG/DL (ref 20–40)
PROT SERPL-MCNC: 6.5 G/DL (ref 6–8.5)
RBC # BLD AUTO: 3.74 10*6/MM3 (ref 4.14–5.8)
SODIUM SERPL-SCNC: 143 MMOL/L (ref 136–145)
WBC NRBC COR # BLD AUTO: 2.64 10*3/MM3 (ref 3.4–10.8)

## 2024-08-05 PROCEDURE — 96368 THER/DIAG CONCURRENT INF: CPT

## 2024-08-05 PROCEDURE — 0 DEXTROSE 5 % SOLUTION: Performed by: STUDENT IN AN ORGANIZED HEALTH CARE EDUCATION/TRAINING PROGRAM

## 2024-08-05 PROCEDURE — 80053 COMPREHEN METABOLIC PANEL: CPT

## 2024-08-05 PROCEDURE — 84134 ASSAY OF PREALBUMIN: CPT | Performed by: SURGERY

## 2024-08-05 PROCEDURE — 25010000002 LEUCOVORIN CALCIUM PER 50 MG: Performed by: STUDENT IN AN ORGANIZED HEALTH CARE EDUCATION/TRAINING PROGRAM

## 2024-08-05 PROCEDURE — 96375 TX/PRO/DX INJ NEW DRUG ADDON: CPT

## 2024-08-05 PROCEDURE — G0498 CHEMO EXTEND IV INFUS W/PUMP: HCPCS

## 2024-08-05 PROCEDURE — 0 DEXTROSE 5 % SOLUTION 250 ML FLEX CONT: Performed by: STUDENT IN AN ORGANIZED HEALTH CARE EDUCATION/TRAINING PROGRAM

## 2024-08-05 PROCEDURE — 96413 CHEMO IV INFUSION 1 HR: CPT

## 2024-08-05 PROCEDURE — 99214 OFFICE O/P EST MOD 30 MIN: CPT | Performed by: STUDENT IN AN ORGANIZED HEALTH CARE EDUCATION/TRAINING PROGRAM

## 2024-08-05 PROCEDURE — 25010000002 OXALIPLATIN PER 0.5 MG: Performed by: STUDENT IN AN ORGANIZED HEALTH CARE EDUCATION/TRAINING PROGRAM

## 2024-08-05 PROCEDURE — 25810000003 SODIUM CHLORIDE 0.9 % SOLUTION 250 ML FLEX CONT: Performed by: STUDENT IN AN ORGANIZED HEALTH CARE EDUCATION/TRAINING PROGRAM

## 2024-08-05 PROCEDURE — 96415 CHEMO IV INFUSION ADDL HR: CPT

## 2024-08-05 PROCEDURE — 96367 TX/PROPH/DG ADDL SEQ IV INF: CPT

## 2024-08-05 PROCEDURE — 25010000002 FOSAPREPITANT PER 1 MG: Performed by: STUDENT IN AN ORGANIZED HEALTH CARE EDUCATION/TRAINING PROGRAM

## 2024-08-05 PROCEDURE — 25010000002 IRINOTECAN PER 20 MG: Performed by: STUDENT IN AN ORGANIZED HEALTH CARE EDUCATION/TRAINING PROGRAM

## 2024-08-05 PROCEDURE — 25010000002 PALONOSETRON PER 25 MCG: Performed by: STUDENT IN AN ORGANIZED HEALTH CARE EDUCATION/TRAINING PROGRAM

## 2024-08-05 PROCEDURE — 0 DEXTROSE 5 % SOLUTION 500 ML FLEX CONT: Performed by: STUDENT IN AN ORGANIZED HEALTH CARE EDUCATION/TRAINING PROGRAM

## 2024-08-05 PROCEDURE — 25010000002 FLUOROURACIL PER 500 MG: Performed by: STUDENT IN AN ORGANIZED HEALTH CARE EDUCATION/TRAINING PROGRAM

## 2024-08-05 PROCEDURE — 85025 COMPLETE CBC W/AUTO DIFF WBC: CPT

## 2024-08-05 PROCEDURE — 25010000002 DEXAMETHASONE SODIUM PHOSPHATE 100 MG/10ML SOLUTION: Performed by: STUDENT IN AN ORGANIZED HEALTH CARE EDUCATION/TRAINING PROGRAM

## 2024-08-05 PROCEDURE — 25010000002 ATROPINE SULFATE 0.4 MG/ML SOLUTION: Performed by: STUDENT IN AN ORGANIZED HEALTH CARE EDUCATION/TRAINING PROGRAM

## 2024-08-05 PROCEDURE — 96417 CHEMO IV INFUS EACH ADDL SEQ: CPT

## 2024-08-05 RX ORDER — ATROPINE SULFATE 0.4 MG/ML
0.25 INJECTION, SOLUTION INTRAVENOUS
Status: DISCONTINUED | OUTPATIENT
Start: 2024-08-05 | End: 2024-08-05 | Stop reason: HOSPADM

## 2024-08-05 RX ORDER — ATROPINE SULFATE 1 MG/ML
0.25 INJECTION, SOLUTION INTRAMUSCULAR; INTRAVENOUS; SUBCUTANEOUS
Status: CANCELLED | OUTPATIENT
Start: 2024-08-05

## 2024-08-05 RX ORDER — DEXTROSE MONOHYDRATE 50 MG/ML
20 INJECTION, SOLUTION INTRAVENOUS ONCE
Status: CANCELLED | OUTPATIENT
Start: 2024-08-05

## 2024-08-05 RX ORDER — FAMOTIDINE 10 MG/ML
20 INJECTION, SOLUTION INTRAVENOUS AS NEEDED
Status: CANCELLED | OUTPATIENT
Start: 2024-08-05

## 2024-08-05 RX ORDER — DEXTROSE MONOHYDRATE 50 MG/ML
20 INJECTION, SOLUTION INTRAVENOUS ONCE
Status: COMPLETED | OUTPATIENT
Start: 2024-08-05 | End: 2024-08-05

## 2024-08-05 RX ORDER — PALONOSETRON 0.05 MG/ML
0.25 INJECTION, SOLUTION INTRAVENOUS ONCE
Status: COMPLETED | OUTPATIENT
Start: 2024-08-05 | End: 2024-08-05

## 2024-08-05 RX ORDER — DIPHENHYDRAMINE HYDROCHLORIDE 50 MG/ML
50 INJECTION INTRAMUSCULAR; INTRAVENOUS AS NEEDED
Status: CANCELLED | OUTPATIENT
Start: 2024-08-05

## 2024-08-05 RX ORDER — PALONOSETRON 0.05 MG/ML
0.25 INJECTION, SOLUTION INTRAVENOUS ONCE
Status: CANCELLED | OUTPATIENT
Start: 2024-08-05

## 2024-08-05 RX ADMIN — OXALIPLATIN 135 MG: 5 INJECTION, SOLUTION INTRAVENOUS at 10:19

## 2024-08-05 RX ADMIN — FLUOROURACIL 3850 MG: 50 INJECTION, SOLUTION INTRAVENOUS at 13:48

## 2024-08-05 RX ADMIN — FOSAPREPITANT 100 ML: 150 INJECTION, POWDER, LYOPHILIZED, FOR SOLUTION INTRAVENOUS at 09:31

## 2024-08-05 RX ADMIN — ATROPINE SULFATE 0.25 MG: 0.4 INJECTION, SOLUTION INTRAVENOUS at 12:14

## 2024-08-05 RX ADMIN — LEUCOVORIN CALCIUM 860 MG: 350 INJECTION, POWDER, LYOPHILIZED, FOR SUSPENSION INTRAMUSCULAR; INTRAVENOUS at 11:53

## 2024-08-05 RX ADMIN — DEXTROSE MONOHYDRATE 265 MG: 5 INJECTION, SOLUTION INTRAVENOUS at 12:16

## 2024-08-05 RX ADMIN — PALONOSETRON HYDROCHLORIDE 0.25 MG: 0.25 INJECTION INTRAVENOUS at 09:31

## 2024-08-05 RX ADMIN — DEXTROSE MONOHYDRATE 20 ML/HR: 50 INJECTION, SOLUTION INTRAVENOUS at 09:31

## 2024-08-05 RX ADMIN — DEXAMETHASONE SODIUM PHOSPHATE 12 MG: 10 INJECTION, SOLUTION INTRAMUSCULAR; INTRAVENOUS at 10:01

## 2024-08-07 ENCOUNTER — INFUSION (OUTPATIENT)
Dept: ONCOLOGY | Facility: HOSPITAL | Age: 44
End: 2024-08-07
Payer: COMMERCIAL

## 2024-08-07 ENCOUNTER — TELEPHONE (OUTPATIENT)
Dept: RADIATION ONCOLOGY | Facility: HOSPITAL | Age: 44
End: 2024-08-07
Payer: COMMERCIAL

## 2024-08-07 DIAGNOSIS — C20 RECTAL ADENOCARCINOMA: Primary | ICD-10-CM

## 2024-08-07 PROCEDURE — 25010000002 HEPARIN LOCK FLUSH PER 10 UNITS: Performed by: NURSE PRACTITIONER

## 2024-08-07 PROCEDURE — 25010000002 PEGFILGRASTIM 6 MG/0.6ML PREFILLED SYRINGE KIT: Performed by: STUDENT IN AN ORGANIZED HEALTH CARE EDUCATION/TRAINING PROGRAM

## 2024-08-07 PROCEDURE — 96377 APPLICATON ON-BODY INJECTOR: CPT

## 2024-08-07 RX ORDER — SODIUM CHLORIDE 0.9 % (FLUSH) 0.9 %
10 SYRINGE (ML) INJECTION AS NEEDED
Status: DISCONTINUED | OUTPATIENT
Start: 2024-08-07 | End: 2024-08-07 | Stop reason: HOSPADM

## 2024-08-07 RX ORDER — HEPARIN SODIUM (PORCINE) LOCK FLUSH IV SOLN 100 UNIT/ML 100 UNIT/ML
500 SOLUTION INTRAVENOUS AS NEEDED
OUTPATIENT
Start: 2024-08-07

## 2024-08-07 RX ORDER — HEPARIN SODIUM (PORCINE) LOCK FLUSH IV SOLN 100 UNIT/ML 100 UNIT/ML
500 SOLUTION INTRAVENOUS AS NEEDED
Status: DISCONTINUED | OUTPATIENT
Start: 2024-08-07 | End: 2024-08-07 | Stop reason: HOSPADM

## 2024-08-07 RX ORDER — SODIUM CHLORIDE 0.9 % (FLUSH) 0.9 %
10 SYRINGE (ML) INJECTION AS NEEDED
OUTPATIENT
Start: 2024-08-07

## 2024-08-07 RX ADMIN — Medication 10 ML: at 11:42

## 2024-08-07 RX ADMIN — PEGFILGRASTIM 6 MG: KIT SUBCUTANEOUS at 11:45

## 2024-08-07 RX ADMIN — Medication 500 UNITS: at 11:42

## 2024-08-08 ENCOUNTER — OFFICE VISIT (OUTPATIENT)
Dept: RADIATION ONCOLOGY | Facility: HOSPITAL | Age: 44
End: 2024-08-08
Payer: COMMERCIAL

## 2024-08-08 VITALS
DIASTOLIC BLOOD PRESSURE: 92 MMHG | HEART RATE: 82 BPM | OXYGEN SATURATION: 100 % | BODY MASS INDEX: 32.8 KG/M2 | SYSTOLIC BLOOD PRESSURE: 160 MMHG | WEIGHT: 222.2 LBS

## 2024-08-08 DIAGNOSIS — C20 RECTAL ADENOCARCINOMA: Primary | ICD-10-CM

## 2024-08-08 PROCEDURE — G0463 HOSPITAL OUTPT CLINIC VISIT: HCPCS | Performed by: RADIOLOGY

## 2024-08-08 NOTE — PROGRESS NOTES
[facility] Radiation Oncology   Follow Up    Chief Complaint  Rectal cancer and tiredness associated with chemotherapy      Diagnosis: Clinical stage III, T3N2M0 adenocarcinoma of the rectum         Interval History:    Jc Brannon Jr. presents for interval reevaluation after having initially been seen in consultation 5/13/2024.  This is a 44-year-old gentleman with a family history of colon cancer.  He underwent cancer screening due to a very strong family history and was found to have an exophytic lesion of the distal rectum.  He was felt to be a candidate for total neoadjuvant therapy and today has received a total of 5 cycles of FOLFIRINOX.  He has had 25% dose reduction due to blood counts.  He is scheduled for cycle 6 8/19/24.  Mr. Brannon is seen today in expectation of combined chemoradiotherapy following completion of 6 cycles of FOLFIRINOX.  Plans are currently for continuous infusion 5-FU via pump along with daily external beam radiotherapy.      Imaging:  Narrative & Impression   MRI ABDOMEN W WO CONTRAST-     INDICATION: Concern for liver metastasis. Rectal cancer.     COMPARISON: CT abdomen/pelvis May 2, 2024       IMPRESSION:  T2 hyperintense nonenhancing liver lesions are consistent with hepatic  cysts and biliary cystic hamartomas. No convincing liver metastasis  identified     This report was finalized on 5/20/2024 12:32 PM by Dr. Brannon Chappell,      Pathology:      1. Rectum, mass, biopsy:               A. Invasive moderately differentiated adenocarcinoma (see comment).   Electronically signed by Didi Villasenor MD on 4/25/2024 at 1107         Labs:    Lab Results   Component Value Date    CREATININE 1.04 08/05/2024                   Problem List:  Patient Active Problem List   Diagnosis    Essential hypertension    Leukopenia    Elevated LDL cholesterol level    Rectal adenocarcinoma    Encounter for screening for malignant neoplasm of colon          Medications:  Current Outpatient  "Medications on File Prior to Visit   Medication Sig Dispense Refill    gabapentin (Neurontin) 100 MG capsule Take 1 capsule by mouth 3 (Three) Times a Day. 90 capsule 2    hydroCHLOROthiazide 12.5 MG tablet Take 1 tablet by mouth Daily. 30 tablet 5    lidocaine-prilocaine (EMLA) 2.5-2.5 % cream Apply nickel sized [facility] 30 minutes prior to access.  Do not rub in. 30 g 3    loperamide (IMODIUM A-D) 2 MG tablet Take 2 tablets (4 mg) at the onset of diarrhea and 2 mg every 2 hours as needed to a max of 16 mg/day (or until patient has been diarrhea free for 12 hours) 30 tablet 5    OLANZapine (zyPREXA) 5 MG tablet Take one tablet every night for 3 days following chemotherapy 18 tablet 0    ondansetron (ZOFRAN) 8 MG tablet Take 1 tablet by mouth 3 (Three) Times a Day As Needed for Nausea or Vomiting. 30 tablet 5    prochlorperazine (COMPAZINE) 10 MG tablet Take 1 tablet by mouth Every 6 (Six) Hours As Needed for Nausea or Vomiting. 30 tablet 5     Current Facility-Administered Medications on File Prior to Visit   Medication Dose Route Frequency Provider Last Rate Last Admin    [COMPLETED] pegfilgrastim (NEULASTA ONPRO) on-body injector 6 mg  6 mg Subcutaneous Once Sonali Pak MD   6 mg at 08/07/24 1145    [DISCONTINUED] heparin injection 500 Units  500 Units Intravenous PRN Sofy Leonard APRN   500 Units at 08/07/24 1142    [DISCONTINUED] sodium chloride 0.9 % flush 10 mL  10 mL Intravenous PRN Sofy Leonard APRN   10 mL at 08/07/24 1142          Allergies:  Allergies   Allergen Reactions    Latex Rash           Vital Signs:  /92   Pulse 82   Wt 101 kg (222 lb 3.2 oz)   SpO2 100%   BMI 32.80 kg/m²   Estimated body mass index is 32.8 kg/m² as calculated from the following:    Height as of 7/22/24: 175.3 cm (69.02\").    Weight as of this encounter: 101 kg (222 lb 3.2 oz).  Pain Score    08/08/24 0919   PainSc: 0-No pain         ECOG: Fully active, able to carry on all " pre-disease performance without restriction = 0    Physical Exam  Constitutional:       Appearance: Normal appearance.   HENT:      Head: Normocephalic.      Nose: Nose normal.      Mouth/Throat:      Mouth: Mucous membranes are moist.   Eyes:      Extraocular Movements: Extraocular movements intact.      Pupils: Pupils are equal, round, and reactive to light.   Cardiovascular:      Rate and Rhythm: Normal rate and regular rhythm.   Pulmonary:      Effort: Pulmonary effort is normal.      Breath sounds: Normal breath sounds.   Abdominal:      General: Abdomen is flat. Bowel sounds are normal.      Palpations: Abdomen is soft.   Musculoskeletal:         General: Normal range of motion.      Cervical back: Normal range of motion.   Skin:     General: Skin is warm.   Neurological:      General: No focal deficit present.      Mental Status: He is alert and oriented to person, place, and time.   Psychiatric:         Mood and Affect: Mood normal.         Behavior: Behavior normal.         Thought Content: Thought content normal.         Judgment: Judgment normal.      Comments: Mr. Brannon is currently under the care of a psychiatrist.  He underwent recent evaluation and demonstrated some anxiety associated with his cancer diagnosis.  He is being treated accordingly.                 Latest Reference Range & Units Most Recent 08/05/24 08:40   WBC 3.40 - 10.80 10*3/mm3 2.64 (L)  8/5/24 08:40 2.64 (L)   RBC 4.14 - 5.80 10*6/mm3 3.74 (L)  8/5/24 08:40 3.74 (L)   Hemoglobin 13.0 - 17.7 g/dL 12.2 (L)  8/5/24 08:40 12.2 (L)   Hematocrit 37.5 - 51.0 % 36.5 (L)  8/5/24 08:40 36.5 (L)   Platelets 140 - 450 10*3/mm3 216  8/5/24 08:40 216   RDW 12.3 - 15.4 % 13.9  8/5/24 08:40 13.9   MCV 79.0 - 97.0 fL 97.6 (H)  8/5/24 08:40 97.6 (H)   MCH 26.6 - 33.0 pg 32.6  8/5/24 08:40 32.6   MCHC 31.5 - 35.7 g/dL 33.4  8/5/24 08:40 33.4   MPV 6.0 - 12.0 fL 10.0  8/5/24 08:40 10.0   RDW-SD 37.0 - 54.0 fl 49.6  8/5/24 08:40 49.6   (L): Data is  abnormally low  (H): Data is abnormally high       Diagnoses and all orders for this visit:    1. Rectal adenocarcinoma (Primary)        Assessment:    Rectal adenocarcinoma (cT3 cN2 cM0), LUCAS, TMB low   Anemia and neutropenia  Skin rash secondary to chemotherapy    Plan:    1.  Complete neoadjuvant FOLFIRINOX for total 6 cycles  2.  Begin chemoradiotherapy to follow.  Patient has preliminary CT simulation time scheduled for 8/27/2024.  Plans would be to administer a total of 50.4 Gray to the pelvis followed boosting area of gross residual disease to approximately 55 Nieves.    Multiple questions are answered from Mr. Brannon as well as his father today regarding side effects, timing and sequencing of therapy.  Specifically, side effects associated with chemoradiotherapy are described in detail and patient understands and accepts these things.       I spent 42 minutes caring for Jc on this date of service. This time includes time spent by me in the following activities:reviewing tests, obtaining and/or reviewing a separately obtained history, performing a medically appropriate examination and/or evaluation , counseling and educating the patient/family/caregiver, referring and communicating with other health care professionals , documenting information in the medical record, independently interpreting results and communicating that information with the patient/family/caregiver, and care coordination    Follow Up   CT Simulation 8/27/2024    Patient was given instructions and counseling regarding his condition or for health maintenance advice. Please see specific information pulled into the AVS if appropriate.     Kolton Noyola MD

## 2024-08-13 DIAGNOSIS — C20 RECTAL ADENOCARCINOMA: Primary | ICD-10-CM

## 2024-08-15 NOTE — PROGRESS NOTES
Follow-up    SUBJECTIVE:    08/19/2024, Interval history  Here for cycle 6-day 1 neoadjuvant FOLFIRINOX.  Continues to do well.  No fevers no neuropathy no mouth ulcers.  Eating okay.  Energy level good.  Continues to work.  Denies any concerns or complaints.    HISTORY OF PRESENT ILLNESS:  The patient is a 44 y.o. year old male with recently diagnosed rectal adenocarcinoma who presents to our clinic to establish care.  Onc history is outlined below    Family history significant for prostate cancer in father at 63, paternal uncle with colon cancer at 57, other paternal uncle with metastatic cancer, type unknown in his 50s, other paternal uncle with possible colon cancer and DVT/PE in his 70s.    Oncology/Hematology History   Rectal adenocarcinoma   4/23/2024 Procedure         4/23/2024 [facility]         5/2/2024 Imaging    CT CAP    A 1.7 cm right hepatic cyst is noted. Numerous small subcentimeter liver  low densities are present that are too small to characterize, could be  cysts, or potentially metastatic disease, interval follow-up can  characterize change.    No bowel obstruction. The sigmorectal mass is better characterized on  the MRI exam of same date (please see separate report). Some stranding  is apparent in the fat around the lesion, numerous surrounding lymph  nodes are present that could be metastatic lymph nodes. For example, on  axial image 165, a 1.9 x 1.6 cm pericolonic lymph node is present. As  another example, on axial image 170, left perirectal lymph node measures  0.7 x 1.2 cm.    IMPRESSION:        1. Sigmorectal mass with numerous surrounding lymph nodes, raising  suspicion for metastatic lymph nodes.     2. Hepatic cyst, and numerous liver low densities that are too small to  characterize; these lesions could also be cysts, but in this clinical  setting, possibility of any metastatic liver lesions is not excluded.  Interval follow-up is advised to characterize change.     3. No  pulmonary mass.     5/2/2024 Imaging    MRI Pelvis    FINDINGS: There is a circumferential solid low rectal mass which  measures approximately 4.5 cm in craniocaudad span and the inferior  margin is approximately 5 mm proximal to the anorectal junction.     The tumor extends approximately 8 mm into the mesorectal fat  predominantly along the right wall of the mass. There appears to be  extension to the mesorectal fascia along the right wall and abutment of  the right levator ani muscles. There are several enlarged perirectal  nodes and the largest is presacral measuring approximately 2.0 x 1.2 cm.  There are at least 4 enhancing suspicious nodes. There is no definite  extramural vascular invasion.     IMPRESSION:  1. Primary Tumor Location: Low rectal     2. MRI Stage: T3 N2 MRF positive     3. No definite sphincter involvement     5/2/2024 Cancer Staged    Staging form: Colon And Rectum, AJCC 8th Edition  - Clinical stage from 5/2/2024: cT3, cN2, cM0 - Signed by Sonali Pak MD on 6/3/2024     5/3/2024 Initial Diagnosis    Rectal adenocarcinoma     5/16/2024 Imaging    MRI abdo    IMPRESSION:  T2 hyperintense nonenhancing liver lesions are consistent with hepatic  cysts and biliary cystic hamartomas. No convincing liver metastasis  identified     5/24/2024 Procedure    PORT placement (Dr Isaac)     6/3/2024 -  Chemotherapy    OP COLORECTAL FOLFIRINOX (Fluorouracil cont. Infusion / Leucovorin / OXALIplatin / Irinotecan)     6/5/2024 -  Chemotherapy    OP CENTRAL VENOUS ACCESS DEVICE Access, Care, and Maintenance (CVAD)     9/2/2024 -  Chemotherapy    OP COLORECTAL Fluorouracil CIV over 168H + XRT         The current medication list and allergy list were reviewed and reconciled.      Past Medical History, Past Surgical History, Social History, Family History have been reviewed and are without significant changes except as mentioned.      REVIEW OF SYSTEMS:  See interval history    Objective:       Vitals:     08/19/24 0900   BP: 168/98   Pulse: 86   Temp: 97.9 °F (36.6 °C)   TempSrc: Oral   SpO2: 98%   Weight: 100 kg (220 lb 12.8 oz)   PainSc: 0-No pain                       8/19/2024     9:00 AM   Current Status   ECOG score 0      PHYSICAL EXAM:    CONSTITUTIONAL:  Vital signs reviewed.  No distress, looks comfortable.  RESPIRATORY: Bilateral lungs clear to auscultation.  No wheezing or rhonchi   CARDIOVASCULAR: Normal S1-S2.  No murmur rubs or gallop GASTROINTESTINAL: Soft, nontender, bowel sounds present  NEURO:  No focal deficits.  Appears to have equal strength all 4 extremities.  SKIN: Rash over bilateral upper extremities, lower extremities and abdominal continues to improve    I have reexamined the patient and the results are consistent with the previously documented exam. Sonali Pak MD        RECENT LABS:        WBC   Date Value Ref Range Status   08/19/2024 3.06 (L) 3.40 - 10.80 10*3/mm3 Final   08/05/2024 2.64 (L) 3.40 - 10.80 10*3/mm3 Final   07/22/2024 2.79 (L) 3.40 - 10.80 10*3/mm3 Final   07/08/2024 3.68 3.40 - 10.80 10*3/mm3 Final   07/01/2024 2.57 (L) 3.40 - 10.80 10*3/mm3 Final   06/17/2024 3.66 3.40 - 10.80 10*3/mm3 Final   06/12/2024 9.90 3.40 - 10.80 10*3/mm3 Final   06/03/2024 2.75 (L) 3.40 - 10.80 10*3/mm3 Final   05/20/2024 3.22 (L) 3.40 - 10.80 10*3/mm3 Final   06/09/2023 3.0 (L) 3.4 - 10.8 x10E3/uL Final   06/04/2022 3.0 (L) 3.4 - 10.8 x10E3/uL Final   05/25/2021 3.11 (L) 3.40 - 10.80 10*3/mm3 Final   05/26/2020 2.91 (L) 3.40 - 10.80 10*3/mm3 Final   02/12/2020 4.5 3.4 - 10.8 x10E3/uL Final   07/27/2019 2.6 (L) 3.4 - 10.8 x10E3/uL Final     Comment:     **Verified by repeat analysis**     Hemoglobin   Date Value Ref Range Status   08/19/2024 12.1 (L) 13.0 - 17.7 g/dL Final   08/05/2024 12.2 (L) 13.0 - 17.7 g/dL Final   07/22/2024 12.2 (L) 13.0 - 17.7 g/dL Final   07/08/2024 12.3 (L) 13.0 - 17.7 g/dL Final   07/01/2024 11.8 (L) 13.0 - 17.7 g/dL Final   06/17/2024 12.4 (L) 13.0 - 17.7 g/dL  Final   06/12/2024 12.3 (L) 13.0 - 17.7 g/dL Final   06/03/2024 12.9 (L) 13.0 - 17.7 g/dL Final   05/20/2024 13.4 13.0 - 17.7 g/dL Final   06/09/2023 14.3 13.0 - 17.7 g/dL Final   06/04/2022 14.1 13.0 - 17.7 g/dL Final   05/25/2021 14.0 13.0 - 17.7 g/dL Final   05/26/2020 13.7 13.0 - 17.7 g/dL Final   02/12/2020 13.5 13.0 - 17.7 g/dL Final   07/27/2019 13.8 13.0 - 17.7 g/dL Final     Platelets   Date Value Ref Range Status   08/19/2024 209 140 - 450 10*3/mm3 Final   08/05/2024 216 140 - 450 10*3/mm3 Final   07/22/2024 174 140 - 450 10*3/mm3 Final   07/08/2024 238 140 - 450 10*3/mm3 Final   07/01/2024 250 140 - 450 10*3/mm3 Final   06/17/2024 195 140 - 450 10*3/mm3 Final   06/12/2024 220 140 - 450 10*3/mm3 Final   06/03/2024 190 140 - 450 10*3/mm3 Final   05/20/2024 218 140 - 450 10*3/mm3 Final   06/09/2023 207 150 - 450 x10E3/uL Final   06/04/2022 230 150 - 450 x10E3/uL Final   05/25/2021 213 140 - 450 10*3/mm3 Final   05/26/2020 223 140 - 450 10*3/mm3 Final   02/12/2020 245 150 - 450 x10E3/uL Final   07/27/2019 249 150 - 450 x10E3/uL Final         Assessment & Plan     *Rectal adenocarcinoma (cT3 cN2 cM0), LUCAS, TMB low  4/23/2024 rectal biopsy-invasive moderately differentiated adenocarcinoma, LUCAS.  5/2/2024 MRI pelvis: Approximately 4.5 cm circumferential low rectal mass, approximately 5 mm proximal to anorectal junction, extending 8 mm in the mesorectal fat.  Appears to be extension to MRF along the right wall and abutment of right levator ani muscles.  Several enlarged perirectal nodes, largest 2 x 1.2 cm.  At least 4 enhancing suspicious nodes.  cT3 cN2 MRF positive.  No definitive sphincter involvement  5/2/2024 CT chest abdomen pelvis: 1.7 cm right hepatic cyst, and numerous low-density liver lesions-cyst versus possible metastatic lesions.  No lung mass  5/16/2024 MRI abdomen: T2 hyperintense nonenhancing liver lesions consistent with liver cyst and biliary cystic hamartomas.  No evidence of liver  mets  Tumor genomic profile-APC ([id number]), APC ([id number]), FANCD2 (Q823), TP53. TMB 1 mut/Mb. LUCAS  6/3/2024 baseline CEA: 1.89  6/3/2024: Cycle 1 day 1 FOLFIRINOX (5-FU bolus eliminated to prevent further myelosuppression given baseline neutropenia; 25% dose reduction starting cycle 3) with G-CSF support  6/17/2024: cycle 2-day 1 FOLFIRINOX with G-CSF support  7/1/2024: Cycle 3-day 1 FOLFIRINOX was delayed by 1 week due to neutropenia.  ANC  7/8/2024:Cycle 3-day 1 FOLFIRINOX, with 25% dose reduction.  It was deferred from last week due to neutropenia.  07/22/2024: Cycle 4 FOLFIRINOX with contiued 25% dose reduction.  Receiving Neulasta.    8/5/2024 for cycle 5-day 1 FOLFIRINOX with 25% dose reduction, with G-CSF support.   8/19/2024 cycle 6-day 1 FOLFIRINOX with 25% dose reduction and G-CSF support.  Labs and exam okay for treatment today.  He is planned for simulation on 27 August 2024.  Response assessment scans scheduled for 27th and 28 August 2024.  Plan for concurrent chemoradiation with 5-FU    *History of elevated alk phos  6/12/2024: Isolated elevation in alk phos at 123.  Resolved since 6/17/2024  No further interventions needed    *Benign ethnic neutropenia  White cell count ranged between 2.6-3.2 since at least July 2019.    8/19/2024: WBC 3.06, ANC 1.27    *Anemia, mild  8/19/2024    *Skin rash, bilateral upper extremities, lower extremities and abdomen  Patient reports improvement with above for application  Likely secondary to chemotherapy    Plan:   Proceed with cycle 6 FOLFIRINOX, continue previous 25% dose reduction.  Response assessment scans-CT chest abdomen pelvis scheduled for 8/28/2024; and MRI pelvis scheduled for 8/27/2024  Return to clinic on August 30 to review scan results  Simulation planned for 27 August 2024 by radiation oncology  Concurrent chemoradiation to be scheduled once assessment scans have been obtained      Patient is on chemotherapy, which requires frequent monitoring

## 2024-08-16 ENCOUNTER — PATIENT OUTREACH (OUTPATIENT)
Dept: OTHER | Facility: HOSPITAL | Age: 44
End: 2024-08-16
Payer: COMMERCIAL

## 2024-08-16 ENCOUNTER — OFFICE VISIT (OUTPATIENT)
Dept: PSYCHIATRY | Facility: HOSPITAL | Age: 44
End: 2024-08-16
Payer: COMMERCIAL

## 2024-08-16 DIAGNOSIS — F43.23 ADJUSTMENT DISORDER WITH MIXED ANXIETY AND DEPRESSED MOOD: Primary | ICD-10-CM

## 2024-08-16 PROCEDURE — 99213 OFFICE O/P EST LOW 20 MIN: CPT | Performed by: NURSE PRACTITIONER

## 2024-08-16 NOTE — PROGRESS NOTES
Call from patient's father. He asked about appt with Jennie today.  I do not see one scheduled with her. I reviewed her most recent note. No f/u was scheduled at that time.  The patient's father states they are outside the [facility] now; they will go in and get a f/u appt scheduled with Jennie.

## 2024-08-19 ENCOUNTER — INFUSION (OUTPATIENT)
Dept: ONCOLOGY | Facility: HOSPITAL | Age: 44
End: 2024-08-19
Payer: COMMERCIAL

## 2024-08-19 ENCOUNTER — OFFICE VISIT (OUTPATIENT)
Dept: ONCOLOGY | Facility: CLINIC | Age: 44
End: 2024-08-19
Payer: COMMERCIAL

## 2024-08-19 VITALS
HEART RATE: 86 BPM | OXYGEN SATURATION: 98 % | BODY MASS INDEX: 32.59 KG/M2 | SYSTOLIC BLOOD PRESSURE: 168 MMHG | TEMPERATURE: 97.9 F | WEIGHT: 220.8 LBS | DIASTOLIC BLOOD PRESSURE: 98 MMHG

## 2024-08-19 DIAGNOSIS — D70.8 OTHER NEUTROPENIA: ICD-10-CM

## 2024-08-19 DIAGNOSIS — C20 RECTAL ADENOCARCINOMA: Primary | ICD-10-CM

## 2024-08-19 DIAGNOSIS — Z01.818 EXAMINATION PRIOR TO CHEMOTHERAPY: ICD-10-CM

## 2024-08-19 DIAGNOSIS — C20 RECTAL ADENOCARCINOMA: ICD-10-CM

## 2024-08-19 LAB
ALBUMIN SERPL-MCNC: 4.3 G/DL (ref 3.5–5.2)
ALBUMIN/GLOB SERPL: 1.8 G/DL
ALP SERPL-CCNC: 111 U/L (ref 39–117)
ALT SERPL W P-5'-P-CCNC: 20 U/L (ref 1–41)
ANION GAP SERPL CALCULATED.3IONS-SCNC: 8.8 MMOL/L (ref 5–15)
AST SERPL-CCNC: 23 U/L (ref 1–40)
BASOPHILS # BLD AUTO: 0.02 10*3/MM3 (ref 0–0.2)
BASOPHILS NFR BLD AUTO: 0.7 % (ref 0–1.5)
BILIRUB SERPL-MCNC: 0.2 MG/DL (ref 0–1.2)
BUN SERPL-MCNC: 13 MG/DL (ref 6–20)
BUN/CREAT SERPL: 13.8 (ref 7–25)
CALCIUM SPEC-SCNC: 9 MG/DL (ref 8.6–10.5)
CHLORIDE SERPL-SCNC: 102 MMOL/L (ref 98–107)
CO2 SERPL-SCNC: 28.2 MMOL/L (ref 22–29)
CREAT SERPL-MCNC: 0.94 MG/DL (ref 0.76–1.27)
DEPRECATED RDW RBC AUTO: 50.4 FL (ref 37–54)
EGFRCR SERPLBLD CKD-EPI 2021: 101.2 ML/MIN/1.73
EOSINOPHIL # BLD AUTO: 0.1 10*3/MM3 (ref 0–0.4)
EOSINOPHIL NFR BLD AUTO: 3.3 % (ref 0.3–6.2)
ERYTHROCYTE [DISTWIDTH] IN BLOOD BY AUTOMATED COUNT: 13.7 % (ref 12.3–15.4)
GLOBULIN UR ELPH-MCNC: 2.4 GM/DL
GLUCOSE SERPL-MCNC: 133 MG/DL (ref 65–99)
HCT VFR BLD AUTO: 36.1 % (ref 37.5–51)
HGB BLD-MCNC: 12.1 G/DL (ref 13–17.7)
IMM GRANULOCYTES # BLD AUTO: 0.02 10*3/MM3 (ref 0–0.05)
IMM GRANULOCYTES NFR BLD AUTO: 0.7 % (ref 0–0.5)
LYMPHOCYTES # BLD AUTO: 1.13 10*3/MM3 (ref 0.7–3.1)
LYMPHOCYTES NFR BLD AUTO: 36.9 % (ref 19.6–45.3)
MCH RBC QN AUTO: 33.1 PG (ref 26.6–33)
MCHC RBC AUTO-ENTMCNC: 33.5 G/DL (ref 31.5–35.7)
MCV RBC AUTO: 98.6 FL (ref 79–97)
MONOCYTES # BLD AUTO: 0.52 10*3/MM3 (ref 0.1–0.9)
MONOCYTES NFR BLD AUTO: 17 % (ref 5–12)
NEUTROPHILS NFR BLD AUTO: 1.27 10*3/MM3 (ref 1.7–7)
NEUTROPHILS NFR BLD AUTO: 41.4 % (ref 42.7–76)
NRBC BLD AUTO-RTO: 0 /100 WBC (ref 0–0.2)
PLATELET # BLD AUTO: 209 10*3/MM3 (ref 140–450)
PMV BLD AUTO: 9.8 FL (ref 6–12)
POTASSIUM SERPL-SCNC: 3.8 MMOL/L (ref 3.5–5.2)
PROT SERPL-MCNC: 6.7 G/DL (ref 6–8.5)
RBC # BLD AUTO: 3.66 10*6/MM3 (ref 4.14–5.8)
SODIUM SERPL-SCNC: 139 MMOL/L (ref 136–145)
WBC NRBC COR # BLD AUTO: 3.06 10*3/MM3 (ref 3.4–10.8)

## 2024-08-19 PROCEDURE — 80053 COMPREHEN METABOLIC PANEL: CPT

## 2024-08-19 PROCEDURE — 25010000002 FOSAPREPITANT PER 1 MG: Performed by: STUDENT IN AN ORGANIZED HEALTH CARE EDUCATION/TRAINING PROGRAM

## 2024-08-19 PROCEDURE — 96417 CHEMO IV INFUS EACH ADDL SEQ: CPT

## 2024-08-19 PROCEDURE — 25010000002 DEXAMETHASONE SODIUM PHOSPHATE 100 MG/10ML SOLUTION: Performed by: STUDENT IN AN ORGANIZED HEALTH CARE EDUCATION/TRAINING PROGRAM

## 2024-08-19 PROCEDURE — 96368 THER/DIAG CONCURRENT INF: CPT

## 2024-08-19 PROCEDURE — 0 DEXTROSE 5 % SOLUTION 500 ML FLEX CONT: Performed by: STUDENT IN AN ORGANIZED HEALTH CARE EDUCATION/TRAINING PROGRAM

## 2024-08-19 PROCEDURE — 96413 CHEMO IV INFUSION 1 HR: CPT

## 2024-08-19 PROCEDURE — 25010000002 LEUCOVORIN CALCIUM PER 50 MG: Performed by: STUDENT IN AN ORGANIZED HEALTH CARE EDUCATION/TRAINING PROGRAM

## 2024-08-19 PROCEDURE — 96375 TX/PRO/DX INJ NEW DRUG ADDON: CPT

## 2024-08-19 PROCEDURE — 25810000003 SODIUM CHLORIDE 0.9 % SOLUTION 250 ML FLEX CONT: Performed by: STUDENT IN AN ORGANIZED HEALTH CARE EDUCATION/TRAINING PROGRAM

## 2024-08-19 PROCEDURE — 96367 TX/PROPH/DG ADDL SEQ IV INF: CPT

## 2024-08-19 PROCEDURE — 25010000002 OXALIPLATIN PER 0.5 MG: Performed by: STUDENT IN AN ORGANIZED HEALTH CARE EDUCATION/TRAINING PROGRAM

## 2024-08-19 PROCEDURE — 0 DEXTROSE 5 % SOLUTION: Performed by: STUDENT IN AN ORGANIZED HEALTH CARE EDUCATION/TRAINING PROGRAM

## 2024-08-19 PROCEDURE — G0498 CHEMO EXTEND IV INFUS W/PUMP: HCPCS

## 2024-08-19 PROCEDURE — 25010000002 FLUOROURACIL PER 500 MG: Performed by: STUDENT IN AN ORGANIZED HEALTH CARE EDUCATION/TRAINING PROGRAM

## 2024-08-19 PROCEDURE — 25010000002 ATROPINE SULFATE 0.4 MG/ML SOLUTION: Performed by: STUDENT IN AN ORGANIZED HEALTH CARE EDUCATION/TRAINING PROGRAM

## 2024-08-19 PROCEDURE — 85025 COMPLETE CBC W/AUTO DIFF WBC: CPT

## 2024-08-19 PROCEDURE — 0 DEXTROSE 5 % SOLUTION 250 ML FLEX CONT: Performed by: STUDENT IN AN ORGANIZED HEALTH CARE EDUCATION/TRAINING PROGRAM

## 2024-08-19 PROCEDURE — 96415 CHEMO IV INFUSION ADDL HR: CPT

## 2024-08-19 PROCEDURE — 25010000002 PALONOSETRON PER 25 MCG: Performed by: STUDENT IN AN ORGANIZED HEALTH CARE EDUCATION/TRAINING PROGRAM

## 2024-08-19 PROCEDURE — 25010000002 IRINOTECAN PER 20 MG: Performed by: STUDENT IN AN ORGANIZED HEALTH CARE EDUCATION/TRAINING PROGRAM

## 2024-08-19 PROCEDURE — 99214 OFFICE O/P EST MOD 30 MIN: CPT | Performed by: STUDENT IN AN ORGANIZED HEALTH CARE EDUCATION/TRAINING PROGRAM

## 2024-08-19 RX ORDER — DIPHENHYDRAMINE HYDROCHLORIDE 50 MG/ML
50 INJECTION INTRAMUSCULAR; INTRAVENOUS AS NEEDED
Status: CANCELLED | OUTPATIENT
Start: 2024-08-19

## 2024-08-19 RX ORDER — ATROPINE SULFATE 0.4 MG/ML
0.25 INJECTION, SOLUTION INTRAMUSCULAR; INTRAVENOUS; SUBCUTANEOUS
Status: DISCONTINUED | OUTPATIENT
Start: 2024-08-19 | End: 2024-08-19 | Stop reason: HOSPADM

## 2024-08-19 RX ORDER — FAMOTIDINE 10 MG/ML
20 INJECTION, SOLUTION INTRAVENOUS AS NEEDED
Status: CANCELLED | OUTPATIENT
Start: 2024-08-19

## 2024-08-19 RX ORDER — PALONOSETRON 0.05 MG/ML
0.25 INJECTION, SOLUTION INTRAVENOUS ONCE
Status: COMPLETED | OUTPATIENT
Start: 2024-08-19 | End: 2024-08-19

## 2024-08-19 RX ORDER — DEXTROSE MONOHYDRATE 50 MG/ML
20 INJECTION, SOLUTION INTRAVENOUS ONCE
Status: COMPLETED | OUTPATIENT
Start: 2024-08-19 | End: 2024-08-19

## 2024-08-19 RX ORDER — ATROPINE SULFATE 1 MG/ML
0.25 INJECTION, SOLUTION INTRAMUSCULAR; INTRAVENOUS; SUBCUTANEOUS
Status: CANCELLED | OUTPATIENT
Start: 2024-08-19

## 2024-08-19 RX ADMIN — ATROPINE SULFATE 0.25 MG: 0.4 INJECTION, SOLUTION INTRAVENOUS at 12:33

## 2024-08-19 RX ADMIN — DEXAMETHASONE SODIUM PHOSPHATE 12 MG: 10 INJECTION, SOLUTION INTRAMUSCULAR; INTRAVENOUS at 10:16

## 2024-08-19 RX ADMIN — DEXTROSE MONOHYDRATE 265 MG: 5 INJECTION, SOLUTION INTRAVENOUS at 12:34

## 2024-08-19 RX ADMIN — PALONOSETRON HYDROCHLORIDE 0.25 MG: 0.25 INJECTION INTRAVENOUS at 10:08

## 2024-08-19 RX ADMIN — OXALIPLATIN 135 MG: 5 INJECTION, SOLUTION INTRAVENOUS at 10:36

## 2024-08-19 RX ADMIN — FOSAPREPITANT 100 ML: 150 INJECTION, POWDER, LYOPHILIZED, FOR SOLUTION INTRAVENOUS at 09:44

## 2024-08-19 RX ADMIN — LEUCOVORIN CALCIUM 860 MG: 350 INJECTION, POWDER, LYOPHILIZED, FOR SUSPENSION INTRAMUSCULAR; INTRAVENOUS at 12:09

## 2024-08-19 RX ADMIN — DEXTROSE MONOHYDRATE 20 ML/HR: 50 INJECTION, SOLUTION INTRAVENOUS at 09:44

## 2024-08-19 RX ADMIN — FLUOROURACIL 3850 MG: 50 INJECTION, SOLUTION INTRAVENOUS at 14:09

## 2024-08-19 NOTE — PROGRESS NOTES
[facility]  In Person Session    Subjective  Patient ID: Jc Brannon Jr. is a 44 y.o. male is seen face to face in the [facility].    CC: Anxiety associated with health    HPI/ Interval History:   Patient is seen alongside father for follow-up visit regarding manageable anxiety associated with health, continued treatment for colon cancer.  Patient continues to report appropriate tolerability of treatment, denying unmanaged depression, anxiety, GI symptoms, sleep, energy, or other concerns.  Continues to work full-time.  Appreciates ability to maintain necessary skills of independent living including paying bills, etc.  Patient continues to speak fast at times, specifically noting today's frustration with having an appointment written down that was not in the schedule.  Despite today's increase in anxiety, patient and father state this is not regularly occurring and denies need for treatment.  Continues to have gabapentin available, recognizing this is a recommended strategy for managing anxiety as needed, but has not felt this to be necessary.  Denies additional concerns at this time.    Exam: As above    Recent Labs Reviewed:  Infusion on 08/05/2024   Component Date Value    Prealbumin 08/05/2024 26.6     Glucose 08/05/2024 118 (H)     BUN 08/05/2024 13     Creatinine 08/05/2024 1.04     Sodium 08/05/2024 143     Potassium 08/05/2024 4.0     Chloride 08/05/2024 103     CO2 08/05/2024 28.0     Calcium 08/05/2024 8.6     Total Protein 08/05/2024 6.5     Albumin 08/05/2024 4.3     ALT (SGPT) 08/05/2024 29     AST (SGOT) 08/05/2024 28     Alkaline Phosphatase 08/05/2024 117     Total Bilirubin 08/05/2024 0.3     Globulin 08/05/2024 2.2     A/G Ratio 08/05/2024 2.0     BUN/Creatinine Ratio 08/05/2024 12.5     Anion Gap 08/05/2024 12.0     eGFR 08/05/2024 89.7     WBC 08/05/2024 2.64 (L)     RBC 08/05/2024 3.74 (L)     Hemoglobin 08/05/2024 12.2 (L)     Hematocrit  08/05/2024 36.5 (L)     MCV 08/05/2024 97.6 (H)     MCH 08/05/2024 32.6     MCHC 08/05/2024 33.4     RDW 08/05/2024 13.9     RDW-SD 08/05/2024 49.6     MPV 08/05/2024 10.0     Platelets 08/05/2024 216     Neutrophil % 08/05/2024 43.5     Lymphocyte % 08/05/2024 34.5     Monocyte % 08/05/2024 17.8 (H)     Eosinophil % 08/05/2024 2.7     Basophil % 08/05/2024 0.4     Immature Grans % 08/05/2024 1.1 (H)     Neutrophils, Absolute 08/05/2024 1.15 (L)     Lymphocytes, Absolute 08/05/2024 0.91     Monocytes, Absolute 08/05/2024 0.47     Eosinophils, Absolute 08/05/2024 0.07     Basophils, Absolute 08/05/2024 0.01     Immature Grans, Absolute 08/05/2024 0.03     nRBC 08/05/2024 0.0    Infusion on 07/22/2024   Component Date Value    Glucose 07/22/2024 119 (H)     BUN 07/22/2024 16     Creatinine 07/22/2024 1.00     Sodium 07/22/2024 140     Potassium 07/22/2024 3.9     Chloride 07/22/2024 102     CO2 07/22/2024 28.9     Calcium 07/22/2024 9.0     Total Protein 07/22/2024 6.5     Albumin 07/22/2024 4.4     ALT (SGPT) 07/22/2024 25     AST (SGOT) 07/22/2024 26     Alkaline Phosphatase 07/22/2024 119 (H)     Total Bilirubin 07/22/2024 0.2     Globulin 07/22/2024 2.1     A/G Ratio 07/22/2024 2.1     BUN/Creatinine Ratio 07/22/2024 16.0     Anion Gap 07/22/2024 9.1     eGFR 07/22/2024 94.0     WBC 07/22/2024 2.79 (L)     RBC 07/22/2024 3.80 (L)     Hemoglobin 07/22/2024 12.2 (L)     Hematocrit 07/22/2024 36.8 (L)     MCV 07/22/2024 96.8     MCH 07/22/2024 32.1     MCHC 07/22/2024 33.2     RDW 07/22/2024 13.6     RDW-SD 07/22/2024 48.7     MPV 07/22/2024 10.3     Platelets 07/22/2024 174     Neutrophil % 07/22/2024 43.4     Lymphocyte % 07/22/2024 33.7     Monocyte % 07/22/2024 16.1 (H)     Eosinophil % 07/22/2024 5.7     Basophil % 07/22/2024 0.4     Immature Grans % 07/22/2024 0.7 (H)     Neutrophils, Absolute 07/22/2024 1.21 (L)     Lymphocytes, Absolute 07/22/2024 0.94     Monocytes, Absolute 07/22/2024 0.45     Eosinophils,  Absolute 07/22/2024 0.16     Basophils, Absolute 07/22/2024 0.01     Immature Grans, Absolute 07/22/2024 0.02     nRBC 07/22/2024 0.0    Infusion on 07/08/2024   Component Date Value    SIGNATERA TEST RESULT 07/08/2024 NEGATIVE     SIGNATERA MTM READOUT 07/08/2024 0     Glucose 07/08/2024 111 (H)     BUN 07/08/2024 15     Creatinine 07/08/2024 0.97     Sodium 07/08/2024 140     Potassium 07/08/2024 3.8     Chloride 07/08/2024 102     CO2 07/08/2024 28.5     Calcium 07/08/2024 9.0     Total Protein 07/08/2024 6.9     Albumin 07/08/2024 4.3     ALT (SGPT) 07/08/2024 20     AST (SGOT) 07/08/2024 25     Alkaline Phosphatase 07/08/2024 90     Total Bilirubin 07/08/2024 0.2     Globulin 07/08/2024 2.6     A/G Ratio 07/08/2024 1.7     BUN/Creatinine Ratio 07/08/2024 15.5     Anion Gap 07/08/2024 9.5     eGFR 07/08/2024 97.5     WBC 07/08/2024 3.68     RBC 07/08/2024 3.78 (L)     Hemoglobin 07/08/2024 12.3 (L)     Hematocrit 07/08/2024 36.5 (L)     MCV 07/08/2024 96.6     MCH 07/08/2024 32.5     MCHC 07/08/2024 33.7     RDW 07/08/2024 13.7     RDW-SD 07/08/2024 49.3     MPV 07/08/2024 10.0     Platelets 07/08/2024 238     Neutrophil % 07/08/2024 50.3     Lymphocyte % 07/08/2024 32.6     Monocyte % 07/08/2024 12.0     Eosinophil % 07/08/2024 4.3     Basophil % 07/08/2024 0.5     Immature Grans % 07/08/2024 0.3     Neutrophils, Absolute 07/08/2024 1.85     Lymphocytes, Absolute 07/08/2024 1.20     Monocytes, Absolute 07/08/2024 0.44     Eosinophils, Absolute 07/08/2024 0.16     Basophils, Absolute 07/08/2024 0.02     Immature Grans, Absolute 07/08/2024 0.01     nRBC 07/08/2024 0.0    Infusion on 07/01/2024   Component Date Value    Glucose 07/01/2024 109 (H)     BUN 07/01/2024 10     Creatinine 07/01/2024 0.85     Sodium 07/01/2024 140     Potassium 07/01/2024 3.7     Chloride 07/01/2024 104     CO2 07/01/2024 28.5     Calcium 07/01/2024 8.9     Total Protein 07/01/2024 6.6     Albumin 07/01/2024 4.4     ALT (SGPT) 07/01/2024  13     AST (SGOT) 07/01/2024 21     Alkaline Phosphatase 07/01/2024 87     Total Bilirubin 07/01/2024 0.3     Globulin 07/01/2024 2.2     A/G Ratio 07/01/2024 2.0     BUN/Creatinine Ratio 07/01/2024 11.8     Anion Gap 07/01/2024 7.5     eGFR 07/01/2024 108.5     WBC 07/01/2024 2.57 (L)     RBC 07/01/2024 3.68 (L)     Hemoglobin 07/01/2024 11.8 (L)     Hematocrit 07/01/2024 35.1 (L)     MCV 07/01/2024 95.4     MCH 07/01/2024 32.1     MCHC 07/01/2024 33.6     RDW 07/01/2024 14.0     RDW-SD 07/01/2024 48.6     MPV 07/01/2024 9.2     Platelets 07/01/2024 250     Neutrophil % 07/01/2024 36.9 (L)     Lymphocyte % 07/01/2024 38.5     Monocyte % 07/01/2024 19.5 (H)     Eosinophil % 07/01/2024 4.7     Basophil % 07/01/2024 0.4     Immature Grans % 07/01/2024 0.0     Neutrophils, Absolute 07/01/2024 0.95 (L)     Lymphocytes, Absolute 07/01/2024 0.99     Monocytes, Absolute 07/01/2024 0.50     Eosinophils, Absolute 07/01/2024 0.12     Basophils, Absolute 07/01/2024 0.01     Immature Grans, Absolute 07/01/2024 0.00     nRBC 07/01/2024 0.0    Infusion on 06/17/2024   Component Date Value    Glucose 06/17/2024 133 (H)     BUN 06/17/2024 12     Creatinine 06/17/2024 0.99     Sodium 06/17/2024 140     Potassium 06/17/2024 3.6     Chloride 06/17/2024 103     CO2 06/17/2024 25.8     Calcium 06/17/2024 8.8     Total Protein 06/17/2024 6.8     Albumin 06/17/2024 4.2     ALT (SGPT) 06/17/2024 14     AST (SGOT) 06/17/2024 19     Alkaline Phosphatase 06/17/2024 107     Total Bilirubin 06/17/2024 0.3     Globulin 06/17/2024 2.6     A/G Ratio 06/17/2024 1.6     BUN/Creatinine Ratio 06/17/2024 12.1     Anion Gap 06/17/2024 11.2     eGFR 06/17/2024 95.7     WBC 06/17/2024 3.66     RBC 06/17/2024 3.90 (L)     Hemoglobin 06/17/2024 12.4 (L)     Hematocrit 06/17/2024 37.6     MCV 06/17/2024 96.4     MCH 06/17/2024 31.8     MCHC 06/17/2024 33.0     RDW 06/17/2024 13.4     RDW-SD 06/17/2024 46.5     MPV 06/17/2024 10.1     Platelets 06/17/2024 195      Neutrophil % 06/17/2024 55.4     Lymphocyte % 06/17/2024 30.9     Monocyte % 06/17/2024 9.0     Eosinophil % 06/17/2024 3.0     Basophil % 06/17/2024 0.3     Immature Grans % 06/17/2024 1.4 (H)     Neutrophils, Absolute 06/17/2024 2.03     Lymphocytes, Absolute 06/17/2024 1.13     Monocytes, Absolute 06/17/2024 0.33     Eosinophils, Absolute 06/17/2024 0.11     Basophils, Absolute 06/17/2024 0.01     Immature Grans, Absolute 06/17/2024 0.05     nRBC 06/17/2024 0.0    Labs reviewed    Current Psychotropic Medications:  Gabapentin - prescribed but not taking    Plan of Care/ Medical Decision Making  Continue off psychotropic medications as preferred by patient and father.  Education continued regarding potential benefits of gabapentin as well as risks.  At this time, we will schedule ongoing follow-up in clinic, as felt to be helpful by patient and father to manage anxiety regarding health.  Follow-up scheduled in approximately 6 weeks.    Diagnoses and all orders for this visit:    1. Adjustment disorder with mixed anxiety and depressed mood (Primary)        I spent 25 minutes caring for Jc on this date of service. This time includes time spent by me in the following activities: preparing for the visit, reviewing tests, obtaining and/or reviewing a separately obtained history, performing a medically appropriate examination and/or evaluation, counseling and educating the patient/family/caregiver, ordering medications, tests, or procedures, and documenting information in the medical record.

## 2024-08-21 ENCOUNTER — INFUSION (OUTPATIENT)
Dept: ONCOLOGY | Facility: HOSPITAL | Age: 44
End: 2024-08-21
Payer: COMMERCIAL

## 2024-08-21 DIAGNOSIS — C20 RECTAL ADENOCARCINOMA: Primary | ICD-10-CM

## 2024-08-21 PROCEDURE — 25010000002 PEGFILGRASTIM 6 MG/0.6ML PREFILLED SYRINGE KIT: Performed by: STUDENT IN AN ORGANIZED HEALTH CARE EDUCATION/TRAINING PROGRAM

## 2024-08-21 PROCEDURE — 25010000002 HEPARIN LOCK FLUSH PER 10 UNITS: Performed by: NURSE PRACTITIONER

## 2024-08-21 PROCEDURE — 96377 APPLICATON ON-BODY INJECTOR: CPT

## 2024-08-21 RX ORDER — HEPARIN SODIUM (PORCINE) LOCK FLUSH IV SOLN 100 UNIT/ML 100 UNIT/ML
500 SOLUTION INTRAVENOUS AS NEEDED
Status: DISCONTINUED | OUTPATIENT
Start: 2024-08-21 | End: 2024-08-21 | Stop reason: HOSPADM

## 2024-08-21 RX ORDER — SODIUM CHLORIDE 0.9 % (FLUSH) 0.9 %
10 SYRINGE (ML) INJECTION AS NEEDED
OUTPATIENT
Start: 2024-08-21

## 2024-08-21 RX ORDER — HEPARIN SODIUM (PORCINE) LOCK FLUSH IV SOLN 100 UNIT/ML 100 UNIT/ML
500 SOLUTION INTRAVENOUS AS NEEDED
OUTPATIENT
Start: 2024-08-21

## 2024-08-21 RX ORDER — SODIUM CHLORIDE 0.9 % (FLUSH) 0.9 %
10 SYRINGE (ML) INJECTION AS NEEDED
Status: DISCONTINUED | OUTPATIENT
Start: 2024-08-21 | End: 2024-08-21 | Stop reason: HOSPADM

## 2024-08-21 RX ADMIN — PEGFILGRASTIM 6 MG: KIT SUBCUTANEOUS at 11:31

## 2024-08-21 RX ADMIN — Medication 500 UNITS: at 11:31

## 2024-08-21 RX ADMIN — Medication 10 ML: at 11:32

## 2024-08-27 ENCOUNTER — HOSPITAL ENCOUNTER (OUTPATIENT)
Dept: RADIATION ONCOLOGY | Facility: HOSPITAL | Age: 44
Setting detail: RADIATION/ONCOLOGY SERIES
End: 2024-08-27
Payer: COMMERCIAL

## 2024-08-27 ENCOUNTER — HOSPITAL ENCOUNTER (OUTPATIENT)
Dept: RADIATION ONCOLOGY | Facility: HOSPITAL | Age: 44
Discharge: HOME | End: 2024-08-27

## 2024-08-27 ENCOUNTER — HOSPITAL ENCOUNTER (OUTPATIENT)
Dept: MRI IMAGING | Facility: HOSPITAL | Age: 44
Discharge: HOME | End: 2024-08-27
Admitting: STUDENT IN AN ORGANIZED HEALTH CARE EDUCATION/TRAINING PROGRAM
Payer: COMMERCIAL

## 2024-08-27 DIAGNOSIS — C20 RECTAL ADENOCARCINOMA: ICD-10-CM

## 2024-08-27 PROCEDURE — 0 GADOBENATE DIMEGLUMINE 529 MG/ML SOLUTION: Performed by: STUDENT IN AN ORGANIZED HEALTH CARE EDUCATION/TRAINING PROGRAM

## 2024-08-27 PROCEDURE — A9577 INJ MULTIHANCE: HCPCS | Performed by: STUDENT IN AN ORGANIZED HEALTH CARE EDUCATION/TRAINING PROGRAM

## 2024-08-27 PROCEDURE — 77334 RADIATION TREATMENT AID(S): CPT | Performed by: RADIOLOGY

## 2024-08-27 PROCEDURE — 72197 MRI PELVIS W/O & W/DYE: CPT

## 2024-08-27 PROCEDURE — 77470 SPECIAL RADIATION TREATMENT: CPT | Performed by: RADIOLOGY

## 2024-08-27 RX ADMIN — GADOBENATE DIMEGLUMINE 20 ML: 529 INJECTION, SOLUTION INTRAVENOUS at 10:54

## 2024-08-28 ENCOUNTER — HOSPITAL ENCOUNTER (OUTPATIENT)
Dept: PET IMAGING | Facility: HOSPITAL | Age: 44
Discharge: HOME | End: 2024-08-28
Admitting: STUDENT IN AN ORGANIZED HEALTH CARE EDUCATION/TRAINING PROGRAM
Payer: COMMERCIAL

## 2024-08-28 DIAGNOSIS — C20 RECTAL ADENOCARCINOMA: ICD-10-CM

## 2024-08-28 PROCEDURE — 25510000001 IOPAMIDOL 61 % SOLUTION: Performed by: STUDENT IN AN ORGANIZED HEALTH CARE EDUCATION/TRAINING PROGRAM

## 2024-08-28 PROCEDURE — 74177 CT ABD & PELVIS W/CONTRAST: CPT

## 2024-08-28 PROCEDURE — 71260 CT THORAX DX C+: CPT

## 2024-08-28 PROCEDURE — 0 DIATRIZOATE MEGLUMINE & SODIUM PER 1 ML: Performed by: STUDENT IN AN ORGANIZED HEALTH CARE EDUCATION/TRAINING PROGRAM

## 2024-08-28 RX ORDER — IOPAMIDOL 612 MG/ML
100 INJECTION, SOLUTION INTRAVASCULAR
Status: COMPLETED | OUTPATIENT
Start: 2024-08-28 | End: 2024-08-28

## 2024-08-28 RX ADMIN — IOPAMIDOL 85 ML: 612 INJECTION, SOLUTION INTRAVENOUS at 07:42

## 2024-08-28 RX ADMIN — DIATRIZOATE MEGLUMINE AND DIATRIZOATE SODIUM 30 ML: 660; 100 LIQUID ORAL; RECTAL at 07:41

## 2024-08-28 NOTE — PROGRESS NOTES
Follow-up    SUBJECTIVE:    08/30/2024, Interval history  He is here for scan review.  Doing well since his last chemo.  Continues to remain active and work.  Blood pressure quite high during clinic visit today.  Likely secondary to anxiety.  Denies any headaches or focal neurologic deficits.  No new concerns or complaints      HISTORY OF PRESENT ILLNESS:  The patient is a 44 y.o. year old male with recently diagnosed rectal adenocarcinoma who presents to our clinic to establish care.  Onc history is outlined below    Family history significant for prostate cancer in father at 63, paternal uncle with colon cancer at 57, other paternal uncle with metastatic cancer, type unknown in his 50s, other paternal uncle with possible colon cancer and DVT/PE in his 70s.    Oncology/Hematology History   Rectal adenocarcinoma   4/23/2024 Procedure         4/23/2024 [facility]         5/2/2024 Imaging    CT CAP    A 1.7 cm right hepatic cyst is noted. Numerous small subcentimeter liver  low densities are present that are too small to characterize, could be  cysts, or potentially metastatic disease, interval follow-up can  characterize change.    No bowel obstruction. The sigmorectal mass is better characterized on  the MRI exam of same date (please see separate report). Some stranding  is apparent in the fat around the lesion, numerous surrounding lymph  nodes are present that could be metastatic lymph nodes. For example, on  axial image 165, a 1.9 x 1.6 cm pericolonic lymph node is present. As  another example, on axial image 170, left perirectal lymph node measures  0.7 x 1.2 cm.    IMPRESSION:        1. Sigmorectal mass with numerous surrounding lymph nodes, raising  suspicion for metastatic lymph nodes.     2. Hepatic cyst, and numerous liver low densities that are too small to  characterize; these lesions could also be cysts, but in this clinical  setting, possibility of any metastatic liver lesions is not  excluded.  Interval follow-up is advised to characterize change.     3. No pulmonary mass.     5/2/2024 Imaging    MRI Pelvis    FINDINGS: There is a circumferential solid low rectal mass which  measures approximately 4.5 cm in craniocaudad span and the inferior  margin is approximately 5 mm proximal to the anorectal junction.     The tumor extends approximately 8 mm into the mesorectal fat  predominantly along the right wall of the mass. There appears to be  extension to the mesorectal fascia along the right wall and abutment of  the right levator ani muscles. There are several enlarged perirectal  nodes and the largest is presacral measuring approximately 2.0 x 1.2 cm.  There are at least 4 enhancing suspicious nodes. There is no definite  extramural vascular invasion.     IMPRESSION:  1. Primary Tumor Location: Low rectal     2. MRI Stage: T3 N2 MRF positive     3. No definite sphincter involvement     5/2/2024 Cancer Staged    Staging form: Colon And Rectum, AJCC 8th Edition  - Clinical stage from 5/2/2024: cT3, cN2, cM0 - Signed by Sonali Pak MD on 6/3/2024     5/3/2024 Initial Diagnosis    Rectal adenocarcinoma     5/16/2024 Imaging    MRI abdo    IMPRESSION:  T2 hyperintense nonenhancing liver lesions are consistent with hepatic  cysts and biliary cystic hamartomas. No convincing liver metastasis  identified     5/24/2024 Procedure    PORT placement (Dr Isaac)     6/3/2024 -  Chemotherapy    OP COLORECTAL FOLFIRINOX (Fluorouracil cont. Infusion / Leucovorin / OXALIplatin / Irinotecan)     6/5/2024 -  Chemotherapy    OP CENTRAL VENOUS ACCESS DEVICE Access, Care, and Maintenance (CVAD)     9/3/2024 -  Chemotherapy    OP COLORECTAL Fluorouracil CIV over 168H + XRT         The current medication list and allergy list were reviewed and reconciled.      Past Medical History, Past Surgical History, Social History, Family History have been reviewed and are without significant changes except as  "mentioned.      REVIEW OF SYSTEMS:  See interval history    Objective:       Vitals:    08/30/24 1111 08/30/24 1117   BP: (!) 183/103 (!) 184/95   Pulse: 102    Resp: 16    Temp: 97.9 °F (36.6 °C)    TempSrc: Oral    SpO2: 98%    Weight: 99.8 kg (220 lb 1.6 oz)    Height: 175.3 cm (69.02\")    PainSc: 0-No pain              8/30/2024    11:10 AM   Current Status   ECOG score 0      PHYSICAL EXAM:    CONSTITUTIONAL:  Vital signs reviewed.  No distress, looks comfortable.  RESPIRATORY: Bilateral lungs clear to auscultation.  No wheezing or rhonchi   CARDIOVASCULAR: Normal S1-S2.  No murmur rubs or gallop GASTROINTESTINAL: Soft, nontender, bowel sounds present  NEURO:  No focal deficits.  Appears to have equal strength all 4 extremities.      I have reexamined the patient and the results are consistent with the previously documented exam. Sonali Pak MD        Diagnostic data  CT Abdomen Pelvis With Contrast (08/28/2024 08:10)  CT Chest With Contrast Diagnostic (08/28/2024 08:10)    Assessment & Plan     *Rectal adenocarcinoma (cT3 cN2 cM0), LUCAS, TMB low  4/23/2024 rectal biopsy-invasive moderately differentiated adenocarcinoma, LUCAS.  5/2/2024 MRI pelvis: Approximately 4.5 cm circumferential low rectal mass, approximately 5 mm proximal to anorectal junction, extending 8 mm in the mesorectal fat.  Appears to be extension to MRF along the right wall and abutment of right levator ani muscles.  Several enlarged perirectal nodes, largest 2 x 1.2 cm.  At least 4 enhancing suspicious nodes.  cT3 cN2 MRF positive.  No definitive sphincter involvement  5/2/2024 CT chest abdomen pelvis: 1.7 cm right hepatic cyst, and numerous low-density liver lesions-cyst versus possible metastatic lesions.  No lung mass  5/16/2024 MRI abdomen: T2 hyperintense nonenhancing liver lesions consistent with liver cyst and biliary cystic hamartomas.  No evidence of liver mets  Tumor genomic profile-APC (I269fs), APC ([id number]), FANCD2 (Q823), TP53. " TMB 1 mut/Mb. LUCAS  6/3/2024 baseline CEA: 1.89  6/3/2024 - 08/19/2024: s/p 6 cycles of  FOLFIRINOX (5-FU bolus eliminated to prevent further myelosuppression given baseline neutropenia; 25% dose reduction starting cycle 3) with G-CSF support  8/27/2024 MRI pelvis-good response.  No clear measurable rectal mass.  Decrease in the size of perirectal lymph node, measuring less than 5 mm; decrease in the size of enlarged hide perirectal lymph nodes/mary metastases.  08/28/2024 CT CAP: Apparent decrease size of rectal mass, decreased pericolonic and perirectal lymph nodes.  1 axillary lymph node and 1 liver low-density (too small to characterize) measures slightly larger, uncertain clinical significance.  8/30/2024: Reviewed the results of the scans.  I have reached out to radiation oncology regarding start date for his concurrent radiation.  He will start concurrent 5-FU with his radiation.    *Elevated blood pressure  Blood pressure during today's visit elevated to 183/103.  Likely secondary to anxiety.  No headaches or focal neurologic deficits  Will give clonidine 0.1 mg x 1    *Solitary axillary lymph node  *Solitary low-density liver lesion  8/28/2024 seen on CT chest abdomen pelvis, too small to characterize.    *Benign ethnic neutropenia  White cell count ranged between 2.6-3.2 since at least July 2019.    8/19/2024: WBC 3.06, ANC 1.27    *Skin rash, bilateral upper extremities, lower extremities and abdomen  Patient reports improvement with above for application  Likely secondary to chemotherapy    Plan:   Reviewed MRI pelvis as well as CT chest abdomen pelvis.  Will administer clonidine 0.1 mg x 1 for elevated Blood pressure.  MD or NP visit for cycle 1 day 1 concurrent chemoradiation with 5-FU infusion      Patient is on chemotherapy, which requires frequent monitoring

## 2024-08-30 ENCOUNTER — INFUSION (OUTPATIENT)
Dept: ONCOLOGY | Facility: HOSPITAL | Age: 44
End: 2024-08-30
Payer: COMMERCIAL

## 2024-08-30 ENCOUNTER — OFFICE VISIT (OUTPATIENT)
Dept: ONCOLOGY | Facility: CLINIC | Age: 44
End: 2024-08-30
Payer: COMMERCIAL

## 2024-08-30 ENCOUNTER — APPOINTMENT (OUTPATIENT)
Dept: OTHER | Facility: HOSPITAL | Age: 44
End: 2024-08-30
Payer: COMMERCIAL

## 2024-08-30 VITALS
RESPIRATION RATE: 16 BRPM | SYSTOLIC BLOOD PRESSURE: 184 MMHG | WEIGHT: 220.1 LBS | DIASTOLIC BLOOD PRESSURE: 95 MMHG | OXYGEN SATURATION: 98 % | TEMPERATURE: 97.9 F | BODY MASS INDEX: 32.6 KG/M2 | HEART RATE: 102 BPM | HEIGHT: 69 IN

## 2024-08-30 DIAGNOSIS — Z09 CHEMOTHERAPY FOLLOW-UP EXAMINATION: ICD-10-CM

## 2024-08-30 DIAGNOSIS — C20 RECTAL ADENOCARCINOMA: Primary | ICD-10-CM

## 2024-08-30 DIAGNOSIS — R03.0 ELEVATED BP WITHOUT DIAGNOSIS OF HYPERTENSION: ICD-10-CM

## 2024-08-30 RX ORDER — CLONIDINE HYDROCHLORIDE 0.1 MG/1
0.1 TABLET ORAL ONCE
Status: DISCONTINUED | OUTPATIENT
Start: 2024-08-30 | End: 2024-08-30

## 2024-08-30 RX ORDER — CLONIDINE HYDROCHLORIDE 0.1 MG/1
0.1 TABLET ORAL ONCE
Status: COMPLETED | OUTPATIENT
Start: 2024-08-30 | End: 2024-08-30

## 2024-08-30 RX ADMIN — CLONIDINE HYDROCHLORIDE 0.1 MG: 0.1 TABLET ORAL at 11:42

## 2024-08-30 NOTE — NURSING NOTE
Per MD request, pt given clonidine.  Currently resting comfortably in infusion chair.  Per Dr. Pak, will recheck in 30 minutes.    Pt left before able to recheck blood pressure.  Notified Dr. Pak with no new orders.

## 2024-09-01 ENCOUNTER — HOSPITAL ENCOUNTER (OUTPATIENT)
Dept: RADIATION ONCOLOGY | Facility: HOSPITAL | Age: 44
Setting detail: RADIATION/ONCOLOGY SERIES
End: 2024-09-01
Payer: COMMERCIAL

## 2024-09-06 DIAGNOSIS — C20 RECTAL ADENOCARCINOMA: Primary | ICD-10-CM

## 2024-09-06 NOTE — PROGRESS NOTES
Treatment plan for 5FU infusion with radiation over 168 hours removed, and new treatment plan for 5 FU with radiation to be over 120 hours and unhook will be on Saturday at [facility] each cycle RADHA Pak

## 2024-09-09 ENCOUNTER — INFUSION (OUTPATIENT)
Dept: ONCOLOGY | Facility: HOSPITAL | Age: 44
End: 2024-09-09
Payer: COMMERCIAL

## 2024-09-09 ENCOUNTER — TELEPHONE (OUTPATIENT)
Dept: ONCOLOGY | Facility: CLINIC | Age: 44
End: 2024-09-09
Payer: COMMERCIAL

## 2024-09-09 ENCOUNTER — HOSPITAL ENCOUNTER (OUTPATIENT)
Dept: RADIATION ONCOLOGY | Facility: HOSPITAL | Age: 44
Discharge: HOME | End: 2024-09-09
Payer: COMMERCIAL

## 2024-09-09 ENCOUNTER — PATIENT OUTREACH (OUTPATIENT)
Dept: OTHER | Facility: HOSPITAL | Age: 44
End: 2024-09-09
Payer: COMMERCIAL

## 2024-09-09 ENCOUNTER — OFFICE VISIT (OUTPATIENT)
Dept: ONCOLOGY | Facility: CLINIC | Age: 44
End: 2024-09-09
Payer: COMMERCIAL

## 2024-09-09 VITALS
DIASTOLIC BLOOD PRESSURE: 97 MMHG | OXYGEN SATURATION: 98 % | RESPIRATION RATE: 16 BRPM | SYSTOLIC BLOOD PRESSURE: 156 MMHG | HEART RATE: 90 BPM | WEIGHT: 225.6 LBS | TEMPERATURE: 98.1 F | BODY MASS INDEX: 33.41 KG/M2 | HEIGHT: 69 IN

## 2024-09-09 DIAGNOSIS — C20 RECTAL ADENOCARCINOMA: ICD-10-CM

## 2024-09-09 DIAGNOSIS — C20 RECTAL ADENOCARCINOMA: Primary | ICD-10-CM

## 2024-09-09 DIAGNOSIS — Z01.818 EXAMINATION PRIOR TO CHEMOTHERAPY: ICD-10-CM

## 2024-09-09 LAB
ALBUMIN SERPL-MCNC: 4.2 G/DL (ref 3.5–5.2)
ALBUMIN/GLOB SERPL: 1.7 G/DL
ALP SERPL-CCNC: 97 U/L (ref 39–117)
ALT SERPL W P-5'-P-CCNC: 34 U/L (ref 1–41)
ANION GAP SERPL CALCULATED.3IONS-SCNC: 9.4 MMOL/L (ref 5–15)
AST SERPL-CCNC: 27 U/L (ref 1–40)
BASOPHILS # BLD AUTO: 0.01 10*3/MM3 (ref 0–0.2)
BASOPHILS NFR BLD AUTO: 0.3 % (ref 0–1.5)
BILIRUB SERPL-MCNC: 0.3 MG/DL (ref 0–1.2)
BUN SERPL-MCNC: 11 MG/DL (ref 6–20)
BUN/CREAT SERPL: 12.4 (ref 7–25)
CALCIUM SPEC-SCNC: 9.1 MG/DL (ref 8.6–10.5)
CHLORIDE SERPL-SCNC: 103 MMOL/L (ref 98–107)
CO2 SERPL-SCNC: 29.6 MMOL/L (ref 22–29)
CREAT SERPL-MCNC: 0.89 MG/DL (ref 0.76–1.27)
DEPRECATED RDW RBC AUTO: 47.3 FL (ref 37–54)
EGFRCR SERPLBLD CKD-EPI 2021: 107 ML/MIN/1.73
EOSINOPHIL # BLD AUTO: 0.07 10*3/MM3 (ref 0–0.4)
EOSINOPHIL NFR BLD AUTO: 2.4 % (ref 0.3–6.2)
ERYTHROCYTE [DISTWIDTH] IN BLOOD BY AUTOMATED COUNT: 12.8 % (ref 12.3–15.4)
GLOBULIN UR ELPH-MCNC: 2.5 GM/DL
GLUCOSE SERPL-MCNC: 128 MG/DL (ref 65–99)
HCT VFR BLD AUTO: 37.1 % (ref 37.5–51)
HGB BLD-MCNC: 12.4 G/DL (ref 13–17.7)
IMM GRANULOCYTES # BLD AUTO: 0.01 10*3/MM3 (ref 0–0.05)
IMM GRANULOCYTES NFR BLD AUTO: 0.3 % (ref 0–0.5)
LYMPHOCYTES # BLD AUTO: 0.96 10*3/MM3 (ref 0.7–3.1)
LYMPHOCYTES NFR BLD AUTO: 33.6 % (ref 19.6–45.3)
MCH RBC QN AUTO: 33.3 PG (ref 26.6–33)
MCHC RBC AUTO-ENTMCNC: 33.4 G/DL (ref 31.5–35.7)
MCV RBC AUTO: 99.7 FL (ref 79–97)
MONOCYTES # BLD AUTO: 0.54 10*3/MM3 (ref 0.1–0.9)
MONOCYTES NFR BLD AUTO: 18.9 % (ref 5–12)
NEUTROPHILS NFR BLD AUTO: 1.27 10*3/MM3 (ref 1.7–7)
NEUTROPHILS NFR BLD AUTO: 44.5 % (ref 42.7–76)
NRBC BLD AUTO-RTO: 0 /100 WBC (ref 0–0.2)
PLATELET # BLD AUTO: 228 10*3/MM3 (ref 140–450)
PMV BLD AUTO: 10.5 FL (ref 6–12)
POTASSIUM SERPL-SCNC: 3.9 MMOL/L (ref 3.5–5.2)
PROT SERPL-MCNC: 6.7 G/DL (ref 6–8.5)
RAD ONC ARIA COURSE ID: NORMAL
RAD ONC ARIA COURSE INTENT: NORMAL
RAD ONC ARIA COURSE LAST TREATMENT DATE: NORMAL
RAD ONC ARIA COURSE START DATE: NORMAL
RAD ONC ARIA COURSE TREATMENT ELAPSED DAYS: 0
RAD ONC ARIA FIRST TREATMENT DATE: NORMAL
RAD ONC ARIA PLAN FRACTIONS TREATED TO DATE: 1
RAD ONC ARIA PLAN ID: NORMAL
RAD ONC ARIA PLAN PRESCRIBED DOSE PER FRACTION: 1.8 GY
RAD ONC ARIA PLAN PRIMARY REFERENCE POINT: NORMAL
RAD ONC ARIA PLAN TOTAL FRACTIONS PRESCRIBED: 28
RAD ONC ARIA PLAN TOTAL PRESCRIBED DOSE: 5040 CGY
RAD ONC ARIA REFERENCE POINT DOSAGE GIVEN TO DATE: 1.8 GY
RAD ONC ARIA REFERENCE POINT ID: NORMAL
RAD ONC ARIA REFERENCE POINT SESSION DOSAGE GIVEN: 0.55 GY
RBC # BLD AUTO: 3.72 10*6/MM3 (ref 4.14–5.8)
SODIUM SERPL-SCNC: 142 MMOL/L (ref 136–145)
WBC NRBC COR # BLD AUTO: 2.86 10*3/MM3 (ref 3.4–10.8)

## 2024-09-09 PROCEDURE — 25010000002 FLUOROURACIL PER 500 MG: Performed by: STUDENT IN AN ORGANIZED HEALTH CARE EDUCATION/TRAINING PROGRAM

## 2024-09-09 PROCEDURE — 96416 CHEMO PROLONG INFUSE W/PUMP: CPT

## 2024-09-09 PROCEDURE — G0498 CHEMO EXTEND IV INFUS W/PUMP: HCPCS

## 2024-09-09 PROCEDURE — 77427 RADIATION TX MANAGEMENT X5: CPT | Performed by: RADIOLOGY

## 2024-09-09 PROCEDURE — 80053 COMPREHEN METABOLIC PANEL: CPT

## 2024-09-09 PROCEDURE — 85025 COMPLETE CBC W/AUTO DIFF WBC: CPT

## 2024-09-09 PROCEDURE — 77386: CPT | Performed by: RADIOLOGY

## 2024-09-09 PROCEDURE — 99214 OFFICE O/P EST MOD 30 MIN: CPT | Performed by: STUDENT IN AN ORGANIZED HEALTH CARE EDUCATION/TRAINING PROGRAM

## 2024-09-09 PROCEDURE — 25810000003 SODIUM CHLORIDE 0.9 % SOLUTION 250 ML FLEX CONT: Performed by: STUDENT IN AN ORGANIZED HEALTH CARE EDUCATION/TRAINING PROGRAM

## 2024-09-09 RX ADMIN — FLUOROURACIL 2420 MG: 50 INJECTION, SOLUTION INTRAVENOUS at 10:51

## 2024-09-09 NOTE — TELEPHONE ENCOUNTER
Call back to Mr. Brannon, as he had stopped and asked at [facility] whether he should work or not.  Reviewed with Dr. Pak, and let patient know that if he feels like he cannot work, he will need to get the paperwork from his job, and Dr. Pak will sign.  Patient verbalized understanding.

## 2024-09-09 NOTE — PROGRESS NOTES
Follow-up    SUBJECTIVE:    09/09/2024, Interval history  He is here to start cycle 1 day 1 of concurrent chemoradiation with 5-FU.  Reports doing well.  Denies any new concerns or complaints.  Has appointment for first radiation at 1230 today      HISTORY OF PRESENT ILLNESS:  The patient is a 44 y.o. year old male with recently diagnosed rectal adenocarcinoma who presents to our clinic to establish care.  Onc history is outlined below    Family history significant for prostate cancer in father at 63, paternal uncle with colon cancer at 57, other paternal uncle with metastatic cancer, type unknown in his 50s, other paternal uncle with possible colon cancer and DVT/PE in his 70s.    Oncology/Hematology History   Rectal adenocarcinoma   4/23/2024 Procedure         4/23/2024 [facility]         5/2/2024 Imaging    CT CAP    A 1.7 cm right hepatic cyst is noted. Numerous small subcentimeter liver  low densities are present that are too small to characterize, could be  cysts, or potentially metastatic disease, interval follow-up can  characterize change.    No bowel obstruction. The sigmorectal mass is better characterized on  the MRI exam of same date (please see separate report). Some stranding  is apparent in the fat around the lesion, numerous surrounding lymph  nodes are present that could be metastatic lymph nodes. For example, on  axial image 165, a 1.9 x 1.6 cm pericolonic lymph node is present. As  another example, on axial image 170, left perirectal lymph node measures  0.7 x 1.2 cm.    IMPRESSION:        1. Sigmorectal mass with numerous surrounding lymph nodes, raising  suspicion for metastatic lymph nodes.     2. Hepatic cyst, and numerous liver low densities that are too small to  characterize; these lesions could also be cysts, but in this clinical  setting, possibility of any metastatic liver lesions is not excluded.  Interval follow-up is advised to characterize change.     3. No pulmonary mass.      5/2/2024 Imaging    MRI Pelvis    FINDINGS: There is a circumferential solid low rectal mass which  measures approximately 4.5 cm in craniocaudad span and the inferior  margin is approximately 5 mm proximal to the anorectal junction.     The tumor extends approximately 8 mm into the mesorectal fat  predominantly along the right wall of the mass. There appears to be  extension to the mesorectal fascia along the right wall and abutment of  the right levator ani muscles. There are several enlarged perirectal  nodes and the largest is presacral measuring approximately 2.0 x 1.2 cm.  There are at least 4 enhancing suspicious nodes. There is no definite  extramural vascular invasion.     IMPRESSION:  1. Primary Tumor Location: Low rectal     2. MRI Stage: T3 N2 MRF positive     3. No definite sphincter involvement     5/2/2024 Cancer Staged    Staging form: Colon And Rectum, AJCC 8th Edition  - Clinical stage from 5/2/2024: cT3, cN2, cM0 - Signed by Sonali Pak MD on 6/3/2024     5/3/2024 Initial Diagnosis    Rectal adenocarcinoma     5/16/2024 Imaging    MRI abdo    IMPRESSION:  T2 hyperintense nonenhancing liver lesions are consistent with hepatic  cysts and biliary cystic hamartomas. No convincing liver metastasis  identified     5/24/2024 Procedure    PORT placement (Dr Isaac)     6/3/2024 - 8/21/2024 Chemotherapy    OP COLORECTAL FOLFIRINOX (Fluorouracil cont. Infusion / Leucovorin / OXALIplatin / Irinotecan)     6/5/2024 -  Chemotherapy    OP CENTRAL VENOUS ACCESS DEVICE Access, Care, and Maintenance (CVAD)     9/9/2024 - 9/9/2024 Chemotherapy    OP COLORECTAL Fluorouracil CIV over 168H + XRT     9/9/2024 -  Chemotherapy    OP COLORECTAL Fluorouracil CIV over 96 H + XRT         The current medication list and allergy list were reviewed and reconciled.      Past Medical History, Past Surgical History, Social History, Family History have been reviewed and are without significant changes except as  "mentioned.      REVIEW OF SYSTEMS:  See interval history    Objective:       Vitals:    09/09/24 0945   BP: 156/97   Pulse: 90   Resp: 16   Temp: 98.1 °F (36.7 °C)   TempSrc: Oral   SpO2: 98%   Weight: 102 kg (225 lb 9.6 oz)   Height: 175.3 cm (69.02\")   PainSc: 0-No pain             9/9/2024     9:51 AM   Current Status   ECOG score 0      PHYSICAL EXAM:    CONSTITUTIONAL:  Vital signs reviewed.  No distress, looks comfortable.  RESPIRATORY: Bilateral lungs clear to auscultation.  No wheezing or rhonchi   CARDIOVASCULAR: Normal S1-S2.  No murmur rubs or gallop GASTROINTESTINAL: Soft, nontender, bowel sounds present  NEURO:  No focal deficits.  Appears to have equal strength all 4 extremities.      I have reexamined the patient and the results are consistent with the previously documented exam. Sonali Pak MD        Diagnostic data  CT Abdomen Pelvis With Contrast (08/28/2024 08:10)  CT Chest With Contrast Diagnostic (08/28/2024 08:10)    Assessment & Plan     *Rectal adenocarcinoma (cT3 cN2 cM0), LUCAS, TMB low  4/23/2024 rectal biopsy-invasive moderately differentiated adenocarcinoma, LUCAS.  5/2/2024 MRI pelvis: Approximately 4.5 cm circumferential low rectal mass, approximately 5 mm proximal to anorectal junction, extending 8 mm in the mesorectal fat.  Appears to be extension to MRF along the right wall and abutment of right levator ani muscles.  Several enlarged perirectal nodes, largest 2 x 1.2 cm.  At least 4 enhancing suspicious nodes.  cT3 cN2 MRF positive.  No definitive sphincter involvement  5/2/2024 CT chest abdomen pelvis: 1.7 cm right hepatic cyst, and numerous low-density liver lesions-cyst versus possible metastatic lesions.  No lung mass  5/16/2024 MRI abdomen: T2 hyperintense nonenhancing liver lesions consistent with liver cyst and biliary cystic hamartomas.  No evidence of liver mets  Tumor genomic profile-APC (I269fs), APC (Q8778kl), FANCD2 (Q823), TP53. TMB 1 mut/Mb. LUCAS  6/3/2024 baseline " CEA: 1.89  6/3/2024 - 08/19/2024: s/p 6 cycles of  FOLFIRINOX (5-FU bolus eliminated to prevent further myelosuppression given baseline neutropenia; 25% dose reduction starting cycle 3) with G-CSF support  8/27/2024 MRI pelvis-good response.  No clear measurable rectal mass.  Decrease in the size of perirectal lymph node, measuring less than 5 mm; decrease in the size of enlarged hide perirectal lymph nodes/mary metastases.  08/28/2024 CT CAP: Apparent decrease size of rectal mass, decreased pericolonic and perirectal lymph nodes.  1 axillary lymph node and 1 liver low-density (too small to characterize) measures slightly larger, uncertain clinical significance.  9/9/2024: Cycle 1 day 1 concurrent chemoradiation with 5-FU.  Labs and exam okay for treatment today    *Solitary axillary lymph node  *Solitary low-density liver lesion  8/28/2024 seen on CT chest abdomen pelvis, too small to characterize.    *Benign ethnic neutropenia  White cell count ranged between 2.6-3.2 since at least July 2019.    8/19/2024: WBC 3.06, ANC 1.27    *Skin rash, bilateral upper extremities, lower extremities and abdomen  Patient reports improvement with above for application  Likely secondary to chemotherapy    Plan:   MD or NP visit for weekly for concurrent chemoradiation with 5-FU infusion      Patient is on chemotherapy, which requires frequent monitoring

## 2024-09-09 NOTE — PROGRESS NOTES
[facility] in [facility] today after treatment. He is doing well although reports some bone pain.  The patient states he was encouraged to take Claritin.     The patient has been working in the [employer] at [employer]. He plans to file for FMLA during treatment. Encouraged him to get the FMLA paperwork from his employer; explained Dr. Pak's office has a staff member who helps with this.  Patient verbalized understanding.     The patient denies any questions/concerns or ongoing resource needs. I will continue to follow; encouraged patient to call as needed.

## 2024-09-10 ENCOUNTER — HOSPITAL ENCOUNTER (OUTPATIENT)
Dept: RADIATION ONCOLOGY | Facility: HOSPITAL | Age: 44
Discharge: HOME | End: 2024-09-10

## 2024-09-10 DIAGNOSIS — C20 RECTAL ADENOCARCINOMA: ICD-10-CM

## 2024-09-10 LAB
RAD ONC ARIA COURSE ID: NORMAL
RAD ONC ARIA COURSE INTENT: NORMAL
RAD ONC ARIA COURSE LAST TREATMENT DATE: NORMAL
RAD ONC ARIA COURSE START DATE: NORMAL
RAD ONC ARIA COURSE TREATMENT ELAPSED DAYS: 1
RAD ONC ARIA FIRST TREATMENT DATE: NORMAL
RAD ONC ARIA PLAN FRACTIONS TREATED TO DATE: 2
RAD ONC ARIA PLAN ID: NORMAL
RAD ONC ARIA PLAN PRESCRIBED DOSE PER FRACTION: 1.8 GY
RAD ONC ARIA PLAN PRIMARY REFERENCE POINT: NORMAL
RAD ONC ARIA PLAN TOTAL FRACTIONS PRESCRIBED: 28
RAD ONC ARIA PLAN TOTAL PRESCRIBED DOSE: 5040 CGY
RAD ONC ARIA REFERENCE POINT DOSAGE GIVEN TO DATE: 3.6 GY
RAD ONC ARIA REFERENCE POINT ID: NORMAL
RAD ONC ARIA REFERENCE POINT SESSION DOSAGE GIVEN: 1.8 GY

## 2024-09-10 PROCEDURE — 77386: CPT | Performed by: RADIOLOGY

## 2024-09-10 PROCEDURE — 77014 CHG CT GUIDANCE RADIATION THERAPY FLDS PLACEMENT: CPT | Performed by: RADIOLOGY

## 2024-09-10 RX ORDER — OLANZAPINE 5 MG/1
TABLET ORAL
Qty: 18 TABLET | Refills: 0 | Status: SHIPPED | OUTPATIENT
Start: 2024-09-10

## 2024-09-11 ENCOUNTER — HOSPITAL ENCOUNTER (OUTPATIENT)
Dept: RADIATION ONCOLOGY | Facility: HOSPITAL | Age: 44
Discharge: HOME | End: 2024-09-11

## 2024-09-11 LAB
RAD ONC ARIA COURSE ID: NORMAL
RAD ONC ARIA COURSE INTENT: NORMAL
RAD ONC ARIA COURSE LAST TREATMENT DATE: NORMAL
RAD ONC ARIA COURSE START DATE: NORMAL
RAD ONC ARIA COURSE TREATMENT ELAPSED DAYS: 2
RAD ONC ARIA FIRST TREATMENT DATE: NORMAL
RAD ONC ARIA PLAN FRACTIONS TREATED TO DATE: 3
RAD ONC ARIA PLAN ID: NORMAL
RAD ONC ARIA PLAN PRESCRIBED DOSE PER FRACTION: 1.8 GY
RAD ONC ARIA PLAN PRIMARY REFERENCE POINT: NORMAL
RAD ONC ARIA PLAN TOTAL FRACTIONS PRESCRIBED: 28
RAD ONC ARIA PLAN TOTAL PRESCRIBED DOSE: 5040 CGY
RAD ONC ARIA REFERENCE POINT DOSAGE GIVEN TO DATE: 5.4 GY
RAD ONC ARIA REFERENCE POINT ID: NORMAL
RAD ONC ARIA REFERENCE POINT SESSION DOSAGE GIVEN: 1.8 GY

## 2024-09-11 PROCEDURE — 77014 CHG CT GUIDANCE RADIATION THERAPY FLDS PLACEMENT: CPT | Performed by: RADIOLOGY

## 2024-09-11 PROCEDURE — 77336 RADIATION PHYSICS CONSULT: CPT | Performed by: RADIOLOGY

## 2024-09-11 PROCEDURE — 77386: CPT | Performed by: RADIOLOGY

## 2024-09-12 ENCOUNTER — HOSPITAL ENCOUNTER (OUTPATIENT)
Dept: RADIATION ONCOLOGY | Facility: HOSPITAL | Age: 44
Discharge: HOME | End: 2024-09-12

## 2024-09-12 LAB
RAD ONC ARIA COURSE ID: NORMAL
RAD ONC ARIA COURSE INTENT: NORMAL
RAD ONC ARIA COURSE LAST TREATMENT DATE: NORMAL
RAD ONC ARIA COURSE START DATE: NORMAL
RAD ONC ARIA COURSE TREATMENT ELAPSED DAYS: 3
RAD ONC ARIA FIRST TREATMENT DATE: NORMAL
RAD ONC ARIA PLAN FRACTIONS TREATED TO DATE: 4
RAD ONC ARIA PLAN ID: NORMAL
RAD ONC ARIA PLAN PRESCRIBED DOSE PER FRACTION: 1.8 GY
RAD ONC ARIA PLAN PRIMARY REFERENCE POINT: NORMAL
RAD ONC ARIA PLAN TOTAL FRACTIONS PRESCRIBED: 28
RAD ONC ARIA PLAN TOTAL PRESCRIBED DOSE: 5040 CGY
RAD ONC ARIA REFERENCE POINT DOSAGE GIVEN TO DATE: 7.2 GY
RAD ONC ARIA REFERENCE POINT ID: NORMAL
RAD ONC ARIA REFERENCE POINT SESSION DOSAGE GIVEN: 1.8 GY

## 2024-09-12 PROCEDURE — 77014 CHG CT GUIDANCE RADIATION THERAPY FLDS PLACEMENT: CPT | Performed by: RADIOLOGY

## 2024-09-12 PROCEDURE — 77386: CPT | Performed by: RADIOLOGY

## 2024-09-13 ENCOUNTER — RADIATION ONCOLOGY WEEKLY ASSESSMENT (OUTPATIENT)
Dept: RADIATION ONCOLOGY | Facility: HOSPITAL | Age: 44
End: 2024-09-13
Payer: COMMERCIAL

## 2024-09-13 ENCOUNTER — INFUSION (OUTPATIENT)
Dept: ONCOLOGY | Facility: HOSPITAL | Age: 44
End: 2024-09-13
Payer: COMMERCIAL

## 2024-09-13 ENCOUNTER — HOSPITAL ENCOUNTER (OUTPATIENT)
Dept: RADIATION ONCOLOGY | Facility: HOSPITAL | Age: 44
Discharge: HOME | End: 2024-09-13

## 2024-09-13 VITALS
HEART RATE: 92 BPM | OXYGEN SATURATION: 100 % | DIASTOLIC BLOOD PRESSURE: 113 MMHG | WEIGHT: 223 LBS | SYSTOLIC BLOOD PRESSURE: 117 MMHG | BODY MASS INDEX: 32.91 KG/M2

## 2024-09-13 DIAGNOSIS — C20 RECTAL ADENOCARCINOMA: Primary | ICD-10-CM

## 2024-09-13 LAB
RAD ONC ARIA COURSE ID: NORMAL
RAD ONC ARIA COURSE INTENT: NORMAL
RAD ONC ARIA COURSE LAST TREATMENT DATE: NORMAL
RAD ONC ARIA COURSE START DATE: NORMAL
RAD ONC ARIA COURSE TREATMENT ELAPSED DAYS: 4
RAD ONC ARIA FIRST TREATMENT DATE: NORMAL
RAD ONC ARIA PLAN FRACTIONS TREATED TO DATE: 5
RAD ONC ARIA PLAN ID: NORMAL
RAD ONC ARIA PLAN PRESCRIBED DOSE PER FRACTION: 1.8 GY
RAD ONC ARIA PLAN PRIMARY REFERENCE POINT: NORMAL
RAD ONC ARIA PLAN TOTAL FRACTIONS PRESCRIBED: 28
RAD ONC ARIA PLAN TOTAL PRESCRIBED DOSE: 5040 CGY
RAD ONC ARIA REFERENCE POINT DOSAGE GIVEN TO DATE: 9 GY
RAD ONC ARIA REFERENCE POINT ID: NORMAL
RAD ONC ARIA REFERENCE POINT SESSION DOSAGE GIVEN: 1.8 GY

## 2024-09-13 PROCEDURE — 77014 CHG CT GUIDANCE RADIATION THERAPY FLDS PLACEMENT: CPT | Performed by: RADIOLOGY

## 2024-09-13 PROCEDURE — 77386: CPT | Performed by: RADIOLOGY

## 2024-09-13 NOTE — PROGRESS NOTES
Radiation Oncology  On-Treatment Note      Patient: Jc Brannon Jr.    MRN: [id number]    Attending Physician: Kolton Noyola MD     Diagnosis:     ICD-10-CM ICD-9-CM   1. Rectal adenocarcinoma  C20 154.1       Radiation Therapy Visit:  Continue radiation therapy, Dosimetry plan remains acceptable, Films reviewed and remains acceptable, Pain assessed, Pain management planned, Radiation dose schedule reviewed and remains acceptable, Radiation technique remains acceptable, and Symptoms within expected range    Radiation Treatments       Active   Plans   BHF_Rectum   Most recent treatment: Dose planned: 180 cGy (fraction 5 on 9/13/2024)   Total: Dose planned: 5,040 cGy (28 fractions)   Elapsed Days: 4      Reference Points   RX: 5040   Most recent treatment: Dose given: 180 cGy (on 9/13/2024)   Total: Dose given: 900 cGy   Elapsed Days: 4                      Physical Examination:  Vitals: Blood pressure (!) 117/113, pulse 92, weight 101 kg (223 lb), SpO2 100%.  Pain Score    09/13/24 1511   PainSc: 0-No pain   Jc's blood pressure is high as it usually is immediately prior to his doctors visits.  He remains extremely anxious but overall is tolerating radiation well.  He states his bowel movements have been formed.  He denies any bleeding per rectum whatsoever.  His urination frequency is unchanged at this point.  He does have continuous infusion 5-FU pump ongoing and this will be removed later today.    Fully active, able to carry on all pre-disease performance without restriction = 0    We examined the relevant areas: yes  Findings are within the expected range for this stage of treatment: yes  -------------------------------------------------------------------------------------------------------------------    ACTION ITEMS:  Patient tolerating treatment well and as expected for this stage in their treatment and Continue radiation therapy as planned    Estimated Completion Date: 4 1/2 weeks      Kolton Noyola  MD  Radiation Oncology

## 2024-09-16 ENCOUNTER — INFUSION (OUTPATIENT)
Dept: ONCOLOGY | Facility: HOSPITAL | Age: 44
End: 2024-09-16
Payer: COMMERCIAL

## 2024-09-16 ENCOUNTER — OFFICE VISIT (OUTPATIENT)
Dept: ONCOLOGY | Facility: CLINIC | Age: 44
End: 2024-09-16
Payer: COMMERCIAL

## 2024-09-16 ENCOUNTER — HOSPITAL ENCOUNTER (OUTPATIENT)
Dept: RADIATION ONCOLOGY | Facility: HOSPITAL | Age: 44
Discharge: HOME | End: 2024-09-16
Payer: COMMERCIAL

## 2024-09-16 ENCOUNTER — TELEPHONE (OUTPATIENT)
Dept: ONCOLOGY | Facility: CLINIC | Age: 44
End: 2024-09-16

## 2024-09-16 VITALS
WEIGHT: 224 LBS | SYSTOLIC BLOOD PRESSURE: 168 MMHG | RESPIRATION RATE: 19 BRPM | HEART RATE: 84 BPM | OXYGEN SATURATION: 98 % | HEIGHT: 69 IN | BODY MASS INDEX: 33.18 KG/M2 | DIASTOLIC BLOOD PRESSURE: 90 MMHG

## 2024-09-16 DIAGNOSIS — C20 RECTAL ADENOCARCINOMA: ICD-10-CM

## 2024-09-16 DIAGNOSIS — C20 RECTAL ADENOCARCINOMA: Primary | ICD-10-CM

## 2024-09-16 DIAGNOSIS — D70.8 OTHER NEUTROPENIA: ICD-10-CM

## 2024-09-16 DIAGNOSIS — Z79.899 HIGH RISK MEDICATION USE: ICD-10-CM

## 2024-09-16 LAB
ALBUMIN SERPL-MCNC: 4.3 G/DL (ref 3.5–5.2)
ALBUMIN/GLOB SERPL: 1.9 G/DL
ALP SERPL-CCNC: 87 U/L (ref 39–117)
ALT SERPL W P-5'-P-CCNC: 25 U/L (ref 1–41)
ANION GAP SERPL CALCULATED.3IONS-SCNC: 10.5 MMOL/L (ref 5–15)
AST SERPL-CCNC: 22 U/L (ref 1–40)
BASOPHILS # BLD AUTO: 0.01 10*3/MM3 (ref 0–0.2)
BASOPHILS NFR BLD AUTO: 0.5 % (ref 0–1.5)
BILIRUB SERPL-MCNC: 0.3 MG/DL (ref 0–1.2)
BUN SERPL-MCNC: 14 MG/DL (ref 6–20)
BUN/CREAT SERPL: 14.7 (ref 7–25)
CALCIUM SPEC-SCNC: 8.8 MG/DL (ref 8.6–10.5)
CHLORIDE SERPL-SCNC: 100 MMOL/L (ref 98–107)
CO2 SERPL-SCNC: 28.5 MMOL/L (ref 22–29)
CREAT SERPL-MCNC: 0.95 MG/DL (ref 0.76–1.27)
DEPRECATED RDW RBC AUTO: 46.6 FL (ref 37–54)
EGFRCR SERPLBLD CKD-EPI 2021: 100 ML/MIN/1.73
EOSINOPHIL # BLD AUTO: 0.07 10*3/MM3 (ref 0–0.4)
EOSINOPHIL NFR BLD AUTO: 3.6 % (ref 0.3–6.2)
ERYTHROCYTE [DISTWIDTH] IN BLOOD BY AUTOMATED COUNT: 12.6 % (ref 12.3–15.4)
GLOBULIN UR ELPH-MCNC: 2.3 GM/DL
GLUCOSE SERPL-MCNC: 126 MG/DL (ref 65–99)
HCT VFR BLD AUTO: 35.3 % (ref 37.5–51)
HGB BLD-MCNC: 11.8 G/DL (ref 13–17.7)
IMM GRANULOCYTES # BLD AUTO: 0.01 10*3/MM3 (ref 0–0.05)
IMM GRANULOCYTES NFR BLD AUTO: 0.5 % (ref 0–0.5)
LYMPHOCYTES # BLD AUTO: 0.68 10*3/MM3 (ref 0.7–3.1)
LYMPHOCYTES NFR BLD AUTO: 34.7 % (ref 19.6–45.3)
MCH RBC QN AUTO: 33.7 PG (ref 26.6–33)
MCHC RBC AUTO-ENTMCNC: 33.4 G/DL (ref 31.5–35.7)
MCV RBC AUTO: 100.9 FL (ref 79–97)
MONOCYTES # BLD AUTO: 0.3 10*3/MM3 (ref 0.1–0.9)
MONOCYTES NFR BLD AUTO: 15.3 % (ref 5–12)
NEUTROPHILS NFR BLD AUTO: 0.89 10*3/MM3 (ref 1.7–7)
NEUTROPHILS NFR BLD AUTO: 45.4 % (ref 42.7–76)
NRBC BLD AUTO-RTO: 0 /100 WBC (ref 0–0.2)
PLATELET # BLD AUTO: 216 10*3/MM3 (ref 140–450)
PMV BLD AUTO: 9.5 FL (ref 6–12)
POTASSIUM SERPL-SCNC: 3.7 MMOL/L (ref 3.5–5.2)
PROT SERPL-MCNC: 6.6 G/DL (ref 6–8.5)
RAD ONC ARIA COURSE ID: NORMAL
RAD ONC ARIA COURSE INTENT: NORMAL
RAD ONC ARIA COURSE LAST TREATMENT DATE: NORMAL
RAD ONC ARIA COURSE START DATE: NORMAL
RAD ONC ARIA COURSE TREATMENT ELAPSED DAYS: 7
RAD ONC ARIA FIRST TREATMENT DATE: NORMAL
RAD ONC ARIA PLAN FRACTIONS TREATED TO DATE: 6
RAD ONC ARIA PLAN ID: NORMAL
RAD ONC ARIA PLAN PRESCRIBED DOSE PER FRACTION: 1.8 GY
RAD ONC ARIA PLAN PRIMARY REFERENCE POINT: NORMAL
RAD ONC ARIA PLAN TOTAL FRACTIONS PRESCRIBED: 28
RAD ONC ARIA PLAN TOTAL PRESCRIBED DOSE: 5040 CGY
RAD ONC ARIA REFERENCE POINT DOSAGE GIVEN TO DATE: 10.8 GY
RAD ONC ARIA REFERENCE POINT ID: NORMAL
RAD ONC ARIA REFERENCE POINT SESSION DOSAGE GIVEN: 1.8 GY
RBC # BLD AUTO: 3.5 10*6/MM3 (ref 4.14–5.8)
SODIUM SERPL-SCNC: 139 MMOL/L (ref 136–145)
WBC NRBC COR # BLD AUTO: 1.96 10*3/MM3 (ref 3.4–10.8)

## 2024-09-16 PROCEDURE — 77427 RADIATION TX MANAGEMENT X5: CPT | Performed by: RADIOLOGY

## 2024-09-16 PROCEDURE — 25810000003 SODIUM CHLORIDE 0.9 % SOLUTION 250 ML FLEX CONT: Performed by: NURSE PRACTITIONER

## 2024-09-16 PROCEDURE — G0498 CHEMO EXTEND IV INFUS W/PUMP: HCPCS

## 2024-09-16 PROCEDURE — 77014 CHG CT GUIDANCE RADIATION THERAPY FLDS PLACEMENT: CPT | Performed by: RADIOLOGY

## 2024-09-16 PROCEDURE — 77386: CPT | Performed by: RADIOLOGY

## 2024-09-16 PROCEDURE — 85025 COMPLETE CBC W/AUTO DIFF WBC: CPT

## 2024-09-16 PROCEDURE — 80053 COMPREHEN METABOLIC PANEL: CPT

## 2024-09-16 PROCEDURE — 25010000002 FLUOROURACIL PER 500 MG: Performed by: NURSE PRACTITIONER

## 2024-09-16 PROCEDURE — 99214 OFFICE O/P EST MOD 30 MIN: CPT | Performed by: NURSE PRACTITIONER

## 2024-09-16 RX ADMIN — FLUOROURACIL 1210 MG: 50 INJECTION, SOLUTION INTRAVENOUS at 14:07

## 2024-09-17 ENCOUNTER — HOSPITAL ENCOUNTER (OUTPATIENT)
Dept: RADIATION ONCOLOGY | Facility: HOSPITAL | Age: 44
Discharge: HOME | End: 2024-09-17

## 2024-09-17 LAB
RAD ONC ARIA COURSE ID: NORMAL
RAD ONC ARIA COURSE INTENT: NORMAL
RAD ONC ARIA COURSE LAST TREATMENT DATE: NORMAL
RAD ONC ARIA COURSE START DATE: NORMAL
RAD ONC ARIA COURSE TREATMENT ELAPSED DAYS: 8
RAD ONC ARIA FIRST TREATMENT DATE: NORMAL
RAD ONC ARIA PLAN FRACTIONS TREATED TO DATE: 7
RAD ONC ARIA PLAN ID: NORMAL
RAD ONC ARIA PLAN PRESCRIBED DOSE PER FRACTION: 1.8 GY
RAD ONC ARIA PLAN PRIMARY REFERENCE POINT: NORMAL
RAD ONC ARIA PLAN TOTAL FRACTIONS PRESCRIBED: 28
RAD ONC ARIA PLAN TOTAL PRESCRIBED DOSE: 5040 CGY
RAD ONC ARIA REFERENCE POINT DOSAGE GIVEN TO DATE: 12.6 GY
RAD ONC ARIA REFERENCE POINT ID: NORMAL
RAD ONC ARIA REFERENCE POINT SESSION DOSAGE GIVEN: 1.8 GY

## 2024-09-17 PROCEDURE — 77014 CHG CT GUIDANCE RADIATION THERAPY FLDS PLACEMENT: CPT | Performed by: RADIOLOGY

## 2024-09-17 PROCEDURE — 77386: CPT | Performed by: RADIOLOGY

## 2024-09-18 ENCOUNTER — HOSPITAL ENCOUNTER (OUTPATIENT)
Dept: RADIATION ONCOLOGY | Facility: HOSPITAL | Age: 44
Discharge: HOME | End: 2024-09-18

## 2024-09-18 LAB
RAD ONC ARIA COURSE ID: NORMAL
RAD ONC ARIA COURSE INTENT: NORMAL
RAD ONC ARIA COURSE LAST TREATMENT DATE: NORMAL
RAD ONC ARIA COURSE START DATE: NORMAL
RAD ONC ARIA COURSE TREATMENT ELAPSED DAYS: 9
RAD ONC ARIA FIRST TREATMENT DATE: NORMAL
RAD ONC ARIA PLAN FRACTIONS TREATED TO DATE: 8
RAD ONC ARIA PLAN ID: NORMAL
RAD ONC ARIA PLAN PRESCRIBED DOSE PER FRACTION: 1.8 GY
RAD ONC ARIA PLAN PRIMARY REFERENCE POINT: NORMAL
RAD ONC ARIA PLAN TOTAL FRACTIONS PRESCRIBED: 28
RAD ONC ARIA PLAN TOTAL PRESCRIBED DOSE: 5040 CGY
RAD ONC ARIA REFERENCE POINT DOSAGE GIVEN TO DATE: 14.4 GY
RAD ONC ARIA REFERENCE POINT ID: NORMAL
RAD ONC ARIA REFERENCE POINT SESSION DOSAGE GIVEN: 1.8 GY

## 2024-09-18 PROCEDURE — 77386: CPT | Performed by: RADIOLOGY

## 2024-09-18 PROCEDURE — 77014 CHG CT GUIDANCE RADIATION THERAPY FLDS PLACEMENT: CPT | Performed by: RADIOLOGY

## 2024-09-18 PROCEDURE — 77336 RADIATION PHYSICS CONSULT: CPT | Performed by: RADIOLOGY

## 2024-09-19 ENCOUNTER — HOSPITAL ENCOUNTER (OUTPATIENT)
Dept: RADIATION ONCOLOGY | Facility: HOSPITAL | Age: 44
Discharge: HOME | End: 2024-09-19

## 2024-09-19 LAB
RAD ONC ARIA COURSE ID: NORMAL
RAD ONC ARIA COURSE INTENT: NORMAL
RAD ONC ARIA COURSE LAST TREATMENT DATE: NORMAL
RAD ONC ARIA COURSE START DATE: NORMAL
RAD ONC ARIA COURSE TREATMENT ELAPSED DAYS: 10
RAD ONC ARIA FIRST TREATMENT DATE: NORMAL
RAD ONC ARIA PLAN FRACTIONS TREATED TO DATE: 9
RAD ONC ARIA PLAN ID: NORMAL
RAD ONC ARIA PLAN PRESCRIBED DOSE PER FRACTION: 1.8 GY
RAD ONC ARIA PLAN PRIMARY REFERENCE POINT: NORMAL
RAD ONC ARIA PLAN TOTAL FRACTIONS PRESCRIBED: 28
RAD ONC ARIA PLAN TOTAL PRESCRIBED DOSE: 5040 CGY
RAD ONC ARIA REFERENCE POINT DOSAGE GIVEN TO DATE: 16.2 GY
RAD ONC ARIA REFERENCE POINT ID: NORMAL
RAD ONC ARIA REFERENCE POINT SESSION DOSAGE GIVEN: 1.8 GY

## 2024-09-19 PROCEDURE — 77014 CHG CT GUIDANCE RADIATION THERAPY FLDS PLACEMENT: CPT | Performed by: RADIOLOGY

## 2024-09-19 PROCEDURE — 77386: CPT | Performed by: RADIOLOGY

## 2024-09-20 ENCOUNTER — RADIATION ONCOLOGY WEEKLY ASSESSMENT (OUTPATIENT)
Dept: RADIATION ONCOLOGY | Facility: HOSPITAL | Age: 44
End: 2024-09-20
Payer: COMMERCIAL

## 2024-09-20 ENCOUNTER — INFUSION (OUTPATIENT)
Dept: ONCOLOGY | Facility: HOSPITAL | Age: 44
End: 2024-09-20
Payer: COMMERCIAL

## 2024-09-20 ENCOUNTER — HOSPITAL ENCOUNTER (OUTPATIENT)
Dept: RADIATION ONCOLOGY | Facility: HOSPITAL | Age: 44
Discharge: HOME | End: 2024-09-20

## 2024-09-20 VITALS — SYSTOLIC BLOOD PRESSURE: 169 MMHG | DIASTOLIC BLOOD PRESSURE: 97 MMHG | HEART RATE: 95 BPM | OXYGEN SATURATION: 99 %

## 2024-09-20 DIAGNOSIS — C20 RECTAL ADENOCARCINOMA: Primary | ICD-10-CM

## 2024-09-20 LAB
RAD ONC ARIA COURSE ID: NORMAL
RAD ONC ARIA COURSE INTENT: NORMAL
RAD ONC ARIA COURSE LAST TREATMENT DATE: NORMAL
RAD ONC ARIA COURSE START DATE: NORMAL
RAD ONC ARIA COURSE TREATMENT ELAPSED DAYS: 11
RAD ONC ARIA FIRST TREATMENT DATE: NORMAL
RAD ONC ARIA PLAN FRACTIONS TREATED TO DATE: 10
RAD ONC ARIA PLAN ID: NORMAL
RAD ONC ARIA PLAN PRESCRIBED DOSE PER FRACTION: 1.8 GY
RAD ONC ARIA PLAN PRIMARY REFERENCE POINT: NORMAL
RAD ONC ARIA PLAN TOTAL FRACTIONS PRESCRIBED: 28
RAD ONC ARIA PLAN TOTAL PRESCRIBED DOSE: 5040 CGY
RAD ONC ARIA REFERENCE POINT DOSAGE GIVEN TO DATE: 18 GY
RAD ONC ARIA REFERENCE POINT ID: NORMAL
RAD ONC ARIA REFERENCE POINT SESSION DOSAGE GIVEN: 1.8 GY

## 2024-09-20 PROCEDURE — 77386: CPT | Performed by: RADIOLOGY

## 2024-09-20 PROCEDURE — 25010000002 HEPARIN LOCK FLUSH PER 10 UNITS: Performed by: NURSE PRACTITIONER

## 2024-09-20 PROCEDURE — 77014 CHG CT GUIDANCE RADIATION THERAPY FLDS PLACEMENT: CPT | Performed by: RADIOLOGY

## 2024-09-20 RX ORDER — SODIUM CHLORIDE 0.9 % (FLUSH) 0.9 %
10 SYRINGE (ML) INJECTION AS NEEDED
Status: DISCONTINUED | OUTPATIENT
Start: 2024-09-20 | End: 2024-09-20 | Stop reason: HOSPADM

## 2024-09-20 RX ORDER — HEPARIN SODIUM (PORCINE) LOCK FLUSH IV SOLN 100 UNIT/ML 100 UNIT/ML
500 SOLUTION INTRAVENOUS AS NEEDED
Status: DISCONTINUED | OUTPATIENT
Start: 2024-09-20 | End: 2024-09-20 | Stop reason: HOSPADM

## 2024-09-20 RX ORDER — HEPARIN SODIUM (PORCINE) LOCK FLUSH IV SOLN 100 UNIT/ML 100 UNIT/ML
500 SOLUTION INTRAVENOUS AS NEEDED
OUTPATIENT
Start: 2024-09-20

## 2024-09-20 RX ORDER — SODIUM CHLORIDE 0.9 % (FLUSH) 0.9 %
10 SYRINGE (ML) INJECTION AS NEEDED
OUTPATIENT
Start: 2024-09-20

## 2024-09-20 RX ADMIN — Medication 500 UNITS: at 11:39

## 2024-09-20 RX ADMIN — Medication 10 ML: at 11:39

## 2024-09-23 ENCOUNTER — TELEPHONE (OUTPATIENT)
Dept: ONCOLOGY | Facility: CLINIC | Age: 44
End: 2024-09-23

## 2024-09-23 ENCOUNTER — INFUSION (OUTPATIENT)
Dept: ONCOLOGY | Facility: HOSPITAL | Age: 44
End: 2024-09-23
Payer: COMMERCIAL

## 2024-09-23 ENCOUNTER — HOSPITAL ENCOUNTER (OUTPATIENT)
Dept: RADIATION ONCOLOGY | Facility: HOSPITAL | Age: 44
Discharge: HOME | End: 2024-09-23
Payer: COMMERCIAL

## 2024-09-23 ENCOUNTER — OFFICE VISIT (OUTPATIENT)
Dept: ONCOLOGY | Facility: CLINIC | Age: 44
End: 2024-09-23
Payer: COMMERCIAL

## 2024-09-23 VITALS
RESPIRATION RATE: 16 BRPM | WEIGHT: 229.2 LBS | DIASTOLIC BLOOD PRESSURE: 91 MMHG | SYSTOLIC BLOOD PRESSURE: 160 MMHG | TEMPERATURE: 98.5 F | HEART RATE: 99 BPM | HEIGHT: 69 IN | OXYGEN SATURATION: 98 % | BODY MASS INDEX: 33.95 KG/M2

## 2024-09-23 DIAGNOSIS — C20 RECTAL ADENOCARCINOMA: ICD-10-CM

## 2024-09-23 DIAGNOSIS — D70.8 OTHER NEUTROPENIA: ICD-10-CM

## 2024-09-23 DIAGNOSIS — C20 RECTAL ADENOCARCINOMA: Primary | ICD-10-CM

## 2024-09-23 DIAGNOSIS — Z79.899 HIGH RISK MEDICATION USE: ICD-10-CM

## 2024-09-23 LAB
ALBUMIN SERPL-MCNC: 4.5 G/DL (ref 3.5–5.2)
ALBUMIN/GLOB SERPL: 2 G/DL
ALP SERPL-CCNC: 87 U/L (ref 39–117)
ALT SERPL W P-5'-P-CCNC: 30 U/L (ref 1–41)
ANION GAP SERPL CALCULATED.3IONS-SCNC: 11.2 MMOL/L (ref 5–15)
AST SERPL-CCNC: 26 U/L (ref 1–40)
BASOPHILS # BLD AUTO: 0 10*3/MM3 (ref 0–0.2)
BASOPHILS NFR BLD AUTO: 0 % (ref 0–1.5)
BILIRUB SERPL-MCNC: 0.3 MG/DL (ref 0–1.2)
BUN SERPL-MCNC: 16 MG/DL (ref 6–20)
BUN/CREAT SERPL: 16.5 (ref 7–25)
CALCIUM SPEC-SCNC: 8.9 MG/DL (ref 8.6–10.5)
CHLORIDE SERPL-SCNC: 104 MMOL/L (ref 98–107)
CO2 SERPL-SCNC: 27.8 MMOL/L (ref 22–29)
CREAT SERPL-MCNC: 0.97 MG/DL (ref 0.76–1.27)
DEPRECATED RDW RBC AUTO: 47.6 FL (ref 37–54)
EGFRCR SERPLBLD CKD-EPI 2021: 97.5 ML/MIN/1.73
EOSINOPHIL # BLD AUTO: 0.15 10*3/MM3 (ref 0–0.4)
EOSINOPHIL NFR BLD AUTO: 6.1 % (ref 0.3–6.2)
ERYTHROCYTE [DISTWIDTH] IN BLOOD BY AUTOMATED COUNT: 12.9 % (ref 12.3–15.4)
GLOBULIN UR ELPH-MCNC: 2.3 GM/DL
GLUCOSE SERPL-MCNC: 162 MG/DL (ref 65–99)
HCT VFR BLD AUTO: 36.1 % (ref 37.5–51)
HGB BLD-MCNC: 12.1 G/DL (ref 13–17.7)
IMM GRANULOCYTES # BLD AUTO: 0 10*3/MM3 (ref 0–0.05)
IMM GRANULOCYTES NFR BLD AUTO: 0 % (ref 0–0.5)
LYMPHOCYTES # BLD AUTO: 0.46 10*3/MM3 (ref 0.7–3.1)
LYMPHOCYTES NFR BLD AUTO: 18.7 % (ref 19.6–45.3)
MCH RBC QN AUTO: 33.9 PG (ref 26.6–33)
MCHC RBC AUTO-ENTMCNC: 33.5 G/DL (ref 31.5–35.7)
MCV RBC AUTO: 101.1 FL (ref 79–97)
MONOCYTES # BLD AUTO: 0.38 10*3/MM3 (ref 0.1–0.9)
MONOCYTES NFR BLD AUTO: 15.4 % (ref 5–12)
NEUTROPHILS NFR BLD AUTO: 1.47 10*3/MM3 (ref 1.7–7)
NEUTROPHILS NFR BLD AUTO: 59.8 % (ref 42.7–76)
NRBC BLD AUTO-RTO: 0 /100 WBC (ref 0–0.2)
PLATELET # BLD AUTO: 175 10*3/MM3 (ref 140–450)
PMV BLD AUTO: 9.1 FL (ref 6–12)
POTASSIUM SERPL-SCNC: 4 MMOL/L (ref 3.5–5.2)
PROT SERPL-MCNC: 6.8 G/DL (ref 6–8.5)
RAD ONC ARIA COURSE ID: NORMAL
RAD ONC ARIA COURSE INTENT: NORMAL
RAD ONC ARIA COURSE LAST TREATMENT DATE: NORMAL
RAD ONC ARIA COURSE START DATE: NORMAL
RAD ONC ARIA COURSE TREATMENT ELAPSED DAYS: 14
RAD ONC ARIA FIRST TREATMENT DATE: NORMAL
RAD ONC ARIA PLAN FRACTIONS TREATED TO DATE: 11
RAD ONC ARIA PLAN ID: NORMAL
RAD ONC ARIA PLAN PRESCRIBED DOSE PER FRACTION: 1.8 GY
RAD ONC ARIA PLAN PRIMARY REFERENCE POINT: NORMAL
RAD ONC ARIA PLAN TOTAL FRACTIONS PRESCRIBED: 28
RAD ONC ARIA PLAN TOTAL PRESCRIBED DOSE: 5040 CGY
RAD ONC ARIA REFERENCE POINT DOSAGE GIVEN TO DATE: 19.8 GY
RAD ONC ARIA REFERENCE POINT ID: NORMAL
RAD ONC ARIA REFERENCE POINT SESSION DOSAGE GIVEN: 1.8 GY
RBC # BLD AUTO: 3.57 10*6/MM3 (ref 4.14–5.8)
SODIUM SERPL-SCNC: 143 MMOL/L (ref 136–145)
WBC NRBC COR # BLD AUTO: 2.46 10*3/MM3 (ref 3.4–10.8)

## 2024-09-23 PROCEDURE — G0498 CHEMO EXTEND IV INFUS W/PUMP: HCPCS

## 2024-09-23 PROCEDURE — 80053 COMPREHEN METABOLIC PANEL: CPT

## 2024-09-23 PROCEDURE — 77386: CPT | Performed by: RADIOLOGY

## 2024-09-23 PROCEDURE — 99214 OFFICE O/P EST MOD 30 MIN: CPT | Performed by: NURSE PRACTITIONER

## 2024-09-23 PROCEDURE — 25010000002 FLUOROURACIL PER 500 MG: Performed by: NURSE PRACTITIONER

## 2024-09-23 PROCEDURE — 85025 COMPLETE CBC W/AUTO DIFF WBC: CPT

## 2024-09-23 PROCEDURE — 25810000003 SODIUM CHLORIDE 0.9 % SOLUTION 250 ML FLEX CONT: Performed by: NURSE PRACTITIONER

## 2024-09-23 RX ORDER — DOCUSATE SODIUM 100 MG/1
100 CAPSULE, LIQUID FILLED ORAL 2 TIMES DAILY
Qty: 60 CAPSULE | Refills: 0 | Status: SHIPPED | OUTPATIENT
Start: 2024-09-23

## 2024-09-23 RX ADMIN — FLUOROURACIL 1210 MG: 50 INJECTION, SOLUTION INTRAVENOUS at 12:45

## 2024-09-24 ENCOUNTER — HOSPITAL ENCOUNTER (OUTPATIENT)
Dept: RADIATION ONCOLOGY | Facility: HOSPITAL | Age: 44
Discharge: HOME | End: 2024-09-24

## 2024-09-24 LAB
RAD ONC ARIA COURSE ID: NORMAL
RAD ONC ARIA COURSE INTENT: NORMAL
RAD ONC ARIA COURSE LAST TREATMENT DATE: NORMAL
RAD ONC ARIA COURSE START DATE: NORMAL
RAD ONC ARIA COURSE TREATMENT ELAPSED DAYS: 15
RAD ONC ARIA FIRST TREATMENT DATE: NORMAL
RAD ONC ARIA PLAN FRACTIONS TREATED TO DATE: 12
RAD ONC ARIA PLAN ID: NORMAL
RAD ONC ARIA PLAN PRESCRIBED DOSE PER FRACTION: 1.8 GY
RAD ONC ARIA PLAN PRIMARY REFERENCE POINT: NORMAL
RAD ONC ARIA PLAN TOTAL FRACTIONS PRESCRIBED: 28
RAD ONC ARIA PLAN TOTAL PRESCRIBED DOSE: 5040 CGY
RAD ONC ARIA REFERENCE POINT DOSAGE GIVEN TO DATE: 21.6 GY
RAD ONC ARIA REFERENCE POINT ID: NORMAL
RAD ONC ARIA REFERENCE POINT SESSION DOSAGE GIVEN: 1.8 GY

## 2024-09-24 PROCEDURE — 77386: CPT | Performed by: RADIOLOGY

## 2024-09-25 ENCOUNTER — HOSPITAL ENCOUNTER (OUTPATIENT)
Dept: RADIATION ONCOLOGY | Facility: HOSPITAL | Age: 44
Discharge: HOME | End: 2024-09-25

## 2024-09-25 LAB
RAD ONC ARIA COURSE ID: NORMAL
RAD ONC ARIA COURSE INTENT: NORMAL
RAD ONC ARIA COURSE LAST TREATMENT DATE: NORMAL
RAD ONC ARIA COURSE START DATE: NORMAL
RAD ONC ARIA COURSE TREATMENT ELAPSED DAYS: 16
RAD ONC ARIA FIRST TREATMENT DATE: NORMAL
RAD ONC ARIA PLAN FRACTIONS TREATED TO DATE: 13
RAD ONC ARIA PLAN ID: NORMAL
RAD ONC ARIA PLAN PRESCRIBED DOSE PER FRACTION: 1.8 GY
RAD ONC ARIA PLAN PRIMARY REFERENCE POINT: NORMAL
RAD ONC ARIA PLAN TOTAL FRACTIONS PRESCRIBED: 28
RAD ONC ARIA PLAN TOTAL PRESCRIBED DOSE: 5040 CGY
RAD ONC ARIA REFERENCE POINT DOSAGE GIVEN TO DATE: 23.4 GY
RAD ONC ARIA REFERENCE POINT ID: NORMAL
RAD ONC ARIA REFERENCE POINT SESSION DOSAGE GIVEN: 1.8 GY

## 2024-09-25 PROCEDURE — 77386: CPT | Performed by: RADIOLOGY

## 2024-09-25 PROCEDURE — 77336 RADIATION PHYSICS CONSULT: CPT | Performed by: RADIOLOGY

## 2024-09-26 ENCOUNTER — HOSPITAL ENCOUNTER (OUTPATIENT)
Dept: RADIATION ONCOLOGY | Facility: HOSPITAL | Age: 44
Discharge: HOME | End: 2024-09-26

## 2024-09-26 LAB
RAD ONC ARIA COURSE ID: NORMAL
RAD ONC ARIA COURSE INTENT: NORMAL
RAD ONC ARIA COURSE LAST TREATMENT DATE: NORMAL
RAD ONC ARIA COURSE START DATE: NORMAL
RAD ONC ARIA COURSE TREATMENT ELAPSED DAYS: 17
RAD ONC ARIA FIRST TREATMENT DATE: NORMAL
RAD ONC ARIA PLAN FRACTIONS TREATED TO DATE: 14
RAD ONC ARIA PLAN ID: NORMAL
RAD ONC ARIA PLAN PRESCRIBED DOSE PER FRACTION: 1.8 GY
RAD ONC ARIA PLAN PRIMARY REFERENCE POINT: NORMAL
RAD ONC ARIA PLAN TOTAL FRACTIONS PRESCRIBED: 28
RAD ONC ARIA PLAN TOTAL PRESCRIBED DOSE: 5040 CGY
RAD ONC ARIA REFERENCE POINT DOSAGE GIVEN TO DATE: 25.2 GY
RAD ONC ARIA REFERENCE POINT ID: NORMAL
RAD ONC ARIA REFERENCE POINT SESSION DOSAGE GIVEN: 1.8 GY

## 2024-09-26 PROCEDURE — 77386: CPT | Performed by: RADIOLOGY

## 2024-09-26 NOTE — PROGRESS NOTES
Follow-up (Lab review & Tx)    SUBJECTIVE:    09/30/2024, Interval history  Week 4 of concurrent chemoradiation with 5-FU.  Tolerating treatment quite well so far.  Denies any fever nausea vomiting ER visits or hospitalization.  Reports skin rash is much improved with use of over the counter steroid cream      HISTORY OF PRESENT ILLNESS:  The patient is a 44 y.o. year old male with recently diagnosed rectal adenocarcinoma who presents to our clinic to establish care.  Onc history is outlined below    Family history significant for prostate cancer in father at 63, paternal uncle with colon cancer at 57, other paternal uncle with metastatic cancer, type unknown in his 50s, other paternal uncle with possible colon cancer and DVT/PE in his 70s.    Oncology/Hematology History   Rectal adenocarcinoma   4/23/2024 Procedure         4/23/2024 [place]         5/2/2024 Imaging    CT CAP    A 1.7 cm right hepatic cyst is noted. Numerous small subcentimeter liver  low densities are present that are too small to characterize, could be  cysts, or potentially metastatic disease, interval follow-up can  characterize change.    No bowel obstruction. The sigmorectal mass is better characterized on  the MRI exam of same date (please see separate report). Some stranding  is apparent in the fat around the lesion, numerous surrounding lymph  nodes are present that could be metastatic lymph nodes. For example, on  axial image 165, a 1.9 x 1.6 cm pericolonic lymph node is present. As  another example, on axial image 170, left perirectal lymph node measures  0.7 x 1.2 cm.    IMPRESSION:        1. Sigmorectal mass with numerous surrounding lymph nodes, raising  suspicion for metastatic lymph nodes.     2. Hepatic cyst, and numerous liver low densities that are too small to  characterize; these lesions could also be cysts, but in this clinical  setting, possibility of any metastatic liver lesions is not excluded.  Interval follow-up  is advised to characterize change.     3. No pulmonary mass.     5/2/2024 Imaging    MRI Pelvis    FINDINGS: There is a circumferential solid low rectal mass which  measures approximately 4.5 cm in craniocaudad span and the inferior  margin is approximately 5 mm proximal to the anorectal junction.     The tumor extends approximately 8 mm into the mesorectal fat  predominantly along the right wall of the mass. There appears to be  extension to the mesorectal fascia along the right wall and abutment of  the right levator ani muscles. There are several enlarged perirectal  nodes and the largest is presacral measuring approximately 2.0 x 1.2 cm.  There are at least 4 enhancing suspicious nodes. There is no definite  extramural vascular invasion.     IMPRESSION:  1. Primary Tumor Location: Low rectal     2. MRI Stage: T3 N2 MRF positive     3. No definite sphincter involvement     5/2/2024 Cancer Staged    Staging form: Colon And Rectum, AJCC 8th Edition  - Clinical stage from 5/2/2024: cT3, cN2, cM0 - Signed by Sonali Pak MD on 6/3/2024     5/3/2024 Initial Diagnosis    Rectal adenocarcinoma     5/16/2024 Imaging    MRI abdo    IMPRESSION:  T2 hyperintense nonenhancing liver lesions are consistent with hepatic  cysts and biliary cystic hamartomas. No convincing liver metastasis  identified     5/24/2024 Procedure    PORT placement (Dr Isaac)     6/3/2024 - 8/21/2024 Chemotherapy    OP COLORECTAL FOLFIRINOX (Fluorouracil cont. Infusion / Leucovorin / OXALIplatin / Irinotecan)     6/5/2024 -  Chemotherapy    OP CENTRAL VENOUS ACCESS DEVICE Access, Care, and Maintenance (CVAD)     9/9/2024 - 9/9/2024 Chemotherapy    OP COLORECTAL Fluorouracil CIV over 168H + XRT     9/9/2024 -  Chemotherapy    OP COLORECTAL Fluorouracil CIV over 96 H + XRT         The current medication list and allergy list were reviewed and reconciled.      Past Medical History, Past Surgical History, Social History, Family History have been  "reviewed and are without significant changes except as mentioned.      REVIEW OF SYSTEMS:  See interval history    Objective:       Vitals:    09/30/24 0933   BP: 173/93   Pulse: 89   Resp: 18   Temp: 98 °F (36.7 °C)   TempSrc: Oral   SpO2: 98%   Weight: 103 kg (227 lb)   Height: 175.3 cm (69.02\")   PainSc: 0-No pain               9/30/2024     9:33 AM   Current Status   ECOG score 0      PHYSICAL EXAM:    CONSTITUTIONAL:  Vital signs reviewed.  No distress, looks comfortable.  RESPIRATORY: Bilateral lungs clear to auscultation.  No wheezing or rhonchi   CARDIOVASCULAR: Normal S1-S2.  No murmur rubs or gallop   GASTROINTESTINAL: Soft, nontender, bowel sounds present  NEURO:  No focal deficits.  Appears to have equal strength all 4 extremities.    I have reexamined the patient and the results are consistent with the previously documented exam. Sonali Pak MD        Diagnostic data  CBC and Differential (09/23/2024 11:22)  Comprehensive metabolic panel (09/23/2024 11:22)    Assessment & Plan     *Rectal adenocarcinoma (cT3 cN2 cM0), LUCAS, TMB low  4/23/2024 rectal biopsy-invasive moderately differentiated adenocarcinoma, LUCAS.  5/2/2024 MRI pelvis: Approximately 4.5 cm circumferential low rectal mass, approximately 5 mm proximal to anorectal junction, extending 8 mm in the mesorectal fat.  Appears to be extension to MRF along the right wall and abutment of right levator ani muscles.  Several enlarged perirectal nodes, largest 2 x 1.2 cm.  At least 4 enhancing suspicious nodes.  cT3 cN2 MRF positive.  No definitive sphincter involvement  5/2/2024 CT chest abdomen pelvis: 1.7 cm right hepatic cyst, and numerous low-density liver lesions-cyst versus possible metastatic lesions.  No lung mass  5/16/2024 MRI abdomen: T2 hyperintense nonenhancing liver lesions consistent with liver cyst and biliary cystic hamartomas.  No evidence of liver mets  Tumor genomic profile-APC (I269fs), APC ([id number]), FANCD2 (Q823), TP53. TMB 1 " mut/Mb. LUCAS  6/3/2024 baseline CEA: 1.89  6/3/2024 - 08/19/2024: s/p 6 cycles of  FOLFIRINOX (5-FU bolus eliminated to prevent further myelosuppression given baseline neutropenia; 25% dose reduction starting cycle 3) with G-CSF support  8/27/2024 MRI pelvis-good response.  No clear measurable rectal mass.  Decrease in the size of perirectal lymph node, measuring less than 5 mm; decrease in the size of enlarged hide perirectal lymph nodes/mary metastases.  08/28/2024 CT CAP: Apparent decrease size of rectal mass, decreased pericolonic and perirectal lymph nodes.  1 axillary lymph node and 1 liver low-density (too small to characterize) measures slightly larger, uncertain clinical significance.  Plan for total neoadjuvant chemotherapy with FOLFIRINOX followed by chemoradiation  9/9/2024: Cycle 1 day 1 concurrent chemoradiation with 5-FU.  9/16/2024 proceed with week 2 concurrent chemoradiation with infusional 5-FU.  50% dose reduction of 5-FU due to neutropenia  9/23/2024 proceed with week 3 concurrent chemoradiation with infusional 5-FU maintaining previous 50% dose reduction  09/30/2024: Concurrent chemoradiation with infusional 5-FU with 50% dose reduction    *Solitary axillary lymph node  *Solitary low-density liver lesion  8/28/2024 seen on CT chest abdomen pelvis, too small to characterize.    *Benign ethnic neutropenia  White cell count ranged between 2.6-3.2 since at least July 2019.    8/19/2024: WBC 3.06, ANC 1.27  9/16/2024 WBC declined at 1.96, ANC 0.89.  9/30/2024 WBC 1.71, .  Continue with 50% dose reduction of 5-FU infusion    *Skin rash, bilateral upper extremities, lower extremities and abdomen, improved  Patient reports improvement with above for application  Likely secondary to chemotherapy  Improved with topical over-the-counter cortisone cream    Plan:   Proceed today with week 4 of concurrent chemoradiation with infusional 5-FU with 50% dose reduction.   today  MD visit in 1 week  for CBC, CMP, MD follow-up and consideration of week 5 concurrent chemoradiation  NP visit on 10/14 for last 5-FU infusion  Plan for response assessment scans (CT chest abdomen pelvis, MRI pelvis) in about 6 weeks after completion of chemoradiation  Dr. Isaac to see the patient after MRI pelvis    Patient continues on high risk medication requiring close monitoring for toxicity    Sonali Pak MD  09/30/2024         Patient is on chemotherapy, which requires frequent monitoring

## 2024-09-27 ENCOUNTER — OFFICE VISIT (OUTPATIENT)
Dept: PSYCHIATRY | Facility: HOSPITAL | Age: 44
End: 2024-09-27
Payer: COMMERCIAL

## 2024-09-27 ENCOUNTER — HOSPITAL ENCOUNTER (OUTPATIENT)
Dept: RADIATION ONCOLOGY | Facility: HOSPITAL | Age: 44
Discharge: HOME | End: 2024-09-27

## 2024-09-27 ENCOUNTER — INFUSION (OUTPATIENT)
Dept: ONCOLOGY | Facility: HOSPITAL | Age: 44
End: 2024-09-27
Payer: COMMERCIAL

## 2024-09-27 ENCOUNTER — RADIATION ONCOLOGY WEEKLY ASSESSMENT (OUTPATIENT)
Dept: RADIATION ONCOLOGY | Facility: HOSPITAL | Age: 44
End: 2024-09-27
Payer: COMMERCIAL

## 2024-09-27 VITALS
DIASTOLIC BLOOD PRESSURE: 103 MMHG | BODY MASS INDEX: 33.51 KG/M2 | SYSTOLIC BLOOD PRESSURE: 181 MMHG | OXYGEN SATURATION: 99 % | WEIGHT: 227 LBS | HEART RATE: 104 BPM

## 2024-09-27 DIAGNOSIS — F43.23 ADJUSTMENT DISORDER WITH MIXED ANXIETY AND DEPRESSED MOOD: ICD-10-CM

## 2024-09-27 DIAGNOSIS — C20 RECTAL ADENOCARCINOMA: Primary | ICD-10-CM

## 2024-09-27 DIAGNOSIS — F90.2 ATTENTION DEFICIT HYPERACTIVITY DISORDER, COMBINED TYPE: Primary | ICD-10-CM

## 2024-09-27 LAB
RAD ONC ARIA COURSE ID: NORMAL
RAD ONC ARIA COURSE INTENT: NORMAL
RAD ONC ARIA COURSE LAST TREATMENT DATE: NORMAL
RAD ONC ARIA COURSE START DATE: NORMAL
RAD ONC ARIA COURSE TREATMENT ELAPSED DAYS: 18
RAD ONC ARIA FIRST TREATMENT DATE: NORMAL
RAD ONC ARIA PLAN FRACTIONS TREATED TO DATE: 15
RAD ONC ARIA PLAN ID: NORMAL
RAD ONC ARIA PLAN PRESCRIBED DOSE PER FRACTION: 1.8 GY
RAD ONC ARIA PLAN PRIMARY REFERENCE POINT: NORMAL
RAD ONC ARIA PLAN TOTAL FRACTIONS PRESCRIBED: 28
RAD ONC ARIA PLAN TOTAL PRESCRIBED DOSE: 5040 CGY
RAD ONC ARIA REFERENCE POINT DOSAGE GIVEN TO DATE: 27 GY
RAD ONC ARIA REFERENCE POINT ID: NORMAL
RAD ONC ARIA REFERENCE POINT SESSION DOSAGE GIVEN: 1.8 GY

## 2024-09-27 PROCEDURE — 25010000002 HEPARIN LOCK FLUSH PER 10 UNITS: Performed by: NURSE PRACTITIONER

## 2024-09-27 PROCEDURE — 77386: CPT | Performed by: RADIOLOGY

## 2024-09-27 RX ORDER — HEPARIN SODIUM (PORCINE) LOCK FLUSH IV SOLN 100 UNIT/ML 100 UNIT/ML
500 SOLUTION INTRAVENOUS AS NEEDED
Status: DISCONTINUED | OUTPATIENT
Start: 2024-09-27 | End: 2024-09-27 | Stop reason: HOSPADM

## 2024-09-27 RX ORDER — SODIUM CHLORIDE 0.9 % (FLUSH) 0.9 %
10 SYRINGE (ML) INJECTION AS NEEDED
OUTPATIENT
Start: 2024-09-27

## 2024-09-27 RX ORDER — SODIUM CHLORIDE 0.9 % (FLUSH) 0.9 %
10 SYRINGE (ML) INJECTION AS NEEDED
Status: DISCONTINUED | OUTPATIENT
Start: 2024-09-27 | End: 2024-09-27 | Stop reason: HOSPADM

## 2024-09-27 RX ORDER — HEPARIN SODIUM (PORCINE) LOCK FLUSH IV SOLN 100 UNIT/ML 100 UNIT/ML
500 SOLUTION INTRAVENOUS AS NEEDED
OUTPATIENT
Start: 2024-09-27

## 2024-09-27 RX ADMIN — Medication 500 UNITS: at 09:53

## 2024-09-27 RX ADMIN — Medication 10 ML: at 09:53

## 2024-09-30 ENCOUNTER — INFUSION (OUTPATIENT)
Dept: ONCOLOGY | Facility: HOSPITAL | Age: 44
End: 2024-09-30
Payer: COMMERCIAL

## 2024-09-30 ENCOUNTER — OFFICE VISIT (OUTPATIENT)
Dept: ONCOLOGY | Facility: CLINIC | Age: 44
End: 2024-09-30
Payer: COMMERCIAL

## 2024-09-30 ENCOUNTER — TELEPHONE (OUTPATIENT)
Dept: SURGERY | Facility: CLINIC | Age: 44
End: 2024-09-30
Payer: COMMERCIAL

## 2024-09-30 ENCOUNTER — HOSPITAL ENCOUNTER (OUTPATIENT)
Dept: RADIATION ONCOLOGY | Facility: HOSPITAL | Age: 44
Discharge: HOME | End: 2024-09-30
Payer: COMMERCIAL

## 2024-09-30 VITALS
OXYGEN SATURATION: 98 % | HEART RATE: 89 BPM | RESPIRATION RATE: 18 BRPM | SYSTOLIC BLOOD PRESSURE: 173 MMHG | DIASTOLIC BLOOD PRESSURE: 93 MMHG | TEMPERATURE: 98 F | BODY MASS INDEX: 33.62 KG/M2 | HEIGHT: 69 IN | WEIGHT: 227 LBS

## 2024-09-30 DIAGNOSIS — C20 RECTAL ADENOCARCINOMA: ICD-10-CM

## 2024-09-30 DIAGNOSIS — D70.8 OTHER NEUTROPENIA: ICD-10-CM

## 2024-09-30 DIAGNOSIS — Z01.818 EXAMINATION PRIOR TO CHEMOTHERAPY: ICD-10-CM

## 2024-09-30 DIAGNOSIS — C20 RECTAL ADENOCARCINOMA: Primary | ICD-10-CM

## 2024-09-30 LAB
ALBUMIN SERPL-MCNC: 4.4 G/DL (ref 3.5–5.2)
ALBUMIN/GLOB SERPL: 1.9 G/DL
ALP SERPL-CCNC: 80 U/L (ref 39–117)
ALT SERPL W P-5'-P-CCNC: 25 U/L (ref 1–41)
ANION GAP SERPL CALCULATED.3IONS-SCNC: 12.2 MMOL/L (ref 5–15)
AST SERPL-CCNC: 24 U/L (ref 1–40)
BASOPHILS # BLD AUTO: 0 10*3/MM3 (ref 0–0.2)
BASOPHILS NFR BLD AUTO: 0 % (ref 0–1.5)
BILIRUB SERPL-MCNC: 0.3 MG/DL (ref 0–1.2)
BUN SERPL-MCNC: 11 MG/DL (ref 6–20)
BUN/CREAT SERPL: 12.4 (ref 7–25)
CALCIUM SPEC-SCNC: 9 MG/DL (ref 8.6–10.5)
CHLORIDE SERPL-SCNC: 100 MMOL/L (ref 98–107)
CO2 SERPL-SCNC: 27.8 MMOL/L (ref 22–29)
CREAT SERPL-MCNC: 0.89 MG/DL (ref 0.76–1.27)
DEPRECATED RDW RBC AUTO: 45.3 FL (ref 37–54)
EGFRCR SERPLBLD CKD-EPI 2021: 107 ML/MIN/1.73
EOSINOPHIL # BLD AUTO: 0.16 10*3/MM3 (ref 0–0.4)
EOSINOPHIL NFR BLD AUTO: 9.4 % (ref 0.3–6.2)
ERYTHROCYTE [DISTWIDTH] IN BLOOD BY AUTOMATED COUNT: 12.5 % (ref 12.3–15.4)
GLOBULIN UR ELPH-MCNC: 2.3 GM/DL
GLUCOSE SERPL-MCNC: 160 MG/DL (ref 65–99)
HCT VFR BLD AUTO: 36.3 % (ref 37.5–51)
HGB BLD-MCNC: 12.2 G/DL (ref 13–17.7)
IMM GRANULOCYTES # BLD AUTO: 0.01 10*3/MM3 (ref 0–0.05)
IMM GRANULOCYTES NFR BLD AUTO: 0.6 % (ref 0–0.5)
LYMPHOCYTES # BLD AUTO: 0.31 10*3/MM3 (ref 0.7–3.1)
LYMPHOCYTES NFR BLD AUTO: 18.1 % (ref 19.6–45.3)
MCH RBC QN AUTO: 33.3 PG (ref 26.6–33)
MCHC RBC AUTO-ENTMCNC: 33.6 G/DL (ref 31.5–35.7)
MCV RBC AUTO: 99.2 FL (ref 79–97)
MONOCYTES # BLD AUTO: 0.31 10*3/MM3 (ref 0.1–0.9)
MONOCYTES NFR BLD AUTO: 18.1 % (ref 5–12)
NEUTROPHILS NFR BLD AUTO: 0.92 10*3/MM3 (ref 1.7–7)
NEUTROPHILS NFR BLD AUTO: 53.8 % (ref 42.7–76)
NRBC BLD AUTO-RTO: 0 /100 WBC (ref 0–0.2)
PLATELET # BLD AUTO: 161 10*3/MM3 (ref 140–450)
PMV BLD AUTO: 9.2 FL (ref 6–12)
POTASSIUM SERPL-SCNC: 3.8 MMOL/L (ref 3.5–5.2)
PROT SERPL-MCNC: 6.7 G/DL (ref 6–8.5)
RAD ONC ARIA COURSE ID: NORMAL
RAD ONC ARIA COURSE INTENT: NORMAL
RAD ONC ARIA COURSE LAST TREATMENT DATE: NORMAL
RAD ONC ARIA COURSE START DATE: NORMAL
RAD ONC ARIA COURSE TREATMENT ELAPSED DAYS: 21
RAD ONC ARIA FIRST TREATMENT DATE: NORMAL
RAD ONC ARIA PLAN FRACTIONS TREATED TO DATE: 16
RAD ONC ARIA PLAN ID: NORMAL
RAD ONC ARIA PLAN PRESCRIBED DOSE PER FRACTION: 1.8 GY
RAD ONC ARIA PLAN PRIMARY REFERENCE POINT: NORMAL
RAD ONC ARIA PLAN TOTAL FRACTIONS PRESCRIBED: 28
RAD ONC ARIA PLAN TOTAL PRESCRIBED DOSE: 5040 CGY
RAD ONC ARIA REFERENCE POINT DOSAGE GIVEN TO DATE: 28.8 GY
RAD ONC ARIA REFERENCE POINT ID: NORMAL
RAD ONC ARIA REFERENCE POINT SESSION DOSAGE GIVEN: 1.8 GY
RBC # BLD AUTO: 3.66 10*6/MM3 (ref 4.14–5.8)
SODIUM SERPL-SCNC: 140 MMOL/L (ref 136–145)
WBC NRBC COR # BLD AUTO: 1.71 10*3/MM3 (ref 3.4–10.8)

## 2024-09-30 PROCEDURE — 99214 OFFICE O/P EST MOD 30 MIN: CPT | Performed by: STUDENT IN AN ORGANIZED HEALTH CARE EDUCATION/TRAINING PROGRAM

## 2024-09-30 PROCEDURE — G0498 CHEMO EXTEND IV INFUS W/PUMP: HCPCS

## 2024-09-30 PROCEDURE — 25010000002 FLUOROURACIL PER 500 MG: Performed by: STUDENT IN AN ORGANIZED HEALTH CARE EDUCATION/TRAINING PROGRAM

## 2024-09-30 PROCEDURE — 77427 RADIATION TX MANAGEMENT X5: CPT | Performed by: RADIOLOGY

## 2024-09-30 PROCEDURE — 80053 COMPREHEN METABOLIC PANEL: CPT

## 2024-09-30 PROCEDURE — 85025 COMPLETE CBC W/AUTO DIFF WBC: CPT

## 2024-09-30 PROCEDURE — 25810000003 SODIUM CHLORIDE 0.9 % SOLUTION 250 ML FLEX CONT: Performed by: STUDENT IN AN ORGANIZED HEALTH CARE EDUCATION/TRAINING PROGRAM

## 2024-09-30 PROCEDURE — 77386: CPT | Performed by: RADIOLOGY

## 2024-09-30 RX ADMIN — FLUOROURACIL 1210 MG: 50 INJECTION, SOLUTION INTRAVENOUS at 10:33

## 2024-09-30 NOTE — TELEPHONE ENCOUNTER
BARTOLOM for patient to schedule follow up appointment at end of November or early December with Dr. Isaac

## 2024-10-01 ENCOUNTER — HOSPITAL ENCOUNTER (OUTPATIENT)
Dept: RADIATION ONCOLOGY | Facility: HOSPITAL | Age: 44
Discharge: HOME | End: 2024-10-01

## 2024-10-01 ENCOUNTER — HOSPITAL ENCOUNTER (OUTPATIENT)
Dept: RADIATION ONCOLOGY | Facility: HOSPITAL | Age: 44
Setting detail: RADIATION/ONCOLOGY SERIES
End: 2024-10-01
Payer: COMMERCIAL

## 2024-10-01 LAB
RAD ONC ARIA COURSE ID: NORMAL
RAD ONC ARIA COURSE INTENT: NORMAL
RAD ONC ARIA COURSE LAST TREATMENT DATE: NORMAL
RAD ONC ARIA COURSE START DATE: NORMAL
RAD ONC ARIA COURSE TREATMENT ELAPSED DAYS: 22
RAD ONC ARIA FIRST TREATMENT DATE: NORMAL
RAD ONC ARIA PLAN FRACTIONS TREATED TO DATE: 17
RAD ONC ARIA PLAN ID: NORMAL
RAD ONC ARIA PLAN PRESCRIBED DOSE PER FRACTION: 1.8 GY
RAD ONC ARIA PLAN PRIMARY REFERENCE POINT: NORMAL
RAD ONC ARIA PLAN TOTAL FRACTIONS PRESCRIBED: 28
RAD ONC ARIA PLAN TOTAL PRESCRIBED DOSE: 5040 CGY
RAD ONC ARIA REFERENCE POINT DOSAGE GIVEN TO DATE: 30.6 GY
RAD ONC ARIA REFERENCE POINT ID: NORMAL
RAD ONC ARIA REFERENCE POINT SESSION DOSAGE GIVEN: 1.8 GY

## 2024-10-01 PROCEDURE — 77014 CHG CT GUIDANCE RADIATION THERAPY FLDS PLACEMENT: CPT | Performed by: RADIOLOGY

## 2024-10-01 PROCEDURE — 77386: CPT | Performed by: RADIOLOGY

## 2024-10-02 ENCOUNTER — HOSPITAL ENCOUNTER (OUTPATIENT)
Dept: RADIATION ONCOLOGY | Facility: HOSPITAL | Age: 44
Discharge: HOME | End: 2024-10-02

## 2024-10-02 LAB
RAD ONC ARIA COURSE ID: NORMAL
RAD ONC ARIA COURSE INTENT: NORMAL
RAD ONC ARIA COURSE LAST TREATMENT DATE: NORMAL
RAD ONC ARIA COURSE START DATE: NORMAL
RAD ONC ARIA COURSE TREATMENT ELAPSED DAYS: 23
RAD ONC ARIA FIRST TREATMENT DATE: NORMAL
RAD ONC ARIA PLAN FRACTIONS TREATED TO DATE: 18
RAD ONC ARIA PLAN ID: NORMAL
RAD ONC ARIA PLAN PRESCRIBED DOSE PER FRACTION: 1.8 GY
RAD ONC ARIA PLAN PRIMARY REFERENCE POINT: NORMAL
RAD ONC ARIA PLAN TOTAL FRACTIONS PRESCRIBED: 28
RAD ONC ARIA PLAN TOTAL PRESCRIBED DOSE: 5040 CGY
RAD ONC ARIA REFERENCE POINT DOSAGE GIVEN TO DATE: 32.4 GY
RAD ONC ARIA REFERENCE POINT ID: NORMAL
RAD ONC ARIA REFERENCE POINT SESSION DOSAGE GIVEN: 1.8 GY

## 2024-10-02 PROCEDURE — 77336 RADIATION PHYSICS CONSULT: CPT | Performed by: RADIOLOGY

## 2024-10-02 PROCEDURE — 77386: CPT | Performed by: RADIOLOGY

## 2024-10-02 PROCEDURE — 77014 CHG CT GUIDANCE RADIATION THERAPY FLDS PLACEMENT: CPT | Performed by: RADIOLOGY

## 2024-10-03 ENCOUNTER — HOSPITAL ENCOUNTER (OUTPATIENT)
Dept: RADIATION ONCOLOGY | Facility: HOSPITAL | Age: 44
Discharge: HOME | End: 2024-10-03

## 2024-10-03 LAB
RAD ONC ARIA COURSE ID: NORMAL
RAD ONC ARIA COURSE INTENT: NORMAL
RAD ONC ARIA COURSE LAST TREATMENT DATE: NORMAL
RAD ONC ARIA COURSE START DATE: NORMAL
RAD ONC ARIA COURSE TREATMENT ELAPSED DAYS: 24
RAD ONC ARIA FIRST TREATMENT DATE: NORMAL
RAD ONC ARIA PLAN FRACTIONS TREATED TO DATE: 19
RAD ONC ARIA PLAN ID: NORMAL
RAD ONC ARIA PLAN PRESCRIBED DOSE PER FRACTION: 1.8 GY
RAD ONC ARIA PLAN PRIMARY REFERENCE POINT: NORMAL
RAD ONC ARIA PLAN TOTAL FRACTIONS PRESCRIBED: 28
RAD ONC ARIA PLAN TOTAL PRESCRIBED DOSE: 5040 CGY
RAD ONC ARIA REFERENCE POINT DOSAGE GIVEN TO DATE: 34.2 GY
RAD ONC ARIA REFERENCE POINT ID: NORMAL
RAD ONC ARIA REFERENCE POINT SESSION DOSAGE GIVEN: 1.8 GY

## 2024-10-03 PROCEDURE — 77014 CHG CT GUIDANCE RADIATION THERAPY FLDS PLACEMENT: CPT | Performed by: RADIOLOGY

## 2024-10-03 PROCEDURE — 77386: CPT | Performed by: RADIOLOGY

## 2024-10-04 ENCOUNTER — INFUSION (OUTPATIENT)
Dept: ONCOLOGY | Facility: HOSPITAL | Age: 44
End: 2024-10-04
Payer: COMMERCIAL

## 2024-10-04 ENCOUNTER — RADIATION ONCOLOGY WEEKLY ASSESSMENT (OUTPATIENT)
Dept: RADIATION ONCOLOGY | Facility: HOSPITAL | Age: 44
End: 2024-10-04
Payer: COMMERCIAL

## 2024-10-04 ENCOUNTER — HOSPITAL ENCOUNTER (OUTPATIENT)
Dept: RADIATION ONCOLOGY | Facility: HOSPITAL | Age: 44
Discharge: HOME | End: 2024-10-04

## 2024-10-04 VITALS
BODY MASS INDEX: 33.15 KG/M2 | OXYGEN SATURATION: 100 % | HEART RATE: 95 BPM | DIASTOLIC BLOOD PRESSURE: 100 MMHG | WEIGHT: 224.6 LBS | SYSTOLIC BLOOD PRESSURE: 167 MMHG

## 2024-10-04 DIAGNOSIS — C20 RECTAL ADENOCARCINOMA: Primary | ICD-10-CM

## 2024-10-04 LAB
RAD ONC ARIA COURSE ID: NORMAL
RAD ONC ARIA COURSE INTENT: NORMAL
RAD ONC ARIA COURSE LAST TREATMENT DATE: NORMAL
RAD ONC ARIA COURSE START DATE: NORMAL
RAD ONC ARIA COURSE TREATMENT ELAPSED DAYS: 25
RAD ONC ARIA FIRST TREATMENT DATE: NORMAL
RAD ONC ARIA PLAN FRACTIONS TREATED TO DATE: 20
RAD ONC ARIA PLAN ID: NORMAL
RAD ONC ARIA PLAN PRESCRIBED DOSE PER FRACTION: 1.8 GY
RAD ONC ARIA PLAN PRIMARY REFERENCE POINT: NORMAL
RAD ONC ARIA PLAN TOTAL FRACTIONS PRESCRIBED: 28
RAD ONC ARIA PLAN TOTAL PRESCRIBED DOSE: 5040 CGY
RAD ONC ARIA REFERENCE POINT DOSAGE GIVEN TO DATE: 36 GY
RAD ONC ARIA REFERENCE POINT ID: NORMAL
RAD ONC ARIA REFERENCE POINT SESSION DOSAGE GIVEN: 1.8 GY

## 2024-10-04 PROCEDURE — 77386: CPT | Performed by: RADIOLOGY

## 2024-10-04 PROCEDURE — 77014 CHG CT GUIDANCE RADIATION THERAPY FLDS PLACEMENT: CPT | Performed by: RADIOLOGY

## 2024-10-04 NOTE — PROGRESS NOTES
Radiation Oncology  On-Treatment Note      Patient: Jc Brannon Jr.    MRN: [id number]    Attending Physician: Kolton Noyola MD     Diagnosis:     ICD-10-CM ICD-9-CM   1. Rectal adenocarcinoma  C20 154.1       Radiation Therapy Visit:  Continue radiation therapy, Dosimetry plan remains acceptable, Films reviewed and remains acceptable, Pain assessed, Pain management planned, Radiation dose schedule reviewed and remains acceptable, Radiation technique remains acceptable, and Symptoms within expected range    Radiation Treatments       Active   Plans   BHF_Rectum   Most recent treatment: Dose planned: 180 cGy (fraction 20 on 10/4/2024)   Total: Dose planned: 5,040 cGy (28 fractions)   Elapsed Days: 25      Reference Points   RX: 5040   Most recent treatment: Dose given: 180 cGy (on 10/4/2024)   Total: Dose given: 3,600 cGy   Elapsed Days: 25                     Latest Reference Range & Units Day 1,  Cycle 4  09/30/24   WBC 3.40 - 10.80 10*3/mm3 1.71 (L)   Neutrophils Absolute 1.70 - 7.00 10*3/mm3 0.92 (L)   Hemoglobin 13.0 - 17.7 g/dL 12.2 (L)   Hematocrit 37.5 - 51.0 % 36.3 (L)   Platelets 140 - 450 10*3/mm3 161   Creatinine 0.76 - 1.27 mg/dL 0.89   BUN 6 - 20 mg/dL 11   Sodium 136 - 145 mmol/L 140   Potassium 3.5 - 5.2 mmol/L 3.8   Glucose 65 - 99 mg/dL 160 (H)   Calcium 8.6 - 10.5 mg/dL 9.0   Albumin 3.5 - 5.2 g/dL 4.4   Total Protein 6.0 - 8.5 g/dL 6.7   AST (SGOT) 1 - 40 U/L 24   ALT (SGPT) 1 - 41 U/L 25   Alkaline Phosphatase 39 - 117 U/L 80   Total Bilirubin 0.0 - 1.2 mg/dL 0.3   (L): Data is abnormally low  (H): Data is abnormally high  Blood counts were low - repeat labs today    Physical Examination:  Vitals: Blood pressure 167/100, pulse 95, weight 102 kg (224 lb 9.6 oz), SpO2 100%.  Pain Score    10/04/24 0911   PainSc:   4   PainLoc: Back   Jc denies any significant pain or irritation today.  He is having 2-3 bowel movements a day which are formed.  He denies diarrhea.  He is having no difficulty  with his continuous infusion 5-FU pump.    Fully active, able to carry on all pre-disease performance without restriction = 0    We examined the relevant areas: yes  Findings are within the expected range for this stage of treatment: yes  -------------------------------------------------------------------------------------------------------------------    ACTION ITEMS:  Patient tolerating treatment well and as expected for this stage in their treatment and Continue radiation therapy as planned    Estimated Completion Date: 2 weeks    Kolton Noyola MD  Radiation Oncology

## 2024-10-07 ENCOUNTER — INFUSION (OUTPATIENT)
Dept: ONCOLOGY | Facility: HOSPITAL | Age: 44
End: 2024-10-07
Payer: COMMERCIAL

## 2024-10-07 ENCOUNTER — HOSPITAL ENCOUNTER (OUTPATIENT)
Dept: RADIATION ONCOLOGY | Facility: HOSPITAL | Age: 44
Discharge: HOME | End: 2024-10-07
Payer: COMMERCIAL

## 2024-10-07 ENCOUNTER — OFFICE VISIT (OUTPATIENT)
Dept: ONCOLOGY | Facility: CLINIC | Age: 44
End: 2024-10-07
Payer: COMMERCIAL

## 2024-10-07 VITALS
HEART RATE: 95 BPM | DIASTOLIC BLOOD PRESSURE: 95 MMHG | SYSTOLIC BLOOD PRESSURE: 152 MMHG | BODY MASS INDEX: 33.4 KG/M2 | HEIGHT: 69 IN | OXYGEN SATURATION: 98 % | WEIGHT: 225.5 LBS | TEMPERATURE: 98 F | RESPIRATION RATE: 16 BRPM

## 2024-10-07 DIAGNOSIS — T45.1X5A CHEMOTHERAPY-INDUCED NEUTROPENIA: ICD-10-CM

## 2024-10-07 DIAGNOSIS — D70.1 CHEMOTHERAPY-INDUCED NEUTROPENIA: ICD-10-CM

## 2024-10-07 DIAGNOSIS — Z09 CHEMOTHERAPY FOLLOW-UP EXAMINATION: ICD-10-CM

## 2024-10-07 DIAGNOSIS — C20 RECTAL ADENOCARCINOMA: Primary | ICD-10-CM

## 2024-10-07 DIAGNOSIS — C20 RECTAL ADENOCARCINOMA: ICD-10-CM

## 2024-10-07 DIAGNOSIS — D64.81 ANEMIA ASSOCIATED WITH CHEMOTHERAPY: ICD-10-CM

## 2024-10-07 DIAGNOSIS — T45.1X5A ANEMIA ASSOCIATED WITH CHEMOTHERAPY: ICD-10-CM

## 2024-10-07 LAB
ALBUMIN SERPL-MCNC: 4.5 G/DL (ref 3.5–5.2)
ALBUMIN/GLOB SERPL: 2 G/DL
ALP SERPL-CCNC: 79 U/L (ref 39–117)
ALT SERPL W P-5'-P-CCNC: 16 U/L (ref 1–41)
ANION GAP SERPL CALCULATED.3IONS-SCNC: 10.3 MMOL/L (ref 5–15)
AST SERPL-CCNC: 20 U/L (ref 1–40)
BASOPHILS # BLD AUTO: 0 10*3/MM3 (ref 0–0.2)
BASOPHILS NFR BLD AUTO: 0 % (ref 0–1.5)
BILIRUB SERPL-MCNC: 0.3 MG/DL (ref 0–1.2)
BUN SERPL-MCNC: 11 MG/DL (ref 6–20)
BUN/CREAT SERPL: 11.3 (ref 7–25)
CALCIUM SPEC-SCNC: 9.1 MG/DL (ref 8.6–10.5)
CHLORIDE SERPL-SCNC: 101 MMOL/L (ref 98–107)
CO2 SERPL-SCNC: 28.7 MMOL/L (ref 22–29)
CREAT SERPL-MCNC: 0.97 MG/DL (ref 0.76–1.27)
DEPRECATED RDW RBC AUTO: 45.4 FL (ref 37–54)
EGFRCR SERPLBLD CKD-EPI 2021: 97.5 ML/MIN/1.73
EOSINOPHIL # BLD AUTO: 0.1 10*3/MM3 (ref 0–0.4)
EOSINOPHIL NFR BLD AUTO: 5.4 % (ref 0.3–6.2)
ERYTHROCYTE [DISTWIDTH] IN BLOOD BY AUTOMATED COUNT: 12.4 % (ref 12.3–15.4)
GLOBULIN UR ELPH-MCNC: 2.3 GM/DL
GLUCOSE SERPL-MCNC: 132 MG/DL (ref 65–99)
HCT VFR BLD AUTO: 37.1 % (ref 37.5–51)
HGB BLD-MCNC: 12.6 G/DL (ref 13–17.7)
IMM GRANULOCYTES # BLD AUTO: 0 10*3/MM3 (ref 0–0.05)
IMM GRANULOCYTES NFR BLD AUTO: 0 % (ref 0–0.5)
LYMPHOCYTES # BLD AUTO: 0.24 10*3/MM3 (ref 0.7–3.1)
LYMPHOCYTES NFR BLD AUTO: 12.9 % (ref 19.6–45.3)
MCH RBC QN AUTO: 33.9 PG (ref 26.6–33)
MCHC RBC AUTO-ENTMCNC: 34 G/DL (ref 31.5–35.7)
MCV RBC AUTO: 99.7 FL (ref 79–97)
MONOCYTES # BLD AUTO: 0.38 10*3/MM3 (ref 0.1–0.9)
MONOCYTES NFR BLD AUTO: 20.4 % (ref 5–12)
NEUTROPHILS NFR BLD AUTO: 1.14 10*3/MM3 (ref 1.7–7)
NEUTROPHILS NFR BLD AUTO: 61.3 % (ref 42.7–76)
NRBC BLD AUTO-RTO: 0 /100 WBC (ref 0–0.2)
PLATELET # BLD AUTO: 171 10*3/MM3 (ref 140–450)
PMV BLD AUTO: 9.3 FL (ref 6–12)
POTASSIUM SERPL-SCNC: 3.8 MMOL/L (ref 3.5–5.2)
PROT SERPL-MCNC: 6.8 G/DL (ref 6–8.5)
RAD ONC ARIA COURSE ID: NORMAL
RAD ONC ARIA COURSE INTENT: NORMAL
RAD ONC ARIA COURSE LAST TREATMENT DATE: NORMAL
RAD ONC ARIA COURSE START DATE: NORMAL
RAD ONC ARIA COURSE TREATMENT ELAPSED DAYS: 28
RAD ONC ARIA FIRST TREATMENT DATE: NORMAL
RAD ONC ARIA PLAN FRACTIONS TREATED TO DATE: 21
RAD ONC ARIA PLAN ID: NORMAL
RAD ONC ARIA PLAN PRESCRIBED DOSE PER FRACTION: 1.8 GY
RAD ONC ARIA PLAN PRIMARY REFERENCE POINT: NORMAL
RAD ONC ARIA PLAN TOTAL FRACTIONS PRESCRIBED: 28
RAD ONC ARIA PLAN TOTAL PRESCRIBED DOSE: 5040 CGY
RAD ONC ARIA REFERENCE POINT DOSAGE GIVEN TO DATE: 37.8 GY
RAD ONC ARIA REFERENCE POINT ID: NORMAL
RAD ONC ARIA REFERENCE POINT SESSION DOSAGE GIVEN: 1.8 GY
RBC # BLD AUTO: 3.72 10*6/MM3 (ref 4.14–5.8)
SODIUM SERPL-SCNC: 140 MMOL/L (ref 136–145)
WBC NRBC COR # BLD AUTO: 1.86 10*3/MM3 (ref 3.4–10.8)

## 2024-10-07 PROCEDURE — 25010000002 FLUOROURACIL PER 500 MG: Performed by: INTERNAL MEDICINE

## 2024-10-07 PROCEDURE — 77014 CHG CT GUIDANCE RADIATION THERAPY FLDS PLACEMENT: CPT | Performed by: RADIOLOGY

## 2024-10-07 PROCEDURE — 77386: CPT | Performed by: RADIOLOGY

## 2024-10-07 PROCEDURE — 85025 COMPLETE CBC W/AUTO DIFF WBC: CPT

## 2024-10-07 PROCEDURE — 80053 COMPREHEN METABOLIC PANEL: CPT

## 2024-10-07 PROCEDURE — 77427 RADIATION TX MANAGEMENT X5: CPT | Performed by: RADIOLOGY

## 2024-10-07 PROCEDURE — 99215 OFFICE O/P EST HI 40 MIN: CPT | Performed by: INTERNAL MEDICINE

## 2024-10-07 PROCEDURE — 96416 CHEMO PROLONG INFUSE W/PUMP: CPT

## 2024-10-07 PROCEDURE — G0498 CHEMO EXTEND IV INFUS W/PUMP: HCPCS

## 2024-10-07 PROCEDURE — 25810000003 SODIUM CHLORIDE 0.9 % SOLUTION 250 ML FLEX CONT: Performed by: INTERNAL MEDICINE

## 2024-10-07 RX ADMIN — FLUOROURACIL 1210 MG: 50 INJECTION, SOLUTION INTRAVENOUS at 10:11

## 2024-10-07 NOTE — PROGRESS NOTES
No chief complaint on file.    SUBJECTIVE:      HISTORY OF PRESENT ILLNESS:  The patient is a 44 y.o. year old male with recently diagnosed rectal adenocarcinoma who presents to our clinic to establish care.  Onc history is outlined below    Family history significant for prostate cancer in father at 63, paternal uncle with colon cancer at 57, other paternal uncle with metastatic cancer, type unknown in his 50s, other paternal uncle with possible colon cancer and DVT/PE in his 70s.    History of Present Illness  The patient is here today, 10/07/2024, for evaluation before his fifth week of concurrent chemotherapy using low-dose 5-FU with radiation therapy.     He has been under the care of my colleague, Dr. Pak, who is currently unavailable. As a result, I am stepping in to see him.    Patient reports no new symptoms or concerns. Specifically, he denies experiencing fevers, sweating, chills, or diarrhea.  He has no nausea vomiting, has been eating well.  No weight losses.      He has questions about the use of alcohol-containing mouthwash during his treatment and whether it is safe for him to go outside after his radiation sessions.    Results  Laboratory Studies on 10/7/2024  WBC 1860, neutrophils 1140, hemoglobin 12.6, platelets 171,000.  Chemistry reported glucose 132 otherwise unremarkable CMP.         Oncology/Hematology History   Rectal adenocarcinoma   4/23/2024 Procedure         4/23/2024 [facility]         5/2/2024 Imaging    CT CAP    A 1.7 cm right hepatic cyst is noted. Numerous small subcentimeter liver  low densities are present that are too small to characterize, could be  cysts, or potentially metastatic disease, interval follow-up can  characterize change.    No bowel obstruction. The sigmorectal mass is better characterized on  the MRI exam of same date (please see separate report). Some stranding  is apparent in the fat around the lesion, numerous surrounding lymph  nodes are present that  could be metastatic lymph nodes. For example, on  axial image 165, a 1.9 x 1.6 cm pericolonic lymph node is present. As  another example, on axial image 170, left perirectal lymph node measures  0.7 x 1.2 cm.    IMPRESSION:        1. Sigmorectal mass with numerous surrounding lymph nodes, raising  suspicion for metastatic lymph nodes.     2. Hepatic cyst, and numerous liver low densities that are too small to  characterize; these lesions could also be cysts, but in this clinical  setting, possibility of any metastatic liver lesions is not excluded.  Interval follow-up is advised to characterize change.     3. No pulmonary mass.     5/2/2024 Imaging    MRI Pelvis    FINDINGS: There is a circumferential solid low rectal mass which  measures approximately 4.5 cm in craniocaudad span and the inferior  margin is approximately 5 mm proximal to the anorectal junction.     The tumor extends approximately 8 mm into the mesorectal fat  predominantly along the right wall of the mass. There appears to be  extension to the mesorectal fascia along the right wall and abutment of  the right levator ani muscles. There are several enlarged perirectal  nodes and the largest is presacral measuring approximately 2.0 x 1.2 cm.  There are at least 4 enhancing suspicious nodes. There is no definite  extramural vascular invasion.     IMPRESSION:  1. Primary Tumor Location: Low rectal     2. MRI Stage: T3 N2 MRF positive     3. No definite sphincter involvement     5/2/2024 Cancer Staged    Staging form: Colon And Rectum, AJCC 8th Edition  - Clinical stage from 5/2/2024: cT3, cN2, cM0 - Signed by Sonali Pak MD on 6/3/2024     5/3/2024 Initial Diagnosis    Rectal adenocarcinoma     5/16/2024 Imaging    MRI abdo    IMPRESSION:  T2 hyperintense nonenhancing liver lesions are consistent with hepatic  cysts and biliary cystic hamartomas. No convincing liver metastasis  identified     5/24/2024 Procedure    PORT placement (Dr Isaac)      6/3/2024 - 8/21/2024 Chemotherapy    OP COLORECTAL FOLFIRINOX (Fluorouracil cont. Infusion / Leucovorin / OXALIplatin / Irinotecan)     6/5/2024 -  Chemotherapy    OP CENTRAL VENOUS ACCESS DEVICE Access, Care, and Maintenance (CVAD)     9/9/2024 - 9/9/2024 Chemotherapy    OP COLORECTAL Fluorouracil CIV over 168H + XRT     9/9/2024 -  Chemotherapy    OP COLORECTAL Fluorouracil CIV over 96 H + XRT         The current medication list and allergy list were reviewed and reconciled.      Past Medical History, Past Surgical History, Social History, Family History have been reviewed and are without significant changes except as mentioned.      REVIEW OF SYSTEMS:  See interval history    Objective:       There were no vitals filed for this visit.              9/30/2024     9:33 AM   Current Status   ECOG score 0      PHYSICAL EXAM:    GENERAL:  Well-developed, well-nourished male in no acute distress.    SKIN:  Warm, dry without rashes, purpura or petechiae.  HEENT:  Normocephalic.   LYMPHATICS:  No cervical, supraclavicular or axillary adenopathy.  CHEST: Normal respiratory effort.  Lungs clear to auscultation. Good airflow.  CARDIAC:  Regular rate and rhythm. Normal S1,S2.  ABDOMEN:  Soft, no tender.  Bowel sounds normal.  EXTREMITIES:  No lower extremity edema.         Diagnostic data  CBC and Differential (09/23/2024 11:22)  Comprehensive metabolic panel (09/23/2024 11:22)      Assessment & Plan     *Rectal adenocarcinoma (cT3 cN2 cM0), LUCAS, TMB low  4/23/2024 rectal biopsy-invasive moderately differentiated adenocarcinoma, LUCAS.  5/2/2024 MRI pelvis: Approximately 4.5 cm circumferential low rectal mass, approximately 5 mm proximal to anorectal junction, extending 8 mm in the mesorectal fat.  Appears to be extension to MRF along the right wall and abutment of right levator ani muscles.  Several enlarged perirectal nodes, largest 2 x 1.2 cm.  At least 4 enhancing suspicious nodes.  cT3 cN2 MRF positive.  No definitive  sphincter involvement  5/2/2024 CT chest abdomen pelvis: 1.7 cm right hepatic cyst, and numerous low-density liver lesions-cyst versus possible metastatic lesions.  No lung mass  5/16/2024 MRI abdomen: T2 hyperintense nonenhancing liver lesions consistent with liver cyst and biliary cystic hamartomas.  No evidence of liver mets  Tumor genomic profile-APC (I269fs), APC (G7347im), FANCD2 (Q823), TP53. TMB 1 mut/Mb. LUCAS  6/3/2024 baseline CEA: 1.89  6/3/2024 - 08/19/2024: s/p 6 cycles of  FOLFIRINOX (5-FU bolus eliminated to prevent further myelosuppression given baseline neutropenia; 25% dose reduction starting cycle 3) with G-CSF support  8/27/2024 MRI pelvis-good response.  No clear measurable rectal mass.  Decrease in the size of perirectal lymph node, measuring less than 5 mm; decrease in the size of enlarged hide perirectal lymph nodes/mary metastases.  08/28/2024 CT CAP: Apparent decrease size of rectal mass, decreased pericolonic and perirectal lymph nodes.  1 axillary lymph node and 1 liver low-density (too small to characterize) measures slightly larger, uncertain clinical significance.  Plan for total neoadjuvant chemotherapy with FOLFIRINOX followed by chemoradiation  9/9/2024: Cycle 1 day 1 concurrent chemoradiation with 5-FU.  9/16/2024 proceed with week 2 concurrent chemoradiation with infusional 5-FU.  50% dose reduction of 5-FU due to neutropenia  9/23/2024 proceed with week 3 concurrent chemoradiation with infusional 5-FU maintaining previous 50% dose reduction  09/30/2024: Concurrent chemoradiation with infusional 5-FU with 50% dose reduction  On 10/7/2024 will proceed ahead with week #5 infusional 5-FU same 50% dose reduction.  Neutrophil 1140.    *Solitary axillary lymph node  *Solitary low-density liver lesion  8/28/2024 seen on CT chest abdomen pelvis, too small to characterize.    *Benign ethnic neutropenia  White cell count ranged between 2.6-3.2 since at least July 2019.    8/19/2024: WBC  3.06, ANC 1.27  9/16/2024 WBC declined at 1.96, ANC 0.89.  9/30/2024 WBC 1.71, .  Continue with 50% dose reduction of 5-FU infusion  10/7/2024 WBC 1860, neutrophils 1140.  Patient reports no evidence of infection.  Certainly this patient overall has worsening neutrophils due to chemotherapy.  Continue 5-FU at 50% dose reduction.     *.  Anemia secondary to chemotherapy.  Baseline hemoglobin over 14 prior to starting chemotherapy in June 2024.  Hemoglobin has been trending down with shawanda hemoglobin 11.8 on 9/16/2024.  Today hemoglobin 12.6 on 10/7/2024.  Continue to monitor.    *Skin rash, bilateral upper extremities, lower extremities and abdomen, improved  Patient reports improvement with above for application  Likely secondary to chemotherapy  Improved with topical over-the-counter cortisone cream    Plan:   Proceed today with week #5 infusional 5-FU with 50% dose reduction in conjunction with concurrent chemoradiation.  Patient has appointment for APRN visit in 1 week for CBC, CMP, and consideration of week #5 concurrent chemoradiation.  That will be his last 5-FU infusion  Plan for response assessment scans (CT chest abdomen pelvis, MRI pelvis) in about 6 weeks after completion of chemoradiation.  Those appointments need to be set up next time.  Dr. Isaac to see the patient after MRI pelvis    I spent 44 minutes caring for Jc on this date of service. This time includes time spent by me in the following activities: preparing for the visit, reviewing tests, obtaining and/or reviewing a separately obtained history, performing a medically appropriate examination and/or evaluation, counseling and educating the patient/family/caregiver, ordering medications, tests, or procedures, referring and communicating with other health care professionals, documenting information in the medical record, independently interpreting results and communicating that information with the patient/family/caregiver and care  coordination        Patient continues on high risk medication requiring close monitoring for toxicity    Teofilo Hermosillo MD PhD  10/07/2024         Patient is on chemotherapy, which requires frequent monitoring

## 2024-10-08 ENCOUNTER — HOSPITAL ENCOUNTER (OUTPATIENT)
Dept: RADIATION ONCOLOGY | Facility: HOSPITAL | Age: 44
Discharge: HOME | End: 2024-10-08

## 2024-10-08 LAB
RAD ONC ARIA COURSE ID: NORMAL
RAD ONC ARIA COURSE INTENT: NORMAL
RAD ONC ARIA COURSE LAST TREATMENT DATE: NORMAL
RAD ONC ARIA COURSE START DATE: NORMAL
RAD ONC ARIA COURSE TREATMENT ELAPSED DAYS: 29
RAD ONC ARIA FIRST TREATMENT DATE: NORMAL
RAD ONC ARIA PLAN FRACTIONS TREATED TO DATE: 22
RAD ONC ARIA PLAN ID: NORMAL
RAD ONC ARIA PLAN PRESCRIBED DOSE PER FRACTION: 1.8 GY
RAD ONC ARIA PLAN PRIMARY REFERENCE POINT: NORMAL
RAD ONC ARIA PLAN TOTAL FRACTIONS PRESCRIBED: 28
RAD ONC ARIA PLAN TOTAL PRESCRIBED DOSE: 5040 CGY
RAD ONC ARIA REFERENCE POINT DOSAGE GIVEN TO DATE: 39.6 GY
RAD ONC ARIA REFERENCE POINT ID: NORMAL
RAD ONC ARIA REFERENCE POINT SESSION DOSAGE GIVEN: 1.8 GY

## 2024-10-08 PROCEDURE — 77386: CPT | Performed by: RADIOLOGY

## 2024-10-08 PROCEDURE — 77014 CHG CT GUIDANCE RADIATION THERAPY FLDS PLACEMENT: CPT | Performed by: RADIOLOGY

## 2024-10-09 ENCOUNTER — HOSPITAL ENCOUNTER (OUTPATIENT)
Dept: RADIATION ONCOLOGY | Facility: HOSPITAL | Age: 44
Discharge: HOME | End: 2024-10-09

## 2024-10-09 LAB
RAD ONC ARIA COURSE ID: NORMAL
RAD ONC ARIA COURSE INTENT: NORMAL
RAD ONC ARIA COURSE LAST TREATMENT DATE: NORMAL
RAD ONC ARIA COURSE START DATE: NORMAL
RAD ONC ARIA COURSE TREATMENT ELAPSED DAYS: 30
RAD ONC ARIA FIRST TREATMENT DATE: NORMAL
RAD ONC ARIA PLAN FRACTIONS TREATED TO DATE: 23
RAD ONC ARIA PLAN ID: NORMAL
RAD ONC ARIA PLAN PRESCRIBED DOSE PER FRACTION: 1.8 GY
RAD ONC ARIA PLAN PRIMARY REFERENCE POINT: NORMAL
RAD ONC ARIA PLAN TOTAL FRACTIONS PRESCRIBED: 28
RAD ONC ARIA PLAN TOTAL PRESCRIBED DOSE: 5040 CGY
RAD ONC ARIA REFERENCE POINT DOSAGE GIVEN TO DATE: 41.4 GY
RAD ONC ARIA REFERENCE POINT ID: NORMAL
RAD ONC ARIA REFERENCE POINT SESSION DOSAGE GIVEN: 1.8 GY

## 2024-10-09 PROCEDURE — 77386: CPT | Performed by: RADIOLOGY

## 2024-10-09 PROCEDURE — 77336 RADIATION PHYSICS CONSULT: CPT | Performed by: RADIOLOGY

## 2024-10-09 PROCEDURE — 77014 CHG CT GUIDANCE RADIATION THERAPY FLDS PLACEMENT: CPT | Performed by: RADIOLOGY

## 2024-10-10 ENCOUNTER — HOSPITAL ENCOUNTER (OUTPATIENT)
Dept: RADIATION ONCOLOGY | Facility: HOSPITAL | Age: 44
Discharge: HOME | End: 2024-10-10

## 2024-10-10 LAB
NTRA SIGNATERA MTM READOUT: NORMAL
NTRA SIGNATERA TEST RESULT: NORMAL
RAD ONC ARIA COURSE ID: NORMAL
RAD ONC ARIA COURSE INTENT: NORMAL
RAD ONC ARIA COURSE LAST TREATMENT DATE: NORMAL
RAD ONC ARIA COURSE START DATE: NORMAL
RAD ONC ARIA COURSE TREATMENT ELAPSED DAYS: 31
RAD ONC ARIA FIRST TREATMENT DATE: NORMAL
RAD ONC ARIA PLAN FRACTIONS TREATED TO DATE: 24
RAD ONC ARIA PLAN ID: NORMAL
RAD ONC ARIA PLAN PRESCRIBED DOSE PER FRACTION: 1.8 GY
RAD ONC ARIA PLAN PRIMARY REFERENCE POINT: NORMAL
RAD ONC ARIA PLAN TOTAL FRACTIONS PRESCRIBED: 28
RAD ONC ARIA PLAN TOTAL PRESCRIBED DOSE: 5040 CGY
RAD ONC ARIA REFERENCE POINT DOSAGE GIVEN TO DATE: 43.2 GY
RAD ONC ARIA REFERENCE POINT ID: NORMAL
RAD ONC ARIA REFERENCE POINT SESSION DOSAGE GIVEN: 1.8 GY

## 2024-10-10 PROCEDURE — 77386: CPT | Performed by: RADIOLOGY

## 2024-10-10 PROCEDURE — 77014 CHG CT GUIDANCE RADIATION THERAPY FLDS PLACEMENT: CPT | Performed by: RADIOLOGY

## 2024-10-11 ENCOUNTER — RADIATION ONCOLOGY WEEKLY ASSESSMENT (OUTPATIENT)
Dept: RADIATION ONCOLOGY | Facility: HOSPITAL | Age: 44
End: 2024-10-11
Payer: COMMERCIAL

## 2024-10-11 ENCOUNTER — HOSPITAL ENCOUNTER (OUTPATIENT)
Dept: RADIATION ONCOLOGY | Facility: HOSPITAL | Age: 44
Discharge: HOME | End: 2024-10-11

## 2024-10-11 ENCOUNTER — INFUSION (OUTPATIENT)
Dept: ONCOLOGY | Facility: HOSPITAL | Age: 44
End: 2024-10-11
Payer: COMMERCIAL

## 2024-10-11 VITALS
SYSTOLIC BLOOD PRESSURE: 168 MMHG | HEART RATE: 58 BPM | WEIGHT: 224 LBS | BODY MASS INDEX: 33.06 KG/M2 | DIASTOLIC BLOOD PRESSURE: 110 MMHG | OXYGEN SATURATION: 99 %

## 2024-10-11 DIAGNOSIS — C20 RECTAL ADENOCARCINOMA: Primary | ICD-10-CM

## 2024-10-11 LAB
RAD ONC ARIA COURSE ID: NORMAL
RAD ONC ARIA COURSE INTENT: NORMAL
RAD ONC ARIA COURSE LAST TREATMENT DATE: NORMAL
RAD ONC ARIA COURSE START DATE: NORMAL
RAD ONC ARIA COURSE TREATMENT ELAPSED DAYS: 32
RAD ONC ARIA FIRST TREATMENT DATE: NORMAL
RAD ONC ARIA PLAN FRACTIONS TREATED TO DATE: 25
RAD ONC ARIA PLAN ID: NORMAL
RAD ONC ARIA PLAN PRESCRIBED DOSE PER FRACTION: 1.8 GY
RAD ONC ARIA PLAN PRIMARY REFERENCE POINT: NORMAL
RAD ONC ARIA PLAN TOTAL FRACTIONS PRESCRIBED: 28
RAD ONC ARIA PLAN TOTAL PRESCRIBED DOSE: 5040 CGY
RAD ONC ARIA REFERENCE POINT DOSAGE GIVEN TO DATE: 45 GY
RAD ONC ARIA REFERENCE POINT ID: NORMAL
RAD ONC ARIA REFERENCE POINT SESSION DOSAGE GIVEN: 1.8 GY

## 2024-10-11 PROCEDURE — 77014 CHG CT GUIDANCE RADIATION THERAPY FLDS PLACEMENT: CPT | Performed by: RADIOLOGY

## 2024-10-11 PROCEDURE — 25010000002 HEPARIN LOCK FLUSH PER 10 UNITS: Performed by: NURSE PRACTITIONER

## 2024-10-11 PROCEDURE — 36591 DRAW BLOOD OFF VENOUS DEVICE: CPT

## 2024-10-11 PROCEDURE — 77386: CPT | Performed by: RADIOLOGY

## 2024-10-11 RX ORDER — SODIUM CHLORIDE 0.9 % (FLUSH) 0.9 %
10 SYRINGE (ML) INJECTION AS NEEDED
Status: DISCONTINUED | OUTPATIENT
Start: 2024-10-11 | End: 2024-10-11 | Stop reason: HOSPADM

## 2024-10-11 RX ORDER — HEPARIN SODIUM (PORCINE) LOCK FLUSH IV SOLN 100 UNIT/ML 100 UNIT/ML
500 SOLUTION INTRAVENOUS AS NEEDED
OUTPATIENT
Start: 2024-10-11

## 2024-10-11 RX ORDER — HEPARIN SODIUM (PORCINE) LOCK FLUSH IV SOLN 100 UNIT/ML 100 UNIT/ML
500 SOLUTION INTRAVENOUS AS NEEDED
Status: DISCONTINUED | OUTPATIENT
Start: 2024-10-11 | End: 2024-10-11 | Stop reason: HOSPADM

## 2024-10-11 RX ORDER — SODIUM CHLORIDE 0.9 % (FLUSH) 0.9 %
10 SYRINGE (ML) INJECTION AS NEEDED
OUTPATIENT
Start: 2024-10-11

## 2024-10-11 RX ADMIN — Medication 10 ML: at 13:37

## 2024-10-11 RX ADMIN — Medication 500 UNITS: at 13:36

## 2024-10-11 NOTE — PROGRESS NOTES
Radiation Oncology  On-Treatment Note      Patient: Jc Brannon Jr.    MRN: [id number]    Attending Physician: Kolton Noyola MD     Diagnosis:     ICD-10-CM ICD-9-CM   1. Rectal adenocarcinoma  C20 154.1       Radiation Therapy Visit:  Continue radiation therapy, Dosimetry plan remains acceptable, Films reviewed and remains acceptable, Pain assessed, Pain management planned, Radiation dose schedule reviewed and remains acceptable, Radiation technique remains acceptable, and Symptoms within expected range    Radiation Treatments       Active   Plans   BHF_Rectum   Most recent treatment: Dose planned: 180 cGy (fraction 25 on 10/11/2024)   Total: Dose planned: 5,040 cGy (28 fractions)   Elapsed Days: 32      Reference Points   RX: 5040   Most recent treatment: Dose given: 180 cGy (on 10/11/2024)   Total: Dose given: 4,500 cGy   Elapsed Days: 32                      Physical Examination:  Vitals: Blood pressure (!) 168/110, pulse 58, weight 102 kg (224 lb), SpO2 99%.  Pain Score    10/11/24 1407   PainSc: 0-No pain   Mr. Brannon is doing well.  He is unable to work with chemotherapy polyp in place.  He denies any foot hand or mouth disease.  He continues to have frequent bowel movements but denies any bleeding.  He is urinating without difficulty.  His energy level remains good.    Fully active, able to carry on all pre-disease performance without restriction = 0    We examined the relevant areas: yes  Findings are within the expected range for this stage of treatment: yes  -------------------------------------------------------------------------------------------------------------------    ACTION ITEMS:  Patient tolerating treatment well and as expected for this stage in their treatment and Continue radiation therapy as planned    Estimated Completion Date: 3 fractions      Kolton Noyola MD  Radiation Oncology

## 2024-10-14 ENCOUNTER — HOSPITAL ENCOUNTER (OUTPATIENT)
Dept: RADIATION ONCOLOGY | Facility: HOSPITAL | Age: 44
Discharge: HOME | End: 2024-10-14
Payer: COMMERCIAL

## 2024-10-14 ENCOUNTER — OFFICE VISIT (OUTPATIENT)
Dept: ONCOLOGY | Facility: CLINIC | Age: 44
End: 2024-10-14
Payer: COMMERCIAL

## 2024-10-14 ENCOUNTER — INFUSION (OUTPATIENT)
Dept: ONCOLOGY | Facility: HOSPITAL | Age: 44
End: 2024-10-14
Payer: COMMERCIAL

## 2024-10-14 VITALS
WEIGHT: 229.5 LBS | HEART RATE: 92 BPM | DIASTOLIC BLOOD PRESSURE: 111 MMHG | OXYGEN SATURATION: 98 % | TEMPERATURE: 98.2 F | SYSTOLIC BLOOD PRESSURE: 161 MMHG | BODY MASS INDEX: 33.99 KG/M2 | HEIGHT: 69 IN

## 2024-10-14 VITALS — SYSTOLIC BLOOD PRESSURE: 158 MMHG | DIASTOLIC BLOOD PRESSURE: 102 MMHG

## 2024-10-14 DIAGNOSIS — C20 RECTAL ADENOCARCINOMA: ICD-10-CM

## 2024-10-14 DIAGNOSIS — Z79.899 HIGH RISK MEDICATION USE: ICD-10-CM

## 2024-10-14 DIAGNOSIS — C20 RECTAL ADENOCARCINOMA: Primary | ICD-10-CM

## 2024-10-14 DIAGNOSIS — R03.0 ELEVATED BP WITHOUT DIAGNOSIS OF HYPERTENSION: ICD-10-CM

## 2024-10-14 LAB
ALBUMIN SERPL-MCNC: 4.4 G/DL (ref 3.5–5.2)
ALBUMIN/GLOB SERPL: 1.8 G/DL
ALP SERPL-CCNC: 79 U/L (ref 39–117)
ALT SERPL W P-5'-P-CCNC: 16 U/L (ref 1–41)
ANION GAP SERPL CALCULATED.3IONS-SCNC: 7.5 MMOL/L (ref 5–15)
AST SERPL-CCNC: 19 U/L (ref 1–40)
BASOPHILS # BLD AUTO: 0 10*3/MM3 (ref 0–0.2)
BASOPHILS NFR BLD AUTO: 0 % (ref 0–1.5)
BILIRUB SERPL-MCNC: 0.3 MG/DL (ref 0–1.2)
BUN SERPL-MCNC: 11 MG/DL (ref 6–20)
BUN/CREAT SERPL: 12 (ref 7–25)
CALCIUM SPEC-SCNC: 9.1 MG/DL (ref 8.6–10.5)
CHLORIDE SERPL-SCNC: 101 MMOL/L (ref 98–107)
CO2 SERPL-SCNC: 27.5 MMOL/L (ref 22–29)
CREAT SERPL-MCNC: 0.92 MG/DL (ref 0.76–1.27)
DEPRECATED RDW RBC AUTO: 45.7 FL (ref 37–54)
EGFRCR SERPLBLD CKD-EPI 2021: 103.9 ML/MIN/1.73
EOSINOPHIL # BLD AUTO: 0.06 10*3/MM3 (ref 0–0.4)
EOSINOPHIL NFR BLD AUTO: 2.7 % (ref 0.3–6.2)
ERYTHROCYTE [DISTWIDTH] IN BLOOD BY AUTOMATED COUNT: 12.4 % (ref 12.3–15.4)
GLOBULIN UR ELPH-MCNC: 2.4 GM/DL
GLUCOSE SERPL-MCNC: 101 MG/DL (ref 65–99)
HCT VFR BLD AUTO: 36.1 % (ref 37.5–51)
HGB BLD-MCNC: 12.3 G/DL (ref 13–17.7)
IMM GRANULOCYTES # BLD AUTO: 0.01 10*3/MM3 (ref 0–0.05)
IMM GRANULOCYTES NFR BLD AUTO: 0.4 % (ref 0–0.5)
LYMPHOCYTES # BLD AUTO: 0.26 10*3/MM3 (ref 0.7–3.1)
LYMPHOCYTES NFR BLD AUTO: 11.6 % (ref 19.6–45.3)
MCH RBC QN AUTO: 34 PG (ref 26.6–33)
MCHC RBC AUTO-ENTMCNC: 34.1 G/DL (ref 31.5–35.7)
MCV RBC AUTO: 99.7 FL (ref 79–97)
MONOCYTES # BLD AUTO: 0.41 10*3/MM3 (ref 0.1–0.9)
MONOCYTES NFR BLD AUTO: 18.3 % (ref 5–12)
NEUTROPHILS NFR BLD AUTO: 1.5 10*3/MM3 (ref 1.7–7)
NEUTROPHILS NFR BLD AUTO: 67 % (ref 42.7–76)
NRBC BLD AUTO-RTO: 0 /100 WBC (ref 0–0.2)
PLATELET # BLD AUTO: 197 10*3/MM3 (ref 140–450)
PMV BLD AUTO: 9.4 FL (ref 6–12)
POTASSIUM SERPL-SCNC: 3.9 MMOL/L (ref 3.5–5.2)
PROT SERPL-MCNC: 6.8 G/DL (ref 6–8.5)
RAD ONC ARIA COURSE ID: NORMAL
RAD ONC ARIA COURSE INTENT: NORMAL
RAD ONC ARIA COURSE LAST TREATMENT DATE: NORMAL
RAD ONC ARIA COURSE START DATE: NORMAL
RAD ONC ARIA COURSE TREATMENT ELAPSED DAYS: 35
RAD ONC ARIA FIRST TREATMENT DATE: NORMAL
RAD ONC ARIA PLAN FRACTIONS TREATED TO DATE: 26
RAD ONC ARIA PLAN ID: NORMAL
RAD ONC ARIA PLAN PRESCRIBED DOSE PER FRACTION: 1.8 GY
RAD ONC ARIA PLAN PRIMARY REFERENCE POINT: NORMAL
RAD ONC ARIA PLAN TOTAL FRACTIONS PRESCRIBED: 28
RAD ONC ARIA PLAN TOTAL PRESCRIBED DOSE: 5040 CGY
RAD ONC ARIA REFERENCE POINT DOSAGE GIVEN TO DATE: 46.8 GY
RAD ONC ARIA REFERENCE POINT ID: NORMAL
RAD ONC ARIA REFERENCE POINT SESSION DOSAGE GIVEN: 1.8 GY
RBC # BLD AUTO: 3.62 10*6/MM3 (ref 4.14–5.8)
SODIUM SERPL-SCNC: 136 MMOL/L (ref 136–145)
WBC NRBC COR # BLD AUTO: 2.24 10*3/MM3 (ref 3.4–10.8)

## 2024-10-14 PROCEDURE — 77014 CHG CT GUIDANCE RADIATION THERAPY FLDS PLACEMENT: CPT | Performed by: RADIOLOGY

## 2024-10-14 PROCEDURE — 25010000002 FLUOROURACIL PER 500 MG: Performed by: NURSE PRACTITIONER

## 2024-10-14 PROCEDURE — 77386: CPT | Performed by: RADIOLOGY

## 2024-10-14 PROCEDURE — 99214 OFFICE O/P EST MOD 30 MIN: CPT | Performed by: NURSE PRACTITIONER

## 2024-10-14 PROCEDURE — 85025 COMPLETE CBC W/AUTO DIFF WBC: CPT

## 2024-10-14 PROCEDURE — 80053 COMPREHEN METABOLIC PANEL: CPT | Performed by: STUDENT IN AN ORGANIZED HEALTH CARE EDUCATION/TRAINING PROGRAM

## 2024-10-14 PROCEDURE — 77427 RADIATION TX MANAGEMENT X5: CPT | Performed by: RADIOLOGY

## 2024-10-14 PROCEDURE — 25810000003 SODIUM CHLORIDE 0.9 % SOLUTION 250 ML FLEX CONT: Performed by: NURSE PRACTITIONER

## 2024-10-14 PROCEDURE — G0498 CHEMO EXTEND IV INFUS W/PUMP: HCPCS

## 2024-10-14 RX ORDER — CLONIDINE HYDROCHLORIDE 0.1 MG/1
0.1 TABLET ORAL ONCE
Status: COMPLETED | OUTPATIENT
Start: 2024-10-14 | End: 2024-10-14

## 2024-10-14 RX ADMIN — CLONIDINE HYDROCHLORIDE 0.1 MG: 0.1 TABLET ORAL at 12:33

## 2024-10-14 RX ADMIN — FLUOROURACIL 1210 MG: 50 INJECTION, SOLUTION INTRAVENOUS at 13:17

## 2024-10-14 NOTE — NURSING NOTE
Bp 158/98 when he left. Has a PCP appt in am at 0915. Is going to start neurotin that he was given for anxiety.

## 2024-10-14 NOTE — PROGRESS NOTES
Follow-up    SUBJECTIVE:      HISTORY OF PRESENT ILLNESS:  The patient is a 44 y.o. year old male with recently diagnosed rectal adenocarcinoma who presents to our clinic to establish care.  Onc history is outlined below    Family history significant for prostate cancer in father at 63, paternal uncle with colon cancer at 57, other paternal uncle with metastatic cancer, type unknown in his 50s, other paternal uncle with possible colon cancer and DVT/PE in his 70s.    History of Present Illness  The patient is here today, 10/07/2024, for evaluation before his fifth week of concurrent chemotherapy using low-dose 5-FU with radiation therapy.     He has been under the care of my colleague, Dr. Pak, who is currently unavailable. As a result, I am stepping in to see him.    Patient reports no new symptoms or concerns. Specifically, he denies experiencing fevers, sweating, chills, or diarrhea.  He has no nausea vomiting, has been eating well.  No weight losses.      He has questions about the use of alcohol-containing mouthwash during his treatment and whether it is safe for him to go outside after his radiation sessions.    Results  Laboratory Studies on 10/7/2024  WBC 1860, neutrophils 1140, hemoglobin 12.6, platelets 171,000.  Chemistry reported glucose 132 otherwise unremarkable CMP.         Oncology/Hematology History   Rectal adenocarcinoma   4/23/2024 Procedure         4/23/2024 [facility]         5/2/2024 Imaging    CT CAP    A 1.7 cm right hepatic cyst is noted. Numerous small subcentimeter liver  low densities are present that are too small to characterize, could be  cysts, or potentially metastatic disease, interval follow-up can  characterize change.    No bowel obstruction. The sigmorectal mass is better characterized on  the MRI exam of same date (please see separate report). Some stranding  is apparent in the fat around the lesion, numerous surrounding lymph  nodes are present that could be  metastatic lymph nodes. For example, on  axial image 165, a 1.9 x 1.6 cm pericolonic lymph node is present. As  another example, on axial image 170, left perirectal lymph node measures  0.7 x 1.2 cm.    IMPRESSION:        1. Sigmorectal mass with numerous surrounding lymph nodes, raising  suspicion for metastatic lymph nodes.     2. Hepatic cyst, and numerous liver low densities that are too small to  characterize; these lesions could also be cysts, but in this clinical  setting, possibility of any metastatic liver lesions is not excluded.  Interval follow-up is advised to characterize change.     3. No pulmonary mass.     5/2/2024 Imaging    MRI Pelvis    FINDINGS: There is a circumferential solid low rectal mass which  measures approximately 4.5 cm in craniocaudad span and the inferior  margin is approximately 5 mm proximal to the anorectal junction.     The tumor extends approximately 8 mm into the mesorectal fat  predominantly along the right wall of the mass. There appears to be  extension to the mesorectal fascia along the right wall and abutment of  the right levator ani muscles. There are several enlarged perirectal  nodes and the largest is presacral measuring approximately 2.0 x 1.2 cm.  There are at least 4 enhancing suspicious nodes. There is no definite  extramural vascular invasion.     IMPRESSION:  1. Primary Tumor Location: Low rectal     2. MRI Stage: T3 N2 MRF positive     3. No definite sphincter involvement     5/2/2024 Cancer Staged    Staging form: Colon And Rectum, AJCC 8th Edition  - Clinical stage from 5/2/2024: cT3, cN2, cM0 - Signed by Sonali Pak MD on 6/3/2024     5/3/2024 Initial Diagnosis    Rectal adenocarcinoma     5/16/2024 Imaging    MRI abdo    IMPRESSION:  T2 hyperintense nonenhancing liver lesions are consistent with hepatic  cysts and biliary cystic hamartomas. No convincing liver metastasis  identified     5/24/2024 Procedure    PORT placement (Dr Isaac)     6/3/2024 -  "8/21/2024 Chemotherapy    OP COLORECTAL FOLFIRINOX (Fluorouracil cont. Infusion / Leucovorin / OXALIplatin / Irinotecan)     6/5/2024 -  Chemotherapy    OP CENTRAL VENOUS ACCESS DEVICE Access, Care, and Maintenance (CVAD)     9/9/2024 - 9/9/2024 Chemotherapy    OP COLORECTAL Fluorouracil CIV over 168H + XRT     9/9/2024 -  Chemotherapy    OP COLORECTAL Fluorouracil CIV over 96 H + XRT         The current medication list and allergy list were reviewed and reconciled.      Past Medical History, Past Surgical History, Social History, Family History have been reviewed and are without significant changes except as mentioned.      REVIEW OF SYSTEMS:  See interval history    Objective:       Vitals:    10/14/24 1126   BP: (!) 161/111  Comment: this is the second time. First time it was 176/111   Pulse: 92   Temp: 98.2 °F (36.8 °C)   TempSrc: Oral   SpO2: 98%   Weight: 104 kg (229 lb 8 oz)   Height: 175.3 cm (69.02\")   PainSc: 0-No pain                 10/7/2024     9:06 AM   Current Status   ECOG score 0      PHYSICAL EXAM:    GENERAL:  Well-developed, well-nourished male in no acute distress.    SKIN:  Warm, dry without rashes, purpura or petechiae.  HEENT:  Normocephalic.   LYMPHATICS:  No cervical, supraclavicular or axillary adenopathy.  CHEST: Normal respiratory effort.  Lungs clear to auscultation. Good airflow.  CARDIAC:  Regular rate and rhythm. Normal S1,S2.  ABDOMEN:  Soft, no tender.  Bowel sounds normal.  EXTREMITIES:  No lower extremity edema.         Diagnostic data  Lab Results   Component Value Date    WBC 2.24 (L) 10/14/2024    HGB 12.3 (L) 10/14/2024    HCT 36.1 (L) 10/14/2024    MCV 99.7 (H) 10/14/2024     10/14/2024     Lab Results   Component Value Date    GLUCOSE 101 (H) 10/14/2024    BUN 11 10/14/2024    CREATININE 0.92 10/14/2024     10/14/2024    K 3.9 10/14/2024     10/14/2024    CALCIUM 9.1 10/14/2024    PROTEINTOT 6.8 10/14/2024    ALBUMIN 4.4 10/14/2024    ALT 16 10/14/2024 "    AST 19 10/14/2024    ALKPHOS 79 10/14/2024    BILITOT 0.3 10/14/2024    GLOB 2.4 10/14/2024    AGRATIO 1.8 10/14/2024    BCR 12.0 10/14/2024    ANIONGAP 7.5 10/14/2024    EGFR 103.9 10/14/2024           Assessment & Plan     *Rectal adenocarcinoma (cT3 cN2 cM0), LUCAS, TMB low  4/23/2024 rectal biopsy-invasive moderately differentiated adenocarcinoma, LUCAS.  5/2/2024 MRI pelvis: Approximately 4.5 cm circumferential low rectal mass, approximately 5 mm proximal to anorectal junction, extending 8 mm in the mesorectal fat.  Appears to be extension to MRF along the right wall and abutment of right levator ani muscles.  Several enlarged perirectal nodes, largest 2 x 1.2 cm.  At least 4 enhancing suspicious nodes.  cT3 cN2 MRF positive.  No definitive sphincter involvement  5/2/2024 CT chest abdomen pelvis: 1.7 cm right hepatic cyst, and numerous low-density liver lesions-cyst versus possible metastatic lesions.  No lung mass  5/16/2024 MRI abdomen: T2 hyperintense nonenhancing liver lesions consistent with liver cyst and biliary cystic hamartomas.  No evidence of liver mets  Tumor genomic profile-APC (I269fs), APC ([id number]), FANCD2 (Q823), TP53. TMB 1 mut/Mb. LUCAS  6/3/2024 baseline CEA: 1.89  6/3/2024 - 08/19/2024: s/p 6 cycles of  FOLFIRINOX (5-FU bolus eliminated to prevent further myelosuppression given baseline neutropenia; 25% dose reduction starting cycle 3) with G-CSF support  8/27/2024 MRI pelvis-good response.  No clear measurable rectal mass.  Decrease in the size of perirectal lymph node, measuring less than 5 mm; decrease in the size of enlarged hide perirectal lymph nodes/mary metastases.  08/28/2024 CT CAP: Apparent decrease size of rectal mass, decreased pericolonic and perirectal lymph nodes.  1 axillary lymph node and 1 liver low-density (too small to characterize) measures slightly larger, uncertain clinical significance.  Plan for total neoadjuvant chemotherapy with FOLFIRINOX followed by  chemoradiation  9/9/2024: Cycle 1 day 1 concurrent chemoradiation with 5-FU.  9/16/2024 proceed with week 2 concurrent chemoradiation with infusional 5-FU.  50% dose reduction of 5-FU due to neutropenia  9/23/2024 proceed with week 3 concurrent chemoradiation with infusional 5-FU maintaining previous 50% dose reduction  09/30/2024: Concurrent chemoradiation with infusional 5-FU with 50% dose reduction  On 10/7/2024 will proceed ahead with week #5 infusional 5-FU same 50% dose reduction.  Neutrophil 1140.  10/14/2024 we will proceed with week #6 infusional 5-FU at the same dose as previous.  Patient with further elevated blood pressure today though anxiety is part of the cause.  Message sent to primary care to evaluate BP in office.  0.1 mg clonidine given in office.  Patient encouraged to begin gabapentin as prescribed by WIL Whitt.    *Solitary axillary lymph node  *Solitary low-density liver lesion  8/28/2024 seen on CT chest abdomen pelvis, too small to characterize.    *Benign ethnic neutropenia  White cell count ranged between 2.6-3.2 since at least July 2019.    8/19/2024: WBC 3.06, ANC 1.27  9/16/2024 WBC declined at 1.96, ANC 0.89.  9/30/2024 WBC 1.71, .  Continue with 50% dose reduction of 5-FU infusion  10/7/2024 WBC 1860, neutrophils 1140.  Patient reports no evidence of infection.  Certainly this patient overall has worsening neutrophils due to chemotherapy.  Continue 5-FU at 50% dose reduction.   10/14/2024 ANC today 1.5    *.  Anemia secondary to chemotherapy.  Baseline hemoglobin over 14 prior to starting chemotherapy in June 2024.  Hemoglobin has been trending down with shawanda hemoglobin 11.8 on 9/16/2024.  Today hemoglobin 12.6 on 10/7/2024.  Continue to monitor.  Hemoglobin 12.3 today    *Skin rash, bilateral upper extremities, lower extremities and abdomen, improved  Patient reports improvement with above for application  Likely secondary to chemotherapy  Improved with topical  over-the-counter cortisone cream    Plan:   Proceed today with week #6 infusional 5-FU with 50% dose reduction in conjunction with concurrent chemoradiation.  Follow-up in 2 weeks with MD/NP and CBC to make sure he has recovered from treatment.  Plan for response assessment scans (CT chest abdomen pelvis, MRI pelvis) in about 6 weeks after completion of chemoradiation.  Ordered today in addition to follow-up with Dr. Pak 1 week later.  Dr. Isaac to see the patient after MRI pelvis          Patient continues on high risk medication requiring close monitoring for toxicity    Mariah Ta, WIL  10/14/2024         Patient is on chemotherapy, which requires frequent monitoring

## 2024-10-15 ENCOUNTER — TELEPHONE (OUTPATIENT)
Dept: ONCOLOGY | Facility: CLINIC | Age: 44
End: 2024-10-15
Payer: COMMERCIAL

## 2024-10-15 ENCOUNTER — HOSPITAL ENCOUNTER (OUTPATIENT)
Dept: RADIATION ONCOLOGY | Facility: HOSPITAL | Age: 44
Discharge: HOME | End: 2024-10-15

## 2024-10-15 ENCOUNTER — OFFICE VISIT (OUTPATIENT)
Dept: FAMILY MEDICINE CLINIC | Facility: CLINIC | Age: 44
End: 2024-10-15
Payer: COMMERCIAL

## 2024-10-15 VITALS
HEIGHT: 69 IN | DIASTOLIC BLOOD PRESSURE: 98 MMHG | BODY MASS INDEX: 34.33 KG/M2 | OXYGEN SATURATION: 99 % | HEART RATE: 86 BPM | SYSTOLIC BLOOD PRESSURE: 174 MMHG | WEIGHT: 231.8 LBS

## 2024-10-15 DIAGNOSIS — I10 PRIMARY HYPERTENSION: Primary | ICD-10-CM

## 2024-10-15 LAB
RAD ONC ARIA COURSE ID: NORMAL
RAD ONC ARIA COURSE INTENT: NORMAL
RAD ONC ARIA COURSE LAST TREATMENT DATE: NORMAL
RAD ONC ARIA COURSE START DATE: NORMAL
RAD ONC ARIA COURSE TREATMENT ELAPSED DAYS: 36
RAD ONC ARIA FIRST TREATMENT DATE: NORMAL
RAD ONC ARIA PLAN FRACTIONS TREATED TO DATE: 27
RAD ONC ARIA PLAN ID: NORMAL
RAD ONC ARIA PLAN PRESCRIBED DOSE PER FRACTION: 1.8 GY
RAD ONC ARIA PLAN PRIMARY REFERENCE POINT: NORMAL
RAD ONC ARIA PLAN TOTAL FRACTIONS PRESCRIBED: 28
RAD ONC ARIA PLAN TOTAL PRESCRIBED DOSE: 5040 CGY
RAD ONC ARIA REFERENCE POINT DOSAGE GIVEN TO DATE: 48.6 GY
RAD ONC ARIA REFERENCE POINT ID: NORMAL
RAD ONC ARIA REFERENCE POINT SESSION DOSAGE GIVEN: 1.8 GY

## 2024-10-15 PROCEDURE — 77014 CHG CT GUIDANCE RADIATION THERAPY FLDS PLACEMENT: CPT | Performed by: RADIOLOGY

## 2024-10-15 PROCEDURE — 99214 OFFICE O/P EST MOD 30 MIN: CPT | Performed by: NURSE PRACTITIONER

## 2024-10-15 PROCEDURE — 77386: CPT | Performed by: RADIOLOGY

## 2024-10-15 RX ORDER — AMLODIPINE BESYLATE 5 MG/1
5 TABLET ORAL DAILY
Qty: 90 TABLET | Refills: 1 | Status: SHIPPED | OUTPATIENT
Start: 2024-10-15

## 2024-10-15 NOTE — PROGRESS NOTES
Chief Complaint  Hypertension (Elevated blood pressure)    Subjective        Jc Brannon Jr. presents to [facility]  Hypertension  This is a recurrent problem. The current episode started more than 1 month ago. The problem is uncontrolled. Associated symptoms include anxiety. Pertinent negatives include no chest pain, headaches, malaise/fatigue, peripheral edema or shortness of breath. Risk factors for coronary artery disease include male gender and family history. Past treatments include nothing. Current antihypertension treatment includes diuretics. The current treatment provides no improvement. Compliance problems include psychosocial issues.          I have reviewed the patient's medical history in detail and updated the computerized patient record.       Current Outpatient Medications:     docusate sodium (Colace) 100 MG capsule, Take 1 capsule by mouth 2 (Two) Times a Day., Disp: 60 capsule, Rfl: 0    gabapentin (Neurontin) 100 MG capsule, Take 1 capsule by mouth 3 (Three) Times a Day., Disp: 90 capsule, Rfl: 2    hydroCHLOROthiazide 12.5 MG tablet, Take 1 tablet by mouth Daily., Disp: 30 tablet, Rfl: 5    loperamide (IMODIUM A-D) 2 MG tablet, Take 2 tablets (4 mg) at the onset of diarrhea and 2 mg every 2 hours as needed to a max of 16 mg/day (or until patient has been diarrhea free for 12 hours), Disp: 30 tablet, Rfl: 5    OLANZapine (zyPREXA) 5 MG tablet, TAKE ONE TABLET BY MOUTH ONCE NIGHTLY FOR 3 DAYS FOLLOWING CHEMOTHERAPY, Disp: 18 tablet, Rfl: 0    ondansetron (ZOFRAN) 8 MG tablet, Take 1 tablet by mouth 3 (Three) Times a Day As Needed for Nausea or Vomiting., Disp: 30 tablet, Rfl: 5    prochlorperazine (COMPAZINE) 10 MG tablet, Take 1 tablet by mouth Every 6 (Six) Hours As Needed for Nausea or Vomiting., Disp: 30 tablet, Rfl: 5    amLODIPine (NORVASC) 5 MG tablet, Take 1 tablet by mouth Daily., Disp: 90 tablet, Rfl: 1  No current facility-administered medications for  "this visit.       Objective   Vital Signs:  /98 (BP Location: Right arm, Patient Position: Sitting, Cuff Size: Adult)   Pulse 86   Ht 175.3 cm (69.02\")   Wt 105 kg (231 lb 12.8 oz)   SpO2 99%   BMI 34.22 kg/m²   Estimated body mass index is 34.22 kg/m² as calculated from the following:    Height as of this encounter: 175.3 cm (69.02\").    Weight as of this encounter: 105 kg (231 lb 12.8 oz).        Physical Exam  Vitals reviewed.   Constitutional:       Appearance: Normal appearance.   Cardiovascular:      Rate and Rhythm: Normal rate and regular rhythm.      Pulses: Normal pulses.      Heart sounds: Normal heart sounds.   Pulmonary:      Effort: Pulmonary effort is normal.      Breath sounds: Normal breath sounds.   Skin:     General: Skin is warm and dry.   Neurological:      Mental Status: He is alert and oriented to person, place, and time.   Psychiatric:      Comments: No acute distress        Result Review :                Assessment and Plan   Diagnoses and all orders for this visit:    1. Primary hypertension (Primary)  -     amLODIPine (NORVASC) 5 MG tablet; Take 1 tablet by mouth Daily.  Dispense: 90 tablet; Refill: 1    Mr. Brannon does not appear to be in any acute distress.  His blood pressure today in the office is 174/98.  He is to start Amlodipine 5 mg daily and continue the HCTZ 12.5 mg daily. I have asked him to return in 2 weeks to follow up on his blood pressure.          Follow Up   Return in about 2 weeks (around 10/29/2024) for f/u on blood pressure.  Patient was given instructions and counseling regarding his condition or for health maintenance advice. Please see specific information pulled into the AVS if appropriate.             "

## 2024-10-15 NOTE — TELEPHONE ENCOUNTER
Caller: Jc Brannon Jr.    Relationship: Self    Best call back number: [phone]     What is the best time to reach you: ANYTIME    Who are you requesting to speak with (clinical staff, provider,  specific staff member): CLINICAL     What was the call regarding: PT WOULD LIKE [facility] TO CALL HIM BACK TO DISCUSS SOME THINGS.

## 2024-10-16 ENCOUNTER — PATIENT OUTREACH (OUTPATIENT)
Dept: OTHER | Facility: HOSPITAL | Age: 44
End: 2024-10-16
Payer: COMMERCIAL

## 2024-10-16 ENCOUNTER — HOSPITAL ENCOUNTER (OUTPATIENT)
Dept: RADIATION ONCOLOGY | Facility: HOSPITAL | Age: 44
Discharge: HOME | End: 2024-10-16

## 2024-10-16 LAB
RAD ONC ARIA COURSE ID: NORMAL
RAD ONC ARIA COURSE INTENT: NORMAL
RAD ONC ARIA COURSE LAST TREATMENT DATE: NORMAL
RAD ONC ARIA COURSE START DATE: NORMAL
RAD ONC ARIA COURSE TREATMENT ELAPSED DAYS: 37
RAD ONC ARIA FIRST TREATMENT DATE: NORMAL
RAD ONC ARIA PLAN FRACTIONS TREATED TO DATE: 28
RAD ONC ARIA PLAN ID: NORMAL
RAD ONC ARIA PLAN PRESCRIBED DOSE PER FRACTION: 1.8 GY
RAD ONC ARIA PLAN PRIMARY REFERENCE POINT: NORMAL
RAD ONC ARIA PLAN TOTAL FRACTIONS PRESCRIBED: 28
RAD ONC ARIA PLAN TOTAL PRESCRIBED DOSE: 5040 CGY
RAD ONC ARIA REFERENCE POINT DOSAGE GIVEN TO DATE: 50.4 GY
RAD ONC ARIA REFERENCE POINT ID: NORMAL
RAD ONC ARIA REFERENCE POINT SESSION DOSAGE GIVEN: 1.8 GY

## 2024-10-16 PROCEDURE — 77014 CHG CT GUIDANCE RADIATION THERAPY FLDS PLACEMENT: CPT | Performed by: RADIOLOGY

## 2024-10-16 PROCEDURE — 77386: CPT | Performed by: RADIOLOGY

## 2024-10-17 ENCOUNTER — HOSPITAL ENCOUNTER (OUTPATIENT)
Dept: RADIATION ONCOLOGY | Facility: HOSPITAL | Age: 44
Discharge: HOME | End: 2024-10-17

## 2024-10-17 LAB
RAD ONC ARIA COURSE ID: NORMAL
RAD ONC ARIA COURSE INTENT: NORMAL
RAD ONC ARIA COURSE LAST TREATMENT DATE: NORMAL
RAD ONC ARIA COURSE START DATE: NORMAL
RAD ONC ARIA COURSE TREATMENT ELAPSED DAYS: 38
RAD ONC ARIA FIRST TREATMENT DATE: NORMAL
RAD ONC ARIA PLAN FRACTIONS TREATED TO DATE: 1
RAD ONC ARIA PLAN ID: NORMAL
RAD ONC ARIA PLAN PRESCRIBED DOSE PER FRACTION: 1.8 GY
RAD ONC ARIA PLAN PRIMARY REFERENCE POINT: NORMAL
RAD ONC ARIA PLAN TOTAL FRACTIONS PRESCRIBED: 2
RAD ONC ARIA PLAN TOTAL PRESCRIBED DOSE: 360 CGY
RAD ONC ARIA REFERENCE POINT DOSAGE GIVEN TO DATE: 1.8 GY
RAD ONC ARIA REFERENCE POINT ID: NORMAL
RAD ONC ARIA REFERENCE POINT SESSION DOSAGE GIVEN: 1.8 GY

## 2024-10-17 PROCEDURE — 77386: CPT | Performed by: RADIOLOGY

## 2024-10-17 PROCEDURE — 77336 RADIATION PHYSICS CONSULT: CPT | Performed by: RADIOLOGY

## 2024-10-17 PROCEDURE — 77014 CHG CT GUIDANCE RADIATION THERAPY FLDS PLACEMENT: CPT | Performed by: RADIOLOGY

## 2024-10-18 ENCOUNTER — RADIATION ONCOLOGY WEEKLY ASSESSMENT (OUTPATIENT)
Dept: RADIATION ONCOLOGY | Facility: HOSPITAL | Age: 44
End: 2024-10-18
Payer: COMMERCIAL

## 2024-10-18 ENCOUNTER — HOSPITAL ENCOUNTER (OUTPATIENT)
Dept: RADIATION ONCOLOGY | Facility: HOSPITAL | Age: 44
Discharge: HOME | End: 2024-10-18

## 2024-10-18 ENCOUNTER — INFUSION (OUTPATIENT)
Dept: ONCOLOGY | Facility: HOSPITAL | Age: 44
End: 2024-10-18
Payer: COMMERCIAL

## 2024-10-18 VITALS
WEIGHT: 222.8 LBS | BODY MASS INDEX: 32.89 KG/M2 | OXYGEN SATURATION: 97 % | HEART RATE: 98 BPM | SYSTOLIC BLOOD PRESSURE: 176 MMHG | DIASTOLIC BLOOD PRESSURE: 99 MMHG

## 2024-10-18 VITALS
WEIGHT: 228.2 LBS | DIASTOLIC BLOOD PRESSURE: 88 MMHG | TEMPERATURE: 98.4 F | BODY MASS INDEX: 33.68 KG/M2 | HEART RATE: 76 BPM | RESPIRATION RATE: 16 BRPM | SYSTOLIC BLOOD PRESSURE: 152 MMHG

## 2024-10-18 DIAGNOSIS — C20 RECTAL ADENOCARCINOMA: Primary | ICD-10-CM

## 2024-10-18 LAB
RAD ONC ARIA COURSE ID: NORMAL
RAD ONC ARIA COURSE INTENT: NORMAL
RAD ONC ARIA COURSE LAST TREATMENT DATE: NORMAL
RAD ONC ARIA COURSE START DATE: NORMAL
RAD ONC ARIA COURSE TREATMENT ELAPSED DAYS: 39
RAD ONC ARIA FIRST TREATMENT DATE: NORMAL
RAD ONC ARIA PLAN FRACTIONS TREATED TO DATE: 2
RAD ONC ARIA PLAN ID: NORMAL
RAD ONC ARIA PLAN PRESCRIBED DOSE PER FRACTION: 1.8 GY
RAD ONC ARIA PLAN PRIMARY REFERENCE POINT: NORMAL
RAD ONC ARIA PLAN TOTAL FRACTIONS PRESCRIBED: 2
RAD ONC ARIA PLAN TOTAL PRESCRIBED DOSE: 360 CGY
RAD ONC ARIA REFERENCE POINT DOSAGE GIVEN TO DATE: 3.6 GY
RAD ONC ARIA REFERENCE POINT ID: NORMAL
RAD ONC ARIA REFERENCE POINT SESSION DOSAGE GIVEN: 1.8 GY

## 2024-10-18 PROCEDURE — 77014 CHG CT GUIDANCE RADIATION THERAPY FLDS PLACEMENT: CPT | Performed by: RADIOLOGY

## 2024-10-18 PROCEDURE — 25010000002 HEPARIN LOCK FLUSH PER 10 UNITS: Performed by: NURSE PRACTITIONER

## 2024-10-18 PROCEDURE — 77386: CPT | Performed by: RADIOLOGY

## 2024-10-18 RX ORDER — HEPARIN SODIUM (PORCINE) LOCK FLUSH IV SOLN 100 UNIT/ML 100 UNIT/ML
500 SOLUTION INTRAVENOUS AS NEEDED
Status: DISCONTINUED | OUTPATIENT
Start: 2024-10-18 | End: 2024-10-18 | Stop reason: HOSPADM

## 2024-10-18 RX ORDER — HEPARIN SODIUM (PORCINE) LOCK FLUSH IV SOLN 100 UNIT/ML 100 UNIT/ML
500 SOLUTION INTRAVENOUS AS NEEDED
OUTPATIENT
Start: 2024-10-18

## 2024-10-18 RX ORDER — SODIUM CHLORIDE 0.9 % (FLUSH) 0.9 %
10 SYRINGE (ML) INJECTION AS NEEDED
OUTPATIENT
Start: 2024-10-18

## 2024-10-18 RX ORDER — SODIUM CHLORIDE 0.9 % (FLUSH) 0.9 %
10 SYRINGE (ML) INJECTION AS NEEDED
Status: DISCONTINUED | OUTPATIENT
Start: 2024-10-18 | End: 2024-10-18 | Stop reason: HOSPADM

## 2024-10-18 RX ADMIN — Medication 10 ML: at 09:42

## 2024-10-18 RX ADMIN — Medication 500 UNITS: at 09:42

## 2024-10-18 NOTE — PROGRESS NOTES
Radiation Treatment Summary Note      Patient Name: Jc Brannon Jr.  : 1980    Attending Provider: Kolton Noyola MD      Diagnosis:     ICD-10-CM ICD-9-CM   1. Rectal adenocarcinoma  C20 154.1       Radiation Start Date: 10/9/2024    Radiation Completion Date: 10/18/2024    Mr. Brannon is a 43-year-old -American gentleman who underwent screening colonoscopy because of the strong family history of cancer and was found to have a moderately differentiated adenocarcinoma of the rectum.  He had pericolonic and perirectal adenopathy.  MRI of the liver was negative.  Patient was found to have a T3 N2 lesion on MRI.  Patient underwent chemotherapy and has now completed chemoradiotherapy prior to expected surgical resection.  Patient has already met with Dr. Ori Isaac to discuss definitive resection.  Patient began chemoradiotherapy 10/9/2024 with feels as follows:    Prescription:     Sequential    Site: Pelvis  Laterality: N/A  Total Dose: 5040 cGy  Dose per Fraction: 180 cGy  Total Fractions: 28  Daily or BID: Daily  Modality: 6 MV photons  Technique: IMRT/VMAT/Rapid Arc  Bolus: No       THEN      2.   Site: Rectal boost  Laterality: N/A  Total Dose: 360 cGy  Dose per Fraction: 180 cGy  Total Fractions: 2  Daily or BID: Daily  Modality: 6 MV photons  Technique: IMRT/VMAT/Rapid Arc  Bolus: No    Final Delivered Dose Deviated From Initially Prescribed Dose: No    Concurrent Chemotherapy: Yes, 5-fluorouracil    Patient Tolerated Treatment Without Unexpected Side Effects/Complications: Yes, patient tolerated treatment extremely well.  He was able to work through most of the treatment.  He has no significant pain or irritation.    ECOG: Fully active, able to carry on all pre-disease performance without restriction = 0    Pain Management Plan: None Indicated/PRN OTC    Follow-Up Plan: 3 months    Imaging Ordered for Follow-Up: None/NA        Kolton oNyola MD

## 2024-10-18 NOTE — PROGRESS NOTES
Radiation Oncology  On-Treatment Note      Patient: Jc Brannon Jr.    MRN: [id number]    Attending Physician: Kolton Noyola MD     Diagnosis:     ICD-10-CM ICD-9-CM   1. Rectal adenocarcinoma  C20 154.1       Radiation Therapy Visit:  Dosimetry plan remains acceptable, Films reviewed and remains acceptable, Pain assessed, Pain management planned, Radiation dose schedule reviewed and remains acceptable, Radiation technique remains acceptable, and Symptoms within expected range    Radiation Treatments       Active   Reference Points   RX: 5040   Most recent treatment: Dose given: 180 cGy (on 10/16/2024)   Total: Dose given: 5,040 cGy   Elapsed Days: 37      RX: [id number]   Most recent treatment: Dose given: 180 cGy (on 10/18/2024)   Total: Dose given: 360 cGy   Elapsed Days: 39                 Jc completes neoadjuvant chemoradiotherapy today.  He still has his 5-FU pump in place.  He has having no specific irritations.  He had only a slight amount of bleeding at the anal verge.  He denies GI upset.  His appetite is good.  His blood counts have remained stable.  He will be seen again in 3 months for further follow-up evaluation.  He has a repeat surgical evaluation 12/6/2024.    Physical Examination:  Vitals: Blood pressure 176/99, pulse 98, weight 101 kg (222 lb 12.8 oz), SpO2 97%.  There were no vitals filed for this visit.    Fully active, able to carry on all pre-disease performance without restriction = 0    We examined the relevant areas: yes  Findings are within the expected range for this stage of treatment: yes  -------------------------------------------------------------------------------------------------------------------    ACTION ITEMS:  Patient tolerating treatment well and as expected for this stage in their treatment and Continue radiation therapy as planned    Estimated Completion Date: Today      Kolton Noyola MD  Radiation Oncology

## 2024-10-21 LAB
NTRA SIGNATERA MTM READOUT: 0 MTM/ML
NTRA SIGNATERA TEST RESULT: NEGATIVE

## 2024-10-24 ENCOUNTER — TELEPHONE (OUTPATIENT)
Dept: OTHER | Facility: HOSPITAL | Age: 44
End: 2024-10-24
Payer: COMMERCIAL

## 2024-10-24 LAB
RAD ONC ARIA COURSE END DATE: NORMAL
RAD ONC ARIA COURSE ID: NORMAL
RAD ONC ARIA COURSE INTENT: NORMAL
RAD ONC ARIA COURSE LAST TREATMENT DATE: NORMAL
RAD ONC ARIA COURSE START DATE: NORMAL
RAD ONC ARIA COURSE TREATMENT ELAPSED DAYS: 39
RAD ONC ARIA FIRST TREATMENT DATE: NORMAL
RAD ONC ARIA PLAN FRACTIONS TREATED TO DATE: 2
RAD ONC ARIA PLAN FRACTIONS TREATED TO DATE: 28
RAD ONC ARIA PLAN ID: NORMAL
RAD ONC ARIA PLAN ID: NORMAL
RAD ONC ARIA PLAN NAME: NORMAL
RAD ONC ARIA PLAN NAME: NORMAL
RAD ONC ARIA PLAN PRESCRIBED DOSE PER FRACTION: 1.8 GY
RAD ONC ARIA PLAN PRESCRIBED DOSE PER FRACTION: 1.8 GY
RAD ONC ARIA PLAN PRIMARY REFERENCE POINT: NORMAL
RAD ONC ARIA PLAN PRIMARY REFERENCE POINT: NORMAL
RAD ONC ARIA PLAN TOTAL FRACTIONS PRESCRIBED: 2
RAD ONC ARIA PLAN TOTAL FRACTIONS PRESCRIBED: 28
RAD ONC ARIA PLAN TOTAL PRESCRIBED DOSE: 360 CGY
RAD ONC ARIA PLAN TOTAL PRESCRIBED DOSE: 5040 CGY
RAD ONC ARIA REFERENCE POINT DOSAGE GIVEN TO DATE: 3.6 GY
RAD ONC ARIA REFERENCE POINT DOSAGE GIVEN TO DATE: 50.4 GY
RAD ONC ARIA REFERENCE POINT ID: NORMAL
RAD ONC ARIA REFERENCE POINT ID: NORMAL

## 2024-10-24 NOTE — TELEPHONE ENCOUNTER
Oncology Social Work  Voicemail message was left for the patient to return call to [facility]. MAVIS Marei

## 2024-10-24 NOTE — TELEPHONE ENCOUNTER
Oncology Social Work  Spoke to the patient's father to inform him of needed documentation that the patient will need to provide to continue with the process of applying for financial assistance.  The patient's father was informed that the following is needed:  Most recent tax return, 2 months of bank statements for the 2 listed accounts, last 2 pay stubs and any information or stubs for how he is currently being paid (short term disability etc).  The patient's father stated that they will bring that information to the patient's next appointment.  MAVIS Marie

## 2024-10-24 NOTE — PROGRESS NOTES
Follow-up (Lab review)    SUBJECTIVE:    10/31/2024, Interval history  He is here for follow-up.  He has completed his neoadjuvant chemoradiation.  Doing quite well.  No new concerns or complaints      HISTORY OF PRESENT ILLNESS:  The patient is a 44 y.o. year old male with recently diagnosed rectal adenocarcinoma who presents to our clinic to establish care.  Onc history is outlined below    Family history significant for prostate cancer in father at 63, paternal uncle with colon cancer at 57, other paternal uncle with metastatic cancer, type unknown in his 50s, other paternal uncle with possible colon cancer and DVT/PE in his 70s.    Oncology/Hematology History   Rectal adenocarcinoma   4/23/2024 Procedure         4/23/2024 oDesk         5/2/2024 Imaging    CT CAP    A 1.7 cm right hepatic cyst is noted. Numerous small subcentimeter liver  low densities are present that are too small to characterize, could be  cysts, or potentially metastatic disease, interval follow-up can  characterize change.    No bowel obstruction. The sigmorectal mass is better characterized on  the MRI exam of same date (please see separate report). Some stranding  is apparent in the fat around the lesion, numerous surrounding lymph  nodes are present that could be metastatic lymph nodes. For example, on  axial image 165, a 1.9 x 1.6 cm pericolonic lymph node is present. As  another example, on axial image 170, left perirectal lymph node measures  0.7 x 1.2 cm.    IMPRESSION:        1. Sigmorectal mass with numerous surrounding lymph nodes, raising  suspicion for metastatic lymph nodes.     2. Hepatic cyst, and numerous liver low densities that are too small to  characterize; these lesions could also be cysts, but in this clinical  setting, possibility of any metastatic liver lesions is not excluded.  Interval follow-up is advised to characterize change.     3. No pulmonary mass.     5/2/2024 Imaging    MRI Pelvis    FINDINGS:  There is a circumferential solid low rectal mass which  measures approximately 4.5 cm in craniocaudad span and the inferior  margin is approximately 5 mm proximal to the anorectal junction.     The tumor extends approximately 8 mm into the mesorectal fat  predominantly along the right wall of the mass. There appears to be  extension to the mesorectal fascia along the right wall and abutment of  the right levator ani muscles. There are several enlarged perirectal  nodes and the largest is presacral measuring approximately 2.0 x 1.2 cm.  There are at least 4 enhancing suspicious nodes. There is no definite  extramural vascular invasion.     IMPRESSION:  1. Primary Tumor Location: Low rectal     2. MRI Stage: T3 N2 MRF positive     3. No definite sphincter involvement     5/2/2024 Cancer Staged    Staging form: Colon And Rectum, AJCC 8th Edition  - Clinical stage from 5/2/2024: cT3, cN2, cM0 - Signed by Sonali Pak MD on 6/3/2024     5/3/2024 Initial Diagnosis    Rectal adenocarcinoma     5/16/2024 Imaging    MRI abdo    IMPRESSION:  T2 hyperintense nonenhancing liver lesions are consistent with hepatic  cysts and biliary cystic hamartomas. No convincing liver metastasis  identified     5/24/2024 Procedure    PORT placement (Dr Isaac)     6/3/2024 - 8/21/2024 Chemotherapy    OP COLORECTAL FOLFIRINOX (Fluorouracil cont. Infusion / Leucovorin / OXALIplatin / Irinotecan)     6/5/2024 -  Chemotherapy    OP CENTRAL VENOUS ACCESS DEVICE Access, Care, and Maintenance (CVAD)     9/9/2024 - 9/9/2024 Chemotherapy    OP COLORECTAL Fluorouracil CIV over 168H + XRT     9/9/2024 - 10/18/2024 Chemotherapy    OP COLORECTAL Fluorouracil CIV over 96 H + XRT         The current medication list and allergy list were reviewed and reconciled.      Past Medical History, Past Surgical History, Social History, Family History have been reviewed and are without significant changes except as mentioned.      REVIEW OF SYSTEMS:  See interval  "history    Objective:       Vitals:    10/31/24 0945   BP: 136/90   Pulse: 86   Resp: 16   Temp: 98.1 °F (36.7 °C)   TempSrc: Oral   SpO2: 99%   Weight: 102 kg (225 lb 11.2 oz)   Height: 175.3 cm (69.02\")   PainSc:   4   PainLoc: Rectum               10/31/2024     9:46 AM   Current Status   ECOG score 0      PHYSICAL EXAM:    CONSTITUTIONAL:  Vital signs reviewed.  No distress, looks comfortable.  RESPIRATORY: Bilateral lungs clear to auscultation.  No wheezing or rhonchi   CARDIOVASCULAR: Normal S1-S2.  No murmur rubs or gallop   GASTROINTESTINAL: Soft, nontender, bowel sounds present  NEURO:  No focal deficits.  Appears to have equal strength all 4 extremities.    I have reexamined the patient and the results are consistent with the previously documented exam. Sonali Pak MD        Diagnostic data  CBC and Differential (09/23/2024 11:22)  Comprehensive metabolic panel (09/23/2024 11:22)    Assessment & Plan     *Rectal adenocarcinoma (cT3 cN2 cM0), LUCAS, TMB low  4/23/2024 rectal biopsy-invasive moderately differentiated adenocarcinoma, LUCAS.  5/2/2024 MRI pelvis: Approximately 4.5 cm circumferential low rectal mass, approximately 5 mm proximal to anorectal junction, extending 8 mm in the mesorectal fat.  Appears to be extension to MRF along the right wall and abutment of right levator ani muscles.  Several enlarged perirectal nodes, largest 2 x 1.2 cm.  At least 4 enhancing suspicious nodes.  cT3 cN2 MRF positive.  No definitive sphincter involvement  5/2/2024 CT chest abdomen pelvis: 1.7 cm right hepatic cyst, and numerous low-density liver lesions-cyst versus possible metastatic lesions.  No lung mass  5/16/2024 MRI abdomen: T2 hyperintense nonenhancing liver lesions consistent with liver cyst and biliary cystic hamartomas.  No evidence of liver mets  Tumor genomic profile-APC (I269fs), APC ([id number]), FANCD2 (Q823), TP53. TMB 1 mut/Mb. LUCAS  6/3/2024 baseline CEA: 1.89  6/3/2024 - 08/19/2024: s/p 6 cycles of  " FOLFIRINOX (5-FU bolus eliminated to prevent further myelosuppression given baseline neutropenia; 25% dose reduction starting cycle 3) with G-CSF support  8/27/2024 MRI pelvis-good response.  No clear measurable rectal mass.  Decrease in the size of perirectal lymph node, measuring less than 5 mm; decrease in the size of enlarged hide perirectal lymph nodes/mary metastases.  08/28/2024 CT CAP: Apparent decrease size of rectal mass, decreased pericolonic and perirectal lymph nodes.  1 axillary lymph node and 1 liver low-density (too small to characterize) measures slightly larger, uncertain clinical significance.  Plan for total neoadjuvant chemotherapy with FOLFIRINOX followed by chemoradiation  9/9/2024 - 10/14/2024: s/p 6 cycles of concurrent chemoradiation with 5-FU.  5-FU dose reduced by 50% due to neutropenia starting cycle 2  10/31/2024: Reviewed radiation oncology notes.  He is scheduled for follow-up with them in 3 months.  Patient doing quite well after completion of neoadjuvant chemotherapy and chemoradiotherapy.  He is scheduled for response assessment imaging in December, and follow-up appointment with Dr. Isaac for surgical evaluation.    *Solitary axillary lymph node  *Solitary low-density liver lesion  8/28/2024 seen on CT chest abdomen pelvis, too small to characterize.    *Benign ethnic neutropenia  White cell count ranged between 2.6-3.2 since at least July 2019.    10/31/2024 WBC 1.96, ANC 1.17    *Anemia secondary to chemotherapy, resolved  10/31/2024: Hemoglobin improved to 13.3    *Skin rash, bilateral upper extremities, lower extremities and abdomen, resolved    Plan:   He is scheduled for response assessment scans (CT chest abdomen pelvis; MRI pelvis) on 12/03/2024   Has appointment with Dr. Isaac 12/05/2024  Return to clinic with labs on 12/9/2024    Sonali Pak MD  10/31/2024

## 2024-10-25 ENCOUNTER — TELEPHONE (OUTPATIENT)
Dept: OTHER | Facility: HOSPITAL | Age: 44
End: 2024-10-25
Payer: COMMERCIAL

## 2024-10-25 ENCOUNTER — DOCUMENTATION (OUTPATIENT)
Dept: OTHER | Facility: HOSPITAL | Age: 44
End: 2024-10-25
Payer: COMMERCIAL

## 2024-10-25 LAB — SCAN RESULT: NORMAL

## 2024-10-25 NOTE — TELEPHONE ENCOUNTER
Oncology Social Work  Inbound call from the patient's father requesting for him and the patient to meet with OSW to discuss hospital financial assistance application.  The patient's father was informed that he and the patient could meet with OSW at 1:30pm and he was agreeable.  MAVIS Marie

## 2024-10-25 NOTE — PROGRESS NOTES
Oncology Social Work  Met with the patient and his father and reviewed the documents that need to be submitted to be considered for hospital financial assistance. The patient and his father were informed that the following needed to be submitted:  2023 federal tax return (his 1040 form), 2 most recent months of bank statements from [employer] and proof of his income -last 2 paystub and if he is receiving any short/long term disability, if he plans to return to work. The patient and his father stated that they will bring those documents to their next appointment.  They stated that the patient would benefit from additional financial assistance and he was agreeable to OSW applying for [email or web address], [employer] and [employer]. OSW provided the patient with a letter listing available supportive oncology services, OSW contact information and information about [employer] and [employer].  OSW will fill out applications for additional financial assistance for the patient. MAVIS Marie

## 2024-10-28 ENCOUNTER — TELEPHONE (OUTPATIENT)
Dept: OTHER | Facility: HOSPITAL | Age: 44
End: 2024-10-28
Payer: COMMERCIAL

## 2024-10-28 NOTE — PROGRESS NOTES
Met patient in radiation center today. He stated he was tolerating treatment fairly well.  Oncology nutrition has been following him.  The patient has continued to work as tolerated during treatment.     He denies any questions/concerns or ongoing resource needs. I will continue to follow; encouraged patient/father to call as needed

## 2024-10-28 NOTE — TELEPHONE ENCOUNTER
Oncology Social Work  Spoke to the patient's father who informed OSW that the patient was working on obtaining needed documentation for the hospital financial assistance program.  The patient's father also informed OSW that the patient is in the process of applying for short term disability with his employer.  OSW expressed appreciation for the updated information.  OSW will continue to follow up with the patient and his father to assist with the patient's needs.  MAVIS Marie

## 2024-10-28 NOTE — TELEPHONE ENCOUNTER
Oncology Social Work  Inbound call from the patient who informed OSW that he is working with his HR dept on applying for short term disability.  The patient states that he is continuing to work on getting OSW the documents that are needed to apply for hospital financial assistance.  OSW provided encouragement for the patient for diligently working on getting those needed documents.  MAVIS Marie

## 2024-10-29 ENCOUNTER — OFFICE VISIT (OUTPATIENT)
Dept: FAMILY MEDICINE CLINIC | Facility: CLINIC | Age: 44
End: 2024-10-29
Payer: COMMERCIAL

## 2024-10-29 VITALS
BODY MASS INDEX: 33.36 KG/M2 | OXYGEN SATURATION: 98 % | SYSTOLIC BLOOD PRESSURE: 148 MMHG | HEART RATE: 93 BPM | WEIGHT: 225.2 LBS | HEIGHT: 69 IN | DIASTOLIC BLOOD PRESSURE: 88 MMHG

## 2024-10-29 DIAGNOSIS — I10 PRIMARY HYPERTENSION: ICD-10-CM

## 2024-10-29 PROCEDURE — 99214 OFFICE O/P EST MOD 30 MIN: CPT | Performed by: NURSE PRACTITIONER

## 2024-10-29 RX ORDER — AMLODIPINE BESYLATE 10 MG/1
10 TABLET ORAL DAILY
Qty: 90 TABLET | Refills: 1 | Status: SHIPPED | OUTPATIENT
Start: 2024-10-29

## 2024-10-29 NOTE — PROGRESS NOTES
Chief Complaint  Hypertension (F/U on blood pressure)    Subjective        Jc Brannon Jr. presents to [facility]  Hypertension  This is a recurrent problem. The current episode started 1 to 4 weeks ago. The problem is uncontrolled. Associated symptoms include anxiety. Pertinent negatives include no chest pain, headaches, malaise/fatigue, peripheral edema or shortness of breath. Risk factors for coronary artery disease include male gender. Current antihypertension treatment includes calcium channel blockers and diuretics. The current treatment provides mild improvement.       I have reviewed the patient's medical history in detail and updated the computerized patient record.       Current Outpatient Medications:     amLODIPine (NORVASC) 10 MG tablet, Take 1 tablet by mouth Daily., Disp: 90 tablet, Rfl: 1    docusate sodium (Colace) 100 MG capsule, Take 1 capsule by mouth 2 (Two) Times a Day., Disp: 60 capsule, Rfl: 0    gabapentin (Neurontin) 100 MG capsule, Take 1 capsule by mouth 3 (Three) Times a Day., Disp: 90 capsule, Rfl: 2    hydroCHLOROthiazide 12.5 MG tablet, Take 1 tablet by mouth Daily., Disp: 30 tablet, Rfl: 5    loperamide (IMODIUM A-D) 2 MG tablet, Take 2 tablets (4 mg) at the onset of diarrhea and 2 mg every 2 hours as needed to a max of 16 mg/day (or until patient has been diarrhea free for 12 hours), Disp: 30 tablet, Rfl: 5    OLANZapine (zyPREXA) 5 MG tablet, TAKE ONE TABLET BY MOUTH ONCE NIGHTLY FOR 3 DAYS FOLLOWING CHEMOTHERAPY, Disp: 18 tablet, Rfl: 0    ondansetron (ZOFRAN) 8 MG tablet, Take 1 tablet by mouth 3 (Three) Times a Day As Needed for Nausea or Vomiting., Disp: 30 tablet, Rfl: 5    prochlorperazine (COMPAZINE) 10 MG tablet, Take 1 tablet by mouth Every 6 (Six) Hours As Needed for Nausea or Vomiting., Disp: 30 tablet, Rfl: 5     Objective   Vital Signs:  /88 (BP Location: Left arm, Patient Position: Sitting, Cuff Size: Adult)   Pulse 93   Ht  "175.3 cm (69.02\")   Wt 102 kg (225 lb 3.2 oz)   SpO2 98%   BMI 33.24 kg/m²   Estimated body mass index is 33.24 kg/m² as calculated from the following:    Height as of this encounter: 175.3 cm (69.02\").    Weight as of this encounter: 102 kg (225 lb 3.2 oz).      Physical Exam  Vitals reviewed.   Constitutional:       Appearance: Normal appearance. He is obese.   Cardiovascular:      Rate and Rhythm: Normal rate and regular rhythm.      Pulses: Normal pulses.      Heart sounds: Normal heart sounds.   Pulmonary:      Effort: Pulmonary effort is normal.      Breath sounds: Normal breath sounds.   Skin:     General: Skin is warm and dry.   Neurological:      Mental Status: He is alert and oriented to person, place, and time.   Psychiatric:         Mood and Affect: Mood is anxious.      Comments: No acute distress        Result Review :             Vitals:    10/29/24 0937 10/29/24 0944   BP: 150/80 148/88   BP Location: Left arm Left arm   Patient Position: Sitting Sitting   Cuff Size: Large Adult Adult   Pulse: 93    SpO2: 98%    Weight: 102 kg (225 lb 3.2 oz)    Height: 175.3 cm (69.02\")          Assessment and Plan   Diagnoses and all orders for this visit:    1. Primary hypertension  -     amLODIPine (NORVASC) 10 MG tablet; Take 1 tablet by mouth Daily.  Dispense: 90 tablet; Refill: 1    Mr. Barrios does not appear to be in any acute distress.  His blood pressure remains elevated. I have increased the Amlodipine from 5 mg daily to 10 mg daily.  He is to continue all other medications as prescribed.          Follow Up   Return in about 6 months (around 4/29/2025), or if symptoms worsen or fail to improve, for Annual physical, Fasting labs 1 week prior to next f/u.  Patient was given instructions and counseling regarding his condition or for health maintenance advice. Please see specific information pulled into the AVS if appropriate.             "

## 2024-10-30 ENCOUNTER — PATIENT ROUNDING (BHMG ONLY) (OUTPATIENT)
Dept: FAMILY MEDICINE CLINIC | Facility: CLINIC | Age: 44
End: 2024-10-30
Payer: COMMERCIAL

## 2024-10-30 ENCOUNTER — DOCUMENTATION (OUTPATIENT)
Dept: OTHER | Facility: HOSPITAL | Age: 44
End: 2024-10-30
Payer: COMMERCIAL

## 2024-10-30 LAB
NTRA SIGNATERA MTM READOUT: 0 MTM/ML
NTRA SIGNATERA TEST RESULT: NEGATIVE

## 2024-10-30 NOTE — PROGRESS NOTES
Oncology Social Work  Application faxed to [facility] for gas card assistance. MAVIS Marie

## 2024-10-30 NOTE — PROGRESS NOTES
Oncology Social Work  Application submitted for Financial Assistance Program through [employer].  MAVIS Marie

## 2024-10-30 NOTE — PROGRESS NOTES
Oncology Social Work  Application submitted for Financial Assistance Program through [employer].  MAVIS Marie

## 2024-10-31 ENCOUNTER — LAB (OUTPATIENT)
Dept: ONCOLOGY | Facility: HOSPITAL | Age: 44
End: 2024-10-31
Payer: COMMERCIAL

## 2024-10-31 ENCOUNTER — OFFICE VISIT (OUTPATIENT)
Dept: ONCOLOGY | Facility: CLINIC | Age: 44
End: 2024-10-31
Payer: COMMERCIAL

## 2024-10-31 ENCOUNTER — DOCUMENTATION (OUTPATIENT)
Dept: OTHER | Facility: HOSPITAL | Age: 44
End: 2024-10-31
Payer: COMMERCIAL

## 2024-10-31 ENCOUNTER — LAB (OUTPATIENT)
Dept: LAB | Facility: HOSPITAL | Age: 44
End: 2024-10-31
Payer: COMMERCIAL

## 2024-10-31 VITALS
RESPIRATION RATE: 16 BRPM | DIASTOLIC BLOOD PRESSURE: 90 MMHG | TEMPERATURE: 98.1 F | HEIGHT: 69 IN | SYSTOLIC BLOOD PRESSURE: 136 MMHG | BODY MASS INDEX: 33.43 KG/M2 | OXYGEN SATURATION: 99 % | HEART RATE: 86 BPM | WEIGHT: 225.7 LBS

## 2024-10-31 DIAGNOSIS — C20 RECTAL ADENOCARCINOMA: Primary | ICD-10-CM

## 2024-10-31 DIAGNOSIS — Z09 CHEMOTHERAPY FOLLOW-UP EXAMINATION: ICD-10-CM

## 2024-10-31 DIAGNOSIS — C20 RECTAL ADENOCARCINOMA: ICD-10-CM

## 2024-10-31 DIAGNOSIS — Z79.899 HIGH RISK MEDICATION USE: ICD-10-CM

## 2024-10-31 LAB
BASOPHILS # BLD AUTO: 0.01 10*3/MM3 (ref 0–0.2)
BASOPHILS NFR BLD AUTO: 0.5 % (ref 0–1.5)
DEPRECATED RDW RBC AUTO: 43.4 FL (ref 37–54)
EOSINOPHIL # BLD AUTO: 0.03 10*3/MM3 (ref 0–0.4)
EOSINOPHIL NFR BLD AUTO: 1.5 % (ref 0.3–6.2)
ERYTHROCYTE [DISTWIDTH] IN BLOOD BY AUTOMATED COUNT: 11.9 % (ref 12.3–15.4)
HCT VFR BLD AUTO: 38.8 % (ref 37.5–51)
HGB BLD-MCNC: 13.3 G/DL (ref 13–17.7)
IMM GRANULOCYTES # BLD AUTO: 0 10*3/MM3 (ref 0–0.05)
IMM GRANULOCYTES NFR BLD AUTO: 0 % (ref 0–0.5)
LYMPHOCYTES # BLD AUTO: 0.31 10*3/MM3 (ref 0.7–3.1)
LYMPHOCYTES NFR BLD AUTO: 15.8 % (ref 19.6–45.3)
MCH RBC QN AUTO: 33.7 PG (ref 26.6–33)
MCHC RBC AUTO-ENTMCNC: 34.3 G/DL (ref 31.5–35.7)
MCV RBC AUTO: 98.2 FL (ref 79–97)
MONOCYTES # BLD AUTO: 0.44 10*3/MM3 (ref 0.1–0.9)
MONOCYTES NFR BLD AUTO: 22.4 % (ref 5–12)
NEUTROPHILS NFR BLD AUTO: 1.17 10*3/MM3 (ref 1.7–7)
NEUTROPHILS NFR BLD AUTO: 59.8 % (ref 42.7–76)
NRBC BLD AUTO-RTO: 0 /100 WBC (ref 0–0.2)
PLATELET # BLD AUTO: 197 10*3/MM3 (ref 140–450)
PMV BLD AUTO: 9.6 FL (ref 6–12)
RBC # BLD AUTO: 3.95 10*6/MM3 (ref 4.14–5.8)
WBC NRBC COR # BLD AUTO: 1.96 10*3/MM3 (ref 3.4–10.8)

## 2024-10-31 PROCEDURE — 36415 COLL VENOUS BLD VENIPUNCTURE: CPT

## 2024-10-31 PROCEDURE — 85025 COMPLETE CBC W/AUTO DIFF WBC: CPT

## 2024-10-31 NOTE — PROGRESS NOTES
Oncology Social Work  The patient's documentation for hospital financial assistance was sent to [employer] .  MAVIS Marie

## 2024-11-06 ENCOUNTER — TELEPHONE (OUTPATIENT)
Dept: RADIATION ONCOLOGY | Facility: HOSPITAL | Age: 44
End: 2024-11-06
Payer: COMMERCIAL

## 2024-11-06 ENCOUNTER — DOCUMENTATION (OUTPATIENT)
Dept: OTHER | Facility: HOSPITAL | Age: 44
End: 2024-11-06
Payer: COMMERCIAL

## 2024-11-06 NOTE — PROGRESS NOTES
Oncology Social Work  Noted that the patient was mailed a letter on 11/1/24 stating that he was approved for 100% hospital financial assistance.  MAVIS Marie

## 2024-11-06 NOTE — PROGRESS NOTES
Oncology Social Work  Per [employer] the patient was mailed a $500 [employer] gift card.  MAVIS Marie

## 2024-11-06 NOTE — TELEPHONE ENCOUNTER
Pt's disability paperwork from [employer] requires his signature. Called pt to see if it was possible to email him this form and have him send it back to me signed or if he could stop by the office to sign it. Pt did not answer, LVM.

## 2024-11-08 ENCOUNTER — OFFICE VISIT (OUTPATIENT)
Dept: PSYCHIATRY | Facility: HOSPITAL | Age: 44
End: 2024-11-08
Payer: COMMERCIAL

## 2024-11-08 DIAGNOSIS — F41.1 GENERALIZED ANXIETY DISORDER: ICD-10-CM

## 2024-11-08 DIAGNOSIS — F90.1 ATTENTION DEFICIT HYPERACTIVITY DISORDER (ADHD), PREDOMINANTLY HYPERACTIVE TYPE: Primary | ICD-10-CM

## 2024-11-08 NOTE — PROGRESS NOTES
[facility]cology Services  In Person Session    Subjective  Patient ID: Jc Brannon Jr. is a 44 y.o. male is seen face to face in the [facility] ([facility].    CC: Anticipitory anxiety surrounding continued treatmetn, financial distress    HPI/ Interval History:   Pt is seen in follow up regarding ongoing anxiety in survivorship of colon cancer. Has completed treatment, expecting response assessment scans in upcoming weeks, anticipating surgery to follow.    Reports to be doing well, feeling well. Reports taking off work appx 5 weeks ago, appreciating and allowing rest. Is eager to return to work while anticipating 2-3 months off total, returning following surgery. Is on short term diability, mourning reduction in pay to 60% of normal income. Does identify increase in anxiety regarding this. Has added gabapentin 100 mg q hs, finding this assists with ruminative worry and allows sleep. Denies SE. Continues to stay in home often, noting distress in current environment with shootings, etc. Does report feeling safe. Appreciates ability to read, learn K[place]z, etc, and sees delving into other cultures as long term coping strategy. Remains able to maintain necessary tasks and chores, appreciating ability to pay bills while concerned he will get behind with prolonged time off work. Has appreciated assistance by OSW team, utilizing STD and requesting all the help he can can. Is eager to go back to work. Pt denies pain. Appreciates improvement in bowels and denies further GI disruption. Not requiring antiemetics.     Continues to identy impact of ADHD, while coping well as he has throughout his life with non pharmacological strategies. Remains disinterested in medication mgmt for this.    Mom had a stroke, recovering. Continues to speak with mom regularly, remaining very close to father and spending time with him often.    Exam: As above  Speech remains rapid, pressured, per patient  baseline    Recent Labs Reviewed:  Lab on 10/31/2024   Component Date Value    WBC 10/31/2024 1.96 (L)     RBC 10/31/2024 3.95 (L)     Hemoglobin 10/31/2024 13.3     Hematocrit 10/31/2024 38.8     MCV 10/31/2024 98.2 (H)     MCH 10/31/2024 33.7 (H)     MCHC 10/31/2024 34.3     RDW 10/31/2024 11.9 (L)     RDW-SD 10/31/2024 43.4     MPV 10/31/2024 9.6     Platelets 10/31/2024 197     Neutrophil % 10/31/2024 59.8     Lymphocyte % 10/31/2024 15.8 (L)     Monocyte % 10/31/2024 22.4 (H)     Eosinophil % 10/31/2024 1.5     Basophil % 10/31/2024 0.5     Immature Grans % 10/31/2024 0.0     Neutrophils, Absolute 10/31/2024 1.17 (L)     Lymphocytes, Absolute 10/31/2024 0.31 (L)     Monocytes, Absolute 10/31/2024 0.44     Eosinophils, Absolute 10/31/2024 0.03     Basophils, Absolute 10/31/2024 0.01     Immature Grans, Absolute 10/31/2024 0.00     nRBC 10/31/2024 0.0    Orders Only on 10/24/2024   Component Date Value    Course ID 10/24/2024 C1     Course Intent 10/24/2024 Curative w/chemo     Course Start Date 10/24/2024 8/26/2024  8:22 AM     Course End Date 10/24/2024 10/24/2024  7:17 AM     Course First Treatment D* 10/24/2024 9/9/2024 11:47 AM     Course Last Treatment Da* 10/24/2024 10/18/2024  8:39 AM     Course Elapsed Days 10/24/2024 39     Reference Point ID 10/24/2024 RX: [id number]     Reference Point Dosage G* 10/24/2024 [id number]     Reference Point ID 10/24/2024 RX: [id number]     Reference Point Dosage G* 10/24/2024 3.6     Plan ID 10/24/2024 [email or web address]     Plan Name 10/24/2024 [email or web address]     Plan Fractions Treated t* 10/24/2024 28     Plan Total Fractions Pre* 10/24/2024 28     Plan Prescribed Dose Per* 10/24/2024 1.8     Plan Total Prescribed Do* 10/24/2024 5,040     Plan Primary Reference P* 10/24/2024 RX: [id number]     Plan ID 10/24/2024 Rectal Boost     Plan Name 10/24/2024 Rectal Boost     Plan Fractions Treated t* 10/24/2024 2     Plan Total Fractions Pre* 10/24/2024 2     Plan Prescribed Dose Per* 10/24/2024 1.8      Plan Total Prescribed Do* 10/24/2024 360     Plan Primary Reference P* 10/24/2024 RX: [id number]    Hospital Outpatient Visit on 10/18/2024   Component Date Value    Course ID 10/18/2024 C1     Course Intent 10/18/2024 Curative w/chemo     Course Start Date 10/18/2024 8/26/2024  8:22 AM     Course First Treatment D* 10/18/2024 9/9/2024 11:47 AM     Course Last Treatment Da* 10/18/2024 10/18/2024  8:39 AM     Course Elapsed Days 10/18/2024 39     Reference Point ID 10/18/2024 RX: [email or web address]     Reference Point Dosage G* 10/18/2024 3.6     Reference Point Session * 10/18/2024 1.8     Plan ID 10/18/2024 Rectal Boost     Plan Fractions Treated t* 10/18/2024 2     Plan Total Fractions Pre* 10/18/2024 2     Plan Prescribed Dose Per* 10/18/2024 1.8     Plan Total Prescribed Do* 10/18/2024 360     Plan Primary Reference P* 10/18/2024 RX: [id number]    Hospital Outpatient Visit on 10/17/2024   Component Date Value    Course ID 10/17/2024 C1     Course Intent 10/17/2024 Curative w/chemo     Course Start Date 10/17/2024 8/26/2024  8:22 AM     Course First Treatment D* 10/17/2024 9/9/2024 11:47 AM     Course Last Treatment Da* 10/17/2024 10/17/2024  3:28 PM     Course Elapsed Days 10/17/2024 38     Reference Point ID 10/17/2024 RX: [email or web address]     Reference Point Dosage G* 10/17/2024 1.8     Reference Point Session * 10/17/2024 1.8     Plan ID 10/17/2024 Rectal Boost     Plan Fractions Treated t* 10/17/2024 1     Plan Total Fractions Pre* 10/17/2024 2     Plan Prescribed Dose Per* 10/17/2024 1.8     Plan Total Prescribed Do* 10/17/2024 360     Plan Primary Reference P* 10/17/2024 RX: [email or web address]    Hospital Outpatient Visit on 10/16/2024   Component Date Value    Course ID 10/16/2024 C1     Course Intent 10/16/2024 Curative w/chemo     Course Start Date 10/16/2024 8/26/2024  8:22 AM     Course First Treatment D* 10/16/2024 9/9/2024 11:47 AM     Course Last Treatment Da* 10/16/2024 10/16/2024  8:39 AM     Course Elapsed Days 10/16/2024 37      Reference Point ID 10/16/2024 RX: [id number]     Reference Point Dosage G* 10/16/2024 [id number]     Reference Point Session * 10/16/2024 1.8     Plan ID 10/16/2024 [email or web address]     Plan Fractions Treated t* 10/16/2024 28     Plan Total Fractions Pre* 10/16/2024 28     Plan Prescribed Dose Per* 10/16/2024 1.8     Plan Total Prescribed Do* 10/16/2024 5,040     Plan Primary Reference P* 10/16/2024 RX: [id number]    Hospital Outpatient Visit on 10/15/2024   Component Date Value    Course ID 10/15/2024 C1     Course Intent 10/15/2024 Curative w/chemo     Course Start Date 10/15/2024 8/26/2024  8:22 AM     Course First Treatment D* 10/15/2024 9/9/2024 11:47 AM     Course Last Treatment Da* 10/15/2024 10/15/2024  3:29 PM     Course Elapsed Days 10/15/2024 36     Reference Point ID 10/15/2024 RX: [id number]     Reference Point Dosage G* 10/15/2024 48.6     Reference Point Session * 10/15/2024 1.8     Plan ID 10/15/2024 [email or web address]     Plan Fractions Treated t* 10/15/2024 27     Plan Total Fractions Pre* 10/15/2024 28     Plan Prescribed Dose Per* 10/15/2024 1.8     Plan Total Prescribed Do* 10/15/2024 5,040     Plan Primary Reference P* 10/15/2024 RX: [id number]    Hospital Outpatient Visit on 10/14/2024   Component Date Value    Course ID 10/14/2024 C1     Course Intent 10/14/2024 Curative w/chemo     Course Start Date 10/14/2024 8/26/2024  8:22 AM     Course First Treatment D* 10/14/2024 9/9/2024 11:47 AM     Course Last Treatment Da* 10/14/2024 10/14/2024 10:45 AM     Course Elapsed Days 10/14/2024 35     Reference Point ID 10/14/2024 RX: [id number]     Reference Point Dosage G* 10/14/2024 46.8     Reference Point Session * 10/14/2024 1.8     Plan ID 10/14/2024 [email or web address]     Plan Fractions Treated t* 10/14/2024 26     Plan Total Fractions Pre* 10/14/2024 28     Plan Prescribed Dose Per* 10/14/2024 1.8     Plan Total Prescribed Do* 10/14/2024 5,040     Plan Primary Reference P* 10/14/2024 RX: [id number]    Infusion on 10/14/2024   Component Date Value     Glucose 10/14/2024 101 (H)     BUN 10/14/2024 11     Creatinine 10/14/2024 0.92     Sodium 10/14/2024 136     Potassium 10/14/2024 3.9     Chloride 10/14/2024 101     CO2 10/14/2024 27.5     Calcium 10/14/2024 9.1     Total Protein 10/14/2024 6.8     Albumin 10/14/2024 4.4     ALT (SGPT) 10/14/2024 16     AST (SGOT) 10/14/2024 19     Alkaline Phosphatase 10/14/2024 79     Total Bilirubin 10/14/2024 0.3     Globulin 10/14/2024 2.4     A/G Ratio 10/14/2024 1.8     BUN/Creatinine Ratio 10/14/2024 12.0     Anion Gap 10/14/2024 7.5     eGFR 10/14/2024 103.9     WBC 10/14/2024 2.24 (L)     RBC 10/14/2024 3.62 (L)     Hemoglobin 10/14/2024 12.3 (L)     Hematocrit 10/14/2024 36.1 (L)     MCV 10/14/2024 99.7 (H)     MCH 10/14/2024 34.0 (H)     MCHC 10/14/2024 34.1     RDW 10/14/2024 12.4     RDW-SD 10/14/2024 45.7     MPV 10/14/2024 9.4     Platelets 10/14/2024 197     Neutrophil % 10/14/2024 67.0     Lymphocyte % 10/14/2024 11.6 (L)     Monocyte % 10/14/2024 18.3 (H)     Eosinophil % 10/14/2024 2.7     Basophil % 10/14/2024 0.0     Immature Grans % 10/14/2024 0.4     Neutrophils, Absolute 10/14/2024 1.50 (L)     Lymphocytes, Absolute 10/14/2024 0.26 (L)     Monocytes, Absolute 10/14/2024 0.41     Eosinophils, Absolute 10/14/2024 0.06     Basophils, Absolute 10/14/2024 0.00     Immature Grans, Absolute 10/14/2024 0.01     nRBC 10/14/2024 0.0    Hospital Outpatient Visit on 10/11/2024   Component Date Value    Course ID 10/11/2024 C1     Course Intent 10/11/2024 Curative w/chemo     Course Start Date 10/11/2024 8/26/2024  8:22 AM     Course First Treatment D* 10/11/2024 9/9/2024 11:47 AM     Course Last Treatment Da* 10/11/2024 10/11/2024  1:58 PM     Course Elapsed Days 10/11/2024 32     Reference Point ID 10/11/2024 RX: [id number]     Reference Point Dosage G* 10/11/2024 45     Reference Point Session * 10/11/2024 1.8     Plan ID 10/11/2024 [email or web address]     Plan Fractions Treated t* 10/11/2024 25     Plan Total Fractions Pre*  10/11/2024 28     Plan Prescribed Dose Per* 10/11/2024 1.8     Plan Total Prescribed Do* 10/11/2024 5,040     Plan Primary Reference P* 10/11/2024 RX: [id number]    Infusion on 10/11/2024   Component Date Value    SIGNATERA TEST RESULT 10/11/2024 NEGATIVE     SIGNATERA MTM READOUT 10/11/2024 0    There may be more visits with results that are not included.   Labs reviewed    Current Psychotropic Medications:  Gabapentin 100 mg q hs    Plan of Care/ Medical Decision Making  Continue gabapentin as felt to be helpful, allowing increase to tid dosing if needed.  Counseling continued, supporting patient in active engagement in various hobbies and tasks, non pharmacological mgmt of ADHD. Again reviewed medication strategies for managing although respect decision to remain off medication for ADHD at this time.  FU scheduled in 6 weeks surrounding surgery.    Diagnoses and all orders for this visit:    1. Attention deficit hyperactivity disorder (ADHD), predominantly hyperactive type (Primary)    2. Generalized anxiety disorder    I spent 34 minutes caring for Jc on this date of service. This time includes time spent by me in the following activities: preparing for the visit, reviewing tests, obtaining and/or reviewing a separately obtained history, performing a medically appropriate examination and/or evaluation, counseling and educating the patient/family/caregiver, ordering medications, tests, or procedures, and documenting information in the medical record.

## 2024-11-17 DIAGNOSIS — I10 ESSENTIAL HYPERTENSION: ICD-10-CM

## 2024-11-18 RX ORDER — HYDROCHLOROTHIAZIDE 12.5 MG/1
12.5 TABLET ORAL DAILY
Qty: 30 TABLET | Refills: 5 | Status: SHIPPED | OUTPATIENT
Start: 2024-11-18

## 2024-12-03 ENCOUNTER — HOSPITAL ENCOUNTER (OUTPATIENT)
Dept: CT IMAGING | Facility: HOSPITAL | Age: 44
Discharge: HOME | End: 2024-12-03
Payer: COMMERCIAL

## 2024-12-03 ENCOUNTER — HOSPITAL ENCOUNTER (OUTPATIENT)
Dept: MRI IMAGING | Facility: HOSPITAL | Age: 44
Discharge: HOME | End: 2024-12-03
Payer: COMMERCIAL

## 2024-12-03 DIAGNOSIS — C20 RECTAL ADENOCARCINOMA: ICD-10-CM

## 2024-12-03 PROCEDURE — A9577 INJ MULTIHANCE: HCPCS | Performed by: NURSE PRACTITIONER

## 2024-12-03 PROCEDURE — 74177 CT ABD & PELVIS W/CONTRAST: CPT

## 2024-12-03 PROCEDURE — 72197 MRI PELVIS W/O & W/DYE: CPT

## 2024-12-03 PROCEDURE — 25510000002 GADOBENATE DIMEGLUMINE 529 MG/ML SOLUTION: Performed by: NURSE PRACTITIONER

## 2024-12-03 PROCEDURE — 25510000001 IOPAMIDOL 61 % SOLUTION: Performed by: NURSE PRACTITIONER

## 2024-12-03 PROCEDURE — 71260 CT THORAX DX C+: CPT

## 2024-12-03 PROCEDURE — 25510000002 DIATRIZOATE MEGLUMINE & SODIUM PER 1 ML: Performed by: NURSE PRACTITIONER

## 2024-12-03 RX ORDER — DIATRIZOATE MEGLUMINE AND DIATRIZOATE SODIUM 660; 100 MG/ML; MG/ML
30 SOLUTION ORAL; RECTAL
Status: COMPLETED | OUTPATIENT
Start: 2024-12-03 | End: 2024-12-03

## 2024-12-03 RX ORDER — IOPAMIDOL 612 MG/ML
100 INJECTION, SOLUTION INTRAVASCULAR
Status: COMPLETED | OUTPATIENT
Start: 2024-12-03 | End: 2024-12-03

## 2024-12-03 RX ADMIN — GADOBENATE DIMEGLUMINE 20 ML: 529 INJECTION, SOLUTION INTRAVENOUS at 15:35

## 2024-12-03 RX ADMIN — DIATRIZOATE MEGLUMINE AND DIATRIZOATE SODIUM 30 ML: 660; 100 LIQUID ORAL; RECTAL at 13:27

## 2024-12-03 RX ADMIN — IOPAMIDOL 85 ML: 612 INJECTION, SOLUTION INTRAVENOUS at 14:28

## 2024-12-05 ENCOUNTER — TELEPHONE (OUTPATIENT)
Dept: OTHER | Facility: HOSPITAL | Age: 44
End: 2024-12-05
Payer: COMMERCIAL

## 2024-12-05 NOTE — TELEPHONE ENCOUNTER
Oncology Social Work  Inbound call from the patient who expressed concerns about getting medical bills from [facility] although he has been approved for hospital financial assistance.  The patient was reminded that he has been approved for financial assistance and he was informed to contact the number on the bill before sending in a payment to confirm if those fees will be placed towards his financial assistance. OSW will continue to follow to assist.  MAVIS Marie

## 2024-12-05 NOTE — TELEPHONE ENCOUNTER
Oncology Social Work  Spoke to the patient's father and informed him to remind the patient about contacting the billing department when he receives a bill from [facility] in order to inquire as to if the bill will be put towards his financial assistance.  MAVIS Marie

## 2024-12-05 NOTE — PROGRESS NOTES
Follow-up    SUBJECTIVE:    12/09/2024, Interval history  He is here for scan review.  He is doing quite well.  He has appointment with Dr. Isaac on 12/12/2024.  He denies any new concerns or complaints.  He is inquiring if he could take his COVID shot.  He is already up-to-date on his flu shot      HISTORY OF PRESENT ILLNESS:  The patient is a 44 y.o. year old male with recently diagnosed rectal adenocarcinoma who presents to our clinic to establish care.  Onc history is outlined below    Family history significant for prostate cancer in father at 63, paternal uncle with colon cancer at 57, other paternal uncle with metastatic cancer, type unknown in his 50s, other paternal uncle with possible colon cancer and DVT/PE in his 70s.    Oncology/Hematology History   Rectal adenocarcinoma   4/23/2024 Procedure         4/23/2024 [facility]         5/2/2024 Imaging    CT CAP    A 1.7 cm right hepatic cyst is noted. Numerous small subcentimeter liver  low densities are present that are too small to characterize, could be  cysts, or potentially metastatic disease, interval follow-up can  characterize change.    No bowel obstruction. The sigmorectal mass is better characterized on  the MRI exam of same date (please see separate report). Some stranding  is apparent in the fat around the lesion, numerous surrounding lymph  nodes are present that could be metastatic lymph nodes. For example, on  axial image 165, a 1.9 x 1.6 cm pericolonic lymph node is present. As  another example, on axial image 170, left perirectal lymph node measures  0.7 x 1.2 cm.    IMPRESSION:        1. Sigmorectal mass with numerous surrounding lymph nodes, raising  suspicion for metastatic lymph nodes.     2. Hepatic cyst, and numerous liver low densities that are too small to  characterize; these lesions could also be cysts, but in this clinical  setting, possibility of any metastatic liver lesions is not excluded.  Interval follow-up is  advised to characterize change.     3. No pulmonary mass.     5/2/2024 Imaging    MRI Pelvis    FINDINGS: There is a circumferential solid low rectal mass which  measures approximately 4.5 cm in craniocaudad span and the inferior  margin is approximately 5 mm proximal to the anorectal junction.     The tumor extends approximately 8 mm into the mesorectal fat  predominantly along the right wall of the mass. There appears to be  extension to the mesorectal fascia along the right wall and abutment of  the right levator ani muscles. There are several enlarged perirectal  nodes and the largest is presacral measuring approximately 2.0 x 1.2 cm.  There are at least 4 enhancing suspicious nodes. There is no definite  extramural vascular invasion.     IMPRESSION:  1. Primary Tumor Location: Low rectal     2. MRI Stage: T3 N2 MRF positive     3. No definite sphincter involvement     5/2/2024 Cancer Staged    Staging form: Colon And Rectum, AJCC 8th Edition  - Clinical stage from 5/2/2024: cT3, cN2, cM0 - Signed by Sonali Pak MD on 6/3/2024     5/3/2024 Initial Diagnosis    Rectal adenocarcinoma     5/16/2024 Imaging    MRI abdo    IMPRESSION:  T2 hyperintense nonenhancing liver lesions are consistent with hepatic  cysts and biliary cystic hamartomas. No convincing liver metastasis  identified     5/24/2024 Procedure    PORT placement (Dr Isaac)     6/3/2024 - 8/21/2024 Chemotherapy    OP COLORECTAL FOLFIRINOX (Fluorouracil cont. Infusion / Leucovorin / OXALIplatin / Irinotecan)     6/5/2024 -  Chemotherapy    OP CENTRAL VENOUS ACCESS DEVICE Access, Care, and Maintenance (CVAD)     9/9/2024 - 9/9/2024 Chemotherapy    OP COLORECTAL Fluorouracil CIV over 168H + XRT     9/9/2024 - 10/18/2024 Chemotherapy    OP COLORECTAL Fluorouracil CIV over 96 H + XRT         The current medication list and allergy list were reviewed and reconciled.      Past Medical History, Past Surgical History, Social History, Family History have been  "reviewed and are without significant changes except as mentioned.      REVIEW OF SYSTEMS:  See interval history    Objective:       Vitals:    12/09/24 0936   BP: 135/87   Pulse: 96   Resp: 16   Temp: 98.2 °F (36.8 °C)   TempSrc: Oral   SpO2: 95%   Weight: 106 kg (234 lb 8 oz)   Height: 175.3 cm (69.02\")   PainSc: 0-No pain                 12/9/2024     9:35 AM   Current Status   ECOG score 0      PHYSICAL EXAM:    CONSTITUTIONAL:  Vital signs reviewed.  No distress, looks comfortable.  RESPIRATORY: Bilateral lungs clear to auscultation.  No wheezing or rhonchi   CARDIOVASCULAR: Normal S1-S2.  No murmur rubs or gallop   GASTROINTESTINAL: Soft, nontender, bowel sounds present  NEURO:  No focal deficits.  Appears to have equal strength all 4 extremities.    I have reexamined the patient and the results are consistent with the previously documented exam. Sonali Pak MD        Diagnostic data  CBC:      Lab 12/09/24  0847   WBC 2.00*   HEMOGLOBIN 13.1   HEMATOCRIT 38.2   PLATELETS 202   NEUTROS ABS 1.34*   IMMATURE GRANS (ABS) 0.01   LYMPHS ABS 0.33*   MONOS ABS 0.29   EOS ABS 0.02   MCV 96.2     MRI Pelvis With & Without Contrast (12/03/2024 15:37)  CT Abdomen Pelvis With Contrast (12/03/2024 14:30)  CT Chest With Contrast Diagnostic (12/03/2024 14:30)      Assessment & Plan     *Rectal adenocarcinoma (cT3 cN2 cM0), LUCAS, TMB low  4/23/2024 rectal biopsy-invasive moderately differentiated adenocarcinoma, LUCAS.  5/2/2024 MRI pelvis: Approximately 4.5 cm circumferential low rectal mass, approximately 5 mm proximal to anorectal junction, extending 8 mm in the mesorectal fat.  Appears to be extension to MRF along the right wall and abutment of right levator ani muscles.  Several enlarged perirectal nodes, largest 2 x 1.2 cm.  At least 4 enhancing suspicious nodes.  cT3 cN2 MRF positive.  No definitive sphincter involvement  5/2/2024 CT chest abdomen pelvis: 1.7 cm right hepatic cyst, and numerous low-density liver " lesions-cyst versus possible metastatic lesions.  No lung mass  5/16/2024 MRI abdomen: T2 hyperintense nonenhancing liver lesions consistent with liver cyst and biliary cystic hamartomas.  No evidence of liver mets  Tumor genomic profile-APC (I269fs), APC ([id number]), FANCD2 (Q823), TP53. TMB 1 mut/Mb. LUCAS  6/3/2024 baseline CEA: 1.89  6/3/2024 - 08/19/2024: s/p 6 cycles of  FOLFIRINOX (5-FU bolus eliminated to prevent further myelosuppression given baseline neutropenia; 25% dose reduction starting cycle 3) with G-CSF support  8/27/2024 MRI pelvis-good response.  No clear measurable rectal mass.  Decrease in the size of perirectal lymph node, measuring less than 5 mm; decrease in the size of enlarged hide perirectal lymph nodes/mary metastases.  08/28/2024 CT CAP: Apparent decrease size of rectal mass, decreased pericolonic and perirectal lymph nodes.  1 axillary lymph node and 1 liver low-density (too small to characterize) measures slightly larger, uncertain clinical significance.  Plan for total neoadjuvant chemotherapy with FOLFIRINOX followed by chemoradiation  9/9/2024 - 10/14/2024: s/p 6 cycles of concurrent chemoradiation with 5-FU.  5-FU dose reduced by 50% due to neutropenia starting cycle 2  12/9/2024: Reviewed results of MRI pelvis as well as CT chest abdomen pelvis.  He has had good response.  He is going to follow-up with Dr. Isaac for surgical recommendations.    *Hepatic cysts and low-density hepatic foci  8/28/2024 seen on CT chest abdomen pelvis, too small to characterize.  12/3/2024 CT chest abdomen pelvis-multiple hepatic cyst as well as hepatic low-density foci which are too small to characterize.    *Benign ethnic neutropenia  White cell count ranged between 2.6-3.2 since at least July 2019.    12/9/2024: WBC 2, ANC 1.34.  No interventions needed.    Plan:   Reviewed results of imaging   Encouraged to keep appointment with Dr. Isaac on 12/12/2024  Okay to take COVID shot at any time  MD  visit in 3 months with labs      Sonali Pak MD  12/09/2024

## 2024-12-09 ENCOUNTER — INFUSION (OUTPATIENT)
Dept: ONCOLOGY | Facility: HOSPITAL | Age: 44
End: 2024-12-09
Payer: COMMERCIAL

## 2024-12-09 ENCOUNTER — OFFICE VISIT (OUTPATIENT)
Dept: ONCOLOGY | Facility: CLINIC | Age: 44
End: 2024-12-09
Payer: COMMERCIAL

## 2024-12-09 ENCOUNTER — PATIENT OUTREACH (OUTPATIENT)
Dept: OTHER | Facility: HOSPITAL | Age: 44
End: 2024-12-09
Payer: COMMERCIAL

## 2024-12-09 VITALS
TEMPERATURE: 98.2 F | SYSTOLIC BLOOD PRESSURE: 135 MMHG | WEIGHT: 234.5 LBS | OXYGEN SATURATION: 95 % | HEART RATE: 96 BPM | DIASTOLIC BLOOD PRESSURE: 87 MMHG | RESPIRATION RATE: 16 BRPM | BODY MASS INDEX: 34.73 KG/M2 | HEIGHT: 69 IN

## 2024-12-09 DIAGNOSIS — C20 RECTAL ADENOCARCINOMA: Primary | ICD-10-CM

## 2024-12-09 DIAGNOSIS — Z79.899 HIGH RISK MEDICATION USE: ICD-10-CM

## 2024-12-09 LAB
ALBUMIN SERPL-MCNC: 4.6 G/DL (ref 3.5–5.2)
ALBUMIN/GLOB SERPL: 1.8 G/DL
ALP SERPL-CCNC: 85 U/L (ref 39–117)
ALT SERPL W P-5'-P-CCNC: 16 U/L (ref 1–41)
ANION GAP SERPL CALCULATED.3IONS-SCNC: 12.5 MMOL/L (ref 5–15)
AST SERPL-CCNC: 22 U/L (ref 1–40)
BASOPHILS # BLD AUTO: 0.01 10*3/MM3 (ref 0–0.2)
BASOPHILS NFR BLD AUTO: 0.5 % (ref 0–1.5)
BILIRUB SERPL-MCNC: 0.4 MG/DL (ref 0–1.2)
BUN SERPL-MCNC: 14 MG/DL (ref 6–20)
BUN/CREAT SERPL: 14.9 (ref 7–25)
CALCIUM SPEC-SCNC: 9.1 MG/DL (ref 8.6–10.5)
CHLORIDE SERPL-SCNC: 102 MMOL/L (ref 98–107)
CO2 SERPL-SCNC: 26.5 MMOL/L (ref 22–29)
CREAT SERPL-MCNC: 0.94 MG/DL (ref 0.76–1.27)
DEPRECATED RDW RBC AUTO: 41.4 FL (ref 37–54)
EGFRCR SERPLBLD CKD-EPI 2021: 101.2 ML/MIN/1.73
EOSINOPHIL # BLD AUTO: 0.02 10*3/MM3 (ref 0–0.4)
EOSINOPHIL NFR BLD AUTO: 1 % (ref 0.3–6.2)
ERYTHROCYTE [DISTWIDTH] IN BLOOD BY AUTOMATED COUNT: 11.7 % (ref 12.3–15.4)
GLOBULIN UR ELPH-MCNC: 2.5 GM/DL
GLUCOSE SERPL-MCNC: 150 MG/DL (ref 65–99)
HCT VFR BLD AUTO: 38.2 % (ref 37.5–51)
HGB BLD-MCNC: 13.1 G/DL (ref 13–17.7)
IMM GRANULOCYTES # BLD AUTO: 0.01 10*3/MM3 (ref 0–0.05)
IMM GRANULOCYTES NFR BLD AUTO: 0.5 % (ref 0–0.5)
LYMPHOCYTES # BLD AUTO: 0.33 10*3/MM3 (ref 0.7–3.1)
LYMPHOCYTES NFR BLD AUTO: 16.5 % (ref 19.6–45.3)
MCH RBC QN AUTO: 33 PG (ref 26.6–33)
MCHC RBC AUTO-ENTMCNC: 34.3 G/DL (ref 31.5–35.7)
MCV RBC AUTO: 96.2 FL (ref 79–97)
MONOCYTES # BLD AUTO: 0.29 10*3/MM3 (ref 0.1–0.9)
MONOCYTES NFR BLD AUTO: 14.5 % (ref 5–12)
NEUTROPHILS NFR BLD AUTO: 1.34 10*3/MM3 (ref 1.7–7)
NEUTROPHILS NFR BLD AUTO: 67 % (ref 42.7–76)
NRBC BLD AUTO-RTO: 0 /100 WBC (ref 0–0.2)
PLATELET # BLD AUTO: 202 10*3/MM3 (ref 140–450)
PMV BLD AUTO: 9.6 FL (ref 6–12)
POTASSIUM SERPL-SCNC: 3.8 MMOL/L (ref 3.5–5.2)
PROT SERPL-MCNC: 7.1 G/DL (ref 6–8.5)
RBC # BLD AUTO: 3.97 10*6/MM3 (ref 4.14–5.8)
SODIUM SERPL-SCNC: 141 MMOL/L (ref 136–145)
WBC NRBC COR # BLD AUTO: 2 10*3/MM3 (ref 3.4–10.8)

## 2024-12-09 PROCEDURE — 85025 COMPLETE CBC W/AUTO DIFF WBC: CPT

## 2024-12-09 PROCEDURE — 36591 DRAW BLOOD OFF VENOUS DEVICE: CPT

## 2024-12-09 PROCEDURE — 80053 COMPREHEN METABOLIC PANEL: CPT

## 2024-12-09 PROCEDURE — 99214 OFFICE O/P EST MOD 30 MIN: CPT | Performed by: STUDENT IN AN ORGANIZED HEALTH CARE EDUCATION/TRAINING PROGRAM

## 2024-12-09 PROCEDURE — 25010000002 HEPARIN LOCK FLUSH PER 10 UNITS: Performed by: NURSE PRACTITIONER

## 2024-12-09 RX ORDER — SODIUM CHLORIDE 0.9 % (FLUSH) 0.9 %
10 SYRINGE (ML) INJECTION AS NEEDED
OUTPATIENT
Start: 2024-12-09

## 2024-12-09 RX ORDER — SODIUM CHLORIDE 0.9 % (FLUSH) 0.9 %
10 SYRINGE (ML) INJECTION AS NEEDED
Status: DISCONTINUED | OUTPATIENT
Start: 2024-12-09 | End: 2024-12-09 | Stop reason: HOSPADM

## 2024-12-09 RX ORDER — HEPARIN SODIUM (PORCINE) LOCK FLUSH IV SOLN 100 UNIT/ML 100 UNIT/ML
500 SOLUTION INTRAVENOUS AS NEEDED
Status: DISCONTINUED | OUTPATIENT
Start: 2024-12-09 | End: 2024-12-09 | Stop reason: HOSPADM

## 2024-12-09 RX ORDER — HEPARIN SODIUM (PORCINE) LOCK FLUSH IV SOLN 100 UNIT/ML 100 UNIT/ML
500 SOLUTION INTRAVENOUS AS NEEDED
OUTPATIENT
Start: 2024-12-09

## 2024-12-09 RX ADMIN — Medication 500 UNITS: at 08:53

## 2024-12-09 RX ADMIN — Medication 10 ML: at 08:53

## 2024-12-09 NOTE — PROGRESS NOTES
[facility] prior to his appt with Dr. Pak.  He states he is doing well. The patient had a recent scan and is scheduled to meet with the surgeon on Thursday to discuss surgery.  We discussed what to expect at this appt. The patient's father will be attending that appt.    The patient has been working with [facility] to address his financial and resource needs.     The patient denies any questions/concerns or ongoing resource needs. Navigation will continue to follow; encouraged patient to call as needed.

## 2024-12-12 ENCOUNTER — TELEPHONE (OUTPATIENT)
Dept: OTHER | Facility: HOSPITAL | Age: 44
End: 2024-12-12
Payer: COMMERCIAL

## 2024-12-12 ENCOUNTER — OFFICE VISIT (OUTPATIENT)
Dept: SURGERY | Facility: CLINIC | Age: 44
End: 2024-12-12
Payer: COMMERCIAL

## 2024-12-12 VITALS
DIASTOLIC BLOOD PRESSURE: 90 MMHG | SYSTOLIC BLOOD PRESSURE: 150 MMHG | HEIGHT: 69 IN | WEIGHT: 234.2 LBS | BODY MASS INDEX: 34.69 KG/M2 | HEART RATE: 114 BPM | OXYGEN SATURATION: 98 %

## 2024-12-12 DIAGNOSIS — C20 RECTAL ADENOCARCINOMA: Primary | ICD-10-CM

## 2024-12-12 NOTE — TELEPHONE ENCOUNTER
Oncology Social Work  Spoke to the patient who was informed that his refund was sent to his HSA card.  The patient was also informed that he will need to reach out to his medical debtors that are not [facility] specific and inquire about financial assistance as he was approved through [facility].  He was informed that if they deny that request then he is responsible for paying those bills.  The patient requested that OSW inform his father also.  MAVIS Marie

## 2024-12-12 NOTE — PROGRESS NOTES
Colorectal & General Surgery  Follow - Up    Patient: Jc Brannon Jr.  YOB: 1980  MRN: [id number]      Assessment  Jc Brannon Jr. is a 44 y.o. 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  He has tolerated chemotherapy and radiation quite well.  I have personally reviewed the MRI of his pelvis, which demonstrates no obvious evidence of residual rectal mass.    I had a nice discussion with him and his parents about the next steps.  I would like to perform flexible sigmoidoscopy to endoscopically evaluate response to total neoadjuvant therapy.  We discussed proceeding with this next week, including the risk, benefits, alternatives the procedure.    Moving forward after his flexible sigmoidoscopy, we will either proceed with surgery, which could include low anterior resection with coloproctostomy and diverting loop ileostomy or low anterior resection with end colostomy creation or abdominal perineal resection, or we could consider a watch and wait approach if he has had a complete clinical response.  We briefly discussed these options and we will discuss more following his flexible sigmoidoscopy.    Chief Complaint: Rectal cancer follow-up    History of Present Illness   Jc Brannon Jr. is a 44 y.o. male who presents in follow-up today after undergoing total neoadjuvant therapy for his rectal adenocarcinoma.  He tolerated chemotherapy and radiation well.  He has no complaints today.  He denies any issues with bowel function or hematochezia.    Most recent colonoscopy: 2024    Past Medical History   Past Medical History:   Diagnosis Date    Anemia     Hyperlipidemia     Hypertension     Mental disability without special needs     Rectal adenocarcinoma 2024        Past Surgical History   Past Surgical History:   Procedure Laterality Date    COLONOSCOPY N/A 4/23/2024    Procedure: COLONOSCOPY into cecum with biopsy;  Surgeon: David Tena  MD Josafat;  Location: [facility] ENDOSCOPY;  Service: Gastroenterology;  Laterality: N/A;  rectal mass    LEG SURGERY Left     ACL    VENOUS ACCESS DEVICE (PORT) INSERTION N/A 5/24/2024    Procedure: INSERTION VENOUS ACCESS DEVICE;  Surgeon: Ori Isaac MD;  Location: [facility] MAIN OR;  Service: General;  Laterality: N/A;       Social History  Social History     Socioeconomic History    Marital status: Single   Tobacco Use    Smoking status: Never     Passive exposure: Never    Smokeless tobacco: Never   Vaping Use    Vaping status: Never Used   Substance and Sexual Activity    Alcohol use: No    Drug use: No    Sexual activity: Defer       Family History  Family History   Problem Relation Age of Onset    Cancer Mother         Stomach    Cancer Father         prostate    Glaucoma Father     Hypertension Maternal Aunt     Heart disease Maternal Aunt     Hypertension Maternal Uncle     Glaucoma Paternal Grandfather     Malig Hyperthermia Neg Hx        Colorectal cancer family history: None    Review of Systems  Negative except as documented in the HPI.     Allergies  Allergies   Allergen Reactions    Latex Rash       Medications    Current Outpatient Medications:     amLODIPine (NORVASC) 10 MG tablet, Take 1 tablet by mouth Daily., Disp: 90 tablet, Rfl: 1    docusate sodium (Colace) 100 MG capsule, Take 1 capsule by mouth 2 (Two) Times a Day., Disp: 60 capsule, Rfl: 0    gabapentin (Neurontin) 100 MG capsule, Take 1 capsule by mouth 3 (Three) Times a Day., Disp: 90 capsule, Rfl: 2    hydroCHLOROthiazide 12.5 MG tablet, TAKE 1 TABLET BY MOUTH DAILY, Disp: 30 tablet, Rfl: 5    loperamide (IMODIUM A-D) 2 MG tablet, Take 2 tablets (4 mg) at the onset of diarrhea and 2 mg every 2 hours as needed to a max of 16 mg/day (or until patient has been diarrhea free for 12 hours), Disp: 30 tablet, Rfl: 5    OLANZapine (zyPREXA) 5 MG tablet, TAKE ONE TABLET BY MOUTH ONCE NIGHTLY FOR 3 DAYS FOLLOWING CHEMOTHERAPY, Disp: 18  tablet, Rfl: 0    ondansetron (ZOFRAN) 8 MG tablet, Take 1 tablet by mouth 3 (Three) Times a Day As Needed for Nausea or Vomiting., Disp: 30 tablet, Rfl: 5    prochlorperazine (COMPAZINE) 10 MG tablet, Take 1 tablet by mouth Every 6 (Six) Hours As Needed for Nausea or Vomiting., Disp: 30 tablet, Rfl: 5    Vital Signs  Vitals:    12/12/24 0843   BP: 150/90   Pulse: 114   SpO2: 98%        Physical Exam  Constitutional: Resting comfortably, no acute distress  Neck: Supple, trachea midline  Respiratory: No increased work of breathing, Symmetric excursion  Cardiovascular: Well pefursed, no jugular venous distention evident   Abdominal:  Soft, non-tender, non-distended  Lymphatics: No cervical or suprascapular adenopathy  Skin: Warm, dry, no rash on visualized skin surfaces  Musculoskeletal: Symmetric strength, no obvious gross abnormalities  Psychiatric: Alert and oriented ×3, normal affect      Laboratory Results  I have personally reviewed CBC with WC 2, hemoglobin 13, platelets 202.  CMP with creatinine 0.94, albumin 4.6.    Radiology  I have personally reviewed CT scan of the abdomen pelvis as well as the CT scan of the chest.  Both demonstrate no evidence of metastatic disease.    Also personally reviewed the MRI of his pelvis which demonstrates no measurable residual mass within his mid lower rectum.    Endoscopy  No new endoscopic studies to review         See Isaac MD  Colorectal & General Surgery  [facility]    [address], [address]  [place], [place], [address]  P: [phone]  F: [phone]

## 2024-12-12 NOTE — H&P (VIEW-ONLY)
Colorectal & General Surgery  Follow - Up    Patient: Jc Brannon Jr.  YOB: 1980  MRN: [id number]      Assessment  Jc Brannon Jr. is a 44 y.o. 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  He has tolerated chemotherapy and radiation quite well.  I have personally reviewed the MRI of his pelvis, which demonstrates no obvious evidence of residual rectal mass.    I had a nice discussion with him and his parents about the next steps.  I would like to perform flexible sigmoidoscopy to endoscopically evaluate response to total neoadjuvant therapy.  We discussed proceeding with this next week, including the risk, benefits, alternatives the procedure.    Moving forward after his flexible sigmoidoscopy, we will either proceed with surgery, which could include low anterior resection with coloproctostomy and diverting loop ileostomy or low anterior resection with end colostomy creation or abdominal perineal resection, or we could consider a watch and wait approach if he has had a complete clinical response.  We briefly discussed these options and we will discuss more following his flexible sigmoidoscopy.    Chief Complaint: Rectal cancer follow-up    History of Present Illness   Jc Brannon Jr. is a 44 y.o. male who presents in follow-up today after undergoing total neoadjuvant therapy for his rectal adenocarcinoma.  He tolerated chemotherapy and radiation well.  He has no complaints today.  He denies any issues with bowel function or hematochezia.    Most recent colonoscopy: 2024    Past Medical History   Past Medical History:   Diagnosis Date    Anemia     Hyperlipidemia     Hypertension     Mental disability without special needs     Rectal adenocarcinoma 2024        Past Surgical History   Past Surgical History:   Procedure Laterality Date    COLONOSCOPY N/A 4/23/2024    Procedure: COLONOSCOPY into cecum with biopsy;  Surgeon: David Tena  MD Josafat;  Location: [facility] ENDOSCOPY;  Service: Gastroenterology;  Laterality: N/A;  rectal mass    LEG SURGERY Left     ACL    VENOUS ACCESS DEVICE (PORT) INSERTION N/A 5/24/2024    Procedure: INSERTION VENOUS ACCESS DEVICE;  Surgeon: Ori Isaac MD;  Location: [facility] MAIN OR;  Service: General;  Laterality: N/A;       Social History  Social History     Socioeconomic History    Marital status: Single   Tobacco Use    Smoking status: Never     Passive exposure: Never    Smokeless tobacco: Never   Vaping Use    Vaping status: Never Used   Substance and Sexual Activity    Alcohol use: No    Drug use: No    Sexual activity: Defer       Family History  Family History   Problem Relation Age of Onset    Cancer Mother         Stomach    Cancer Father         prostate    Glaucoma Father     Hypertension Maternal Aunt     Heart disease Maternal Aunt     Hypertension Maternal Uncle     Glaucoma Paternal Grandfather     Malig Hyperthermia Neg Hx        Colorectal cancer family history: None    Review of Systems  Negative except as documented in the HPI.     Allergies  Allergies   Allergen Reactions    Latex Rash       Medications    Current Outpatient Medications:     amLODIPine (NORVASC) 10 MG tablet, Take 1 tablet by mouth Daily., Disp: 90 tablet, Rfl: 1    docusate sodium (Colace) 100 MG capsule, Take 1 capsule by mouth 2 (Two) Times a Day., Disp: 60 capsule, Rfl: 0    gabapentin (Neurontin) 100 MG capsule, Take 1 capsule by mouth 3 (Three) Times a Day., Disp: 90 capsule, Rfl: 2    hydroCHLOROthiazide 12.5 MG tablet, TAKE 1 TABLET BY MOUTH DAILY, Disp: 30 tablet, Rfl: 5    loperamide (IMODIUM A-D) 2 MG tablet, Take 2 tablets (4 mg) at the onset of diarrhea and 2 mg every 2 hours as needed to a max of 16 mg/day (or until patient has been diarrhea free for 12 hours), Disp: 30 tablet, Rfl: 5    OLANZapine (zyPREXA) 5 MG tablet, TAKE ONE TABLET BY MOUTH ONCE NIGHTLY FOR 3 DAYS FOLLOWING CHEMOTHERAPY, Disp: 18  tablet, Rfl: 0    ondansetron (ZOFRAN) 8 MG tablet, Take 1 tablet by mouth 3 (Three) Times a Day As Needed for Nausea or Vomiting., Disp: 30 tablet, Rfl: 5    prochlorperazine (COMPAZINE) 10 MG tablet, Take 1 tablet by mouth Every 6 (Six) Hours As Needed for Nausea or Vomiting., Disp: 30 tablet, Rfl: 5    Vital Signs  Vitals:    12/12/24 0843   BP: 150/90   Pulse: 114   SpO2: 98%        Physical Exam  Constitutional: Resting comfortably, no acute distress  Neck: Supple, trachea midline  Respiratory: No increased work of breathing, Symmetric excursion  Cardiovascular: Well pefursed, no jugular venous distention evident   Abdominal:  Soft, non-tender, non-distended  Lymphatics: No cervical or suprascapular adenopathy  Skin: Warm, dry, no rash on visualized skin surfaces  Musculoskeletal: Symmetric strength, no obvious gross abnormalities  Psychiatric: Alert and oriented ×3, normal affect      Laboratory Results  I have personally reviewed CBC with WC 2, hemoglobin 13, platelets 202.  CMP with creatinine 0.94, albumin 4.6.    Radiology  I have personally reviewed CT scan of the abdomen pelvis as well as the CT scan of the chest.  Both demonstrate no evidence of metastatic disease.    Also personally reviewed the MRI of his pelvis which demonstrates no measurable residual mass within his mid lower rectum.    Endoscopy  No new endoscopic studies to review         See Isaac MD  Colorectal & General Surgery  [facility]    [address], [address]  [place], [place], [address]  P: [phone]  F: [phone]

## 2024-12-12 NOTE — TELEPHONE ENCOUNTER
Oncology Social Work  Voicemail message was left for the patient's father to return call to OSW. MAVIS Marie

## 2024-12-16 ENCOUNTER — ANESTHESIA (OUTPATIENT)
Dept: GASTROENTEROLOGY | Facility: HOSPITAL | Age: 44
End: 2024-12-16
Payer: COMMERCIAL

## 2024-12-16 ENCOUNTER — HOSPITAL ENCOUNTER (OUTPATIENT)
Facility: HOSPITAL | Age: 44
Setting detail: HOSPITAL OUTPATIENT SURGERY
Discharge: HOME | End: 2024-12-16
Attending: SURGERY | Admitting: SURGERY
Payer: COMMERCIAL

## 2024-12-16 ENCOUNTER — ANESTHESIA EVENT (OUTPATIENT)
Dept: GASTROENTEROLOGY | Facility: HOSPITAL | Age: 44
End: 2024-12-16
Payer: COMMERCIAL

## 2024-12-16 VITALS
HEART RATE: 90 BPM | BODY MASS INDEX: 33.64 KG/M2 | SYSTOLIC BLOOD PRESSURE: 150 MMHG | DIASTOLIC BLOOD PRESSURE: 100 MMHG | OXYGEN SATURATION: 99 % | RESPIRATION RATE: 16 BRPM | HEIGHT: 70 IN | WEIGHT: 235 LBS

## 2024-12-16 DIAGNOSIS — C20 RECTAL ADENOCARCINOMA: ICD-10-CM

## 2024-12-16 PROCEDURE — 88305 TISSUE EXAM BY PATHOLOGIST: CPT | Performed by: SURGERY

## 2024-12-16 PROCEDURE — 88341 IMHCHEM/IMCYTCHM EA ADD ANTB: CPT | Performed by: SURGERY

## 2024-12-16 PROCEDURE — 88342 IMHCHEM/IMCYTCHM 1ST ANTB: CPT | Performed by: SURGERY

## 2024-12-16 PROCEDURE — 25010000002 LIDOCAINE 2% SOLUTION: Performed by: NURSE ANESTHETIST, CERTIFIED REGISTERED

## 2024-12-16 PROCEDURE — 25810000003 LACTATED RINGERS PER 1000 ML: Performed by: SURGERY

## 2024-12-16 PROCEDURE — 25010000002 PROPOFOL 10 MG/ML EMULSION: Performed by: NURSE ANESTHETIST, CERTIFIED REGISTERED

## 2024-12-16 RX ORDER — SODIUM CHLORIDE, SODIUM LACTATE, POTASSIUM CHLORIDE, CALCIUM CHLORIDE 600; 310; 30; 20 MG/100ML; MG/100ML; MG/100ML; MG/100ML
30 INJECTION, SOLUTION INTRAVENOUS CONTINUOUS
Status: DISCONTINUED | OUTPATIENT
Start: 2024-12-16 | End: 2024-12-16 | Stop reason: HOSPADM

## 2024-12-16 RX ORDER — LIDOCAINE HYDROCHLORIDE 20 MG/ML
INJECTION, SOLUTION INFILTRATION; PERINEURAL AS NEEDED
Status: DISCONTINUED | OUTPATIENT
Start: 2024-12-16 | End: 2024-12-16 | Stop reason: SURG

## 2024-12-16 RX ORDER — PROPOFOL 10 MG/ML
VIAL (ML) INTRAVENOUS AS NEEDED
Status: DISCONTINUED | OUTPATIENT
Start: 2024-12-16 | End: 2024-12-16 | Stop reason: SURG

## 2024-12-16 RX ADMIN — PROPOFOL 60 MG: 10 INJECTION, EMULSION INTRAVENOUS at 07:13

## 2024-12-16 RX ADMIN — PROPOFOL 20 MG: 10 INJECTION, EMULSION INTRAVENOUS at 07:15

## 2024-12-16 RX ADMIN — LIDOCAINE HYDROCHLORIDE 60 MG: 20 INJECTION, SOLUTION INFILTRATION; PERINEURAL at 07:13

## 2024-12-16 RX ADMIN — PROPOFOL 160 MCG/KG/MIN: 10 INJECTION, EMULSION INTRAVENOUS at 07:13

## 2024-12-16 RX ADMIN — PROPOFOL 20 MG: 10 INJECTION, EMULSION INTRAVENOUS at 07:14

## 2024-12-16 RX ADMIN — SODIUM CHLORIDE, POTASSIUM CHLORIDE, SODIUM LACTATE AND CALCIUM CHLORIDE 30 ML/HR: 600; 310; 30; 20 INJECTION, SOLUTION INTRAVENOUS at 07:00

## 2024-12-16 NOTE — ANESTHESIA POSTPROCEDURE EVALUATION
Patient: Jc Brannon Jr.    Procedure Summary       Date: 12/16/24 Room / Location: [facility] ENDOSCOPY 4 / [facility] ENDOSCOPY    Anesthesia Start: 0707 Anesthesia Stop: 0725    Procedure: FLEXIBLE SIGMOIDOSCOPY WITH BIOPSIES cold with tattoo injection w/ scleratherapy needle Diagnosis:       Rectal adenocarcinoma      (Rectal adenocarcinoma [C20])    Surgeons: Ori Isaac MD Provider: Vijay Conklin MD    Anesthesia Type: MAC ASA Status: 3            Anesthesia Type: MAC    Vitals  Vitals Value Taken Time   /100 12/16/24 0747   Temp     Pulse 90 12/16/24 0747   Resp 16 12/16/24 0747   SpO2 99 % 12/16/24 0747           Post Anesthesia Care and Evaluation    Patient location during evaluation: PACU  Patient participation: complete - patient participated  Level of consciousness: awake and alert  Pain management: adequate    Airway patency: patent  Anesthetic complications: No anesthetic complications    Cardiovascular status: acceptable  Respiratory status: acceptable  Hydration status: acceptable    Comments: --------------------            12/16/24 0747     --------------------   BP:      150/100     Pulse:      90       Resp:       16       SpO2:      99%      --------------------

## 2024-12-16 NOTE — OP NOTE
Colorectal & General Surgery  Operative Report    Patient: Jc Brannon Jr.  YOB: 1980  MRN: [id number]  DATE OF PROCEDURE: 12/16/24     PREOPERATIVE DIAGNOSIS:  cT3 N2 rectal adenocarcinoma status post total neoadjuvant therapy    POSTOPERATIVE DIAGNOSIS:  Residual scar noted at the middle rectal valve consistent with known rectal mass.  Incomplete clinical response.    PROCEDURE:  Flexible sigmoidoscopy with cold forcep biopsy and submucosal injection of tattoo    FINDINGS:  Residual scar noted at the middle rectal valve on the right side consistent with known rectal mass status post total neoadjuvant therapy.  This likely represents an incomplete clinical response.  Biopsies were obtained.  Tattoo was placed at the distal most aspect of the scar.    RECOMMENDATIONS:   Await pathology results.    SURGEON:  See Isaac MD    ANESTHESIA:  Monitored anesthesia care    EBL:  None    SPECIMEN:  Rectal mass biopsy    OPERATIVE DESCRIPTION:  The patient was brought to the endoscopy suite under the care of the nursing staff.  A preoperative timeout was performed.  Monitored anesthesia care was initiated.  A digital rectal examination was performed.  The endoscope was inserted into the anus and advanced carefully to the sigmoid colon.  The endoscope was then withdrawn and the entire mucosal surface of the sigmoid colon and rectum were visualized.  Biopsies were obtained from the residual rectal scar.  Submucosal injection of tattoo dye was placed at the distalmost extent of the scar.  Retroflexion was performed in the rectum.  The scope was then withdrawn.    The patient tolerated the procedure well and was transferred to the postanesthesia care unit in stable condition.    See Isaac M.D.  Colorectal & General Surgery  [facility] [address]  [place], [place], [address]  P: [phone]  F: [phone]

## 2024-12-16 NOTE — DISCHARGE INSTRUCTIONS
For the next 24 hours patient needs to be with a responsible adult.    For 24 hours DO NOT drive, operate machinery, appliances, drink alcohol, make important decisions or sign legal documents.    Start with a light or bland diet if you are feeling sick to your stomach otherwise advance to regular diet as tolerated.    Follow recommendations on procedure report if provided by your doctor.    Call Dr Isaac for problems [phone]    Problems may include but not limited to: large amounts of bleeding, trouble breathing, repeated vomiting, severe unrelieved pain, fever or chills.

## 2024-12-16 NOTE — ANESTHESIA PREPROCEDURE EVALUATION
Anesthesia Evaluation     Patient summary reviewed and Nursing notes reviewed                Airway   Mallampati: III  Dental      Pulmonary - negative pulmonary ROS   Cardiovascular     Rhythm: regular  Rate: normal    (+) hypertension, cardiac stents , hyperlipidemia      Neuro/Psych- negative ROS  GI/Hepatic/Renal/Endo    (+) morbid obesity    Musculoskeletal (-) negative ROS    Abdominal    Substance History - negative use     OB/GYN negative ob/gyn ROS         Other      history of cancer (rectal)                Anesthesia Plan    ASA 3     MAC     intravenous induction     Anesthetic plan, risks, benefits, and alternatives have been provided, discussed and informed consent has been obtained with: patient.    CODE STATUS:

## 2024-12-18 ENCOUNTER — TELEPHONE (OUTPATIENT)
Dept: SURGERY | Facility: CLINIC | Age: 44
End: 2024-12-18
Payer: COMMERCIAL

## 2024-12-18 ENCOUNTER — OFFICE VISIT (OUTPATIENT)
Dept: OTHER | Facility: HOSPITAL | Age: 44
End: 2024-12-18
Payer: COMMERCIAL

## 2024-12-18 ENCOUNTER — OFFICE VISIT (OUTPATIENT)
Dept: PSYCHIATRY | Facility: HOSPITAL | Age: 44
End: 2024-12-18
Payer: COMMERCIAL

## 2024-12-18 ENCOUNTER — PREP FOR SURGERY (OUTPATIENT)
Dept: OTHER | Facility: HOSPITAL | Age: 44
End: 2024-12-18
Payer: COMMERCIAL

## 2024-12-18 VITALS
OXYGEN SATURATION: 97 % | DIASTOLIC BLOOD PRESSURE: 91 MMHG | HEART RATE: 82 BPM | RESPIRATION RATE: 16 BRPM | SYSTOLIC BLOOD PRESSURE: 146 MMHG | BODY MASS INDEX: 33.42 KG/M2 | WEIGHT: 232.9 LBS

## 2024-12-18 DIAGNOSIS — C20 RECTAL ADENOCARCINOMA: Primary | ICD-10-CM

## 2024-12-18 DIAGNOSIS — F90.1 ATTENTION DEFICIT HYPERACTIVITY DISORDER (ADHD), PREDOMINANTLY HYPERACTIVE TYPE: Primary | ICD-10-CM

## 2024-12-18 DIAGNOSIS — F41.1 GENERALIZED ANXIETY DISORDER: ICD-10-CM

## 2024-12-18 LAB
CYTO UR: NORMAL
LAB AP CASE REPORT: NORMAL
LAB AP DIAGNOSIS COMMENT: NORMAL
LAB AP SPECIAL STAINS: NORMAL
PATH REPORT.FINAL DX SPEC: NORMAL
PATH REPORT.GROSS SPEC: NORMAL

## 2024-12-18 PROCEDURE — G0463 HOSPITAL OUTPT CLINIC VISIT: HCPCS | Performed by: NURSE PRACTITIONER

## 2024-12-18 RX ORDER — ATOMOXETINE 18 MG/1
18 CAPSULE ORAL DAILY
Qty: 30 CAPSULE | Refills: 2 | Status: SHIPPED | OUTPATIENT
Start: 2024-12-18 | End: 2025-12-18

## 2024-12-18 NOTE — PROGRESS NOTES
[facility] MULTIDISCIPLINARY CLINIC   IN CLINIC Initial Visit  SUPPORTIVE ONCOLOGY - SURVIVORSHIP    Jc Brannon Jr. is a pleasant 44 y.o. male being followed by Sonali Pak MD for rectal cancer. Reviewed today in Multidisciplinary Clinic, for initial visit    HPI  44-year-old gentleman presenting today for for evaluation for any functional or supportive care needs as he is completing neoadjuvant treatment with chemoradiation for rectal adenocarcinoma.    Completed 6 cycles of FOLFIRINOX on 8/19/2024 followed by concurrent chemoradiation.  Completed radiation 10/19/24.  He had an MRI of the pelvis and CT chest abdomen pelvis performed 12/3/2024 demonstrating treatment response. He was seen by  Dr Isaac 12/16/24 for flex sigmoidoscopy biopsy pending.     The patient is waiting to hear from Dr Isaac regarding pathology results and proceeding with next steps which could involve surgery and additional systemic therapy adjuvantly.     He is a little nervous about what comes next but is feeling very well. He has minimal fatigue. Reports sleep is good. Denies constipation or diarrhea. Was taking benefiber three times a day through radiation and wondering if he should continue. He walks daily.     He has been on short term disability with his job at [employer], but misses work, having independence, and own money and getting out of the house.  Living with his parents who are wonderfully supportive    Family history significant for prostate cancer in father at 63, paternal uncle with colon cancer at 57, other paternal uncle with metastatic cancer, type unknown in his 50s, other paternal uncle with possible colon cancer and DVT/PE in his 70s     Distress: 4  PHQ-9 Total Score:   0  POLI-7: 13     TREATMENT HISTORY:     Oncology/Hematology History   Rectal adenocarcinoma   4/23/2024 Procedure         4/23/2024 FINDING ROVER         5/2/2024 Imaging    CT CAP    A 1.7 cm right hepatic cyst is noted.  Numerous small subcentimeter liver  low densities are present that are too small to characterize, could be  cysts, or potentially metastatic disease, interval follow-up can  characterize change.    No bowel obstruction. The sigmorectal mass is better characterized on  the MRI exam of same date (please see separate report). Some stranding  is apparent in the fat around the lesion, numerous surrounding lymph  nodes are present that could be metastatic lymph nodes. For example, on  axial image 165, a 1.9 x 1.6 cm pericolonic lymph node is present. As  another example, on axial image 170, left perirectal lymph node measures  0.7 x 1.2 cm.    IMPRESSION:        1. Sigmorectal mass with numerous surrounding lymph nodes, raising  suspicion for metastatic lymph nodes.     2. Hepatic cyst, and numerous liver low densities that are too small to  characterize; these lesions could also be cysts, but in this clinical  setting, possibility of any metastatic liver lesions is not excluded.  Interval follow-up is advised to characterize change.     3. No pulmonary mass.     5/2/2024 Imaging    MRI Pelvis    FINDINGS: There is a circumferential solid low rectal mass which  measures approximately 4.5 cm in craniocaudad span and the inferior  margin is approximately 5 mm proximal to the anorectal junction.     The tumor extends approximately 8 mm into the mesorectal fat  predominantly along the right wall of the mass. There appears to be  extension to the mesorectal fascia along the right wall and abutment of  the right levator ani muscles. There are several enlarged perirectal  nodes and the largest is presacral measuring approximately 2.0 x 1.2 cm.  There are at least 4 enhancing suspicious nodes. There is no definite  extramural vascular invasion.     IMPRESSION:  1. Primary Tumor Location: Low rectal     2. MRI Stage: T3 N2 MRF positive     3. No definite sphincter involvement     5/2/2024 Cancer Staged    Staging form: Colon And  Rectum, AJCC 8th Edition  - Clinical stage from 5/2/2024: cT3, cN2, cM0 - Signed by Sonali Pak MD on 6/3/2024     5/3/2024 Initial Diagnosis    Rectal adenocarcinoma     5/16/2024 Imaging    MRI abdo    IMPRESSION:  T2 hyperintense nonenhancing liver lesions are consistent with hepatic  cysts and biliary cystic hamartomas. No convincing liver metastasis  identified     5/24/2024 Procedure    PORT placement (Dr Isaac)     6/3/2024 - 8/21/2024 Chemotherapy    OP COLORECTAL FOLFIRINOX (Fluorouracil cont. Infusion / Leucovorin / OXALIplatin / Irinotecan)     6/5/2024 -  Chemotherapy    OP CENTRAL VENOUS ACCESS DEVICE Access, Care, and Maintenance (CVAD)     9/9/2024 - 9/9/2024 Chemotherapy    OP COLORECTAL Fluorouracil CIV over 168H + XRT     9/9/2024 - 10/18/2024 Chemotherapy    Concurrent radiation to the rectum completed 9/9/24 - 10/18/24 (Dr Noyola)  OP COLORECTAL Fluorouracil CIV over 96 H + XRT         Past Medical History:   Diagnosis Date    Anemia     Hyperlipidemia     Hypertension     Mental disability without special needs     STUTTERS    Rectal adenocarcinoma 2024       Past Surgical History:   Procedure Laterality Date    COLONOSCOPY N/A 4/23/2024    Procedure: COLONOSCOPY into cecum with biopsy;  Surgeon: David Tena MD;  Location: [facility] ENDOSCOPY;  Service: Gastroenterology;  Laterality: N/A;  rectal mass    LEG SURGERY Left     ACL    SIGMOIDOSCOPY N/A 12/16/2024    Procedure: FLEXIBLE SIGMOIDOSCOPY WITH BIOPSIES cold with tattoo injection w/ scleratherapy needle;  Surgeon: Ori Isaac MD;  Location: [facility] ENDOSCOPY;  Service: General;  Laterality: N/A;  pre monitor site(ca) s/p chemo radiation post remnant of tumor/needs tx    VENOUS ACCESS DEVICE (PORT) INSERTION N/A 5/24/2024    Procedure: INSERTION VENOUS ACCESS DEVICE;  Surgeon: Ori Isaac MD;  Location: [facility] OR;  Service: General;  Laterality: N/A;       Social History     Socioeconomic History     "Marital status: Single   Tobacco Use    Smoking status: Never     Passive exposure: Never    Smokeless tobacco: Never   Vaping Use    Vaping status: Never Used   Substance and Sexual Activity    Alcohol use: No    Drug use: No    Sexual activity: Defer         No results found for: \"LDH\", \"URICACID\"      Lab Results   Component Value Date    GLUCOSE 150 (H) 12/09/2024    BUN 14 12/09/2024    CREATININE 0.94 12/09/2024    EGFRIFNONA 74 05/25/2021    EGFRIFAFRI 90 05/25/2021    BCR 14.9 12/09/2024    K 3.8 12/09/2024    CO2 26.5 12/09/2024    CALCIUM 9.1 12/09/2024    PROTENTOTREF 7.2 06/09/2023    ALBUMIN 4.6 12/09/2024    LABIL2 2.0 06/09/2023    AST 22 12/09/2024    ALT 16 12/09/2024       CBC w/diff          10/14/2024    11:18 10/31/2024    09:24 12/9/2024    08:47   CBC w/Diff   WBC 2.24  1.96  2.00    RBC 3.62  3.95  3.97    Hemoglobin 12.3  13.3  13.1    Hematocrit 36.1  38.8  38.2    MCV 99.7  98.2  96.2    MCH 34.0  33.7  33.0    MCHC 34.1  34.3  34.3    RDW 12.4  11.9  11.7    Platelets 197  197  202    Neutrophil Rel % 67.0  59.8  67.0    Immature Granulocyte Rel % 0.4  0.0  0.5    Lymphocyte Rel % 11.6  15.8  16.5    Monocyte Rel % 18.3  22.4  14.5    Eosinophil Rel % 2.7  1.5  1.0    Basophil Rel % 0.0  0.5  0.5        Allergies as of 12/18/2024 - Reviewed 12/18/2024   Allergen Reaction Noted    Latex Rash 11/10/2023        MEDICATIONS:  Patient medication list reviewed today    Review of Systems   Constitutional:  Negative for activity change, appetite change, fatigue and unexpected weight change.   Respiratory:  Negative for chest tightness and shortness of breath.    Cardiovascular:  Negative for chest pain and leg swelling.   Gastrointestinal:  Negative for blood in stool, constipation and diarrhea.   Genitourinary:  Negative for dysuria and hematuria.   Musculoskeletal:  Negative for arthralgias and myalgias.   Neurological:  Negative for weakness and numbness.   Psychiatric/Behavioral:  Negative " for dysphoric mood and sleep disturbance. The patient is nervous/anxious.        /91   Pulse 82   Resp 16   Wt 106 kg (232 lb 14.4 oz)   SpO2 97%   BMI 33.42 kg/m²     Wt Readings from Last 3 Encounters:   12/18/24 106 kg (232 lb 14.4 oz)   12/16/24 107 kg (235 lb)   12/12/24 106 kg (234 lb 3.2 oz)        Pain Score    12/18/24 0902   PainSc: 0-No pain           Physical Exam  Constitutional:       Appearance: Normal appearance. He is well-groomed.   HENT:      Head: Normocephalic and atraumatic.   Cardiovascular:      Rate and Rhythm: Normal rate and regular rhythm.   Pulmonary:      Effort: No tachypnea or respiratory distress.   Abdominal:      General: Abdomen is flat. There is no distension.   Skin:     General: Skin is warm and dry.   Neurological:      Mental Status: He is alert and oriented to person, place, and time.   Psychiatric:         Attention and Perception: Attention and perception normal.         Mood and Affect: Mood and affect normal.         Speech: Speech normal.         Behavior: Behavior normal. Behavior is cooperative.         Thought Content: Thought content normal.           Advance Care Planning     Patient does not have advance care planning complete, we do not have a copy on file.    Patient has not designated a healthcare surrogate:     Arrangements for further assistance with advance care planning can be made by scheduling an appointment with myself or another advance care planning facilitator at their convenience. Patients may also call the toll free [facility] ACP Help Line at [phone].           DISCUSSION HELD TODAY:   Discussed NCCN recommendations for all cancer survivors of 150 minutes/week moderate intensity exercise, achieve and maintain a healthy weight, plants-based whole-foods diet, avoid tobacco and second hand smoke, avoid alcohol or minimize alcohol intake - no more than 1 drink in a day for adults.     A copy of the Survivorship Treatment Summary &  Care Plan for Mr. Brannon was provided to and forwarded to the providers identified on the care team.    Cancer Screening:   Colonoscopy: per NCCN post treatment surveillance guidelines - currently undergoing active treatment  Prostate screening: Last PSA reviewed 7/27/2019: 0.6 ng/ml  Low-dose chest CT: never smoker not indicated    Continue benefiber at least once daily, review with Dr Isaac  Discussed available resources but feels distance from services is a barrier to things like [employer].  Continues to work with Raman DE LA TORRE finding this beneficial  Social work following for financial toxicity  Encouraged continued daily walks and physical activity  We will reach out to patient arrange follow up in/around completion of recommended adjuvant therapies for treatment summary and survivorship care plan.  Call my office as needed at any point for additional information, resources or support at [phone]     Diagnosis Plan   1. Rectal adenocarcinoma              I spent 30 minutes caring for this patient on this date of service by face-to-face counseling. This time includes time spent by me in the following activities: preparing for the visit, reviewing tests, performing a medically appropriate examination and/or evaluation, counseling and educating the patient/family/caregiver, referring and communicating with other health care professionals, documenting information in the medical record, care coordination, obtaining a separately obtained history, and reviewing a separately obtained history     Danielle Damico DNP, APRN, AGCNS-BC, AOCNS  12/18/2024

## 2024-12-18 NOTE — PROGRESS NOTES
Supportive Oncology Services  In Person Session    Subjective  Patient ID: Jc Brannon Jr. is a 44 y.o. male is seen face to face in the Supportive Oncology Services ([facility]) Clinic.    CC: Anxiety, fast speaking - ADHD    HPI/ Interval History:   Pt is seen in follow up regarding ongoing experience of anxiety, intrusive symptoms of ADHD, alongside ongoing treatment in survivorship of colon cancer.    Continues to report anxiety, speaking fast today and recognizing this. Remains invested in various elements from personal health and wellness, continuing to manage appointments independently and continuing FU as recommended. Remains invested in [place] studies, reading, watching TV, and allowing self to rest and recover with time off from work. Met with surgeon recently with colonoscopy, reporting some residual disease, anticipating decision upcoming to determine whether surgery, additional treatment, or radiation will be recommended as next step.  Hopeful to return to work 3 weeks following surgery, if that is the plan.  Plans to stay with parents as needed for necessary assistance.    Does endorse increase in feelings of anxiety, specifically managing various medical appointments, concern for decline without regular follow up.  Again discussed chronic symptomatology, speaking fast, thoughts racing, inability to slow down.  Reiterates issues with school and work, not that he was a poor employee, but unable to slow down to process and complete requested tasks.  Acknowledges he has been very successful, despite this, and remains hesitant regarding any medication recommendation.  However, appreciates developed a rapport, and is agreeable to considering.    Exam: As above    Recent Labs Reviewed:  Infusion on 12/09/2024   Component Date Value    Glucose 12/09/2024 150 (H)     BUN 12/09/2024 14     Creatinine 12/09/2024 0.94     Sodium 12/09/2024 141     Potassium 12/09/2024 3.8     Chloride 12/09/2024 102     CO2  12/09/2024 26.5     Calcium 12/09/2024 9.1     Total Protein 12/09/2024 7.1     Albumin 12/09/2024 4.6     ALT (SGPT) 12/09/2024 16     AST (SGOT) 12/09/2024 22     Alkaline Phosphatase 12/09/2024 85     Total Bilirubin 12/09/2024 0.4     Globulin 12/09/2024 2.5     A/G Ratio 12/09/2024 1.8     BUN/Creatinine Ratio 12/09/2024 14.9     Anion Gap 12/09/2024 12.5     eGFR 12/09/2024 101.2     WBC 12/09/2024 2.00 (L)     RBC 12/09/2024 3.97 (L)     Hemoglobin 12/09/2024 13.1     Hematocrit 12/09/2024 38.2     MCV 12/09/2024 96.2     MCH 12/09/2024 33.0     MCHC 12/09/2024 34.3     RDW 12/09/2024 11.7 (L)     RDW-SD 12/09/2024 41.4     MPV 12/09/2024 9.6     Platelets 12/09/2024 202     Neutrophil % 12/09/2024 67.0     Lymphocyte % 12/09/2024 16.5 (L)     Monocyte % 12/09/2024 14.5 (H)     Eosinophil % 12/09/2024 1.0     Basophil % 12/09/2024 0.5     Immature Grans % 12/09/2024 0.5     Neutrophils, Absolute 12/09/2024 1.34 (L)     Lymphocytes, Absolute 12/09/2024 0.33 (L)     Monocytes, Absolute 12/09/2024 0.29     Eosinophils, Absolute 12/09/2024 0.02     Basophils, Absolute 12/09/2024 0.01     Immature Grans, Absolute 12/09/2024 0.01     nRBC 12/09/2024 0.0    Lab on 10/31/2024   Component Date Value    WBC 10/31/2024 1.96 (L)     RBC 10/31/2024 3.95 (L)     Hemoglobin 10/31/2024 13.3     Hematocrit 10/31/2024 38.8     MCV 10/31/2024 98.2 (H)     MCH 10/31/2024 33.7 (H)     MCHC 10/31/2024 34.3     RDW 10/31/2024 11.9 (L)     RDW-SD 10/31/2024 43.4     MPV 10/31/2024 9.6     Platelets 10/31/2024 197     Neutrophil % 10/31/2024 59.8     Lymphocyte % 10/31/2024 15.8 (L)     Monocyte % 10/31/2024 22.4 (H)     Eosinophil % 10/31/2024 1.5     Basophil % 10/31/2024 0.5     Immature Grans % 10/31/2024 0.0     Neutrophils, Absolute 10/31/2024 1.17 (L)     Lymphocytes, Absolute 10/31/2024 0.31 (L)     Monocytes, Absolute 10/31/2024 0.44     Eosinophils, Absolute 10/31/2024 0.03     Basophils, Absolute 10/31/2024 0.01      Immature Grans, Absolute 10/31/2024 0.00     nRBC 10/31/2024 0.0    Orders Only on 10/24/2024   Component Date Value    Course ID 10/24/2024 C1     Course Intent 10/24/2024 Curative w/chemo     Course Start Date 10/24/2024 8/26/2024  8:22 AM     Course End Date 10/24/2024 10/24/2024  7:17 AM     Course First Treatment D* 10/24/2024 9/9/2024 11:47 AM     Course Last Treatment Da* 10/24/2024 10/18/2024  8:39 AM     Course Elapsed Days 10/24/2024 39     Reference Point ID 10/24/2024 RX: [id number]     Reference Point Dosage G* 10/24/2024 [id number]     Reference Point ID 10/24/2024 RX: [email or web address]     Reference Point Dosage G* 10/24/2024 3.6     Plan ID 10/24/2024 BHF_Rectum     Plan Name 10/24/2024 BHF_Rectum     Plan Fractions Treated t* 10/24/2024 28     Plan Total Fractions Pre* 10/24/2024 28     Plan Prescribed Dose Per* 10/24/2024 1.8     Plan Total Prescribed Do* 10/24/2024 5,040     Plan Primary Reference P* 10/24/2024 RX: [id number]     Plan ID 10/24/2024 Rectal Boost     Plan Name 10/24/2024 Rectal Boost     Plan Fractions Treated t* 10/24/2024 2     Plan Total Fractions Pre* 10/24/2024 2     Plan Prescribed Dose Per* 10/24/2024 1.8     Plan Total Prescribed Do* 10/24/2024 360     Plan Primary Reference P* 10/24/2024 RX: [id number]    Labs reviewed    Current Psychotropic Medications:  Gabapentin 100 mg nightly    Plan of Care/ Medical Decision Making  Again discussed ongoing impact of ADHD, anxiety, negative attributions toward medication. Pt agreeable to initiation of Strattera.  Aware to take in the morning.  Deep breathing demonstrated with additional discussion regarding non pharmacological anxiety reduction techniques.  Continued counseling provided surrounding anxiety related to health, supporting patient independence and successful workarounds.  Follow-up scheduled in 4 weeks; patient aware sooner appointment available if needed.  Case discussed with Danielle Damico DNP, who also remains available as  needed.    Diagnoses and all orders for this visit:    1. Attention deficit hyperactivity disorder (ADHD), predominantly hyperactive type (Primary)    2. Generalized anxiety disorder    Other orders  -     atomoxetine (Strattera) 18 MG capsule; Take 1 capsule by mouth Daily.  Dispense: 30 capsule; Refill: 2    I spent 32 minutes caring for Jc on this date of service. This time includes time spent by me in the following activities: preparing for the visit, reviewing tests, obtaining and/or reviewing a separately obtained history, performing a medically appropriate examination and/or evaluation, counseling and educating the patient/family/caregiver, ordering medications, tests, or procedures, referring and communicating with other health care professionals, and documenting information in the medical record.

## 2024-12-18 NOTE — TELEPHONE ENCOUNTER
Colorectal & General Surgery  Endoscopy Follow-Up Note    Patient: Jc Brannon Jr.  YOB: 1980  MRN: [id number]      Indication  Rectal cancer status post total neoadjuvant therapy    Pathology  Rectal biopsy consistent with persistent invasive adenocarcinoma    Recommendation  Return to office to discuss operative management of rectal cancer      See Isaac MD  Colorectal & General Surgery  [facility]    [address], [address]  [place], [place], [address]  P: [phone]  F: [phone]

## 2024-12-23 ENCOUNTER — OFFICE VISIT (OUTPATIENT)
Dept: SURGERY | Facility: CLINIC | Age: 44
End: 2024-12-23
Payer: COMMERCIAL

## 2024-12-23 VITALS
BODY MASS INDEX: 34.22 KG/M2 | WEIGHT: 239 LBS | DIASTOLIC BLOOD PRESSURE: 86 MMHG | OXYGEN SATURATION: 99 % | SYSTOLIC BLOOD PRESSURE: 144 MMHG | HEART RATE: 82 BPM | HEIGHT: 70 IN

## 2024-12-23 DIAGNOSIS — C20 RECTAL ADENOCARCINOMA: Primary | ICD-10-CM

## 2024-12-23 RX ORDER — NEOMYCIN SULFATE 500 MG/1
1000 TABLET ORAL ONCE
Qty: 2 TABLET | Refills: 0 | Status: SHIPPED | OUTPATIENT
Start: 2024-12-23 | End: 2024-12-23 | Stop reason: ALTCHOICE

## 2024-12-23 RX ORDER — METRONIDAZOLE 500 MG/1
500 TABLET ORAL SEE ADMIN INSTRUCTIONS
Qty: 4 TABLET | Refills: 0 | Status: SHIPPED | OUTPATIENT
Start: 2024-12-23 | End: 2024-12-24

## 2024-12-23 RX ORDER — METRONIDAZOLE 500 MG/1
500 TABLET ORAL 3 TIMES DAILY
Qty: 30 TABLET | Refills: 0 | Status: SHIPPED | OUTPATIENT
Start: 2024-12-23 | End: 2024-12-23 | Stop reason: ALTCHOICE

## 2024-12-23 RX ORDER — NEOMYCIN SULFATE 500 MG/1
TABLET ORAL
Qty: 4 TABLET | Refills: 0 | Status: SHIPPED | OUTPATIENT
Start: 2024-12-23

## 2024-12-23 NOTE — PROGRESS NOTES
Colorectal & General Surgery  Follow - Up    Patient: Jc Brannon Jr.  YOB: 1980  MRN: [id number]      Assessment  Jc Brannon Jr. is a 44 y.o. male with cT3 cN2c M0 low to mid rectal adenocarcinoma status post total neoadjuvant therapy with incomplete response.  We discussed the findings of his flexible sigmoidoscopy last week, which demonstrates a residual scar with biopsy-proven invasive adenocarcinoma.  Given Mr. Rehman's complex social and cognitive status, I do not think that he is a good candidate for watch and wait approach.  I have recommended proceeding with  mesorectal excision.  We discussed proceeding with a laparoscopic low anterior resection with diverting loop ileostomy creation.  He understands that there is a chance that we will not be able to divide the rectum distal to his tumor and that there is also a chance that we are not able to create an anastomosis, both of which would render him with a permanent colostomy.  He understands that there may be conversion of the procedure to abdominal perineal resection.  We discussed the risk of injury to the ureter, damage to surrounding structures, need for additional procedures, bleeding, and infection.  Informed consent was obtained.  We also spent a great deal of the visit discussing postoperative expectations, adapting to life with a temporary ileostomy or a permanent colostomy, and what recovery looks like for him.    Plan  Proceed to the operating room for laparoscopic low anterior resection with diverting loop ileostomy  Enhanced recovery after surgery protocols  Flagyl and neomycin prescribed for preoperative bowel preparation  Full mechanical bowel preparation prior to surgery  WOCN team to chris him at his PAT appointment    Chief Complaint: Rectal cancer follow-up    History of Present Illness   Jc Brannon Jr. is a 44 y.o. male who presents in follow-up today regarding his rectal cancer.  He feels that he continues to  do well.    Past Medical History   Past Medical History:   Diagnosis Date    Anemia     Hyperlipidemia     Hypertension     Mental disability without special needs     STUTTERS    Rectal adenocarcinoma 2024        Past Surgical History   Past Surgical History:   Procedure Laterality Date    COLONOSCOPY N/A 4/23/2024    Procedure: COLONOSCOPY into cecum with biopsy;  Surgeon: David Tena MD;  [facility];  Service: Gastroenterology;  Laterality: N/A;  rectal mass    LEG SURGERY Left     ACL    SIGMOIDOSCOPY N/A 12/16/2024    Procedure: FLEXIBLE SIGMOIDOSCOPY WITH BIOPSIES cold with tattoo injection w/ scleratherapy needle;  Surgeon: Ori Isaac MD;  Location:  [facility];  Service: General;  Laterality: N/A;  pre monitor site(ca) s/p chemo radiation post remnant of tumor/needs tx    VENOUS ACCESS DEVICE (PORT) INSERTION N/A 5/24/2024    Procedure: INSERTION VENOUS ACCESS DEVICE;  Surgeon: Ori Isaac MD;  Location: [facility] OR;  Service: General;  Laterality: N/A;       Social History  Social History     Socioeconomic History    Marital status: Single   Tobacco Use    Smoking status: Never     Passive exposure: Never    Smokeless tobacco: Never   Vaping Use    Vaping status: Never Used   Substance and Sexual Activity    Alcohol use: No    Drug use: No    Sexual activity: Defer       Family History  Family History   Problem Relation Age of Onset    Cancer Mother         Stomach    Cancer Father         prostate    Glaucoma Father     Hypertension Maternal Aunt     Heart disease Maternal Aunt     Hypertension Maternal Uncle     Glaucoma Paternal Grandfather     Malig Hyperthermia Neg Hx        Colorectal cancer family history: None    Review of Systems  Negative except as documented in the HPI.     Allergies  Allergies   Allergen Reactions    Latex Rash       Medications    Current Outpatient Medications:     amLODIPine (NORVASC) 10 MG tablet, Take 1 tablet by  mouth Daily., Disp: 90 tablet, Rfl: 1    atomoxetine (Strattera) 18 MG capsule, Take 1 capsule by mouth Daily., Disp: 30 capsule, Rfl: 2    docusate sodium (Colace) 100 MG capsule, Take 1 capsule by mouth 2 (Two) Times a Day., Disp: 60 capsule, Rfl: 0    gabapentin (Neurontin) 100 MG capsule, Take 1 capsule by mouth 3 (Three) Times a Day., Disp: 90 capsule, Rfl: 2    hydroCHLOROthiazide 12.5 MG tablet, TAKE 1 TABLET BY MOUTH DAILY, Disp: 30 tablet, Rfl: 5    loperamide (IMODIUM A-D) 2 MG tablet, Take 2 tablets (4 mg) at the onset of diarrhea and 2 mg every 2 hours as needed to a max of 16 mg/day (or until patient has been diarrhea free for 12 hours) (Patient not taking: Reported on 12/23/2024), Disp: 30 tablet, Rfl: 5    metroNIDAZOLE (Flagyl) 500 MG tablet, Take 1 tablet by mouth See Admin Instructions for 1 day. Take two (2) tablets at 7 PM and two (2) tablets at 11 PM the night prior to surgery., Disp: 4 tablet, Rfl: 0    neomycin (MYCIFRADIN) 500 MG tablet, Take two (2) tablets at 7 PM and two (2) tablets at 11 PM the night prior to surgery., Disp: 4 tablet, Rfl: 0    OLANZapine (zyPREXA) 5 MG tablet, TAKE ONE TABLET BY MOUTH ONCE NIGHTLY FOR 3 DAYS FOLLOWING CHEMOTHERAPY (Patient not taking: Reported on 12/23/2024), Disp: 18 tablet, Rfl: 0    ondansetron (ZOFRAN) 8 MG tablet, Take 1 tablet by mouth 3 (Three) Times a Day As Needed for Nausea or Vomiting. (Patient not taking: Reported on 12/23/2024), Disp: 30 tablet, Rfl: 5    prochlorperazine (COMPAZINE) 10 MG tablet, Take 1 tablet by mouth Every 6 (Six) Hours As Needed for Nausea or Vomiting. (Patient not taking: Reported on 12/23/2024), Disp: 30 tablet, Rfl: 5    Vital Signs  Vitals:    12/23/24 1449   BP: 144/86   Pulse: 82   SpO2: 99%        Physical Exam  Constitutional: Resting comfortably, no acute distress  Neck: Supple, trachea midline  Respiratory: No increased work of breathing, Symmetric excursion  Cardiovascular: Well pefursed, no jugular venous  distention evident   Abdominal:  Soft, non-tender, non-distended  Lymphatics: No cervical or suprascapular adenopathy  Skin: Warm, dry, no rash on visualized skin surfaces  Musculoskeletal: Symmetric strength, no obvious gross abnormalities  Psychiatric: Alert and oriented ×3, normal affect      Laboratory Results  I have personally reviewed pathology report which demonstrates residual adenocarcinoma rectal scar    Radiology  No new imaging to review    Endoscopy  No new outside endoscopy to review    I spent 45 minutes caring for Jc on this date of service. This time includes time spent by me in the following activities:preparing for the visit, reviewing tests, obtaining and/or reviewing a separately obtained history, performing a medically appropriate examination and/or evaluation , counseling and educating the patient/family/caregiver, ordering medications, tests, or procedures, documenting information in the medical record, independently interpreting results and communicating that information with the patient/family/caregiver, and care coordination     See Isaac MD  Colorectal & General Surgery  [facility]    [address], [address]  [place], [place], [address]  P: [phone]  F: [phone]

## 2024-12-23 NOTE — H&P (VIEW-ONLY)
Colorectal & General Surgery  Follow - Up    Patient: Jc Brannon Jr.  YOB: 1980  MRN: [id number]      Assessment  Jc Brannon Jr. is a 44 y.o. male with cT3 cN2c M0 low to mid rectal adenocarcinoma status post total neoadjuvant therapy with incomplete response.  We discussed the findings of his flexible sigmoidoscopy last week, which demonstrates a residual scar with biopsy-proven invasive adenocarcinoma.  Given Mr. Rehman's complex social and cognitive status, I do not think that he is a good candidate for watch and wait approach.  I have recommended proceeding with  mesorectal excision.  We discussed proceeding with a laparoscopic low anterior resection with diverting loop ileostomy creation.  He understands that there is a chance that we will not be able to divide the rectum distal to his tumor and that there is also a chance that we are not able to create an anastomosis, both of which would render him with a permanent colostomy.  He understands that there may be conversion of the procedure to abdominal perineal resection.  We discussed the risk of injury to the ureter, damage to surrounding structures, need for additional procedures, bleeding, and infection.  Informed consent was obtained.  We also spent a great deal of the visit discussing postoperative expectations, adapting to life with a temporary ileostomy or a permanent colostomy, and what recovery looks like for him.    Plan  Proceed to the operating room for laparoscopic low anterior resection with diverting loop ileostomy  Enhanced recovery after surgery protocols  Flagyl and neomycin prescribed for preoperative bowel preparation  Full mechanical bowel preparation prior to surgery  WOCN team to chris him at his PAT appointment    Chief Complaint: Rectal cancer follow-up    History of Present Illness   Jc Brannon Jr. is a 44 y.o. male who presents in follow-up today regarding his rectal cancer.  He feels that he continues to  do well.    Past Medical History   Past Medical History:   Diagnosis Date    Anemia     Hyperlipidemia     Hypertension     Mental disability without special needs     STUTTERS    Rectal adenocarcinoma 2024        Past Surgical History   Past Surgical History:   Procedure Laterality Date    COLONOSCOPY N/A 4/23/2024    Procedure: COLONOSCOPY into cecum with biopsy;  Surgeon: David Tena MD;  [facility];  Service: Gastroenterology;  Laterality: N/A;  rectal mass    LEG SURGERY Left     ACL    SIGMOIDOSCOPY N/A 12/16/2024    Procedure: FLEXIBLE SIGMOIDOSCOPY WITH BIOPSIES cold with tattoo injection w/ scleratherapy needle;  Surgeon: Ori Isaac MD;  Location:  [facility];  Service: General;  Laterality: N/A;  pre monitor site(ca) s/p chemo radiation post remnant of tumor/needs tx    VENOUS ACCESS DEVICE (PORT) INSERTION N/A 5/24/2024    Procedure: INSERTION VENOUS ACCESS DEVICE;  Surgeon: Ori Isaac MD;  Location: [facility] OR;  Service: General;  Laterality: N/A;       Social History  Social History     Socioeconomic History    Marital status: Single   Tobacco Use    Smoking status: Never     Passive exposure: Never    Smokeless tobacco: Never   Vaping Use    Vaping status: Never Used   Substance and Sexual Activity    Alcohol use: No    Drug use: No    Sexual activity: Defer       Family History  Family History   Problem Relation Age of Onset    Cancer Mother         Stomach    Cancer Father         prostate    Glaucoma Father     Hypertension Maternal Aunt     Heart disease Maternal Aunt     Hypertension Maternal Uncle     Glaucoma Paternal Grandfather     Malig Hyperthermia Neg Hx        Colorectal cancer family history: None    Review of Systems  Negative except as documented in the HPI.     Allergies  Allergies   Allergen Reactions    Latex Rash       Medications    Current Outpatient Medications:     amLODIPine (NORVASC) 10 MG tablet, Take 1 tablet by  mouth Daily., Disp: 90 tablet, Rfl: 1    atomoxetine (Strattera) 18 MG capsule, Take 1 capsule by mouth Daily., Disp: 30 capsule, Rfl: 2    docusate sodium (Colace) 100 MG capsule, Take 1 capsule by mouth 2 (Two) Times a Day., Disp: 60 capsule, Rfl: 0    gabapentin (Neurontin) 100 MG capsule, Take 1 capsule by mouth 3 (Three) Times a Day., Disp: 90 capsule, Rfl: 2    hydroCHLOROthiazide 12.5 MG tablet, TAKE 1 TABLET BY MOUTH DAILY, Disp: 30 tablet, Rfl: 5    loperamide (IMODIUM A-D) 2 MG tablet, Take 2 tablets (4 mg) at the onset of diarrhea and 2 mg every 2 hours as needed to a max of 16 mg/day (or until patient has been diarrhea free for 12 hours) (Patient not taking: Reported on 12/23/2024), Disp: 30 tablet, Rfl: 5    metroNIDAZOLE (Flagyl) 500 MG tablet, Take 1 tablet by mouth See Admin Instructions for 1 day. Take two (2) tablets at 7 PM and two (2) tablets at 11 PM the night prior to surgery., Disp: 4 tablet, Rfl: 0    neomycin (MYCIFRADIN) 500 MG tablet, Take two (2) tablets at 7 PM and two (2) tablets at 11 PM the night prior to surgery., Disp: 4 tablet, Rfl: 0    OLANZapine (zyPREXA) 5 MG tablet, TAKE ONE TABLET BY MOUTH ONCE NIGHTLY FOR 3 DAYS FOLLOWING CHEMOTHERAPY (Patient not taking: Reported on 12/23/2024), Disp: 18 tablet, Rfl: 0    ondansetron (ZOFRAN) 8 MG tablet, Take 1 tablet by mouth 3 (Three) Times a Day As Needed for Nausea or Vomiting. (Patient not taking: Reported on 12/23/2024), Disp: 30 tablet, Rfl: 5    prochlorperazine (COMPAZINE) 10 MG tablet, Take 1 tablet by mouth Every 6 (Six) Hours As Needed for Nausea or Vomiting. (Patient not taking: Reported on 12/23/2024), Disp: 30 tablet, Rfl: 5    Vital Signs  Vitals:    12/23/24 1449   BP: 144/86   Pulse: 82   SpO2: 99%        Physical Exam  Constitutional: Resting comfortably, no acute distress  Neck: Supple, trachea midline  Respiratory: No increased work of breathing, Symmetric excursion  Cardiovascular: Well pefursed, no jugular venous  distention evident   Abdominal:  Soft, non-tender, non-distended  Lymphatics: No cervical or suprascapular adenopathy  Skin: Warm, dry, no rash on visualized skin surfaces  Musculoskeletal: Symmetric strength, no obvious gross abnormalities  Psychiatric: Alert and oriented ×3, normal affect      Laboratory Results  I have personally reviewed pathology report which demonstrates residual adenocarcinoma rectal scar    Radiology  No new imaging to review    Endoscopy  No new outside endoscopy to review    I spent 45 minutes caring for Jc on this date of service. This time includes time spent by me in the following activities:preparing for the visit, reviewing tests, obtaining and/or reviewing a separately obtained history, performing a medically appropriate examination and/or evaluation , counseling and educating the patient/family/caregiver, ordering medications, tests, or procedures, documenting information in the medical record, independently interpreting results and communicating that information with the patient/family/caregiver, and care coordination     See Isaac MD  Colorectal & General Surgery  [facility]    [address], [address]  [place], [place], [address]  P: [phone]  F: [phone]

## 2024-12-26 LAB
CYTO UR: NORMAL
LAB AP CASE REPORT: NORMAL
LAB AP DIAGNOSIS COMMENT: NORMAL
LAB AP SPECIAL STAINS: NORMAL
PATH REPORT.ADDENDUM SPEC: NORMAL
PATH REPORT.FINAL DX SPEC: NORMAL
PATH REPORT.GROSS SPEC: NORMAL

## 2024-12-27 RX ORDER — NEOMYCIN SULFATE 500 MG/1
TABLET ORAL
Qty: 4 TABLET | Refills: 0 | Status: SHIPPED | OUTPATIENT
Start: 2024-12-27

## 2024-12-27 RX ORDER — METRONIDAZOLE 500 MG/1
500 TABLET ORAL SEE ADMIN INSTRUCTIONS
Qty: 4 TABLET | Refills: 0 | Status: SHIPPED | OUTPATIENT
Start: 2024-12-27 | End: 2024-12-28

## 2024-12-30 ENCOUNTER — PATIENT OUTREACH (OUTPATIENT)
Dept: OTHER | Facility: HOSPITAL | Age: 44
End: 2024-12-30
Payer: COMMERCIAL

## 2024-12-30 ENCOUNTER — TELEPHONE (OUTPATIENT)
Dept: SURGERY | Facility: CLINIC | Age: 44
End: 2024-12-30

## 2024-12-30 NOTE — TELEPHONE ENCOUNTER
Called and informed patient and his father that  said that the 'Ensure Clear Nutrition' was okay to get. They verbalized understanding.

## 2024-12-30 NOTE — TELEPHONE ENCOUNTER
Patients father called stating he was given instructions to get a drink called 'Ensure Surgery'. Father stated they have not been able to find this specific drink instead they found 'Ensure Clear Nutrition'. Patient and his father want to know if they are able to buy this drink or do they need to have the 'Ensure Surgery' specifically.

## 2024-12-30 NOTE — PROGRESS NOTES
Call from patient's father. They are looking for Ensure surgery nutrition shake. The patient needs to start drinking three per day on 1/4/25 prior to his surgery; they need 15 total. The patient's father stated they are having difficulty locating this although states they rec'd it from Dolores, dietician previously.  I will contact Dolores to see if she has access to this or if she can advise where they can locate it. Will call patient's father back later today.    Message to Dolores     Message from Dolores-this product is only available on line. Encouraged patient to contact Dr. Isaac's office to see if they have it there.  Nestle Impact AR is the same product.    Informed patient's father of above. He will contact Dr. Isaac's office again.  It appears he can purchase [employer] Impact at [employer] and [employer].  Patient's father verbalized appreciation.

## 2024-12-30 NOTE — TELEPHONE ENCOUNTER
PT'S FATHER LEFT A MESSAGE REGARDING THE ENSURE DRINKS PT IS TO START DRINKING 5 DAYS PRIOR TO SX. HIS FATHER STATED THAT HE PURCHASED 15 OF THE ENSURE CLEAR FRUIT FLAVORED DRINKS. I INFORMED THE PT THAT THOSE ARE SUITABLE FOR A CLEAR LIQUID DIET.

## 2025-01-03 ENCOUNTER — OFFICE VISIT (OUTPATIENT)
Dept: PSYCHIATRY | Facility: HOSPITAL | Age: 45
End: 2025-01-03
Payer: COMMERCIAL

## 2025-01-03 DIAGNOSIS — F90.1 ATTENTION DEFICIT HYPERACTIVITY DISORDER (ADHD), PREDOMINANTLY HYPERACTIVE TYPE: Primary | ICD-10-CM

## 2025-01-03 DIAGNOSIS — F41.1 GENERALIZED ANXIETY DISORDER: ICD-10-CM

## 2025-01-03 RX ORDER — ATOMOXETINE 18 MG/1
18 CAPSULE ORAL DAILY
Qty: 30 CAPSULE | Refills: 2 | Status: CANCELLED | OUTPATIENT
Start: 2025-01-03 | End: 2026-01-03

## 2025-01-03 NOTE — PROGRESS NOTES
Supportive Oncology Services  In Person Session    Subjective  Patient ID: Jc Brannon Jr. is a 44 y.o. male is seen face to face in the [facility] ([facility]) [facility].    CC: Anxiety regarding health, upcoming surgery    HPI/ Interval History:   Pt is seen in follow up regarding increasing anxiety in anticipation of upcoming surgery. Identifies drastic increase in anxiety with uncertainty regarding final outcome, potential need for permanent colostomy. Secifically notes disrupted sleep, anxiety upon awakening. Reports continued fixation on various practical needs, trying to get disability paperwork prepared, internet and other household needs alligned.  Reports radical acceptance of potential ostomy, more anxious about need to stay with parents, inability to drive, continued time off work. Anticipating a second surgery following. Sees regular counseling as being an important part of coping with this. Ready to get surgery over with.    Speech remains pressured, anxious. Has initiated Strattera, tolerating this well. Appetite is good, helping Dad cook and keeping house clean. Sleep reviewed; does report sleeping more during day, 2-4 hours. Continues to take gabapentin 100 mg q hs, sleeping well for appx 6 hours. Up regularly appx 4 AM. Routine disrupted with internet not working. Sleeping to avoid current anxiety.     Additionally shares grief regarding loss of friend.    Exam: As above    Recent Labs Reviewed:  Admission on 12/16/2024, Discharged on 12/16/2024   Component Date Value    Addendum 12/16/2024                      Value:Please see the completely scanned MSI by IHC report from [facility] below.     Please see the completely scanned MSI by PCR report from [facility] below.       Case Report 12/16/2024                      Value:Surgical Pathology Report                         Case: [id number]                                  Authorizing Provider:  Ori Isaac,   Collected:            "12/16/2024 07:20 AM                                 MD                                                                           Ordering Location:     [facility]  Received:            12/16/2024 09:18 AM                                 ENDO SUITES                                                                  Pathologist:           Didi Villasenor MD                                                          Specimen:    Large Intestine, Rectum, rectal mass biopsies                                              Final Diagnosis 12/16/2024                      Value:1.  Rectum, biopsy: MINIMALLY REPRESENTED INVASIVE MODERATELY DIFFERENTIATED ADENOCARCINOMA.    SWM      Comment 12/16/2024                      Value:Representative H&E-stained slide was reviewed internally with Dr. Torres, who concurred.  MSI studies are pending and will be issued in addendum to this report.      Gross Description 12/16/2024                      Value:1. Large Intestine, Rectum.  The specimen is received in formalin labeled with the patient's name and further designated 'rectum biopsy' are multiple small fragments of gray-tan tissue. The specimen is submitted for embedding as received.        Special Stains 12/16/2024                      Value:Utilizing appropriate controls, multiple immunohistochemical stains were performed including CK7, CK20, CDX2 and villin.  Tumor cells are positive for CK20, CDX2 and villin and negative for CK7, supporting a colorectal primary.      Microscopic Description 12/16/2024                      Value:Unless \"gross only\" is specified, the final diagnosis for each specimen is based on microscopic examination of tissue.     Infusion on 12/09/2024   Component Date Value    Glucose 12/09/2024 150 (H)     BUN 12/09/2024 14     Creatinine 12/09/2024 0.94     Sodium 12/09/2024 141     Potassium 12/09/2024 3.8     Chloride 12/09/2024 102     CO2 12/09/2024 26.5     Calcium 12/09/2024 9.1     Total " Protein 12/09/2024 7.1     Albumin 12/09/2024 4.6     ALT (SGPT) 12/09/2024 16     AST (SGOT) 12/09/2024 22     Alkaline Phosphatase 12/09/2024 85     Total Bilirubin 12/09/2024 0.4     Globulin 12/09/2024 2.5     A/G Ratio 12/09/2024 1.8     BUN/Creatinine Ratio 12/09/2024 14.9     Anion Gap 12/09/2024 12.5     eGFR 12/09/2024 101.2     WBC 12/09/2024 2.00 (L)     RBC 12/09/2024 3.97 (L)     Hemoglobin 12/09/2024 13.1     Hematocrit 12/09/2024 38.2     MCV 12/09/2024 96.2     MCH 12/09/2024 33.0     MCHC 12/09/2024 34.3     RDW 12/09/2024 11.7 (L)     RDW-SD 12/09/2024 41.4     MPV 12/09/2024 9.6     Platelets 12/09/2024 202     Neutrophil % 12/09/2024 67.0     Lymphocyte % 12/09/2024 16.5 (L)     Monocyte % 12/09/2024 14.5 (H)     Eosinophil % 12/09/2024 1.0     Basophil % 12/09/2024 0.5     Immature Grans % 12/09/2024 0.5     Neutrophils, Absolute 12/09/2024 1.34 (L)     Lymphocytes, Absolute 12/09/2024 0.33 (L)     Monocytes, Absolute 12/09/2024 0.29     Eosinophils, Absolute 12/09/2024 0.02     Basophils, Absolute 12/09/2024 0.01     Immature Grans, Absolute 12/09/2024 0.01     nRBC 12/09/2024 0.0    Labs reviewed    Current Psychotropic Medications:  Strattera 18 mg daily  Gabapentin 100 mg q hs    Plan of Care/ Medical Decision Making  Continue gabapentin q hs. Discussed use during day for notable anxiety, although patient is resistant given concerns for sedation.  Continue Strattera as written; acknowledged subtherapeutic dosing, hesitancy toward increase given upcoming surgery. Will continue as written with plans for increase following.  Discussed importance of maintaining sleep/ wake cycle, taking a walk to minimize anxiety during the day, and demonstrating additional non pharmacological anxiety reduction techniques.  FU scheduled following surgery; pt prefers 4 weeks following to allow for appropriate recovery.  Has number to clinic for assistance as needed.    Diagnoses and all orders for this  visit:    1. Attention deficit hyperactivity disorder (ADHD), predominantly hyperactive type (Primary)    2. Generalized anxiety disorder    I spent 35 minutes caring for Jc on this date of service. This time includes time spent by me in the following activities: preparing for the visit, reviewing tests, obtaining and/or reviewing a separately obtained history, performing a medically appropriate examination and/or evaluation, counseling and educating the patient/family/caregiver, and documenting information in the medical record.

## 2025-01-07 ENCOUNTER — TELEPHONE (OUTPATIENT)
Dept: SURGERY | Facility: CLINIC | Age: 45
End: 2025-01-07
Payer: COMMERCIAL

## 2025-01-07 NOTE — TELEPHONE ENCOUNTER
This patient called inquiring about his [facility] Disability paperwork ([employer] and [place]).  He states he needs coverage for  12/31 through 1/08/2025.  I explained that the paperwork was sent by Joanne and documented for 1/09 through 02/20/2025.  He was advised to follow up with Manju Pak's office for the additional [facility] requested time.  Patient seems confused about next steps with [facility] paperwork and I told him that I would forward to the medical assistants for call back.  He has lots of questions and concerns.

## 2025-01-08 ENCOUNTER — TELEPHONE (OUTPATIENT)
Dept: ONCOLOGY | Facility: CLINIC | Age: 45
End: 2025-01-08
Payer: COMMERCIAL

## 2025-01-08 ENCOUNTER — PATIENT OUTREACH (OUTPATIENT)
Dept: OTHER | Facility: HOSPITAL | Age: 45
End: 2025-01-08
Payer: COMMERCIAL

## 2025-01-08 RX ORDER — METRONIDAZOLE 500 MG/1
TABLET ORAL
Status: ON HOLD | COMMUNITY
Start: 2025-01-02 | End: 2025-01-09

## 2025-01-08 NOTE — PROGRESS NOTES
Call from father Jc Nolasco Left message that the patient is having surgery tomorrow and they need assistance with his disability again, He is trying to reach Dr. Pak's office. Called Jc Freeman left message that Ana Lilia Rosales in Dr. Pak's office usually handles disability.  I will send her an email asking her to contact Jc Freeman.  Also provided dr. Pak's office number if needed.  Call back if needed    Emailed Ana Lilia Bowman regarding above

## 2025-01-08 NOTE — TELEPHONE ENCOUNTER
SPOKE W/PT'S FATHER, DEE SALVADOR TODAY. PT CONCERNED ABOUT FMLA FORMS. I EXPLAINED THAT AS PT IS HAVING SURGERY ON 1/9/25 THAT DR. REZA'S OFFICE DID THE FMLA FORMS AND SENT THEM IN ON 12/27/24. ACCORDING TO THE FROM DR REZA COMPLETED, PT IS COVERED FROM 1/9/25 - 2/20/25. I ALSO EXPLAINED THAT IF PT NEEDED ANYMORE FMLA/DISABILITY BEYOND THAT AND IS UNDER DR. CASTRO'S CARE, WE WOULD DO FORMS AT THAT TIME. DEE SALVADOR WAS REASSURED AND THANKED ME FOR THE EXPLANATION.

## 2025-01-09 ENCOUNTER — ANESTHESIA (OUTPATIENT)
Dept: PERIOP | Facility: HOSPITAL | Age: 45
End: 2025-01-09
Payer: COMMERCIAL

## 2025-01-09 ENCOUNTER — HOSPITAL ENCOUNTER (INPATIENT)
Facility: HOSPITAL | Age: 45
LOS: 10 days | Discharge: HOME HEALTH | DRG: 331 | End: 2025-01-19
Attending: SURGERY | Admitting: SURGERY
Payer: COMMERCIAL

## 2025-01-09 ENCOUNTER — ANESTHESIA EVENT (OUTPATIENT)
Dept: PERIOP | Facility: HOSPITAL | Age: 45
End: 2025-01-09
Payer: COMMERCIAL

## 2025-01-09 DIAGNOSIS — C20 RECTAL ADENOCARCINOMA: ICD-10-CM

## 2025-01-09 PROCEDURE — 25010000002 BUPIVACAINE LIPOSOME 1.3 % SUSPENSION: Performed by: ANESTHESIOLOGY

## 2025-01-09 PROCEDURE — 25010000002 PROPOFOL 10 MG/ML EMULSION: Performed by: NURSE ANESTHETIST, CERTIFIED REGISTERED

## 2025-01-09 PROCEDURE — 0DBP4ZZ EXCISION OF RECTUM, PERCUTANEOUS ENDOSCOPIC APPROACH: ICD-10-PCS | Performed by: SURGERY

## 2025-01-09 PROCEDURE — 0DTJ4ZZ RESECTION OF APPENDIX, PERCUTANEOUS ENDOSCOPIC APPROACH: ICD-10-PCS | Performed by: SURGERY

## 2025-01-09 PROCEDURE — 25810000003 LACTATED RINGERS PER 1000 ML: Performed by: ANESTHESIOLOGY

## 2025-01-09 PROCEDURE — 0DJD8ZZ INSPECTION OF LOWER INTESTINAL TRACT, VIA NATURAL OR ARTIFICIAL OPENING ENDOSCOPIC: ICD-10-PCS | Performed by: SURGERY

## 2025-01-09 PROCEDURE — 25010000002 SUGAMMADEX 200 MG/2ML SOLUTION: Performed by: ANESTHESIOLOGY

## 2025-01-09 PROCEDURE — 44213 LAP MOBIL SPLENIC FL ADD-ON: CPT | Performed by: SURGERY

## 2025-01-09 PROCEDURE — 25010000002 LABETALOL 5 MG/ML SOLUTION: Performed by: NURSE ANESTHETIST, CERTIFIED REGISTERED

## 2025-01-09 PROCEDURE — 88305 TISSUE EXAM BY PATHOLOGIST: CPT | Performed by: SURGERY

## 2025-01-09 PROCEDURE — 25010000002 BUPIVACAINE (PF) 0.25 % SOLUTION: Performed by: ANESTHESIOLOGY

## 2025-01-09 PROCEDURE — 88309 TISSUE EXAM BY PATHOLOGIST: CPT | Performed by: SURGERY

## 2025-01-09 PROCEDURE — 25010000002 MIDAZOLAM PER 1 MG: Performed by: ANESTHESIOLOGY

## 2025-01-09 PROCEDURE — 44208 L COLECTOMY/COLOPROCTOSTOMY: CPT | Performed by: SURGERY

## 2025-01-09 PROCEDURE — 25010000002 MAGNESIUM SULFATE PER 500 MG OF MAGNESIUM: Performed by: NURSE ANESTHETIST, CERTIFIED REGISTERED

## 2025-01-09 PROCEDURE — 0D1B4Z4 BYPASS ILEUM TO CUTANEOUS, PERCUTANEOUS ENDOSCOPIC APPROACH: ICD-10-PCS | Performed by: SURGERY

## 2025-01-09 PROCEDURE — 25010000002 FENTANYL CITRATE (PF) 50 MCG/ML SOLUTION: Performed by: NURSE ANESTHETIST, CERTIFIED REGISTERED

## 2025-01-09 PROCEDURE — 25010000002 CEFOXITIN PER 1 G: Performed by: NURSE ANESTHETIST, CERTIFIED REGISTERED

## 2025-01-09 PROCEDURE — 45330 DIAGNOSTIC SIGMOIDOSCOPY: CPT | Performed by: SURGERY

## 2025-01-09 PROCEDURE — 25010000002 HYDROMORPHONE 1 MG/ML SOLUTION: Performed by: NURSE ANESTHETIST, CERTIFIED REGISTERED

## 2025-01-09 PROCEDURE — 25010000002 ONDANSETRON PER 1 MG: Performed by: ANESTHESIOLOGY

## 2025-01-09 PROCEDURE — 25010000002 GLYCOPYRROLATE 0.2 MG/ML SOLUTION: Performed by: NURSE ANESTHETIST, CERTIFIED REGISTERED

## 2025-01-09 PROCEDURE — 25810000003 SODIUM CHLORIDE PER 500 ML: Performed by: SURGERY

## 2025-01-09 PROCEDURE — 0D1M4ZP BYPASS DESCENDING COLON TO RECTUM, PERCUTANEOUS ENDOSCOPIC APPROACH: ICD-10-PCS | Performed by: SURGERY

## 2025-01-09 PROCEDURE — 25010000002 HYDROMORPHONE PER 4 MG: Performed by: NURSE ANESTHETIST, CERTIFIED REGISTERED

## 2025-01-09 PROCEDURE — 25010000002 DEXAMETHASONE PER 1 MG: Performed by: NURSE ANESTHETIST, CERTIFIED REGISTERED

## 2025-01-09 PROCEDURE — 88302 TISSUE EXAM BY PATHOLOGIST: CPT | Performed by: SURGERY

## 2025-01-09 PROCEDURE — 0DTN4ZZ RESECTION OF SIGMOID COLON, PERCUTANEOUS ENDOSCOPIC APPROACH: ICD-10-PCS | Performed by: SURGERY

## 2025-01-09 PROCEDURE — 25010000002 CEFOXITIN PER 1 G: Performed by: SURGERY

## 2025-01-09 DEVICE — HORIZON TI ML 6 CLIPS/CART
Type: IMPLANTABLE DEVICE | Site: ABDOMEN | Status: FUNCTIONAL
Brand: WECK

## 2025-01-09 DEVICE — ENDOPATH ECHELON ENDOSCOPIC LINEAR CUTTER RELOADS, BLUE, 60MM
Type: IMPLANTABLE DEVICE | Site: ABDOMEN | Status: FUNCTIONAL
Brand: ECHELON ENDOPATH

## 2025-01-09 DEVICE — THE ECHELON, ECHELON ENDOPATH™ AND ECHELON FLEX™ FAMILIES OF ENDOSCOPIC LINEAR CUTTERS AND RELOADS ARE STERILE, SINGLE PATIENT USE INSTRUMENTS THAT SIMULTANEOUSLY CUT AND STAPLE TISSUE. THERE ARE SIX STAGGERED ROWS OF STAPLES, THREE ON EITHER SIDE OF THE CUT LINE. THE 45 MM INSTRUMENTS HAVE A STAPLE LINE THATIS APPROXIMATELY 45 MM LONG AND A CUT LINE THAT IS APPROXIMATELY 42 MM LONG. THE SHAFT CAN ROTATE FREELY IN BOTH DIRECTIONS AND AN ARTICULATION MECHANISM ON ARTICULATING INSTRUMENTS ENABLES BENDING THE DISTAL PORTIONOF THE SHAFT TO FACILITATE LATERAL ACCESS OF THE OPERATIVE SITE.THE INSTRUMENTS ARE SHIPPED WITHOUT A RELOAD AND MUST BE LOADED PRIOR TO USE. A STAPLE RETAINING CAP ON THE RELOAD PROTECTS THE STAPLE LEG POINTS DURING SHIPPING AND TRANSPORTATION. THE INSTRUMENTS’ LOCK-OUT FEATURE IS DESIGNED TO PREVENT A USED RELOAD FROM BEING REFIRED.
Type: IMPLANTABLE DEVICE | Site: ABDOMEN | Status: FUNCTIONAL
Brand: ECHELON ENDOPATH

## 2025-01-09 DEVICE — ENDOPATH ECHELON ENDOSCOPIC LINEAR CUTTER RELOADS, WHITE, 60MM
Type: IMPLANTABLE DEVICE | Site: ABDOMEN | Status: FUNCTIONAL
Brand: ECHELON ENDOPATH

## 2025-01-09 DEVICE — ENDOSCOPIC LINEAR CUTTER RELOADS GRAY 2.0MM, 6 ROWS
Type: IMPLANTABLE DEVICE | Site: ABDOMEN | Status: FUNCTIONAL
Brand: ECHELON ENDOPATH

## 2025-01-09 DEVICE — CELLULAR STAPLER WITH TRI-STAPLE TECHNOLOGY
Type: IMPLANTABLE DEVICE | Site: ABDOMEN | Status: FUNCTIONAL
Brand: EEA

## 2025-01-09 RX ORDER — IPRATROPIUM BROMIDE AND ALBUTEROL SULFATE 2.5; .5 MG/3ML; MG/3ML
3 SOLUTION RESPIRATORY (INHALATION) ONCE AS NEEDED
Status: DISCONTINUED | OUTPATIENT
Start: 2025-01-09 | End: 2025-01-09 | Stop reason: HOSPADM

## 2025-01-09 RX ORDER — ENOXAPARIN SODIUM 100 MG/ML
40 INJECTION SUBCUTANEOUS DAILY
Status: DISCONTINUED | OUTPATIENT
Start: 2025-01-10 | End: 2025-01-19 | Stop reason: HOSPADM

## 2025-01-09 RX ORDER — HYDROMORPHONE HYDROCHLORIDE 1 MG/ML
0.5 INJECTION, SOLUTION INTRAMUSCULAR; INTRAVENOUS; SUBCUTANEOUS
Status: DISCONTINUED | OUTPATIENT
Start: 2025-01-09 | End: 2025-01-09 | Stop reason: HOSPADM

## 2025-01-09 RX ORDER — DEXAMETHASONE SODIUM PHOSPHATE 4 MG/ML
INJECTION, SOLUTION INTRA-ARTICULAR; INTRALESIONAL; INTRAMUSCULAR; INTRAVENOUS; SOFT TISSUE AS NEEDED
Status: DISCONTINUED | OUTPATIENT
Start: 2025-01-09 | End: 2025-01-09 | Stop reason: SURG

## 2025-01-09 RX ORDER — BUPIVACAINE HYDROCHLORIDE 2.5 MG/ML
INJECTION, SOLUTION EPIDURAL; INFILTRATION; INTRACAUDAL
Status: COMPLETED | OUTPATIENT
Start: 2025-01-09 | End: 2025-01-09

## 2025-01-09 RX ORDER — EPHEDRINE SULFATE 50 MG/ML
INJECTION, SOLUTION INTRAVENOUS AS NEEDED
Status: DISCONTINUED | OUTPATIENT
Start: 2025-01-09 | End: 2025-01-09 | Stop reason: SURG

## 2025-01-09 RX ORDER — BUPIVACAINE HYDROCHLORIDE 2.5 MG/ML
INJECTION, SOLUTION EPIDURAL; INFILTRATION; INTRACAUDAL
Status: COMPLETED
Start: 2025-01-09 | End: 2025-01-09

## 2025-01-09 RX ORDER — SODIUM CHLORIDE, SODIUM LACTATE, POTASSIUM CHLORIDE, CALCIUM CHLORIDE 600; 310; 30; 20 MG/100ML; MG/100ML; MG/100ML; MG/100ML
9 INJECTION, SOLUTION INTRAVENOUS CONTINUOUS
Status: DISCONTINUED | OUTPATIENT
Start: 2025-01-09 | End: 2025-01-09

## 2025-01-09 RX ORDER — NALOXONE HCL 0.4 MG/ML
0.2 VIAL (ML) INJECTION AS NEEDED
Status: DISCONTINUED | OUTPATIENT
Start: 2025-01-09 | End: 2025-01-09 | Stop reason: HOSPADM

## 2025-01-09 RX ORDER — OXYCODONE HYDROCHLORIDE 5 MG/1
10 TABLET ORAL EVERY 4 HOURS PRN
Status: COMPLETED | OUTPATIENT
Start: 2025-01-09 | End: 2025-01-18

## 2025-01-09 RX ORDER — FENTANYL CITRATE 50 UG/ML
50 INJECTION, SOLUTION INTRAMUSCULAR; INTRAVENOUS ONCE AS NEEDED
Status: DISCONTINUED | OUTPATIENT
Start: 2025-01-09 | End: 2025-01-09 | Stop reason: HOSPADM

## 2025-01-09 RX ORDER — PROCHLORPERAZINE EDISYLATE 5 MG/ML
10 INJECTION INTRAMUSCULAR; INTRAVENOUS EVERY 6 HOURS PRN
Status: DISCONTINUED | OUTPATIENT
Start: 2025-01-09 | End: 2025-01-09 | Stop reason: HOSPADM

## 2025-01-09 RX ORDER — SODIUM CHLORIDE 0.9 % (FLUSH) 0.9 %
3-10 SYRINGE (ML) INJECTION AS NEEDED
Status: DISCONTINUED | OUTPATIENT
Start: 2025-01-09 | End: 2025-01-09 | Stop reason: HOSPADM

## 2025-01-09 RX ORDER — ONDANSETRON 4 MG/1
4 TABLET, ORALLY DISINTEGRATING ORAL EVERY 6 HOURS PRN
Status: DISCONTINUED | OUTPATIENT
Start: 2025-01-09 | End: 2025-01-19 | Stop reason: HOSPADM

## 2025-01-09 RX ORDER — EPHEDRINE SULFATE 50 MG/ML
5 INJECTION, SOLUTION INTRAVENOUS ONCE AS NEEDED
Status: DISCONTINUED | OUTPATIENT
Start: 2025-01-09 | End: 2025-01-09 | Stop reason: HOSPADM

## 2025-01-09 RX ORDER — SODIUM CHLORIDE 9 MG/ML
INJECTION, SOLUTION INTRAVENOUS AS NEEDED
Status: DISCONTINUED | OUTPATIENT
Start: 2025-01-09 | End: 2025-01-09 | Stop reason: HOSPADM

## 2025-01-09 RX ORDER — PROMETHAZINE HYDROCHLORIDE 25 MG/1
25 SUPPOSITORY RECTAL ONCE AS NEEDED
Status: DISCONTINUED | OUTPATIENT
Start: 2025-01-09 | End: 2025-01-09 | Stop reason: HOSPADM

## 2025-01-09 RX ORDER — ONDANSETRON 2 MG/ML
4 INJECTION INTRAMUSCULAR; INTRAVENOUS ONCE AS NEEDED
Status: DISCONTINUED | OUTPATIENT
Start: 2025-01-09 | End: 2025-01-09 | Stop reason: HOSPADM

## 2025-01-09 RX ORDER — DIPHENHYDRAMINE HYDROCHLORIDE 50 MG/ML
12.5 INJECTION INTRAMUSCULAR; INTRAVENOUS
Status: DISCONTINUED | OUTPATIENT
Start: 2025-01-09 | End: 2025-01-09 | Stop reason: HOSPADM

## 2025-01-09 RX ORDER — ONDANSETRON 2 MG/ML
INJECTION INTRAMUSCULAR; INTRAVENOUS AS NEEDED
Status: DISCONTINUED | OUTPATIENT
Start: 2025-01-09 | End: 2025-01-09 | Stop reason: SURG

## 2025-01-09 RX ORDER — LABETALOL HYDROCHLORIDE 5 MG/ML
INJECTION, SOLUTION INTRAVENOUS AS NEEDED
Status: DISCONTINUED | OUTPATIENT
Start: 2025-01-09 | End: 2025-01-09 | Stop reason: SURG

## 2025-01-09 RX ORDER — METHOCARBAMOL 750 MG/1
750 TABLET, FILM COATED ORAL 4 TIMES DAILY
Status: DISCONTINUED | OUTPATIENT
Start: 2025-01-09 | End: 2025-01-17

## 2025-01-09 RX ORDER — DEXMEDETOMIDINE HYDROCHLORIDE 100 UG/ML
INJECTION, SOLUTION INTRAVENOUS AS NEEDED
Status: DISCONTINUED | OUTPATIENT
Start: 2025-01-09 | End: 2025-01-09 | Stop reason: SURG

## 2025-01-09 RX ORDER — GLYCOPYRROLATE 0.2 MG/ML
INJECTION INTRAMUSCULAR; INTRAVENOUS AS NEEDED
Status: DISCONTINUED | OUTPATIENT
Start: 2025-01-09 | End: 2025-01-09 | Stop reason: SURG

## 2025-01-09 RX ORDER — SODIUM CHLORIDE 0.9 % (FLUSH) 0.9 %
3 SYRINGE (ML) INJECTION EVERY 12 HOURS SCHEDULED
Status: DISCONTINUED | OUTPATIENT
Start: 2025-01-09 | End: 2025-01-09 | Stop reason: HOSPADM

## 2025-01-09 RX ORDER — AMLODIPINE BESYLATE 10 MG/1
10 TABLET ORAL DAILY
Status: DISCONTINUED | OUTPATIENT
Start: 2025-01-10 | End: 2025-01-19 | Stop reason: HOSPADM

## 2025-01-09 RX ORDER — GABAPENTIN 100 MG/1
100 CAPSULE ORAL 3 TIMES DAILY
Status: DISCONTINUED | OUTPATIENT
Start: 2025-01-09 | End: 2025-01-19 | Stop reason: HOSPADM

## 2025-01-09 RX ORDER — ATROPINE SULFATE 0.4 MG/ML
0.4 INJECTION, SOLUTION INTRAMUSCULAR; INTRAVENOUS; SUBCUTANEOUS ONCE AS NEEDED
Status: DISCONTINUED | OUTPATIENT
Start: 2025-01-09 | End: 2025-01-09 | Stop reason: HOSPADM

## 2025-01-09 RX ORDER — LIDOCAINE HYDROCHLORIDE 10 MG/ML
0.5 INJECTION, SOLUTION INFILTRATION; PERINEURAL ONCE AS NEEDED
Status: DISCONTINUED | OUTPATIENT
Start: 2025-01-09 | End: 2025-01-09 | Stop reason: HOSPADM

## 2025-01-09 RX ORDER — CEFOXITIN 2 G/1
INJECTION, POWDER, FOR SOLUTION INTRAVENOUS AS NEEDED
Status: DISCONTINUED | OUTPATIENT
Start: 2025-01-09 | End: 2025-01-09 | Stop reason: SURG

## 2025-01-09 RX ORDER — FLUMAZENIL 0.1 MG/ML
0.2 INJECTION INTRAVENOUS AS NEEDED
Status: DISCONTINUED | OUTPATIENT
Start: 2025-01-09 | End: 2025-01-09 | Stop reason: HOSPADM

## 2025-01-09 RX ORDER — LABETALOL HYDROCHLORIDE 5 MG/ML
5 INJECTION, SOLUTION INTRAVENOUS
Status: DISCONTINUED | OUTPATIENT
Start: 2025-01-09 | End: 2025-01-09 | Stop reason: HOSPADM

## 2025-01-09 RX ORDER — HYDRALAZINE HYDROCHLORIDE 20 MG/ML
5 INJECTION INTRAMUSCULAR; INTRAVENOUS
Status: DISCONTINUED | OUTPATIENT
Start: 2025-01-09 | End: 2025-01-09 | Stop reason: HOSPADM

## 2025-01-09 RX ORDER — MAGNESIUM HYDROXIDE 1200 MG/15ML
LIQUID ORAL AS NEEDED
Status: DISCONTINUED | OUTPATIENT
Start: 2025-01-09 | End: 2025-01-09 | Stop reason: HOSPADM

## 2025-01-09 RX ORDER — ACETAMINOPHEN 500 MG
1000 TABLET ORAL EVERY 6 HOURS
Status: DISCONTINUED | OUTPATIENT
Start: 2025-01-09 | End: 2025-01-18

## 2025-01-09 RX ORDER — FAMOTIDINE 10 MG/ML
20 INJECTION, SOLUTION INTRAVENOUS ONCE
Status: COMPLETED | OUTPATIENT
Start: 2025-01-09 | End: 2025-01-09

## 2025-01-09 RX ORDER — OXYCODONE HYDROCHLORIDE 5 MG/1
5 TABLET ORAL EVERY 4 HOURS PRN
Status: COMPLETED | OUTPATIENT
Start: 2025-01-09 | End: 2025-01-18

## 2025-01-09 RX ORDER — FENTANYL CITRATE 50 UG/ML
50 INJECTION, SOLUTION INTRAMUSCULAR; INTRAVENOUS
Status: DISCONTINUED | OUTPATIENT
Start: 2025-01-09 | End: 2025-01-09 | Stop reason: HOSPADM

## 2025-01-09 RX ORDER — ROCURONIUM BROMIDE 10 MG/ML
INJECTION, SOLUTION INTRAVENOUS AS NEEDED
Status: DISCONTINUED | OUTPATIENT
Start: 2025-01-09 | End: 2025-01-09 | Stop reason: SURG

## 2025-01-09 RX ORDER — OXYCODONE AND ACETAMINOPHEN 7.5; 325 MG/1; MG/1
1 TABLET ORAL EVERY 4 HOURS PRN
Status: DISCONTINUED | OUTPATIENT
Start: 2025-01-09 | End: 2025-01-09 | Stop reason: HOSPADM

## 2025-01-09 RX ORDER — ATOMOXETINE 18 MG/1
18 CAPSULE ORAL DAILY
Status: DISCONTINUED | OUTPATIENT
Start: 2025-01-10 | End: 2025-01-19 | Stop reason: HOSPADM

## 2025-01-09 RX ORDER — HYDROMORPHONE HYDROCHLORIDE 1 MG/ML
0.5 INJECTION, SOLUTION INTRAMUSCULAR; INTRAVENOUS; SUBCUTANEOUS
Status: DISCONTINUED | OUTPATIENT
Start: 2025-01-09 | End: 2025-01-13

## 2025-01-09 RX ORDER — PROMETHAZINE HYDROCHLORIDE 25 MG/1
25 TABLET ORAL ONCE AS NEEDED
Status: DISCONTINUED | OUTPATIENT
Start: 2025-01-09 | End: 2025-01-09 | Stop reason: HOSPADM

## 2025-01-09 RX ORDER — SODIUM CHLORIDE 9 MG/ML
75 INJECTION, SOLUTION INTRAVENOUS CONTINUOUS
Status: DISCONTINUED | OUTPATIENT
Start: 2025-01-09 | End: 2025-01-11

## 2025-01-09 RX ORDER — FENTANYL CITRATE 50 UG/ML
INJECTION, SOLUTION INTRAMUSCULAR; INTRAVENOUS AS NEEDED
Status: DISCONTINUED | OUTPATIENT
Start: 2025-01-09 | End: 2025-01-09 | Stop reason: SURG

## 2025-01-09 RX ORDER — HYDROCHLOROTHIAZIDE 12.5 MG/1
12.5 TABLET ORAL DAILY
Status: DISCONTINUED | OUTPATIENT
Start: 2025-01-10 | End: 2025-01-19 | Stop reason: HOSPADM

## 2025-01-09 RX ORDER — MAGNESIUM SULFATE HEPTAHYDRATE 500 MG/ML
INJECTION, SOLUTION INTRAMUSCULAR; INTRAVENOUS AS NEEDED
Status: DISCONTINUED | OUTPATIENT
Start: 2025-01-09 | End: 2025-01-09 | Stop reason: SURG

## 2025-01-09 RX ORDER — MIDAZOLAM HYDROCHLORIDE 1 MG/ML
1 INJECTION, SOLUTION INTRAMUSCULAR; INTRAVENOUS
Status: COMPLETED | OUTPATIENT
Start: 2025-01-09 | End: 2025-01-09

## 2025-01-09 RX ORDER — PROPOFOL 10 MG/ML
VIAL (ML) INTRAVENOUS AS NEEDED
Status: DISCONTINUED | OUTPATIENT
Start: 2025-01-09 | End: 2025-01-09 | Stop reason: SURG

## 2025-01-09 RX ORDER — HYDROCODONE BITARTRATE AND ACETAMINOPHEN 5; 325 MG/1; MG/1
1 TABLET ORAL ONCE AS NEEDED
Status: DISCONTINUED | OUTPATIENT
Start: 2025-01-09 | End: 2025-01-09 | Stop reason: HOSPADM

## 2025-01-09 RX ORDER — ONDANSETRON 2 MG/ML
4 INJECTION INTRAMUSCULAR; INTRAVENOUS EVERY 6 HOURS PRN
Status: DISCONTINUED | OUTPATIENT
Start: 2025-01-09 | End: 2025-01-19 | Stop reason: HOSPADM

## 2025-01-09 RX ADMIN — DEXMEDETOMIDINE HYDROCHLORIDE 10 MCG: 100 INJECTION, SOLUTION INTRAVENOUS at 16:29

## 2025-01-09 RX ADMIN — HYDROMORPHONE HYDROCHLORIDE 0.5 MG: 0.5 INJECTION, SOLUTION INTRAMUSCULAR; INTRAVENOUS; SUBCUTANEOUS at 20:32

## 2025-01-09 RX ADMIN — FENTANYL CITRATE 50 MCG: 50 INJECTION, SOLUTION INTRAMUSCULAR; INTRAVENOUS at 15:14

## 2025-01-09 RX ADMIN — CEFOXITIN SODIUM 2 G: 2 POWDER, FOR SOLUTION INTRAVENOUS at 18:22

## 2025-01-09 RX ADMIN — MIDAZOLAM 1 MG: 1 INJECTION INTRAMUSCULAR; INTRAVENOUS at 14:19

## 2025-01-09 RX ADMIN — HYDROMORPHONE HYDROCHLORIDE 0.5 MG: 0.5 INJECTION, SOLUTION INTRAMUSCULAR; INTRAVENOUS; SUBCUTANEOUS at 21:47

## 2025-01-09 RX ADMIN — ROCURONIUM BROMIDE 20 MG: 10 INJECTION, SOLUTION INTRAVENOUS at 16:25

## 2025-01-09 RX ADMIN — EPHEDRINE SULFATE 10 MG: 50 INJECTION INTRAVENOUS at 15:05

## 2025-01-09 RX ADMIN — LABETALOL HYDROCHLORIDE 7.5 MG: 5 INJECTION, SOLUTION INTRAVENOUS at 15:58

## 2025-01-09 RX ADMIN — FENTANYL CITRATE 50 MCG: 50 INJECTION, SOLUTION INTRAMUSCULAR; INTRAVENOUS at 20:33

## 2025-01-09 RX ADMIN — ROCURONIUM BROMIDE 10 MG: 10 INJECTION, SOLUTION INTRAVENOUS at 15:15

## 2025-01-09 RX ADMIN — ROCURONIUM BROMIDE 20 MG: 10 INJECTION, SOLUTION INTRAVENOUS at 15:13

## 2025-01-09 RX ADMIN — FENTANYL CITRATE 50 MCG: 50 INJECTION, SOLUTION INTRAMUSCULAR; INTRAVENOUS at 21:01

## 2025-01-09 RX ADMIN — ROCURONIUM BROMIDE 10 MG: 10 INJECTION, SOLUTION INTRAVENOUS at 17:55

## 2025-01-09 RX ADMIN — SUGAMMADEX 200 MG: 100 INJECTION, SOLUTION INTRAVENOUS at 19:20

## 2025-01-09 RX ADMIN — ROCURONIUM BROMIDE 20 MG: 10 INJECTION, SOLUTION INTRAVENOUS at 18:23

## 2025-01-09 RX ADMIN — DEXMEDETOMIDINE HYDROCHLORIDE 5 MCG: 100 INJECTION, SOLUTION INTRAVENOUS at 15:11

## 2025-01-09 RX ADMIN — SODIUM CHLORIDE, POTASSIUM CHLORIDE, SODIUM LACTATE AND CALCIUM CHLORIDE 9 ML/HR: 600; 310; 30; 20 INJECTION, SOLUTION INTRAVENOUS at 14:00

## 2025-01-09 RX ADMIN — MIDAZOLAM 1 MG: 1 INJECTION INTRAMUSCULAR; INTRAVENOUS at 14:16

## 2025-01-09 RX ADMIN — DEXAMETHASONE SODIUM PHOSPHATE 8 MG: 4 INJECTION, SOLUTION INTRA-ARTICULAR; INTRALESIONAL; INTRAMUSCULAR; INTRAVENOUS; SOFT TISSUE at 14:45

## 2025-01-09 RX ADMIN — DEXAMETHASONE SODIUM PHOSPHATE 4 MG: 4 INJECTION, SOLUTION INTRA-ARTICULAR; INTRALESIONAL; INTRAMUSCULAR; INTRAVENOUS; SOFT TISSUE at 18:32

## 2025-01-09 RX ADMIN — DEXMEDETOMIDINE HYDROCHLORIDE 5 MCG: 100 INJECTION, SOLUTION INTRAVENOUS at 15:36

## 2025-01-09 RX ADMIN — LABETALOL HYDROCHLORIDE 2.5 MG: 5 INJECTION, SOLUTION INTRAVENOUS at 18:52

## 2025-01-09 RX ADMIN — BUPIVACAINE 20 ML: 13.3 INJECTION, SUSPENSION, LIPOSOMAL INFILTRATION at 14:45

## 2025-01-09 RX ADMIN — FENTANYL CITRATE 50 MCG: 50 INJECTION, SOLUTION INTRAMUSCULAR; INTRAVENOUS at 22:07

## 2025-01-09 RX ADMIN — EPHEDRINE SULFATE 10 MG: 50 INJECTION INTRAVENOUS at 15:00

## 2025-01-09 RX ADMIN — HYDROMORPHONE HYDROCHLORIDE 0.5 MG: 1 INJECTION, SOLUTION INTRAMUSCULAR; INTRAVENOUS; SUBCUTANEOUS at 15:07

## 2025-01-09 RX ADMIN — LABETALOL HYDROCHLORIDE 2.5 MG: 5 INJECTION, SOLUTION INTRAVENOUS at 16:04

## 2025-01-09 RX ADMIN — DEXMEDETOMIDINE HYDROCHLORIDE 10 MCG: 100 INJECTION, SOLUTION INTRAVENOUS at 16:02

## 2025-01-09 RX ADMIN — DEXMEDETOMIDINE HYDROCHLORIDE 5 MCG: 100 INJECTION, SOLUTION INTRAVENOUS at 14:40

## 2025-01-09 RX ADMIN — FENTANYL CITRATE 50 MCG: 50 INJECTION, SOLUTION INTRAMUSCULAR; INTRAVENOUS at 14:34

## 2025-01-09 RX ADMIN — ROCURONIUM BROMIDE 50 MG: 10 INJECTION, SOLUTION INTRAVENOUS at 14:35

## 2025-01-09 RX ADMIN — CEFOXITIN SODIUM 2 G: 2 POWDER, FOR SOLUTION INTRAVENOUS at 14:22

## 2025-01-09 RX ADMIN — DEXMEDETOMIDINE HYDROCHLORIDE 5 MCG: 100 INJECTION, SOLUTION INTRAVENOUS at 14:47

## 2025-01-09 RX ADMIN — ROCURONIUM BROMIDE 20 MG: 10 INJECTION, SOLUTION INTRAVENOUS at 17:07

## 2025-01-09 RX ADMIN — GLYCOPYRROLATE 0.2 MG: 0.2 INJECTION INTRAMUSCULAR; INTRAVENOUS at 14:49

## 2025-01-09 RX ADMIN — PROPOFOL 180 MG: 10 INJECTION, EMULSION INTRAVENOUS at 14:34

## 2025-01-09 RX ADMIN — MAGNESIUM SULFATE HEPTAHYDRATE 1 G: 500 INJECTION, SOLUTION INTRAMUSCULAR; INTRAVENOUS at 15:10

## 2025-01-09 RX ADMIN — BUPIVACAINE HYDROCHLORIDE 20 ML: 2.5 INJECTION, SOLUTION EPIDURAL; INFILTRATION; INTRACAUDAL; PERINEURAL at 14:41

## 2025-01-09 RX ADMIN — HYDROMORPHONE HYDROCHLORIDE 0.5 MG: 0.5 INJECTION, SOLUTION INTRAMUSCULAR; INTRAVENOUS; SUBCUTANEOUS at 22:18

## 2025-01-09 RX ADMIN — HYDROMORPHONE HYDROCHLORIDE 0.5 MG: 0.5 INJECTION, SOLUTION INTRAMUSCULAR; INTRAVENOUS; SUBCUTANEOUS at 21:19

## 2025-01-09 RX ADMIN — CEFOXITIN SODIUM 2 G: 2 POWDER, FOR SOLUTION INTRAVENOUS at 16:22

## 2025-01-09 RX ADMIN — MAGNESIUM SULFATE HEPTAHYDRATE 1 G: 500 INJECTION, SOLUTION INTRAMUSCULAR; INTRAVENOUS at 16:23

## 2025-01-09 RX ADMIN — ONDANSETRON 4 MG: 2 INJECTION INTRAMUSCULAR; INTRAVENOUS at 18:32

## 2025-01-09 RX ADMIN — FAMOTIDINE 20 MG: 10 INJECTION INTRAVENOUS at 14:00

## 2025-01-09 RX ADMIN — FENTANYL CITRATE 50 MCG: 50 INJECTION, SOLUTION INTRAMUSCULAR; INTRAVENOUS at 21:35

## 2025-01-09 NOTE — ANESTHESIA PREPROCEDURE EVALUATION
Anesthesia Evaluation                  Airway   Mallampati: III  TM distance: >3 FB  Neck ROM: full  Large neck circumference and No difficulty expected  Dental - normal exam     Pulmonary    Cardiovascular     (+) hypertension 2 medications or greater, hyperlipidemia      Neuro/Psych  (+) psychiatric history    ROS Comment: Learning disability   GI/Hepatic/Renal/Endo      Musculoskeletal     Abdominal    Substance History      OB/GYN          Other      history of cancer      Other Comment: rectal              Anesthesia Plan    ASA 3     general     intravenous induction     Anesthetic plan, risks, benefits, and alternatives have been provided, discussed and informed consent has been obtained with: patient.    CODE STATUS:

## 2025-01-09 NOTE — ANESTHESIA PROCEDURE NOTES
Airway  Date/Time: 1/9/2025 2:38 PM    General Information and Staff    CRNA/CAA: Ray, Fanta, CRNA    Indications and Patient Condition  Indications for airway management: airway protection    Preoxygenated: yes  MILS maintained throughout  Mask difficulty assessment: 1 - vent by mask    Final Airway Details  Final airway type: endotracheal airway      Successful airway: ETT  Cuffed: yes   Successful intubation technique: direct laryngoscopy  Facilitating devices/methods: intubating stylet  Endotracheal tube insertion site: oral  Blade: Radha  Blade size: 4  ETT size (mm): 7.5  Cormack-Lehane Classification: grade I - full view of glottis  Placement verified by: chest auscultation and capnometry   Measured from: lips  ETT/EBT  to lips (cm): 21  Number of attempts at approach: 1  Assessment: lips, teeth, and gum same as pre-op and atraumatic intubation

## 2025-01-09 NOTE — NURSING NOTE
01/09/25 1345   Stoma Site Marking   Procedure Marking For ileostomy   Site Marked RUQ: right upper quadrant   Patient Assessment Screen rectus muscle identified;marked within patient's visual field;bony prominences avoided;waistband avoided;creases/scars avoided   How Was Patient Marked? surgical marker;transparent dressing   Stoma Marking Comments CWOCN- marked patient in RUQ but near level with umbilicus. No creases noted except lower abdomen. Abdomen is rounded. No pre-op teaching done as patient getting prepped for OR. Will see post op.   Patient Position During Marking sitting;lying

## 2025-01-09 NOTE — ANESTHESIA PROCEDURE NOTES
Peripheral Block      Patient reassessed immediately prior to procedure    Patient location during procedure: holding area  Reason for block: at surgeon's request and post-op pain management  Performed by  Anesthesiologist: Sheron Garvey MD  Preanesthetic Checklist  Completed: patient identified, IV checked, site marked, risks and benefits discussed, surgical consent, monitors and equipment checked, pre-op evaluation and timeout performed  Prep:  Sterile barriers:gloves  Prep: ChloraPrep  Patient monitoring: continuous pulse oximetry, blood pressure monitoring and EKG  Procedure    Sedation: yes  Performed under: PNB  Guidance:ultrasound guided    ULTRASOUND INTERPRETATION.  Using ultrasound guidance a 20 G gauge needle was placed in close proximity to the nerve, at which point, under ultrasound guidance anesthetic was injected in the area of the nerve and spread of the anesthesia was seen on ultrasound in close proximity thereto.  There were no abnormalities seen on ultrasound; a digital image was taken; and the patient tolerated the procedure with no complications. Images:still images obtained, printed/placed on chart    Block Type:TAP    Needle Type:echogenic  Needle Gauge:20 G      Medications Used: bupivacaine PF (MARCAINE) 0.25 % injection - Injection   20 mL - 1/9/2025 2:41:00 PM  bupivacaine liposome (EXPAREL) 1.3 % injection - Infiltration   20 mL - 1/9/2025 2:45:00 PM      Medications  Comment:Ultrasound guidance was used to view and verify medication disbursement.  Ultrasound guidance was used for needle placement.    Post Assessment  Injection Assessment: negative aspiration for heme, no paresthesia on injection and incremental injection  Patient Tolerance:comfortable throughout block  Complications:no  Additional Notes  Ultrasound guidance was used to guide needle placement, as well as to view and verify medication disbursement.   Performed by: Sheron Garvey MD

## 2025-01-10 ENCOUNTER — PATIENT OUTREACH (OUTPATIENT)
Dept: OTHER | Facility: HOSPITAL | Age: 45
End: 2025-01-10
Payer: COMMERCIAL

## 2025-01-10 LAB
ANION GAP SERPL CALCULATED.3IONS-SCNC: 12.1 MMOL/L (ref 5–15)
BASOPHILS # BLD AUTO: 0.01 10*3/MM3 (ref 0–0.2)
BASOPHILS NFR BLD AUTO: 0.1 % (ref 0–1.5)
BUN SERPL-MCNC: 10 MG/DL (ref 6–20)
BUN/CREAT SERPL: 9.6 (ref 7–25)
CALCIUM SPEC-SCNC: 8.7 MG/DL (ref 8.6–10.5)
CHLORIDE SERPL-SCNC: 100 MMOL/L (ref 98–107)
CO2 SERPL-SCNC: 26.9 MMOL/L (ref 22–29)
CREAT SERPL-MCNC: 1.04 MG/DL (ref 0.76–1.27)
DEPRECATED RDW RBC AUTO: 41.4 FL (ref 37–54)
EGFRCR SERPLBLD CKD-EPI 2021: 89.7 ML/MIN/1.73
EOSINOPHIL # BLD AUTO: 0 10*3/MM3 (ref 0–0.4)
EOSINOPHIL NFR BLD AUTO: 0 % (ref 0.3–6.2)
ERYTHROCYTE [DISTWIDTH] IN BLOOD BY AUTOMATED COUNT: 11.7 % (ref 12.3–15.4)
GLUCOSE SERPL-MCNC: 171 MG/DL (ref 65–99)
HCT VFR BLD AUTO: 38.5 % (ref 37.5–51)
HGB BLD-MCNC: 12.5 G/DL (ref 13–17.7)
IMM GRANULOCYTES # BLD AUTO: 0.03 10*3/MM3 (ref 0–0.05)
IMM GRANULOCYTES NFR BLD AUTO: 0.4 % (ref 0–0.5)
LYMPHOCYTES # BLD AUTO: 0.21 10*3/MM3 (ref 0.7–3.1)
LYMPHOCYTES NFR BLD AUTO: 2.7 % (ref 19.6–45.3)
MCH RBC QN AUTO: 31.4 PG (ref 26.6–33)
MCHC RBC AUTO-ENTMCNC: 32.5 G/DL (ref 31.5–35.7)
MCV RBC AUTO: 96.7 FL (ref 79–97)
MONOCYTES # BLD AUTO: 0.36 10*3/MM3 (ref 0.1–0.9)
MONOCYTES NFR BLD AUTO: 4.6 % (ref 5–12)
NEUTROPHILS NFR BLD AUTO: 7.16 10*3/MM3 (ref 1.7–7)
NEUTROPHILS NFR BLD AUTO: 92.2 % (ref 42.7–76)
NRBC BLD AUTO-RTO: 0 /100 WBC (ref 0–0.2)
PLATELET # BLD AUTO: 206 10*3/MM3 (ref 140–450)
PMV BLD AUTO: 10 FL (ref 6–12)
POTASSIUM SERPL-SCNC: 3.4 MMOL/L (ref 3.5–5.2)
POTASSIUM SERPL-SCNC: 3.4 MMOL/L (ref 3.5–5.2)
RBC # BLD AUTO: 3.98 10*6/MM3 (ref 4.14–5.8)
SODIUM SERPL-SCNC: 139 MMOL/L (ref 136–145)
WBC NRBC COR # BLD AUTO: 7.77 10*3/MM3 (ref 3.4–10.8)

## 2025-01-10 PROCEDURE — 25010000002 ENOXAPARIN PER 10 MG: Performed by: SURGERY

## 2025-01-10 PROCEDURE — 85025 COMPLETE CBC W/AUTO DIFF WBC: CPT | Performed by: SURGERY

## 2025-01-10 PROCEDURE — 84132 ASSAY OF SERUM POTASSIUM: CPT | Performed by: SURGERY

## 2025-01-10 PROCEDURE — 25010000002 ONDANSETRON PER 1 MG: Performed by: SURGERY

## 2025-01-10 PROCEDURE — 99024 POSTOP FOLLOW-UP VISIT: CPT | Performed by: SURGERY

## 2025-01-10 PROCEDURE — 80048 BASIC METABOLIC PNL TOTAL CA: CPT | Performed by: SURGERY

## 2025-01-10 PROCEDURE — 25810000003 SODIUM CHLORIDE 0.9 % SOLUTION: Performed by: SURGERY

## 2025-01-10 RX ORDER — POTASSIUM CHLORIDE 750 MG/1
40 TABLET, FILM COATED, EXTENDED RELEASE ORAL EVERY 4 HOURS
Status: COMPLETED | OUTPATIENT
Start: 2025-01-10 | End: 2025-01-10

## 2025-01-10 RX ADMIN — ACETAMINOPHEN 1000 MG: 500 TABLET, FILM COATED ORAL at 05:01

## 2025-01-10 RX ADMIN — METHOCARBAMOL 750 MG: 750 TABLET ORAL at 00:05

## 2025-01-10 RX ADMIN — GABAPENTIN 100 MG: 100 CAPSULE ORAL at 20:51

## 2025-01-10 RX ADMIN — OXYCODONE HYDROCHLORIDE 5 MG: 5 TABLET ORAL at 08:41

## 2025-01-10 RX ADMIN — METHOCARBAMOL 750 MG: 750 TABLET ORAL at 20:51

## 2025-01-10 RX ADMIN — GABAPENTIN 100 MG: 100 CAPSULE ORAL at 16:12

## 2025-01-10 RX ADMIN — AMLODIPINE BESYLATE 10 MG: 10 TABLET ORAL at 08:34

## 2025-01-10 RX ADMIN — ONDANSETRON 4 MG: 2 INJECTION, SOLUTION INTRAMUSCULAR; INTRAVENOUS at 08:47

## 2025-01-10 RX ADMIN — METHOCARBAMOL 750 MG: 750 TABLET ORAL at 11:12

## 2025-01-10 RX ADMIN — ACETAMINOPHEN 1000 MG: 500 TABLET, FILM COATED ORAL at 17:06

## 2025-01-10 RX ADMIN — POTASSIUM CHLORIDE 40 MEQ: 750 TABLET, EXTENDED RELEASE ORAL at 08:33

## 2025-01-10 RX ADMIN — METHOCARBAMOL 750 MG: 750 TABLET ORAL at 17:06

## 2025-01-10 RX ADMIN — ACETAMINOPHEN 1000 MG: 500 TABLET, FILM COATED ORAL at 11:12

## 2025-01-10 RX ADMIN — GABAPENTIN 100 MG: 100 CAPSULE ORAL at 00:05

## 2025-01-10 RX ADMIN — SODIUM CHLORIDE 75 ML/HR: 9 INJECTION, SOLUTION INTRAVENOUS at 00:05

## 2025-01-10 RX ADMIN — ACETAMINOPHEN 1000 MG: 500 TABLET, FILM COATED ORAL at 00:05

## 2025-01-10 RX ADMIN — SODIUM CHLORIDE 75 ML/HR: 9 INJECTION, SOLUTION INTRAVENOUS at 14:07

## 2025-01-10 RX ADMIN — ACETAMINOPHEN 1000 MG: 500 TABLET, FILM COATED ORAL at 23:44

## 2025-01-10 RX ADMIN — GABAPENTIN 100 MG: 100 CAPSULE ORAL at 08:33

## 2025-01-10 RX ADMIN — POTASSIUM CHLORIDE 40 MEQ: 750 TABLET, EXTENDED RELEASE ORAL at 20:51

## 2025-01-10 RX ADMIN — POTASSIUM CHLORIDE 40 MEQ: 750 TABLET, EXTENDED RELEASE ORAL at 17:06

## 2025-01-10 RX ADMIN — METHOCARBAMOL 750 MG: 750 TABLET ORAL at 08:33

## 2025-01-10 RX ADMIN — POTASSIUM CHLORIDE 40 MEQ: 750 TABLET, EXTENDED RELEASE ORAL at 11:12

## 2025-01-10 RX ADMIN — ENOXAPARIN SODIUM 40 MG: 100 INJECTION SUBCUTANEOUS at 08:34

## 2025-01-10 NOTE — PLAN OF CARE
Goal Outcome Evaluation:  Plan of Care Reviewed With: patient        Progress: improving        Patient had a calm rest all through the shift, alert and oriented X4, on room air. Vital signs stable. No new complain. He was carried along with his plan of care. Ellis catheter, LLQ BRIANNA drain and RLQ ileostomy bag In use. I will continue to monitor and render care during the shift.

## 2025-01-10 NOTE — ANESTHESIA POSTPROCEDURE EVALUATION
Patient: Jc Brannon Jr.    Procedure Summary       Date: 01/09/25 Room / Location: [facility] OR [address] [place] [address] MAIN OR    Anesthesia Start: 1433 Anesthesia Stop: 1945    Procedure: Laparoscopic, possible open, low anterior resection with diverting loop ileostomy and appendectomy (Abdomen) Diagnosis:       Rectal adenocarcinoma      (Rectal adenocarcinoma [C20])    Surgeons: Ori Isaac MD Provider: Deirdre Hager MD    Anesthesia Type: general ASA Status: 3            Anesthesia Type: general    Vitals  Vitals Value Taken Time   /87 01/09/25 2215   Temp 36.9 °C (98.4 °F) 01/09/25 1941   Pulse 105 01/09/25 2219   Resp 16 01/09/25 2145   SpO2 95 % 01/09/25 2219   Vitals shown include unfiled device data.        Post Anesthesia Care and Evaluation    Patient location during evaluation: bedside  Patient participation: complete - patient participated  Level of consciousness: awake and alert  Pain management: adequate    Airway patency: patent  Anesthetic complications: No anesthetic complications  PONV Status: controlled  Cardiovascular status: acceptable and hemodynamically stable  Respiratory status: acceptable, spontaneous ventilation and nonlabored ventilation  Hydration status: acceptable    Comments: /86   Pulse 100   Temp 36.9 °C (98.4 °F) (Oral)   Resp 16   SpO2 95%

## 2025-01-10 NOTE — PROGRESS NOTES
Colorectal & General Surgery  Progress Note    Patient: Jc Brannon Jr.  YOB: 1980  MRN: [id number]      Assessment  Jc Brannon Jr. is a 44 y.o. male with mid rectal cancer now postoperative day 1 from laparoscopic low anterior resection with creation of low pelvic coloproctostomy with diverting loop ileostomy, splenic flexure mobilization, and appendectomy.    Afebrile, notes significant tachycardia, slightly hypertensive.  His abdominal exam is slightly distended but soft and not particularly tender.  His ileostomy is pink and above the skin.  His drain is serosanguineous.  Labs all within expected limits.  Hypokalemia repleted orally with potassium chloride.    Plan  Replete potassium with 60 mEq potassium chloride  Remove Ellis catheter tomorrow morning  Continue clear liquid diet today  Continue current analgesia regimen  Continue Lovenox for pharmacologic DVT prophylaxis  Encouraged ambulation  Appreciate WOCN team      Subjective  No significant events.  Feels relatively comfortable today aside from some soreness.  He walked from the bed to the chair and is looking forward to walking further.    Objective    Vitals:    01/10/25 1100   BP: 164/92   Pulse: 103   Resp: 18   Temp: 99 °F (37.2 °C)   SpO2: 95%       Physical Exam  Constitutional: Well-developed well-nourished, no acute distress  Neck: Supple, trachea midline  Respiratory: No increased work of breathing, Symmetric excursion  Cardiovascular: Well pefursed, no jugular venous distention evident   Abdominal: Incisions in good order, soft, mildly distended, appropriately tender, drain is serosanguineous.  Ileostomy is pink and above the skin.  Skin: Warm, dry, no rash on visualized skin surfaces  Psychiatric: Alert and oriented ×3, normal affect     Laboratory Results  I have personally reviewed CBC with WC 7, hemoglobin 12, platelet 206.  BMP with creatinine 1.04, bicarb 26, potassium 3.4.    Radiology  None to review         See  JIGAR Isaac MD  Colorectal & General Surgery  [facility]    [address], [address]  [place], [place], [address]  P: [phone]  F: [phone]

## 2025-01-10 NOTE — PROGRESS NOTES
Call from patient's father. Stated the patient's surgery went well. We discussed his estimated length of stay and current room number.    Navigation will continue to follow

## 2025-01-10 NOTE — DISCHARGE PLACEMENT REQUEST
"Jc Riggs Jr. (44 y.o. Male)       Date of Birth   1980    Social Security Number       Address   [address] [place] [address]    Home Phone   [phone]    MRN   [id number]       [employer]   [employer]    Marital Status   Single                            Admission Date   1/9/25    Admission Type   Elective    Admitting Provider   Ori Isaac MD    Attending Provider   Ori Isaac MD    Department, Room/Bed   [address], 93/1       Discharge Date       Discharge Disposition       Discharge Destination                                 Attending Provider: Ori Isaac MD    Allergies: Latex    Isolation: None   Infection: None   Code Status: CPR    Ht: 177.8 cm (70\")   Wt: 108 kg (239 lb)    Admission Cmt: None   Principal Problem: Rectal adenocarcinoma [C20]                   Active Insurance as of 1/9/2025       Primary Coverage       Payor Plan Insurance Group Employer/Plan Group    ANTHEM BLUE CROSS ANTHEM BLUE CROSS BLUE SHIELD PPO [id number]       Payor Plan Address Payor Plan Phone Number Payor Plan Fax Number Effective Dates    [address] [phone]  1/1/2025 - None Entered    [facility] [id number]         Subscriber Name Subscriber Birth Date Member ID       JC RIGGS JR. 1980 [id number]                     Emergency Contacts        (Rel.) Home Phone Work Phone Mobile Phone    Jc Riggs SR (Father) [phone] -- --                "

## 2025-01-10 NOTE — NURSING NOTE
"   01/10/25 0856   Ileostomy Loop   Placement date: If unknown, DO NOT use \"Add Comment\" note: 01/09/25   Ileostomy Type: Loop   Stomal Appliance 2 piece;Clean;Dry;Intact;Changed;Drainable   Stoma Appearance round;swollen;moist;red;protruding above skin level   Peristomal Assessment Clean;Intact   Accessories/Skin Care cleansed with water;skin barrier ring   Stoma Function no flatus;no stool;serosanguineous   Treatment Bag change;Site care   Output (mL) 20 mL     Ostomy education provided to the patient/family:    [x]Pouch changed   [x]Pt observed   []Pt participated    []CG participated            [x]Instructed in frequency of pouch change  [x]Pouch emptied and cleaned, demonstrated use of pouch closure    [x]Pt observed   []Pt participated    []CG participated    []Instructed to empty pouch when 1/3 to 1/2 full  [x]Teaching packet/handouts provided/reviewed  []Return to ADL's, showering, clothing  [x]Peristomal skin care, avoiding use of soaps with moisturizers  [x]Diet   [x]Adequate po fluid intake   []S/S of dehydration   []Foods to thicken stool   []Foods to avoid to prevent blockage  [x]Supplies at bedside  []Samples ordered    Current Supplies: Placed in 2 1/4 2 piece ethan with adapt ring. Reviewed step by step how to change ostomy pouch. Discussed the goal is for him to be independent  in ostomy care  Explained to patient he will have HH when discharged from hospital, He is sitting up in chair having clear liquids. Will plan to see Monday to change pouch.     WOC Team follow up plan:     "

## 2025-01-10 NOTE — PROGRESS NOTES
"Nutrition Services    Patient Name:  Jc Brannon Jr.  YOB: 1980  MRN: [id number]  Admit Date:  1/9/2025    Assessment Date:  01/10/25    Summary: 43 yo male currently POD 1 from lap low anterior resection w/ ileostomy and appendectomy      CLINICAL NUTRITION ASSESSMENT      Reason for Assessment MST score 2+     Diagnosis/Problem   Unsure wt loss   Medical/Surgical History Past Medical History:   Diagnosis Date    Anemia     Hyperlipidemia     Hypertension     Mental disability without special needs     STUTTERS    Rectal adenocarcinoma 2024       Past Surgical History:   Procedure Laterality Date    COLON RESECTION N/A 1/9/2025    Procedure: Laparoscopic, possible open, low anterior resection with diverting loop ileostomy and appendectomy;  Surgeon: Ori Isaac MD;  Location: [facility] OR;  Service: General;  Laterality: N/A;    COLONOSCOPY N/A 4/23/2024    Procedure: COLONOSCOPY into cecum with biopsy;  Surgeon: David Tena MD;  Location: [facility] ENDOSCOPY;  Service: Gastroenterology;  Laterality: N/A;  rectal mass    LEG SURGERY Left     ACL    SIGMOIDOSCOPY N/A 12/16/2024    Procedure: FLEXIBLE SIGMOIDOSCOPY WITH BIOPSIES cold with tattoo injection w/ scleratherapy needle;  Surgeon: Ori Isaac MD;  Location: [facility] ENDOSCOPY;  Service: General;  Laterality: N/A;  pre monitor site(ca) s/p chemo radiation post remnant of tumor/needs tx    VENOUS ACCESS DEVICE (PORT) INSERTION N/A 5/24/2024    Procedure: INSERTION VENOUS ACCESS DEVICE;  Surgeon: Ori Isaac MD;  Location: [facility];  Service: General;  Laterality: N/A;        Anthropometrics        Current Height  Current Weight  BMI kg/m2 Height: 177 cm (69.69\")  Weight: 101 kg (221 lb 9 oz) (01/10/25 1100)  Body mass index is 32.08 kg/m².   Adjusted BMI (if applicable)    BMI Category Obese, Class I (30 - 34.9)   Ideal Body Weight (IBW) 160# +/- 10%   Usual Body Weight (UBW) ~225# " "  Weight Trend Stable, Loss, Gain   Weight History Wt Readings from Last 30 Encounters:   01/10/25 1100 101 kg (221 lb 9 oz)   12/23/24 1449 108 kg (239 lb)   12/18/24 0902 106 kg (232 lb 14.4 oz)   12/16/24 0701 107 kg (235 lb)   12/12/24 0843 106 kg (234 lb 3.2 oz)   12/09/24 0936 106 kg (234 lb 8 oz)   10/31/24 0945 102 kg (225 lb 11.2 oz)   10/29/24 0937 102 kg (225 lb 3.2 oz)   10/18/24 0915 101 kg (222 lb 12.8 oz)   10/18/24 0939 104 kg (228 lb 3.2 oz)   10/15/24 0855 105 kg (231 lb 12.8 oz)   10/14/24 1126 104 kg (229 lb 8 oz)   10/11/24 1407 102 kg (224 lb)   10/07/24 0907 102 kg (225 lb 8 oz)   10/04/24 0911 102 kg (224 lb 9.6 oz)   09/30/24 0933 103 kg (227 lb)   09/27/24 0912 103 kg (227 lb)   09/23/24 1204 104 kg (229 lb 3.2 oz)   09/16/24 1243 102 kg (224 lb)   09/13/24 1511 101 kg (223 lb)   09/09/24 0945 102 kg (225 lb 9.6 oz)   08/30/24 1111 99.8 kg (220 lb 1.6 oz)   08/19/24 0900 100 kg (220 lb 12.8 oz)   08/08/24 0919 101 kg (222 lb 3.2 oz)   08/05/24 0847 101 kg (221 lb 11.2 oz)   07/22/24 0852 102 kg (223 lb 12.8 oz)   07/08/24 0857 102 kg (223 lb 14.4 oz)   07/01/24 0904 101 kg (222 lb 4.8 oz)   06/17/24 0805 99.2 kg (218 lb 9.6 oz)   06/12/24 0821 99.4 kg (219 lb 3.2 oz)      --  Labs       Pertinent Labs    Results from last 7 days   Lab Units 01/10/25  0500   SODIUM mmol/L 139   POTASSIUM mmol/L 3.4*   CHLORIDE mmol/L 100   CO2 mmol/L 26.9   BUN mg/dL 10   CREATININE mg/dL 1.04   CALCIUM mg/dL 8.7   GLUCOSE mg/dL 171*     Results from last 7 days   Lab Units 01/10/25  0500   HEMOGLOBIN g/dL 12.5*   HEMATOCRIT % 38.5   WBC 10*3/mm3 7.77     Results from last 7 days   Lab Units 01/10/25  0500   PLATELETS 10*3/mm3 206     No results found for: \"COVID19\"  No results found for: \"HGBA1C\"       Medications           Scheduled Medications acetaminophen, 1,000 mg, Oral, Q6H  amLODIPine, 10 mg, Oral, Daily  atomoxetine, 18 mg, Oral, Daily  enoxaparin, 40 mg, Subcutaneous, Daily  gabapentin, 100 mg, " Oral, TID  hydroCHLOROthiazide, 12.5 mg, Oral, Daily  methocarbamol, 750 mg, Oral, 4x Daily       Infusions sodium chloride, 75 mL/hr, Last Rate: 75 mL/hr (01/10/25 0005)       PRN Medications   HYDROmorphone    ondansetron ODT **OR** ondansetron    oxyCODONE **OR** oxyCODONE    Potassium Replacement - Follow Nurse / BPA Driven Protocol     Physical Findings          General Findings alert, oriented, room air, other: calm   Oral/Mouth Cavity WDL   Edema  no edema   Gastrointestinal WDL, last bowel movement: 1/9   Skin  surgical incision: abdomen   Tubes/Drains/Lines ileostomy, implantable port   NFPE No clinical signs of muscle wasting or fat loss   --  Current Nutrition Orders & Evaluation of Intake       Oral Nutrition     Food Allergies NKFA   Current PO Diet Diet: Liquid; Clear Liquid; Fluid Consistency: Thin (IDDSI 0)   Supplement n/a   PO Evaluation     % PO Intake Doing well on clears    Factors Affecting Intake: decreased appetite   --  PES STATEMENT / NUTRITION DIAGNOSIS      Nutrition Dx Problem  No nutritional diagnosis at this time     NUTRITION INTERVENTION / PLAN OF CARE      Intervention Goal(s) Tolerate PO , Increase intake, Advance diet, and Maintain weight         RD Intervention/Action Encourage intake and Continue to monitor   --      Prescription/Orders:       PO Diet       Supplements       Enteral Nutrition       Parenteral Nutrition    New Prescription Ordered? No changes at this time   --      Monitor/Evaluation Per protocol   Discharge Plan/Needs Pending clinical course   --    RD to follow per protocol.      Electronically signed by:  Skyler Hines RD  01/10/25 14:06 EST

## 2025-01-10 NOTE — CASE MANAGEMENT/SOCIAL WORK
Discharge Planning Assessment  [facility]     Patient Name: Jc Brannon Jr.  MRN: [id number]  Today's Date: 1/10/2025    Admit Date: 1/9/2025    Plan: Home with parents with [facility]. Family to transport.   Discharge Needs Assessment       Row Name 01/10/25 1233       Living Environment    People in Home alone    Current Living Arrangements apartment    Primary Care Provided by self    Family Caregiver if Needed parent(s)  pt staying with parents for a few weeks after DC.       Resource/Environmental Concerns    Transportation Concerns none       Transition Planning    Patient/Family Anticipates Transition to home with family    Patient/Family Anticipated Services at Transition none    Transportation Anticipated family or friend will provide       Discharge Needs Assessment    Readmission Within the Last 30 Days no previous admission in last 30 days    Equipment Currently Used at Home none    Concerns to be Addressed denies needs/concerns at this time;no discharge needs identified    Anticipated Changes Related to Illness none    Outpatient/Agency/Support Group Needs homecare agency                   Discharge Plan       Row Name 01/10/25 1234       Plan    Plan Home with parents with [facility]. Family to transport.    Patient/Family in Agreement with Plan yes    Plan Comments CCP met with pt at bedside. Introduced self and role. Facesheet information and pharmacy verified. Pt lives alone in a 2nd floor apartment. Pt drives, is IADLs and has no DME. Pt has no history of using HH or SNF. Pt does not have a living will. Pt is enrolled in meds to beds. Pt denies having trouble affording or managing medications. CCP explained to patient that with a new ostomy pt will need HH at home. Pt has no preference of HH agency. CCP placed referral to [facility]. CCP LVM for [facility] to notify of referral. [facility] accepted pt in epic. Pt will be staying with his parents after DC for a few weeks. Plan will be for pt to go home with parents with [facility]  and family to transport. CCP will continue to follow for medical DC readiness and any DC needs. RLdany RN/CCP                  Continued Care and Services - Admitted Since 1/9/2025       Home Medical Care       Service Provider Request Status Services Address Phone Fax Patient Preferred    [facility]  Selected Home Health Services, Home Nursing, Home Rehabilitation [address] [place] [id number] [phone] --                     Demographic Summary       Row Name 01/10/25 1232       General Information    Admission Type inpatient    Arrived From home    Referral Source admission list;case finding    Preferred Language English       Contact Information    Permission Granted to Share Info With                    Functional Status       Row Name 01/10/25 1232       Functional Status    Usual Activity Tolerance good    Current Activity Tolerance good       Physical Activity    On average, how many days per week do you engage in moderate to strenuous exercise (like a brisk walk)? Pt Declined    On average, how many minutes do you engage in exercise at this level? Pt Declined       Assessment of Health Literacy    How often do you have someone help you read hospital materials? Never    How often do you have problems learning about your medical condition because of difficulty understanding written information? Never    How often do you have a problem understanding what is told to you about your medical condition? Never    How confident are you filling out medical forms by yourself? Extremely    Health Literacy Excellent                          Bri Acuña RN

## 2025-01-10 NOTE — OP NOTE
Colorectal & General Surgery  Operative Report    Patient: Jc Brannon Jr.  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  Total mesorectal excision performed with gross appearing margins of at least 1 cm.  28 mm stapled coloproctostomy treated approximately 5 cm from the anal verge Tension and with reassuring leak test.    SURGEON:  See Isaac MD    ASSISTANT:  Juni Haynes MD    ANESTHESIA:  General-endotracheal  Tap blocks    EBL:  50 mL    SPECIMEN:  Colon and rectum, staple line marks distal  Distal margin, anastomotic ring rectum, distal margin, anastomotic rings  Appendix    OPERATIVE DESCRIPTION:  The patient was brought to the operating room under the care of the nursing staff.  The patient was placed on the operating room table in the supine position where anesthesia was induced.  The patient was then prepped and positioned in the usual sterile fashion.  A standardized timeout was then performed.    5 mm supraumbilical incision created.  Veress needle inserted into the abdomen and the abdomen was insufflated to 15 mmHg.  5 mm trocar placed.  12 mm trocar placed in the right lower quadrant, 5 mm trocars were placed in the right upper abdomen and the left mid abdomen.  Omentum was retracted cephalad.  Inferior mesenteric vein was identified and circumferentially dissected.  It was ligated between clips.  Mesentery of the transverse colon was then dissected away from the retroperitoneum within this plane  as far as it would allow.  The omentum was then dissected away from the transverse colon and the lesser sac was entered.  This dissection was carried out up to the level of the splenic flexure.  The white line of Toldt on the proximal descending colon was then incised and the splenic flexure was mobilized completely by connecting these 2 planes of dissection.  This was carried out down to the level of the base of the mesentery.  The sigmoid colon was then mobilized away from its lateral attachments.  A medial incision was then made on the sigmoid colon mesentery and the presacral space was developed.  The left ureter was then identified and kept safe throughout the entirety of this dissection.  The inferior mesenteric artery was identified and circumferentially dissected.  It had become slightly  from the mesorectum during the difficult portion of the dissection to identify the left ureter.  It was circumferentially dissected and divided at its insertion to the aorta.  The remainder of the mesenteric attachments to the retroperitoneum were taken down sharply with the LigaSure device.  Either side of the peritoneal attachment of the mesorectum were then divided sharply.  The presacral space was then developed down to the level of the pelvic floor which was identified by the levator ani muscles.  The right and left attachments of the mesorectum to the peritoneum were then taken down sharply.  The anterior peritoneal reflection was incised and the plane between the seminal vesicles and the anterior surface of the rectum was dissected down to the level of the pelvic floor.  At this point, flexible sigmoidoscopy was utilized to identify the distalmost extent of the tumor and the tattoo.  This was correlated with laparoscopic findings.  The mesorectum was then divided up to the level of the mesenteric border of the rectum.  A 45 mm blue load of the Hassell stapler was then used to divide the rectum.  This  required multiple fires secondary to the difficult angulation of the rectum and the very narrow pelvis that made this dissection so difficult.  Flexible sigmoidoscopy was then used again to ensure that the tattoo had been resected within the specimen and there was no evidence during the flexible sigmoidoscopy of residual tattoo.  A leak test was performed on the rectal stump that was negative.  An extraction site was then created in the left lower quadrant and the specimen was extracted with a wound protector in place.  Doppler was used to identify appropriate Doppler signal to the colonic conduit.  The colon was divided with the Bovie electrocautery.  Specimen was examined and it appeared to have at least 1 cm of gross negative margin between the distal extent of the rectal ulcer and the distal staple line.  A 2-0 Prolene pursestring suture was then placed in the distal extents of the colonic conduit.  A 28 mm anvil was secured in place.  The mesentery was cleared away from the impending anastomotic surface.  The conduit was placed back in the abdomen and the wound protector was removed.  Gloves and instruments were changed.  The abdomen was reinsufflated.  The colonic conduit region of the pelvis without any tension whatsoever.  A 28 mm stapled coloproctostomy was created.  Leak test was reassuring.  Both anastomotic rings were intact.  The distal ring was sent for a distal margin of the rectum.  A 19 Romanian BRIANNA drain was placed in the pelvis.  The small bowel was removed from underneath the colonic conduit.  An appropriate site approximately 20 cm proximal to the ileocecal valve was identified for creation of a diverting loop ileostomy.  It reached the wall easily.  The mesoappendix was divided with a bipolar device.  The appendix was stapled away from the cecum and placed in an Endo Catch bag.  The appendix was then removed through the 12 mm port site.  The 12 mm port was closed with an interrupted 0 Vicryl  suture.  Ileostomy site was then created in the right mid abdomen at the previously marked location.  Anterio fascia was divided and the rectus muscle was split.  The posterior fascia was then divided.  Using laparoscopic guidance, the ileum was brought up through the abdominal wall.  All incisions were then closed with 4-0 Monocryl and Exofin.  Ileostomy was matured in standard fashion with 3-0 Vicryl sutures.  Stoma appliance was placed.    All needle, sponge, and instrument counts were correct at the end of the case.    The patient tolerated the procedure well and was transferred to the postanesthesia care unit in stable condition.    Synoptic portion:    The indication for total mesorectum excision was mid rectal tumor with curative intent.     See Isaac M.D.  Colorectal & General Surgery  [facility]    [address] [place] [address], [address]  [place], [place], [address]  P: [phone]  F: [phone]

## 2025-01-11 LAB
CYTO UR: NORMAL
LAB AP CASE REPORT: NORMAL
LAB AP DIAGNOSIS COMMENT: NORMAL
LAB AP SYNOPTIC CHECKLIST: NORMAL
PATH REPORT.FINAL DX SPEC: NORMAL
PATH REPORT.GROSS SPEC: NORMAL
POTASSIUM SERPL-SCNC: 4.4 MMOL/L (ref 3.5–5.2)

## 2025-01-11 PROCEDURE — 25010000002 ENOXAPARIN PER 10 MG: Performed by: SURGERY

## 2025-01-11 PROCEDURE — 99024 POSTOP FOLLOW-UP VISIT: CPT | Performed by: STUDENT IN AN ORGANIZED HEALTH CARE EDUCATION/TRAINING PROGRAM

## 2025-01-11 PROCEDURE — 84132 ASSAY OF SERUM POTASSIUM: CPT | Performed by: SURGERY

## 2025-01-11 RX ADMIN — ENOXAPARIN SODIUM 40 MG: 100 INJECTION SUBCUTANEOUS at 08:09

## 2025-01-11 RX ADMIN — ACETAMINOPHEN 1000 MG: 500 TABLET, FILM COATED ORAL at 05:51

## 2025-01-11 RX ADMIN — METHOCARBAMOL 750 MG: 750 TABLET ORAL at 11:14

## 2025-01-11 RX ADMIN — METHOCARBAMOL 750 MG: 750 TABLET ORAL at 08:09

## 2025-01-11 RX ADMIN — METHOCARBAMOL 750 MG: 750 TABLET ORAL at 17:45

## 2025-01-11 RX ADMIN — GABAPENTIN 100 MG: 100 CAPSULE ORAL at 16:34

## 2025-01-11 RX ADMIN — ACETAMINOPHEN 1000 MG: 500 TABLET, FILM COATED ORAL at 23:22

## 2025-01-11 RX ADMIN — ACETAMINOPHEN 1000 MG: 500 TABLET, FILM COATED ORAL at 11:14

## 2025-01-11 RX ADMIN — HYDROCHLOROTHIAZIDE 12.5 MG: 12.5 TABLET ORAL at 08:09

## 2025-01-11 RX ADMIN — METHOCARBAMOL 750 MG: 750 TABLET ORAL at 20:41

## 2025-01-11 RX ADMIN — GABAPENTIN 100 MG: 100 CAPSULE ORAL at 08:09

## 2025-01-11 RX ADMIN — OXYCODONE HYDROCHLORIDE 5 MG: 5 TABLET ORAL at 16:39

## 2025-01-11 RX ADMIN — OXYCODONE HYDROCHLORIDE 10 MG: 5 TABLET ORAL at 08:14

## 2025-01-11 RX ADMIN — ACETAMINOPHEN 1000 MG: 500 TABLET, FILM COATED ORAL at 17:45

## 2025-01-11 RX ADMIN — GABAPENTIN 100 MG: 100 CAPSULE ORAL at 20:41

## 2025-01-11 RX ADMIN — AMLODIPINE BESYLATE 10 MG: 10 TABLET ORAL at 08:09

## 2025-01-11 NOTE — PROGRESS NOTES
General Surgery  Progress Note     Patient: Jc Brannon Jr.  YOB: 1980  MRN: [id number]        Assessment  Jc Brannon Jr. is a 44 y.o. male with mid rectal cancer now postoperative day 2 from laparoscopic low anterior resection with creation of low pelvic coloproctostomy with diverting loop ileostomy, splenic flexure mobilization, and appendectomy.      Plan  Remove Ellis catheter today  Advance diet as tolerated, saline lock IV  Continue current analgesia regimen  Continue Lovenox for pharmacologic DVT prophylaxis  Encouraged ambulation  Appreciate WOCN team     Subjective  No acute events overnight.  Looks and feels great.  Tolerating a diet.  Sitting up in the chair.  Ellis in place, urine output 1350 cc.  BRIANNA in place, output 80 cc.  Ileostomy with 270 cc of stool.     Objective         Vitals:     01/10/25 1100   BP: 164/92   Pulse: 103   Resp: 18   Temp: 99 °F (37.2 °C)   SpO2: 95%      Physical Exam  Constitutional: Well-developed well-nourished, no acute distress  Neck: Supple, trachea midline  Respiratory: No increased work of breathing, Symmetric excursion  Cardiovascular: Well pefursed, no jugular venous distention evident   Abdominal: Incisions in good order, soft, mildly distended, appropriately tender, drain is serosanguineous.  Ileostomy is pink and above the skin.  Skin: Warm, dry, no rash on visualized skin surfaces  Psychiatric: Alert and oriented ×3, normal affect      Laboratory Results  No labs today     Radiology  None to review     Allyn Berry MD   General Surgery  [facility]     [address], [address]  [place], [place], [address]  P: [phone]  F: [phone]

## 2025-01-11 NOTE — PLAN OF CARE
Goal Outcome Evaluation:  Plan of Care Reviewed With: patient        Progress: improving     Patient met ambulating around the nursing station. Alert and oriented X4, on room air. Vital signs stable. Diet advanced, he was carried along with his plan of care. Ellis catheter, LLQ BRIANNA drain and RLQ ileostomy bag In use. I will continue to monitor and render care during the shift.

## 2025-01-11 NOTE — PLAN OF CARE
Goal Outcome Evaluation:            Pt A/Ox4, RA, up with SBA. Ellis removed, pt is voiding already. IVF discontinued. BRIANNA to bulb suction with serosanguineous output, new dressing applied. PRN lucie given for pain, tolerating reg diet, plan of care ongoing, VSS.

## 2025-01-12 PROCEDURE — 25010000002 ENOXAPARIN PER 10 MG: Performed by: SURGERY

## 2025-01-12 PROCEDURE — 25010000002 ONDANSETRON PER 1 MG: Performed by: SURGERY

## 2025-01-12 PROCEDURE — 99024 POSTOP FOLLOW-UP VISIT: CPT | Performed by: STUDENT IN AN ORGANIZED HEALTH CARE EDUCATION/TRAINING PROGRAM

## 2025-01-12 RX ADMIN — OXYCODONE HYDROCHLORIDE 5 MG: 5 TABLET ORAL at 06:13

## 2025-01-12 RX ADMIN — METHOCARBAMOL 750 MG: 750 TABLET ORAL at 17:08

## 2025-01-12 RX ADMIN — ACETAMINOPHEN 1000 MG: 500 TABLET, FILM COATED ORAL at 06:13

## 2025-01-12 RX ADMIN — GABAPENTIN 100 MG: 100 CAPSULE ORAL at 08:21

## 2025-01-12 RX ADMIN — ACETAMINOPHEN 1000 MG: 500 TABLET, FILM COATED ORAL at 11:59

## 2025-01-12 RX ADMIN — ACETAMINOPHEN 1000 MG: 500 TABLET, FILM COATED ORAL at 20:41

## 2025-01-12 RX ADMIN — HYDROCHLOROTHIAZIDE 12.5 MG: 12.5 TABLET ORAL at 08:21

## 2025-01-12 RX ADMIN — ONDANSETRON 4 MG: 2 INJECTION, SOLUTION INTRAMUSCULAR; INTRAVENOUS at 10:54

## 2025-01-12 RX ADMIN — ONDANSETRON 4 MG: 2 INJECTION, SOLUTION INTRAMUSCULAR; INTRAVENOUS at 21:28

## 2025-01-12 RX ADMIN — GABAPENTIN 100 MG: 100 CAPSULE ORAL at 16:07

## 2025-01-12 RX ADMIN — METHOCARBAMOL 750 MG: 750 TABLET ORAL at 08:21

## 2025-01-12 RX ADMIN — ENOXAPARIN SODIUM 40 MG: 100 INJECTION SUBCUTANEOUS at 08:21

## 2025-01-12 RX ADMIN — AMLODIPINE BESYLATE 10 MG: 10 TABLET ORAL at 08:21

## 2025-01-12 RX ADMIN — GABAPENTIN 100 MG: 100 CAPSULE ORAL at 20:40

## 2025-01-12 RX ADMIN — METHOCARBAMOL 750 MG: 750 TABLET ORAL at 11:59

## 2025-01-12 RX ADMIN — METHOCARBAMOL 750 MG: 750 TABLET ORAL at 20:41

## 2025-01-12 NOTE — PROGRESS NOTES
General Surgery  Progress Note     Patient: Jc Brannon Jr.  YOB: 1980  MRN: [id number]        Assessment  Jc Brannon Jr. is a 44 y.o. male with mid rectal cancer now postoperative day 3 from laparoscopic low anterior resection with creation of low pelvic coloproctostomy with diverting loop ileostomy, splenic flexure mobilization, and appendectomy.      Plan  Diet as tolerated  Continue current analgesia regimen  Continue Lovenox for pharmacologic DVT prophylaxis  Encouraged ambulation  Appreciate WOCN team     Subjective  No acute events overnight.  Tolerating a diet.  Having ostomy output.  BRIANNA serosanguineous. Ostomy output 950cc, drain output 250cc, UOP 1100cc.      Objective         Vitals:     01/10/25 1100   BP: 164/92   Pulse: 103   Resp: 18   Temp: 99 °F (37.2 °C)   SpO2: 95%      Physical Exam  Constitutional: Well-developed well-nourished, no acute distress  Neck: Supple, trachea midline  Respiratory: No increased work of breathing, Symmetric excursion, on room air   Cardiovascular: Well pefursed, no jugular venous distention evident   Abdominal: Incisions in good order, soft, mildly distended, appropriately tender, drain is serosanguineous.  Ileostomy is pink and above the skin.  Skin: Warm, dry, no rash on visualized skin surfaces  Psychiatric: Alert and oriented ×3, normal affect      Laboratory Results  No labs today     Radiology  None to review     Allyn Berry MD   General Surgery  [facility]     [address], [address]  [place], [place], [address]  P: [phone]  F: [phone]

## 2025-01-12 NOTE — PLAN OF CARE
Goal Outcome Evaluation:               Pt A/Ox4, RA, up with SBA. BRIANNA to bulb suction with serosanguineous output, new dressing applied x2. Patient is very impulsive and seems to lack space awareness, he pulls on his BRIANNA drain when repositioning in bed or walking without realizing. Provided additional education on how to empty his ileostomy and reminded him to be careful with his BRIANNA drain. Pt c/o nausea at the beginning of this shift after [place], PRN zofran given once. Pt has not been eating much due to fear of being nauseated. Encouraged nutrition and reminded him he has PRN nausea medicine, pt needs reinforcement of all education points. Plan of care ongoing, VSS.

## 2025-01-12 NOTE — PLAN OF CARE
VSS, ileostomy maintained and assessed frequently stool charted, education provided regarding ileostomy care and how to properly open and seal the container, needs reinforcement. BRIANNA site assessed and charted output, pain medication given per protocol with good effect, pt resting and appears to be sleeping between care. Will continue to monitor the remainder of this shift.   Problem: Adult Inpatient Plan of Care  Goal: Plan of Care Review  1/12/2025 0433 by Denise Wilson RN  Outcome: Progressing  1/12/2025 0433 by Denise Wilson RN  Outcome: Progressing     Problem: Adult Inpatient Plan of Care  Goal: Optimal Comfort and Wellbeing  1/12/2025 0433 by Denise Wilson RN  Outcome: Progressing  1/12/2025 0433 by Denise Wilson RN  Outcome: Progressing  Intervention: Monitor Pain and Promote Comfort  Recent Flowsheet Documentation  Taken 1/11/2025 2030 by Denise Wilson RN  Pain Management Interventions:   care clustered   position adjusted   pillow support provided   pain medication given  Intervention: Provide Person-Centered Care  Recent Flowsheet Documentation  Taken 1/11/2025 2030 by Denise Wilson RN  Trust Relationship/Rapport:   care explained   reassurance provided   thoughts/feelings acknowledged     Problem: Adult Inpatient Plan of Care  Goal: Optimal Comfort and Wellbeing  Intervention: Monitor Pain and Promote Comfort  Recent Flowsheet Documentation  Taken 1/11/2025 2030 by Denise Wilson RN  Pain Management Interventions:   care clustered   position adjusted   pillow support provided   pain medication given     Problem: Adult Inpatient Plan of Care  Goal: Optimal Comfort and Wellbeing  Intervention: Provide Person-Centered Care  Recent Flowsheet Documentation  Taken 1/11/2025 2030 by Denise Wilson RN  Trust Relationship/Rapport:   care explained   reassurance provided   thoughts/feelings acknowledged     Problem: Fall Injury Risk  Goal: Absence of Fall and Fall-Related  Injury  1/12/2025 0433 by Denise Wilson RN  Outcome: Progressing  1/12/2025 0433 by Denise Wilson RN  Outcome: Progressing  Intervention: Identify and Manage Contributors  Recent Flowsheet Documentation  Taken 1/12/2025 0200 by Denise Wilson RN  Medication Review/Management: medications reviewed  Taken 1/12/2025 0000 by Denise Wilson RN  Medication Review/Management: medications reviewed  Taken 1/11/2025 2200 by Denise Wilson RN  Medication Review/Management: medications reviewed  Taken 1/11/2025 2030 by Denise Wilson RN  Medication Review/Management: medications reviewed  Intervention: Promote Injury-Free Environment  Recent Flowsheet Documentation  Taken 1/12/2025 0200 by Denise Wilson RN  Safety Promotion/Fall Prevention:   assistive device/personal items within reach   clutter free environment maintained   room organization consistent   safety round/check completed  Taken 1/12/2025 0000 by Denise Wilson RN  Safety Promotion/Fall Prevention:   assistive device/personal items within reach   clutter free environment maintained   room organization consistent   safety round/check completed  Taken 1/11/2025 2200 by Denise Wilson RN  Safety Promotion/Fall Prevention:   assistive device/personal items within reach   clutter free environment maintained   room organization consistent   safety round/check completed  Taken 1/11/2025 2030 by Denise Wilson RN  Safety Promotion/Fall Prevention:   safety round/check completed   room organization consistent   assistive device/personal items within reach   clutter free environment maintained     Problem: Fall Injury Risk  Goal: Absence of Fall and Fall-Related Injury  Intervention: Identify and Manage Contributors  Recent Flowsheet Documentation  Taken 1/12/2025 0200 by Denise Wilson RN  Medication Review/Management: medications reviewed  Taken 1/12/2025 0000 by Denise Wilson RN  Medication Review/Management: medications  reviewed  Taken 1/11/2025 2200 by Denise Wilson RN  Medication Review/Management: medications reviewed  Taken 1/11/2025 2030 by Denise Wilson RN  Medication Review/Management: medications reviewed     Problem: Fall Injury Risk  Goal: Absence of Fall and Fall-Related Injury  Intervention: Promote Injury-Free Environment  Recent Flowsheet Documentation  Taken 1/12/2025 0200 by Denise Wilson RN  Safety Promotion/Fall Prevention:   assistive device/personal items within reach   clutter free environment maintained   room organization consistent   safety round/check completed  Taken 1/12/2025 0000 by Denise Wilson RN  Safety Promotion/Fall Prevention:   assistive device/personal items within reach   clutter free environment maintained   room organization consistent   safety round/check completed  Taken 1/11/2025 2200 by Denise Wilson RN  Safety Promotion/Fall Prevention:   assistive device/personal items within reach   clutter free environment maintained   room organization consistent   safety round/check completed  Taken 1/11/2025 2030 by Denise Wilson RN  Safety Promotion/Fall Prevention:   safety round/check completed   room organization consistent   assistive device/personal items within reach   clutter free environment maintained     Problem: Coping Ineffective  Goal: Effective Coping  Outcome: Progressing   Goal Outcome Evaluation:

## 2025-01-13 ENCOUNTER — TELEPHONE (OUTPATIENT)
Dept: SURGERY | Facility: CLINIC | Age: 45
End: 2025-01-13
Payer: COMMERCIAL

## 2025-01-13 PROCEDURE — 25010000002 ONDANSETRON PER 1 MG: Performed by: SURGERY

## 2025-01-13 PROCEDURE — 99024 POSTOP FOLLOW-UP VISIT: CPT | Performed by: SURGERY

## 2025-01-13 PROCEDURE — 25010000002 ENOXAPARIN PER 10 MG: Performed by: SURGERY

## 2025-01-13 RX ADMIN — OXYCODONE HYDROCHLORIDE 5 MG: 5 TABLET ORAL at 22:38

## 2025-01-13 RX ADMIN — ONDANSETRON 4 MG: 2 INJECTION, SOLUTION INTRAMUSCULAR; INTRAVENOUS at 17:43

## 2025-01-13 RX ADMIN — ACETAMINOPHEN 1000 MG: 500 TABLET, FILM COATED ORAL at 05:34

## 2025-01-13 RX ADMIN — AMLODIPINE BESYLATE 10 MG: 10 TABLET ORAL at 08:29

## 2025-01-13 RX ADMIN — METHOCARBAMOL 750 MG: 750 TABLET ORAL at 12:38

## 2025-01-13 RX ADMIN — GABAPENTIN 100 MG: 100 CAPSULE ORAL at 16:16

## 2025-01-13 RX ADMIN — GABAPENTIN 100 MG: 100 CAPSULE ORAL at 08:29

## 2025-01-13 RX ADMIN — ACETAMINOPHEN 1000 MG: 500 TABLET, FILM COATED ORAL at 12:38

## 2025-01-13 RX ADMIN — ENOXAPARIN SODIUM 40 MG: 100 INJECTION SUBCUTANEOUS at 08:28

## 2025-01-13 RX ADMIN — GABAPENTIN 100 MG: 100 CAPSULE ORAL at 20:49

## 2025-01-13 RX ADMIN — ACETAMINOPHEN 1000 MG: 500 TABLET, FILM COATED ORAL at 18:02

## 2025-01-13 RX ADMIN — HYDROCHLOROTHIAZIDE 12.5 MG: 12.5 TABLET ORAL at 09:36

## 2025-01-13 RX ADMIN — METHOCARBAMOL 750 MG: 750 TABLET ORAL at 08:29

## 2025-01-13 RX ADMIN — METHOCARBAMOL 750 MG: 750 TABLET ORAL at 20:49

## 2025-01-13 RX ADMIN — METHOCARBAMOL 750 MG: 750 TABLET ORAL at 18:02

## 2025-01-13 NOTE — PLAN OF CARE
Goal Outcome Evaluation:  Plan of Care Reviewed With: patient        Progress: improving  Outcome Evaluation: Pt A&OX 4. Has difficulty understanding how to care for ostomy independently. Denies pain. VSS. Fall precautions. No events overnight.                             Problem: Adult Inpatient Plan of Care  Goal: Plan of Care Review  Outcome: Progressing  Flowsheets (Taken 1/13/2025 0418)  Progress: improving  Outcome Evaluation: Pt A&OX 4. Has difficulty understanding how to care for ostomy independently. Denies pain. VSS. Fall precautions. No events overnight.  Plan of Care Reviewed With: patient  Goal: Absence of Hospital-Acquired Illness or Injury  Outcome: Progressing  Intervention: Identify and Manage Fall Risk  Recent Flowsheet Documentation  Taken 1/13/2025 0417 by Ramya Manley RN  Safety Promotion/Fall Prevention:   assistive device/personal items within reach   clutter free environment maintained   fall prevention program maintained   nonskid shoes/slippers when out of bed   room organization consistent   safety round/check completed  Taken 1/13/2025 0214 by Ramya Manley RN  Safety Promotion/Fall Prevention:   assistive device/personal items within reach   clutter free environment maintained   fall prevention program maintained   nonskid shoes/slippers when out of bed   room organization consistent   safety round/check completed  Taken 1/13/2025 0035 by Ramya Manley RN  Safety Promotion/Fall Prevention:   assistive device/personal items within reach   clutter free environment maintained   fall prevention program maintained   nonskid shoes/slippers when out of bed   room organization consistent   safety round/check completed  Intervention: Prevent Skin Injury  Recent Flowsheet Documentation  Taken 1/13/2025 0417 by Ramya Manley, RN  Body Position: position changed independently  Taken 1/13/2025 0214 by Ramya Manley, RAKESH  Body Position: position changed independently  Taken 1/13/2025 0035 by Vineet  RAKESH Bui  Body Position: position changed independently  Goal: Optimal Comfort and Wellbeing  Outcome: Progressing  Goal: Readiness for Transition of Care  Outcome: Progressing     Problem: Skin Injury Risk Increased  Goal: Skin Health and Integrity  Outcome: Progressing  Intervention: Optimize Skin Protection  Recent Flowsheet Documentation  Taken 1/13/2025 0417 by Ramya Manley RN  Activity Management: activity minimized  Taken 1/13/2025 0214 by Ramya Manley RN  Activity Management: (asleep) other (see comments)  Taken 1/13/2025 0035 by Ramya Manley RN  Activity Management: (asleep) other (see comments)     Problem: Fall Injury Risk  Goal: Absence of Fall and Fall-Related Injury  Outcome: Progressing  Intervention: Promote Injury-Free Environment  Recent Flowsheet Documentation  Taken 1/13/2025 0417 by Ramya Manley RN  Safety Promotion/Fall Prevention:   assistive device/personal items within reach   clutter free environment maintained   fall prevention program maintained   nonskid shoes/slippers when out of bed   room organization consistent   safety round/check completed  Taken 1/13/2025 0214 by Ramya Manley RN  Safety Promotion/Fall Prevention:   assistive device/personal items within reach   clutter free environment maintained   fall prevention program maintained   nonskid shoes/slippers when out of bed   room organization consistent   safety round/check completed  Taken 1/13/2025 0035 by aRmya Manley RN  Safety Promotion/Fall Prevention:   assistive device/personal items within reach   clutter free environment maintained   fall prevention program maintained   nonskid shoes/slippers when out of bed   room organization consistent   safety round/check completed     Problem: Coping Ineffective  Goal: Effective Coping  Outcome: Progressing

## 2025-01-13 NOTE — PLAN OF CARE
Goal Outcome Evaluation:         Pt vomit once after taking scheduled night time meds and believes all pills we are giving him is making him sick and lose his appetite. PRN Zofran given and relieved nausea per patient. Plan of care ongoing.

## 2025-01-13 NOTE — TELEPHONE ENCOUNTER
Called [facility] and confirmed that they received pt's [place] paperwork that was faxed over last Wednesday.

## 2025-01-13 NOTE — PLAN OF CARE
"Goal Outcome Evaluation:            Patient is a very pleasant gentleman who is cooperative and calm in his care.  Wound care came and assisted patient with emptying and caring for his ostomy.  He told me he is \"ready to care for his ostomy!\"    Patient is alert and oriented x 4, although mentally challenged, he is on room air, has ambulated around the nurses station and his family brought his home med Strattera to the hospital.  Medication given to [facility] for verification and dispensing.    Patient is tolerating his diet, but is afraid that he will vomit.  When asked he was nauseated after surgery and thinks that the medication given to him will always cause he nausea and possible vomiting.    WCTM                                "

## 2025-01-13 NOTE — NURSING NOTE
"   01/13/25 1046   Ileostomy Loop   Placement date: If unknown, DO NOT use \"Add Comment\" note: 01/09/25   Ileostomy Type: Loop   Stomal Appliance 2 piece;Clean;Dry;Intact;Changed;Drainable   Stoma Appearance round;moist;red;protruding above skin level   Peristomal Assessment Clean;Intact   Accessories/Skin Care cleansed with water;skin barrier ring   Stoma Function flatus;stool   Stool Color brown, light;tan   Stool Consistency liquid   Treatment Bag change;Site care   Output (mL) 25 mL     WOCN:   Ostomy education provided to the patient/family:    [x]Pouch changed   [x]Pt observed   []Pt participated    []CG participated            []Instructed in frequency of pouch change  [x]Pouch emptied and cleaned, demonstrated use of pouch closure    [x]Pt observed   [x]Pt participated    []CG participated    [x]Instructed to empty pouch when 1/3 to 1/2 full  []Teaching packet/handouts provided/reviewed  [x]Return to ADL's, showering, clothing  [x]Peristomal skin care, avoiding use of soaps with moisturizers  [x]Diet   [x]Adequate po fluid intake   []S/S of dehydration   [x]Foods to thicken stool   []Foods to avoid to prevent blockage  [x]Supplies at bedside  []Samples ordered    Current Supplies: 2 1/4 2 piece ethan with adapt ring.     WOC Team follow up plan: continue to education patient on ostomy care. He was able to open and close pouch on own after several attempts. Discussed to change pouch every 3-4 days or 2 times per week, when to empty pouch, showering, clothing. Patient lives alone and will need  to continue with education. He will need much assistance and teaching ostomy care. Will plan to see tomorrow and continue ostomy education.     "

## 2025-01-13 NOTE — PROGRESS NOTES
Colorectal & General Surgery  Progress Note    Patient: Jc Brannon Jr.  YOB: 1980  MRN: [id number]      Assessment  Jc Brannon Jr. is a 44 y.o. male with low to mid rectal adenocarcinoma status post total neoadjuvant therapy who is postoperative day 4 from laparoscopic low anterior resection with diverting loop ileostomy creation.    Afebrile, mild tachycardia at times.  Abdominal exam is benign except he is distended today.  He is having good ileostomy output.  I suspect he has a mild ileus but is overall recovering very nicely.  Anticipate that he will be ready for home on Tuesday or Wednesday pending his ability to care for his ileostomy independently.    Plan  Labs in the morning  Continue regular diet  Continue education from WOCN team  Anticipate home Tuesday or Wednesday      Subjective  No significant events.  Feels relatively well.    Objective    Vitals:    01/13/25 0700   BP: 120/85   Pulse: 100   Resp: 18   Temp: 98.8 °F (37.1 °C)   SpO2: 96%       Physical Exam  Constitutional: Well-developed well-nourished, no acute distress  Neck: Supple, trachea midline  Respiratory: No increased work of breathing, Symmetric excursion  Cardiovascular: Well pefursed, no jugular venous distention evident   Abdominal: Incisions are healing nicely.  Ileostomy is pink and above the skin.  Soft, non-tender, non-distended  Skin: Warm, dry, no rash on visualized skin surfaces  Psychiatric: Alert and oriented ×3, normal affect          See Isaac MD  Colorectal & General Surgery  [facility]    [address] [place] [address], [address]  [place], [place], [facility]  P: [phone]  F: [phone]

## 2025-01-13 NOTE — CASE MANAGEMENT/SOCIAL WORK
Continued Stay Note  [facility]     Patient Name: Jc Brannon Jr.  MRN: [id number]  Today's Date: 1/13/2025    Admit Date: 1/9/2025    Plan: Home with parents until 2/20/25 and [facility]. Family to transport.   Discharge Plan       Row Name 01/13/25 1423       Plan    Plan Home with parents until 2/20/25 and [facility]. Family to transport.    Plan Comments CCP met with pt at bedside to confirm DC plan. CCP notified pt [facility] accepted pt. Pt confirms DC plan. At DC pt will stay with his parents until 2/20/25 when his next surgery is scheduled. Plan is home with parents and [facility]. Family to transport. CCP will continue to follow for medical DC readiness and any DC needs. RLutes RN/CCP                   Discharge Codes    No documentation.                 Expected Discharge Date and Time       Expected Discharge Date Expected Discharge Time    Jan 14, 2025               Bri Acuña RN

## 2025-01-14 ENCOUNTER — APPOINTMENT (OUTPATIENT)
Dept: GENERAL RADIOLOGY | Facility: HOSPITAL | Age: 45
DRG: 331 | End: 2025-01-14
Payer: COMMERCIAL

## 2025-01-14 LAB
ANION GAP SERPL CALCULATED.3IONS-SCNC: 17.4 MMOL/L (ref 5–15)
BUN SERPL-MCNC: 28 MG/DL (ref 6–20)
BUN/CREAT SERPL: 26.9 (ref 7–25)
CALCIUM SPEC-SCNC: 9 MG/DL (ref 8.6–10.5)
CHLORIDE SERPL-SCNC: 97 MMOL/L (ref 98–107)
CO2 SERPL-SCNC: 22.6 MMOL/L (ref 22–29)
CREAT SERPL-MCNC: 1.04 MG/DL (ref 0.76–1.27)
DEPRECATED RDW RBC AUTO: 40 FL (ref 37–54)
EGFRCR SERPLBLD CKD-EPI 2021: 89.7 ML/MIN/1.73
ERYTHROCYTE [DISTWIDTH] IN BLOOD BY AUTOMATED COUNT: 11.6 % (ref 12.3–15.4)
GLUCOSE SERPL-MCNC: 153 MG/DL (ref 65–99)
HCT VFR BLD AUTO: 43.9 % (ref 37.5–51)
HGB BLD-MCNC: 15.2 G/DL (ref 13–17.7)
MAGNESIUM SERPL-MCNC: 2.3 MG/DL (ref 1.6–2.6)
MCH RBC QN AUTO: 32.8 PG (ref 26.6–33)
MCHC RBC AUTO-ENTMCNC: 34.6 G/DL (ref 31.5–35.7)
MCV RBC AUTO: 94.8 FL (ref 79–97)
PHOSPHATE SERPL-MCNC: 3.8 MG/DL (ref 2.5–4.5)
PLATELET # BLD AUTO: 251 10*3/MM3 (ref 140–450)
PMV BLD AUTO: 9.7 FL (ref 6–12)
POTASSIUM SERPL-SCNC: 3.9 MMOL/L (ref 3.5–5.2)
RBC # BLD AUTO: 4.63 10*6/MM3 (ref 4.14–5.8)
SODIUM SERPL-SCNC: 137 MMOL/L (ref 136–145)
WBC NRBC COR # BLD AUTO: 7.26 10*3/MM3 (ref 3.4–10.8)

## 2025-01-14 PROCEDURE — 74018 RADEX ABDOMEN 1 VIEW: CPT

## 2025-01-14 PROCEDURE — 83735 ASSAY OF MAGNESIUM: CPT | Performed by: SURGERY

## 2025-01-14 PROCEDURE — 84100 ASSAY OF PHOSPHORUS: CPT | Performed by: SURGERY

## 2025-01-14 PROCEDURE — 25810000003 SODIUM CHLORIDE 0.9 % SOLUTION: Performed by: SURGERY

## 2025-01-14 PROCEDURE — 63710000001 ONDANSETRON ODT 4 MG TABLET DISPERSIBLE: Performed by: SURGERY

## 2025-01-14 PROCEDURE — 85027 COMPLETE CBC AUTOMATED: CPT | Performed by: SURGERY

## 2025-01-14 PROCEDURE — 25010000002 ENOXAPARIN PER 10 MG: Performed by: SURGERY

## 2025-01-14 PROCEDURE — 99024 POSTOP FOLLOW-UP VISIT: CPT | Performed by: SURGERY

## 2025-01-14 PROCEDURE — 80048 BASIC METABOLIC PNL TOTAL CA: CPT | Performed by: SURGERY

## 2025-01-14 RX ORDER — SODIUM CHLORIDE 9 MG/ML
50 INJECTION, SOLUTION INTRAVENOUS CONTINUOUS
Status: DISCONTINUED | OUTPATIENT
Start: 2025-01-14 | End: 2025-01-17

## 2025-01-14 RX ADMIN — GABAPENTIN 100 MG: 100 CAPSULE ORAL at 08:56

## 2025-01-14 RX ADMIN — METHOCARBAMOL 750 MG: 750 TABLET ORAL at 08:56

## 2025-01-14 RX ADMIN — OXYCODONE HYDROCHLORIDE 10 MG: 5 TABLET ORAL at 13:01

## 2025-01-14 RX ADMIN — METHOCARBAMOL 750 MG: 750 TABLET ORAL at 21:11

## 2025-01-14 RX ADMIN — SODIUM CHLORIDE 1000 ML: 9 INJECTION, SOLUTION INTRAVENOUS at 10:18

## 2025-01-14 RX ADMIN — GABAPENTIN 100 MG: 100 CAPSULE ORAL at 21:11

## 2025-01-14 RX ADMIN — HYDROCHLOROTHIAZIDE 12.5 MG: 12.5 TABLET ORAL at 08:56

## 2025-01-14 RX ADMIN — ONDANSETRON 4 MG: 4 TABLET, ORALLY DISINTEGRATING ORAL at 12:35

## 2025-01-14 RX ADMIN — SODIUM CHLORIDE 50 ML/HR: 9 INJECTION, SOLUTION INTRAVENOUS at 12:30

## 2025-01-14 RX ADMIN — AMLODIPINE BESYLATE 10 MG: 10 TABLET ORAL at 08:56

## 2025-01-14 RX ADMIN — ACETAMINOPHEN 1000 MG: 500 TABLET, FILM COATED ORAL at 17:31

## 2025-01-14 RX ADMIN — OXYCODONE HYDROCHLORIDE 5 MG: 5 TABLET ORAL at 06:19

## 2025-01-14 RX ADMIN — METHOCARBAMOL 750 MG: 750 TABLET ORAL at 17:31

## 2025-01-14 RX ADMIN — ATOMOXETINE 18 MG: 18 CAPSULE ORAL at 08:56

## 2025-01-14 RX ADMIN — ACETAMINOPHEN 1000 MG: 500 TABLET, FILM COATED ORAL at 01:33

## 2025-01-14 RX ADMIN — ONDANSETRON 4 MG: 4 TABLET, ORALLY DISINTEGRATING ORAL at 06:19

## 2025-01-14 RX ADMIN — ENOXAPARIN SODIUM 40 MG: 100 INJECTION SUBCUTANEOUS at 08:56

## 2025-01-14 NOTE — PLAN OF CARE
Goal Outcome Evaluation:   Alert and oriented X 4, VSS. Cx of pain/ nausea intermittently. PRN medications given. Patient was able to care for ostomy throughout the night independently. Up ad maine.

## 2025-01-14 NOTE — PROGRESS NOTES
Colorectal & General Surgery  Progress Note    Patient: Jc Brannon Jr.  YOB: 1980  MRN: [id number]      Assessment  Jc Brannon Jr. is a 44 y.o. male with low to mid rectal adenocarcinoma status post total neoadjuvant therapy who is postoperative day 5 from laparoscopic low anterior resection with diverting loop ileostomy creation.     He has developed a mild ileus. Will back him down to clear liquids today and start maintenance IV fluids again.     He is also making slow progress with his ileostomy care.     Remove drain today.     Anticipate home with home health later this week.     Subjective  One episode of emesis yesterday.     Objective    Vitals:    01/14/25 0735   BP: 133/87   Pulse: 102   Resp: 18   Temp: 97.9 °F (36.6 °C)   SpO2: 96%       Physical Exam  Constitutional: Well-developed well-nourished, no acute distress  Neck: Supple, trachea midline  Respiratory: No increased work of breathing, Symmetric excursion  Cardiovascular: Well pefursed, no jugular venous distention evident   Abdominal: Soft, non-tender, distended, ileostomy pink. Drain serosang.   Skin: Warm, dry, no rash on visualized skin surfaces  Psychiatric: Alert and oriented ×3, normal affect          See Isaac MD  Colorectal & General Surgery  [facility]    [address], [address]  [place], [place], [address]  P: [phone]  F: [phone]

## 2025-01-14 NOTE — PLAN OF CARE
Goal Outcome Evaluation:              Outcome Evaluation: a&o x4, vitals stable, prn oxycodone given x1 for abdominal pain, c/o nausea and emesis--prn zofran given, Dr. Isaac notified and patient made NPO and KUB ordered, BRIANNA drain removed per order, NS @ 50, ambulates with a steady gait

## 2025-01-14 NOTE — NURSING NOTE
"   01/14/25 0921   Ileostomy Loop   Placement date: If unknown, DO NOT use \"Add Comment\" note: 01/09/25   Ileostomy Type: Loop   Stomal Appliance 2 piece;Clean;Dry;Intact;Changed;Drainable   Stoma Appearance round;moist;red   Peristomal Assessment Clean;Intact   Stoma Function stool   Stool Color brown, light   Stool Consistency liquid   Treatment Bag change;Site care   Output (mL) 50 mL     Ostomy education provided to the patient/family:    [x]Pouch changed   [x]Pt observed   [x]Pt participated    []CG participated            []Instructed in frequency of pouch change  [x]Pouch emptied and cleaned, demonstrated use of pouch closure    []Pt observed   [x]Pt participated    []CG participated    [x]Instructed to empty pouch when 1/3 to 1/2 full  []Teaching packet/handouts provided/reviewed  [x]Return to ADL's, showering, clothing  [x]Peristomal skin care, avoiding use of soaps with moisturizers  [x]Diet   [x]Adequate po fluid intake   [x]S/S of dehydration   [x]Foods to thicken stool   []Foods to avoid to prevent blockage  [x]Supplies at bedside  []Samples ordered    Current Supplies:Placed in 2 1/4 2 piece ethan pouch with adapt ring. He is unable to cut pouch correctly or connect 2 piece. He is able to open , close pouch by self. He remembers frequency pouch change and when to empty pouch. Will need much education. He did attempt to removed pouch per self and did well, Cleansed skin but needed assistance. Will need HH when discharge to assist with pouch changes and teaching. Samples ordered. Will see tomorrow to continue teaching. He may need 1 piece. Will also send home with coloplast one piece. Plan is discharge tomorrow.     WOC Team follow up plan:     "

## 2025-01-15 LAB
ANION GAP SERPL CALCULATED.3IONS-SCNC: 11 MMOL/L (ref 5–15)
BUN SERPL-MCNC: 23 MG/DL (ref 6–20)
BUN/CREAT SERPL: 26.1 (ref 7–25)
CALCIUM SPEC-SCNC: 8.6 MG/DL (ref 8.6–10.5)
CHLORIDE SERPL-SCNC: 101 MMOL/L (ref 98–107)
CO2 SERPL-SCNC: 24 MMOL/L (ref 22–29)
CREAT SERPL-MCNC: 0.88 MG/DL (ref 0.76–1.27)
DEPRECATED RDW RBC AUTO: 39.6 FL (ref 37–54)
EGFRCR SERPLBLD CKD-EPI 2021: 107.4 ML/MIN/1.73
ERYTHROCYTE [DISTWIDTH] IN BLOOD BY AUTOMATED COUNT: 11.5 % (ref 12.3–15.4)
GLUCOSE SERPL-MCNC: 131 MG/DL (ref 65–99)
HCT VFR BLD AUTO: 40.7 % (ref 37.5–51)
HGB BLD-MCNC: 13.9 G/DL (ref 13–17.7)
MAGNESIUM SERPL-MCNC: 2.3 MG/DL (ref 1.6–2.6)
MCH RBC QN AUTO: 32.4 PG (ref 26.6–33)
MCHC RBC AUTO-ENTMCNC: 34.2 G/DL (ref 31.5–35.7)
MCV RBC AUTO: 94.9 FL (ref 79–97)
PHOSPHATE SERPL-MCNC: 2.3 MG/DL (ref 2.5–4.5)
PLATELET # BLD AUTO: 239 10*3/MM3 (ref 140–450)
PMV BLD AUTO: 10 FL (ref 6–12)
POTASSIUM SERPL-SCNC: 3.9 MMOL/L (ref 3.5–5.2)
RBC # BLD AUTO: 4.29 10*6/MM3 (ref 4.14–5.8)
SODIUM SERPL-SCNC: 136 MMOL/L (ref 136–145)
WBC NRBC COR # BLD AUTO: 7.78 10*3/MM3 (ref 3.4–10.8)

## 2025-01-15 PROCEDURE — 85027 COMPLETE CBC AUTOMATED: CPT | Performed by: SURGERY

## 2025-01-15 PROCEDURE — 84100 ASSAY OF PHOSPHORUS: CPT | Performed by: SURGERY

## 2025-01-15 PROCEDURE — 25810000003 SODIUM CHLORIDE 0.9 % SOLUTION: Performed by: SURGERY

## 2025-01-15 PROCEDURE — 83735 ASSAY OF MAGNESIUM: CPT | Performed by: SURGERY

## 2025-01-15 PROCEDURE — 25010000002 ONDANSETRON PER 1 MG: Performed by: SURGERY

## 2025-01-15 PROCEDURE — 25010000002 ENOXAPARIN PER 10 MG: Performed by: SURGERY

## 2025-01-15 PROCEDURE — 99024 POSTOP FOLLOW-UP VISIT: CPT | Performed by: SURGERY

## 2025-01-15 PROCEDURE — 80048 BASIC METABOLIC PNL TOTAL CA: CPT | Performed by: SURGERY

## 2025-01-15 RX ORDER — FENTANYL/ROPIVACAINE/NS/PF 2-625MCG/1
15 PLASTIC BAG, INJECTION (ML) EPIDURAL ONCE
Status: COMPLETED | OUTPATIENT
Start: 2025-01-15 | End: 2025-01-15

## 2025-01-15 RX ADMIN — ACETAMINOPHEN 1000 MG: 500 TABLET, FILM COATED ORAL at 23:45

## 2025-01-15 RX ADMIN — ACETAMINOPHEN 1000 MG: 500 TABLET, FILM COATED ORAL at 05:55

## 2025-01-15 RX ADMIN — POTASSIUM PHOSPHATES 15 MMOL: 236; 224 INJECTION, SOLUTION INTRAVENOUS at 16:31

## 2025-01-15 RX ADMIN — ACETAMINOPHEN 1000 MG: 500 TABLET, FILM COATED ORAL at 10:51

## 2025-01-15 RX ADMIN — GABAPENTIN 100 MG: 100 CAPSULE ORAL at 15:35

## 2025-01-15 RX ADMIN — ATOMOXETINE 18 MG: 18 CAPSULE ORAL at 09:13

## 2025-01-15 RX ADMIN — METHOCARBAMOL 750 MG: 750 TABLET ORAL at 20:34

## 2025-01-15 RX ADMIN — ONDANSETRON 4 MG: 2 INJECTION, SOLUTION INTRAMUSCULAR; INTRAVENOUS at 10:51

## 2025-01-15 RX ADMIN — SODIUM CHLORIDE 50 ML/HR: 9 INJECTION, SOLUTION INTRAVENOUS at 07:08

## 2025-01-15 RX ADMIN — GABAPENTIN 100 MG: 100 CAPSULE ORAL at 20:34

## 2025-01-15 RX ADMIN — METHOCARBAMOL 750 MG: 750 TABLET ORAL at 17:23

## 2025-01-15 RX ADMIN — ENOXAPARIN SODIUM 40 MG: 100 INJECTION SUBCUTANEOUS at 09:18

## 2025-01-15 RX ADMIN — OXYCODONE HYDROCHLORIDE 5 MG: 5 TABLET ORAL at 10:52

## 2025-01-15 RX ADMIN — METHOCARBAMOL 750 MG: 750 TABLET ORAL at 09:13

## 2025-01-15 RX ADMIN — OXYCODONE HYDROCHLORIDE 5 MG: 5 TABLET ORAL at 20:37

## 2025-01-15 RX ADMIN — ACETAMINOPHEN 1000 MG: 500 TABLET, FILM COATED ORAL at 17:23

## 2025-01-15 RX ADMIN — AMLODIPINE BESYLATE 10 MG: 10 TABLET ORAL at 09:13

## 2025-01-15 RX ADMIN — HYDROCHLOROTHIAZIDE 12.5 MG: 12.5 TABLET ORAL at 09:13

## 2025-01-15 RX ADMIN — METHOCARBAMOL 750 MG: 750 TABLET ORAL at 11:38

## 2025-01-15 RX ADMIN — GABAPENTIN 100 MG: 100 CAPSULE ORAL at 09:13

## 2025-01-15 NOTE — NURSING NOTE
"   01/15/25 0936   Ileostomy Loop   Placement date: If unknown, DO NOT use \"Add Comment\" note: 01/09/25   Ileostomy Type: Loop   Stomal Appliance 2 piece;Clean;Dry;Intact;Drainable   Stoma Appearance moist;red   Peristomal Assessment KRANTHI   Stoma Function stool   Stool Color brown, light   Stool Consistency liquid     WOCN: patient continues with nausea and discomfort. No teaching today. Will plan to change pouch Friday when patient feeling better. Discussed situation with home health WOCN. Hopefully reversal soon as patient is having difficulty with appliance changes. Patient states he went to bathroom last night and empty his pouch himself. States I left it in the container.   "

## 2025-01-15 NOTE — PLAN OF CARE
Goal Outcome Evaluation:         Patient is alert and oriented x 4, vitals stable, room air, no complaints of pain, N/V this shift, IVF infusing, chest port not accessed. Safety precautions in use, plan of care ongoing.

## 2025-01-15 NOTE — PAYOR COMM NOTE
"Riggs Jc JAVED Estrada (44 y.o. Male)          U/D FOR [id number]     UR F# [phone]         Date of Birth   1980    Social Security Number       Address   [address] [place] [address]    Home Phone   [phone]    MRN   [id number]       Denominational   Lutheran    Marital Status   Single                            Admission Date   1/9/25    Admission Type   Elective    Admitting Provider   Ori Isaac MD    Attending Provider   Ori Isaac MD    Department, Room/Bed   [facility] [address] [place], P393/1       Discharge Date       Discharge Disposition       Discharge Destination                                 Attending Provider: Ori Isaac MD    Allergies: Latex    Isolation: None   Infection: None   Code Status: CPR    Ht: 177 cm (69.69\")   Wt: 101 kg (221 lb 9 oz)    Admission Cmt: None   Principal Problem: Rectal adenocarcinoma [C20]                   Active Insurance as of 1/9/2025       Primary Coverage       Payor Plan Insurance Group Employer/Plan Group    ANTHEM BLUE CROSS ANTHEM BLUE CROSS BLUE SHIELD O [id number]       Payor Plan Address Payor Plan Phone Number Payor Plan Fax Number Effective Dates    [address] [phone]  1/1/2025 - None Entered    [place] [address]         Subscriber Name Subscriber Birth Date Member ID       JC RIGGS Johanna 1980 [id number]                     Emergency Contacts        (Rel.) Home Phone Work Phone Mobile Phone    Jc Riggs SR (Father) [phone] -- --              Operative/Procedure Notes (last 24 hours)  Notes from 01/14/25 1346 through 01/15/25 1346   No notes of this type exist for this encounter.          Physician Progress Notes (last 24 hours)        Juni Haynes MD at 01/15/25 1330          IMPRESSION & PLAN:  44-year-old gentleman status post laparoscopic low anterior resection with diverting loop ileostomy.    Continue IV fluids.  Will allow 8 " ounces of clear liquids every 4 hours but will not have him get a tray as I worry he will finish everything on the tray and have further nausea and emesis.    CC:  No chief complaint on file.        HPI: He reports he is feeling better.  He has less nausea and it is controlled with antiemetics.  He has not vomited today.  He is having some ostomy output of stool and gas.  He really wants to be able to drink water again.      PE:    VS:   Vitals:    01/15/25 1156   BP: 126/86   Pulse: 115   Resp: 16   Temp: 97.5 °F (36.4 °C)   SpO2: 91%          Intake/Output Summary (Last 24 hours) at 1/15/2025 1330  Last data filed at 1/14/2025 1611  Gross per 24 hour   Intake --   Output 380 ml   Net -380 ml        CONST: Awake, alert  LUNGS: symmetric excursion, normal inspiratory effort  CV: regular rate, well perfused  Abdomen: Soft, mildly distended, ileostomy pink and patent, some liquid stool in appliance      LABS:  Results from last 7 days   Lab Units 01/15/25  0632 01/14/25  0642 01/10/25  0500   WBC 10*3/mm3 7.78 7.26 7.77   HEMOGLOBIN g/dL 13.9 15.2 12.5*   HEMATOCRIT % 40.7 43.9 38.5   PLATELETS 10*3/mm3 239 251 206     Results from last 7 days   Lab Units 01/15/25  0632 01/14/25  1041 01/11/25  0127 01/10/25  1436 01/10/25  0500   SODIUM mmol/L 136 137  --   --  139   POTASSIUM mmol/L 3.9 3.9 4.4   < > 3.4*   CHLORIDE mmol/L 101 97*  --   --  100   CO2 mmol/L 24.0 22.6  --   --  26.9   BUN mg/dL 23* 28*  --   --  10   CREATININE mg/dL 0.88 1.04  --   --  1.04   CALCIUM mg/dL 8.6 9.0  --   --  8.7   GLUCOSE mg/dL 131* 153*  --   --  171*    < > = values in this interval not displayed.             Electronically signed by Juni Haynes MD at 01/15/25 1332       Consult Notes (last 24 hours)  Notes from 01/14/25 1346 through 01/15/25 1346   No notes of this type exist for this encounter.

## 2025-01-15 NOTE — PLAN OF CARE
Goal Outcome Evaluation:                         Pt A/Ox4, RA, up adlib. C/o abd discomfort and intermittent nausea. Clear liquids provided q4 per  with an 8oz limit. Educated pt on frequency and amount of liquids he can have. PRN Zofran and Mague given x1. Ileostomy has had very little output, not enough to empty. Provided education materials on ileus. Phosphorus replaced per protocol. Plan of care ongoing, VSS.

## 2025-01-15 NOTE — PROGRESS NOTES
Nutrition Services    Patient Name:  Jc Brannon Jr.  YOB: 1980  MRN: [id number]  Admit Date:  1/9/2025    Assessment Date:  01/15/25    CLINICAL NUTRITION - PROGRESS NOTE       Reason for Encounter Follow-up         Nutrition Order NPO Diet NPO Type: Sips with Meds,     Supplements/Snacks  None      Meds    Labs    GI    Weight   Noted, includes K Phos    BUN 23, Glu 131, Phos 2.3    + BM 1/13/25, emesis 1/14    100.5 kg (1/10/25)         Pertinent Information Patient will eat/consume all of tray and not pace based on tolerance, resulting in emesis.  He is not receiving a tray due to impulsivity and resulting nausea, but is allowed to have 8 ounces/meal time given by nursing per Surgeon.          Intervention/Plan Will continue to follow for tolerance and diet advance.     RD to follow up per protocol.    Electronically signed by:  Skyler Hines RD  01/15/25 14:11 EST

## 2025-01-15 NOTE — PROGRESS NOTES
IMPRESSION & PLAN:  44-year-old gentleman status post laparoscopic low anterior resection with diverting loop ileostomy.    Continue IV fluids.  Will allow 8 ounces of clear liquids every 4 hours but will not have him get a tray as I worry he will finish everything on the tray and have further nausea and emesis.    CC:  No chief complaint on file.        HPI: He reports he is feeling better.  He has less nausea and it is controlled with antiemetics.  He has not vomited today.  He is having some ostomy output of stool and gas.  He really wants to be able to drink water again.      PE:    VS:   Vitals:    01/15/25 1156   BP: 126/86   Pulse: 115   Resp: 16   Temp: 97.5 °F (36.4 °C)   SpO2: 91%          Intake/Output Summary (Last 24 hours) at 1/15/2025 1330  Last data filed at 1/14/2025 1611  Gross per 24 hour   Intake --   Output 380 ml   Net -380 ml        CONST: Awake, alert  LUNGS: symmetric excursion, normal inspiratory effort  CV: regular rate, well perfused  Abdomen: Soft, mildly distended, ileostomy pink and patent, some liquid stool in appliance      LABS:  Results from last 7 days   Lab Units 01/15/25  0632 01/14/25  0642 01/10/25  0500   WBC 10*3/mm3 7.78 7.26 7.77   HEMOGLOBIN g/dL 13.9 15.2 12.5*   HEMATOCRIT % 40.7 43.9 38.5   PLATELETS 10*3/mm3 239 251 206     Results from last 7 days   Lab Units 01/15/25  0632 01/14/25  1041 01/11/25  0127 01/10/25  1436 01/10/25  0500   SODIUM mmol/L 136 137  --   --  139   POTASSIUM mmol/L 3.9 3.9 4.4   < > 3.4*   CHLORIDE mmol/L 101 97*  --   --  100   CO2 mmol/L 24.0 22.6  --   --  26.9   BUN mg/dL 23* 28*  --   --  10   CREATININE mg/dL 0.88 1.04  --   --  1.04   CALCIUM mg/dL 8.6 9.0  --   --  8.7   GLUCOSE mg/dL 131* 153*  --   --  171*    < > = values in this interval not displayed.

## 2025-01-15 NOTE — CASE MANAGEMENT/SOCIAL WORK
Continued Stay Note  [facility]     Patient Name: Jc Brannon Jr.  MRN: [id number]  Today's Date: 1/15/2025    Admit Date: 1/9/2025    Plan: DC to parent's house with [facility]. Family to transport.   Discharge Plan       Row Name 01/15/25 1212       Plan    Plan DC to parent's house with [facility]. Family to transport.    Patient/Family in Agreement with Plan yes    Plan Comments CCP met with pt at bedside to confirm DC plan. Pt confirms DC plan. Plan will be to DC home with parents and [facility]. Family will transport. Pt requests CCP update his parents. CCP LVM for pt's father/Jc. CCP will continue to follow for medical DC readiness and any DC needs. RLutes RN/CCP                   Discharge Codes    No documentation.                 Expected Discharge Date and Time       Expected Discharge Date Expected Discharge Time    Mykel 15, 2025               Bri Acuña RN

## 2025-01-16 LAB
ANION GAP SERPL CALCULATED.3IONS-SCNC: 10 MMOL/L (ref 5–15)
BUN SERPL-MCNC: 21 MG/DL (ref 6–20)
BUN/CREAT SERPL: 27.3 (ref 7–25)
CALCIUM SPEC-SCNC: 8.7 MG/DL (ref 8.6–10.5)
CHLORIDE SERPL-SCNC: 103 MMOL/L (ref 98–107)
CO2 SERPL-SCNC: 27 MMOL/L (ref 22–29)
CREAT SERPL-MCNC: 0.77 MG/DL (ref 0.76–1.27)
EGFRCR SERPLBLD CKD-EPI 2021: 111.8 ML/MIN/1.73
GLUCOSE SERPL-MCNC: 119 MG/DL (ref 65–99)
MAGNESIUM SERPL-MCNC: 2.3 MG/DL (ref 1.6–2.6)
PHOSPHATE SERPL-MCNC: 2.4 MG/DL (ref 2.5–4.5)
POTASSIUM SERPL-SCNC: 3.7 MMOL/L (ref 3.5–5.2)
SODIUM SERPL-SCNC: 140 MMOL/L (ref 136–145)

## 2025-01-16 PROCEDURE — 84100 ASSAY OF PHOSPHORUS: CPT | Performed by: SURGERY

## 2025-01-16 PROCEDURE — 25810000003 SODIUM CHLORIDE 0.9 % SOLUTION: Performed by: SURGERY

## 2025-01-16 PROCEDURE — 25010000002 ENOXAPARIN PER 10 MG: Performed by: SURGERY

## 2025-01-16 PROCEDURE — 83735 ASSAY OF MAGNESIUM: CPT | Performed by: SURGERY

## 2025-01-16 PROCEDURE — 99024 POSTOP FOLLOW-UP VISIT: CPT | Performed by: SURGERY

## 2025-01-16 PROCEDURE — 80048 BASIC METABOLIC PNL TOTAL CA: CPT | Performed by: SURGERY

## 2025-01-16 RX ORDER — FENTANYL/ROPIVACAINE/NS/PF 2-625MCG/1
15 PLASTIC BAG, INJECTION (ML) EPIDURAL ONCE
Status: COMPLETED | OUTPATIENT
Start: 2025-01-16 | End: 2025-01-16

## 2025-01-16 RX ADMIN — OXYCODONE HYDROCHLORIDE 5 MG: 5 TABLET ORAL at 21:08

## 2025-01-16 RX ADMIN — METHOCARBAMOL 750 MG: 750 TABLET ORAL at 17:44

## 2025-01-16 RX ADMIN — POTASSIUM PHOSPHATE, MONOBASIC AND POTASSIUM PHOSPHATE, DIBASIC 15 MMOL: 224; 236 INJECTION, SOLUTION, CONCENTRATE INTRAVENOUS at 15:17

## 2025-01-16 RX ADMIN — METHOCARBAMOL 750 MG: 750 TABLET ORAL at 11:20

## 2025-01-16 RX ADMIN — AMLODIPINE BESYLATE 10 MG: 10 TABLET ORAL at 09:12

## 2025-01-16 RX ADMIN — METHOCARBAMOL 750 MG: 750 TABLET ORAL at 20:34

## 2025-01-16 RX ADMIN — OXYCODONE HYDROCHLORIDE 10 MG: 5 TABLET ORAL at 02:53

## 2025-01-16 RX ADMIN — HYDROCHLOROTHIAZIDE 12.5 MG: 12.5 TABLET ORAL at 09:12

## 2025-01-16 RX ADMIN — GABAPENTIN 100 MG: 100 CAPSULE ORAL at 15:17

## 2025-01-16 RX ADMIN — GABAPENTIN 100 MG: 100 CAPSULE ORAL at 09:12

## 2025-01-16 RX ADMIN — ACETAMINOPHEN 1000 MG: 500 TABLET, FILM COATED ORAL at 06:19

## 2025-01-16 RX ADMIN — ACETAMINOPHEN 1000 MG: 500 TABLET, FILM COATED ORAL at 17:44

## 2025-01-16 RX ADMIN — ATOMOXETINE 18 MG: 18 CAPSULE ORAL at 09:13

## 2025-01-16 RX ADMIN — GABAPENTIN 100 MG: 100 CAPSULE ORAL at 20:34

## 2025-01-16 RX ADMIN — METHOCARBAMOL 750 MG: 750 TABLET ORAL at 09:12

## 2025-01-16 RX ADMIN — ACETAMINOPHEN 1000 MG: 500 TABLET, FILM COATED ORAL at 11:20

## 2025-01-16 RX ADMIN — ENOXAPARIN SODIUM 40 MG: 100 INJECTION SUBCUTANEOUS at 09:12

## 2025-01-16 NOTE — PROGRESS NOTES
Colorectal & General Surgery  Progress Note    Patient: Jc Brannon Jr.  YOB: 1980  MRN: [id number]      Assessment  Jc Brannon Jr. is a 44 y.o. male with low to mid rectal adenocarcinoma status post total neoadjuvant therapy who is postoperative day 7 from laparoscopic low anterior resection with diverting loop ileostomy creation.    He has developed an ileus.  Continues to have some output from his ileostomy and very little nausea with no emesis since Tuesday afternoon.    We will continue to allow him some water and clear liquids every few hours, but I do not think that we can bring him a full tray as he will eat all of it.    Plan  Continue liquid diet with current limitations  Continue maintenance intravenous fluids  Continue education regarding his ileostomy  Anticipate advancing his diet some later today versus tomorrow depending on how he does      Subjective  No significant events.  Less nausea and having good ileostomy output with no further emesis.    Objective    Vitals:    01/16/25 0730   BP: 132/81   Pulse: 98   Resp:    Temp: 97.5 °F (36.4 °C)   SpO2: 96%       Physical Exam  Constitutional: Well-developed well-nourished, no acute distress  Neck: Supple, trachea midline  Respiratory: No increased work of breathing, Symmetric excursion  Cardiovascular: Well pefursed, no jugular venous distention evident   Abdominal: Soft, not particularly tender, incisions in good order, distended but less so.  Skin: Warm, dry, no rash on visualized skin surfaces  Psychiatric: Alert and oriented ×3, normal affect            See Isaac MD  Colorectal & General Surgery  [facility]    [address], [address]  [place], [place], [address]  P: [phone]  F: [phone]

## 2025-01-16 NOTE — PLAN OF CARE
Goal Outcome Evaluation:                    Pt A/Ox4, RA, up adlib. Clear liquids provided q4 per  with an 8oz limit. Pt as had jello, apple juice and broth. Tolerating clears. Pt reports he emptied his ileostomy bag once, educated him on measuring the amount emptied or calling for staff when it needs to be emptied so we can measure his output. Phosphorus replaced. Plan of care ongoing, VSS.

## 2025-01-16 NOTE — PLAN OF CARE
Goal Outcome Evaluation:      Patient is alert and oriented x 4, vitals stable, room air, up ad maine, complaints of pain, see MAR for all medications administered, NPO but can have 8 ounces of clear liquids every 4 hours. IV fluids infusing, safety precautions in use, plan of care ongoing.

## 2025-01-17 PROCEDURE — 25010000002 ENOXAPARIN PER 10 MG: Performed by: SURGERY

## 2025-01-17 PROCEDURE — 99024 POSTOP FOLLOW-UP VISIT: CPT | Performed by: SURGERY

## 2025-01-17 RX ORDER — METHOCARBAMOL 750 MG/1
750 TABLET, FILM COATED ORAL EVERY 6 HOURS PRN
Status: DISCONTINUED | OUTPATIENT
Start: 2025-01-17 | End: 2025-01-19 | Stop reason: HOSPADM

## 2025-01-17 RX ADMIN — GABAPENTIN 100 MG: 100 CAPSULE ORAL at 09:26

## 2025-01-17 RX ADMIN — ENOXAPARIN SODIUM 40 MG: 100 INJECTION SUBCUTANEOUS at 09:26

## 2025-01-17 RX ADMIN — HYDROCHLOROTHIAZIDE 12.5 MG: 12.5 TABLET ORAL at 09:26

## 2025-01-17 RX ADMIN — GABAPENTIN 100 MG: 100 CAPSULE ORAL at 20:22

## 2025-01-17 RX ADMIN — ACETAMINOPHEN 1000 MG: 500 TABLET, FILM COATED ORAL at 11:47

## 2025-01-17 RX ADMIN — OXYCODONE HYDROCHLORIDE 10 MG: 5 TABLET ORAL at 20:22

## 2025-01-17 RX ADMIN — ATOMOXETINE 18 MG: 18 CAPSULE ORAL at 09:26

## 2025-01-17 RX ADMIN — METHOCARBAMOL 750 MG: 750 TABLET ORAL at 11:47

## 2025-01-17 RX ADMIN — METHOCARBAMOL 750 MG: 750 TABLET ORAL at 09:26

## 2025-01-17 RX ADMIN — ACETAMINOPHEN 1000 MG: 500 TABLET, FILM COATED ORAL at 17:22

## 2025-01-17 RX ADMIN — AMLODIPINE BESYLATE 10 MG: 10 TABLET ORAL at 09:26

## 2025-01-17 RX ADMIN — ACETAMINOPHEN 1000 MG: 500 TABLET, FILM COATED ORAL at 06:14

## 2025-01-17 RX ADMIN — GABAPENTIN 100 MG: 100 CAPSULE ORAL at 17:22

## 2025-01-17 RX ADMIN — ACETAMINOPHEN 1000 MG: 500 TABLET, FILM COATED ORAL at 23:03

## 2025-01-17 NOTE — PLAN OF CARE
Goal Outcome Evaluation:      Patient is alert and oriented x 4, vitals stable, room air, up ad maine, has been emptying his ostomy himself, complaints of pain, see MAR for all medications administered, IVF infusing, safety precautions in use, plan of care ongoing.

## 2025-01-17 NOTE — PLAN OF CARE
Goal Outcome Evaluation:              Outcome Evaluation: a&o x4, vitals stable, no prns required, minimal complaints of pain, no c/o nausea, advanced to full liquids--encouraged patient to take things slow, stool and flatus per ileostomy, IV saline locked

## 2025-01-17 NOTE — PROGRESS NOTES
Colorectal & General Surgery  Progress Note    Patient: Jc Brannon Jr.  YOB: 1980  MRN: [id number]      Assessment  Jc Brannon Jr. is a 44 y.o. male with low to mid rectal adenocarcinoma status post total neoadjuvant therapy who is postoperative day 8 from laparoscopic low anterior resection with diverting loop ileostomy creation.  His ileus seems to be improving.  I will advance his diet to full liquids today.    Anticipate that he may be able to go home by the end of the weekend or early next week.    Subjective  No significant events.  No nausea or vomiting.  Doing much better with his stoma care.  Passing flatus via his ileostomy.    Objective    Vitals:    01/17/25 0730   BP: 132/84   Pulse: 108   Resp: 18   Temp: 98.1 °F (36.7 °C)   SpO2: 94%       Physical Exam  Constitutional: Well-developed well-nourished, no acute distress  Neck: Supple, trachea midline  Respiratory: No increased work of breathing, Symmetric excursion  Cardiovascular: Well pefursed, no jugular venous distention evident   Abdominal: Ileostomy is pink and by the skin.  Soft, non-tender, less distended  Skin: Warm, dry, no rash on visualized skin surfaces  Psychiatric: Alert and oriented ×3, normal affect          See Isaac MD  Colorectal & General Surgery  [facility]    [address] [place] [address], [address]  [place], [place], [address]  P: [phone]  F: [phone]

## 2025-01-17 NOTE — NURSING NOTE
"   01/17/25 1001   Ileostomy Loop   Placement date: If unknown, DO NOT use \"Add Comment\" note: 01/09/25   Ileostomy Type: Loop   Stomal Appliance 2 piece;Clean;Dry;Intact;Changed;Drainable   Stoma Appearance round;moist;red   Peristomal Assessment Clean;Intact   Accessories/Skin Care cleansed with water;skin barrier ring   Stoma Function flatus;stool   Stool Color brown, light   Stool Consistency liquid   Treatment Bag change;Site care   Output (mL) 50 mL     WOCN  Ostomy education provided to the patient/family:    [x]Pouch changed   [x]Pt observed   [x]Pt participated    []CG participated            []Instructed in frequency of pouch change  [x]Pouch emptied and cleaned, demonstrated use of pouch closure    [x]Pt observed   [x]Pt participated    []CG participated    [x]Instructed to empty pouch when 1/3 to 1/2 full  []Teaching packet/handouts provided/reviewed  []Return to ADL's, showering, clothing  [x]Peristomal skin care, avoiding use of soaps with moisturizers  []Diet   []Adequate po fluid intake   []S/S of dehydration   []Foods to thicken stool   []Foods to avoid to prevent blockage  [x]Supplies at bedside  [x]Samples ordered: Discussed with patient the samples are being delivered to his home. He will need his parents to go check his mail. Verbalizes understanding. Patient states he has not been sick this morning. He states he empty pouch per self. Pouch wasn't closed completely. Teach back patient is able to verbalize frequency pouch change and when to empty pouch. Continues to need much coaching on ostomy care.     Current Supplies: 2 1/4 2 piece ethan with adapt ring.     WOC Team follow up plan: Will continue to follow up and change pouch Monday. Patient states I am trying to get this. I verbalized to patient he is doing a great job.     "

## 2025-01-17 NOTE — CASE MANAGEMENT/SOCIAL WORK
Continued Stay Note  [facility]     Patient Name: Jc Brannon Jr.  MRN: [id number]  Today's Date: 1/17/2025    Admit Date: 1/9/2025    Plan: DC to parent's home with [facility]. Family to transport.   Discharge Plan       Row Name 01/17/25 0917       Plan    Plan DC to parent's home with [facility]. Family to transport.    Patient/Family in Agreement with Plan yes    Plan Comments CCP met with pt at bedside to confirm DC. Pt confirms plan remains the same. Plan will be for pt to DC to his parent's home with [facility]. Family will transport. CCP s/w pt's father/Jc via phone call to confirm DC plan with him. Pt's father confirms DC plan. CCP will continue to follow for medical DC readiness and any DC needs. RLutes RN/CCP                   Discharge Codes    No documentation.                 Expected Discharge Date and Time       Expected Discharge Date Expected Discharge Time    Jan 17, 2025               Bri Acuña RN

## 2025-01-18 PROCEDURE — 25010000002 ENOXAPARIN PER 10 MG: Performed by: SURGERY

## 2025-01-18 PROCEDURE — 99024 POSTOP FOLLOW-UP VISIT: CPT | Performed by: SURGERY

## 2025-01-18 RX ORDER — ACETAMINOPHEN 325 MG/1
650 TABLET ORAL EVERY 6 HOURS PRN
Status: DISCONTINUED | OUTPATIENT
Start: 2025-01-18 | End: 2025-01-19 | Stop reason: HOSPADM

## 2025-01-18 RX ADMIN — GABAPENTIN 100 MG: 100 CAPSULE ORAL at 20:12

## 2025-01-18 RX ADMIN — HYDROCHLOROTHIAZIDE 12.5 MG: 12.5 TABLET ORAL at 08:58

## 2025-01-18 RX ADMIN — GABAPENTIN 100 MG: 100 CAPSULE ORAL at 17:00

## 2025-01-18 RX ADMIN — AMLODIPINE BESYLATE 10 MG: 10 TABLET ORAL at 08:58

## 2025-01-18 RX ADMIN — OXYCODONE HYDROCHLORIDE 10 MG: 5 TABLET ORAL at 21:58

## 2025-01-18 RX ADMIN — ATOMOXETINE 18 MG: 18 CAPSULE ORAL at 08:58

## 2025-01-18 RX ADMIN — ACETAMINOPHEN 1000 MG: 500 TABLET, FILM COATED ORAL at 05:31

## 2025-01-18 RX ADMIN — GABAPENTIN 100 MG: 100 CAPSULE ORAL at 08:58

## 2025-01-18 RX ADMIN — OXYCODONE HYDROCHLORIDE 10 MG: 5 TABLET ORAL at 13:39

## 2025-01-18 RX ADMIN — ENOXAPARIN SODIUM 40 MG: 100 INJECTION SUBCUTANEOUS at 08:58

## 2025-01-18 NOTE — PROGRESS NOTES
Postop day 9 laparoscopic low anterior resection with diverting loop ileostomy and ileus    Tolerated full liquid diet.  Ostomy functioning.  No nausea or vomiting.    Afebrile vital signs stable.  No new lab work.  Ostomy output 850 mL last 24 hours.  Abdomen is soft, nondistended, and minimally if at all tender.  Incisions healing well.    Advance to regular diet  Likely home tomorrow

## 2025-01-18 NOTE — PROGRESS NOTES
Nutrition Services    Patient Name:  Jc Brannon Jr.  YOB: 1980  MRN: [id number]  Admit Date:  1/9/2025    Assessment Date:  01/17/25    CLINICAL NUTRITION - PROGRESS NOTE       Reason for Encounter Follow-up         Nutrition Order Diet: Liquid; Full Liquid; Fluid Consistency: Thin (IDDSI 0),     Supplements/Snacks  None      Meds    Labs    GI    Weight   Noted    BUN 21, Glu 119, Phos 2.4    + BM 1/16/25, nausea 1/17    101 kg (1/10/25)         Pertinent Information Noted RN today notes no nausea during most of shift and assisted patient with slow intake and saw improved tolerance.  Noted MD with notes of improvement in ileus.        Intervention/Plan Will continue to follow for tolerance and diet advance.     RD to follow up per protocol.    Electronically signed by:  Skyler Hines RD  01/17/25 19:49 EST

## 2025-01-18 NOTE — PLAN OF CARE
Goal Outcome Evaluation:  Plan of Care Reviewed With: patient        Progress: improving  Outcome Evaluation: patient alert and oriented, on room air, pain medication given once for voiced pain, ostomy site intact with noted output, diet increased to regular diet and tolerating no nausea voiced, incision sites clean dry intact, vitals stable, plan of care continued

## 2025-01-19 ENCOUNTER — READMISSION MANAGEMENT (OUTPATIENT)
Dept: CALL CENTER | Facility: HOSPITAL | Age: 45
End: 2025-01-19
Payer: COMMERCIAL

## 2025-01-19 ENCOUNTER — TRANSCRIBE ORDERS (OUTPATIENT)
Dept: HOME HEALTH SERVICES | Facility: HOME HEALTHCARE | Age: 45
End: 2025-01-19
Payer: COMMERCIAL

## 2025-01-19 ENCOUNTER — DOCUMENTATION (OUTPATIENT)
Dept: HOME HEALTH SERVICES | Facility: HOME HEALTHCARE | Age: 45
End: 2025-01-19
Payer: COMMERCIAL

## 2025-01-19 ENCOUNTER — HOME HEALTH ADMISSION (OUTPATIENT)
Dept: HOME HEALTH SERVICES | Facility: HOME HEALTHCARE | Age: 45
End: 2025-01-19
Payer: COMMERCIAL

## 2025-01-19 VITALS
HEIGHT: 70 IN | OXYGEN SATURATION: 100 % | SYSTOLIC BLOOD PRESSURE: 125 MMHG | WEIGHT: 221.56 LBS | DIASTOLIC BLOOD PRESSURE: 86 MMHG | HEART RATE: 98 BPM | TEMPERATURE: 97.1 F | RESPIRATION RATE: 18 BRPM | BODY MASS INDEX: 31.72 KG/M2

## 2025-01-19 DIAGNOSIS — C20 MALIGNANT NEOPLASM OF RECTUM: Primary | ICD-10-CM

## 2025-01-19 PROCEDURE — 25010000002 ENOXAPARIN PER 10 MG: Performed by: SURGERY

## 2025-01-19 RX ORDER — HYDROCODONE BITARTRATE AND ACETAMINOPHEN 5; 325 MG/1; MG/1
1 TABLET ORAL EVERY 4 HOURS PRN
Qty: 10 TABLET | Refills: 0 | Status: SHIPPED | OUTPATIENT
Start: 2025-01-19

## 2025-01-19 RX ORDER — LOPERAMIDE HYDROCHLORIDE 2 MG/1
2 CAPSULE ORAL
Qty: 120 CAPSULE | Refills: 2 | Status: SHIPPED | OUTPATIENT
Start: 2025-01-19 | End: 2025-04-19

## 2025-01-19 RX ORDER — LOPERAMIDE HYDROCHLORIDE 2 MG/1
2 CAPSULE ORAL
Status: DISCONTINUED | OUTPATIENT
Start: 2025-01-19 | End: 2025-01-19 | Stop reason: HOSPADM

## 2025-01-19 RX ADMIN — HYDROCHLOROTHIAZIDE 12.5 MG: 12.5 TABLET ORAL at 08:43

## 2025-01-19 RX ADMIN — LOPERAMIDE HYDROCHLORIDE 2 MG: 2 CAPSULE ORAL at 12:13

## 2025-01-19 RX ADMIN — LOPERAMIDE HYDROCHLORIDE 2 MG: 2 CAPSULE ORAL at 08:43

## 2025-01-19 RX ADMIN — GABAPENTIN 100 MG: 100 CAPSULE ORAL at 08:43

## 2025-01-19 RX ADMIN — ENOXAPARIN SODIUM 40 MG: 100 INJECTION SUBCUTANEOUS at 08:43

## 2025-01-19 RX ADMIN — ATOMOXETINE 18 MG: 18 CAPSULE ORAL at 08:43

## 2025-01-19 RX ADMIN — AMLODIPINE BESYLATE 10 MG: 10 TABLET ORAL at 08:43

## 2025-01-19 NOTE — OUTREACH NOTE
Prep Survey      Flowsheet Row Responses   [facility] patient discharged from? [place]   Is LACE score < 7 ? No   Eligibility [facility]   Date of Admission 01/09/25   Date of Discharge 01/19/25   Discharge Disposition Home-Health Care [facility]   Discharge diagnosis Rectal adenocarcinoma,  Laparoscopic, possible open, low anterior resection with diverting loop ileostomy and appendectomy   Does the patient have one of the following disease processes/diagnoses(primary or secondary)? General Surgery   Does the patient have Home health ordered? Yes   [facility] the [facility]   Is there a DME ordered? No   Prep survey completed? Yes            Lucina HOPE - Registered Nurse

## 2025-01-19 NOTE — PLAN OF CARE
Goal Outcome Evaluation:  Plan of Care Reviewed With: patient        Progress: no change     Patient alert and oriented on room air. Vital sign stable, he had a calm rest all through the shift. Patient was carried along with his plan of care and safety ensured. No new complain made. I will continue to monitor and render care as needed during the shift.

## 2025-01-19 NOTE — PROGRESS NOTES
[facility] will follow post hospital as requested. Patient agreeable to service. Contact information confirmed. Patient will be staying with parents at [address] [place] [address], [place] [address]. Home Health should call father at [phone] to schedule home visits.

## 2025-01-19 NOTE — CASE MANAGEMENT/SOCIAL WORK
Case Management Discharge Note      Final Note: DC to parent's home with [facility]. Family to transport.         Selected Continued Care - Discharged on 1/19/2025 Admission date: 1/9/2025 - Discharge disposition: Home or Self Care      Destination    No services have been selected for the patient.                Durable Medical Equipment    No services have been selected for the patient.                Dialysis/Infusion    No services have been selected for the patient.                Home Medical Care       Service Provider Services Address Phone Fax Patient Preferred    [facility], Home Nursing, Home Rehabilitation [address] [place] [id number] [phone] --              Therapy    No services have been selected for the patient.                Community Resources    No services have been selected for the patient.                Community & DME    No services have been selected for the patient.                    Transportation Services  Private: Car    Final Discharge Disposition Code: 06 - home with home health care

## 2025-01-20 ENCOUNTER — TRANSITIONAL CARE MANAGEMENT TELEPHONE ENCOUNTER (OUTPATIENT)
Dept: CALL CENTER | Facility: HOSPITAL | Age: 45
End: 2025-01-20
Payer: COMMERCIAL

## 2025-01-20 ENCOUNTER — TELEPHONE (OUTPATIENT)
Dept: PSYCHIATRY | Facility: HOSPITAL | Age: 45
End: 2025-01-20
Payer: COMMERCIAL

## 2025-01-20 NOTE — OUTREACH NOTE
Call Center TCM Note      Flowsheet Row Responses   [facility] patient discharged from? [place]   Does the patient have one of the following disease processes/diagnoses(primary or secondary)? General Surgery   TCM attempt successful? Yes   Call start time 1501   Call end time 1512   Meds reviewed with patient/caregiver? Yes   Is the patient having any side effects they believe may be caused by any medication additions or changes? No   Does the patient have all medications related to this admission filled (includes all antibiotics, pain medications, etc.) Yes   Is the patient taking all medications as directed (includes completed medication regime)? Yes   Comments Post op surgeon appt 01/23/25, radiation oncology 02/20/25, oncology 03/04/25, PCP appt 04/29/25.   Does the patient have an appointment with their PCP within 7-14 days of discharge? No   Nursing Interventions Patient desires to follow up with specialty only, Routed TCM call to PCP office   Has home health visited the patient within 72 hours of discharge? No   Home health interventions Called Home Health agency  [Left message with [facility] intake to call if does not have orders.]   DME comments Has ostomy and supplies.   Psychosocial issues? No   Psychosocial comments Patient's father is able to assist him if needed.   Did the patient receive a copy of their discharge instructions? Yes   Nursing interventions Reviewed instructions with patient   What is the patient's perception of their health status since discharge? Improving   Nursing interventions Nurse provided patient education   Is the patient /caregiver able to teach back basic post-op care? Drive as instructed by MD in discharge instructions, Take showers only when approved by MD-sponge bathe until then, No tub bath, swimming, or hot tub until instructed by MD, Keep incision areas clean,dry and protected, Do not remove steri-strips, Lifting as instructed by MD in discharge instructions   Is the  patient/caregiver able to teach back signs and symptoms of incisional infection? Increased redness, swelling or pain at the incisonal site, Increased drainage or bleeding, Incisional warmth, Pus or odor from incision, Fever   Is the patient/caregiver able to teach back steps to recovery at home? Set small, achievable goals for return to baseline health, Rest and rebuild strength, gradually increase activity, Practice good oral hygiene, Eat a well-balance diet   If the patient is a current smoker, are they able to teach back resources for cessation? Not a smoker   Is the patient/caregiver able to teach back the hierarchy of who to call/visit for symptoms/problems? PCP, Specialist, Home health nurse, Urgent Care, ED, 911 Yes   TCM call completed? Yes   Wrap up additional comments Patient states is improving. Denies any s/s of infection. Reviewed medications per AVS. Denies any needs at this time. Left message with [facility] to call if any problem with orders. TCM complete.   Call end time 1512   Would this patient benefit from a Referral to [facility] Social Work? No   Is the patient interested in additional calls from an ambulatory ? No            Yue Marte RN    1/20/2025, 15:13 EST

## 2025-01-20 NOTE — PAYOR COMM NOTE
"Jc Riggs Jr. (44 y.o. Male)          DC SUMMARY FOR [id number]      F# [phone]         Date of Birth   1980    Social Security Number       Address   [address] [place] [address]    Home Phone   [phone]    MRN   [id number]       Rastafarian   Moravian    Marital Status   Single                            Admission Date   1/9/25    Admission Type   Elective    Admitting Provider   Ori Isaac MD    Attending Provider       Department, Room/Bed   [facility] [address] [place], P393/1       Discharge Date   1/19/2025    Discharge Disposition   Home or Self Care    Discharge Destination                                 Attending Provider: (none)   Allergies: Latex    Isolation: None   Infection: None   Code Status: Prior    Ht: 177 cm (69.69\")   Wt: 101 kg (221 lb 9 oz)    Admission Cmt: None   Principal Problem: Rectal adenocarcinoma [C20]                   Active Insurance as of 1/9/2025       Primary Coverage       Payor Plan Insurance Group Employer/Plan Group    [facility] [employer] [facility] [employer] [facility] [id number]       Payor Plan Address Payor Plan Phone Number Payor Plan Fax Number Effective Dates    [address] [phone]  1/1/2025 - None Entered    [place] [address]         Subscriber Name Subscriber Birth Date Member ID       JC RIGGS JR. 1980 [id number]                     Emergency Contacts        (Rel.) Home Phone Work Phone Mobile Phone    Jc Riggs SR (Father) [phone] -- --                 Discharge Summary        Gary Paul MD at 01/19/25 1556          DATE OF ADMIT:    1/9/2025    DATE OF DISCHARGE:    1/19/2025    DIAGNOSIS:    Mid to low rectal cancer status post neoadjuvant treatment    FINAL PATHOLOGY:    ypT2N0  Invasive adenocarcinoma  Moderately differentiated  Negative distal margin, 3.5 cm  No perineural or lymphovascular invasion  Extensive treatment effect  18 negative lymph " nodes, 1 with extensive treatment effect but no residual tumor  Benign appendix    PROCEDURES:    1/9/2025  Laparoscopic low anterior resection with creation of low pelvic coloproctostomy  Laparoscopic creation of diverting loop ileostomy  Laparoscopic mobilization of the splenic flexure  Flexible sigmoidoscopy  Laparoscopic appendectomy    LABORATORY SUMMARY:  1/16/2025  Magnesium 2.3  Glucose 119, BMP otherwise normal  WBC 7.8, hemoglobin 13.9, platelets 239    SUMMARY OF HOSPITAL COURSE:     Postoperative course was marked by postoperative ileus.  With conservative management this resolved and by discharge he was tolerating regular diet, having good ostomy function, and pain was minimal.  Last recorded vitals: Temp 97.1, pulse 98, respiratory rate 18, blood pressure 125/86  Exam on discharge day: Abdomen was soft, nondistended, nontender, incisions healing well, ostomy viable and functioning    DIET:  Regular    ACTIVITY:  Walking encouraged, no lifting or strenuous activity    MEDICATIONS:  No changes were made to preadmission medication list  Prescriptions given for:  Norco 5/325, #10  Imodium 2 mg p.o. before every meal and at bedtime (instructed to call if ostomy output greater than 1 L/day)    FOLLOW-UP:   Thursday, January 23 with Dr. Poncho Paul M.D.    Electronically signed by Gary Paul MD at 01/20/25 8502

## 2025-01-20 NOTE — TELEPHONE ENCOUNTER
Supportive Oncology Services    Supportive call placed to pt regarding recent surgery.     Pt reports being a little sore, managed appropriately with available pain medication. Shares appreciation of surgery, understanding surgeon was able to fully excise cancer, and feels this was successful. Denies intrusive anxiety at this time, prepared for follow up with medical team later this week for ongoing care. Continues to hope for eventual reversal of colostomy.    Denies additional needs at this time.

## 2025-01-20 NOTE — DISCHARGE SUMMARY
DATE OF ADMIT:    1/9/2025    DATE OF DISCHARGE:    1/19/2025    DIAGNOSIS:    Mid to low rectal cancer status post neoadjuvant treatment    FINAL PATHOLOGY:    ypT2N0  Invasive adenocarcinoma  Moderately differentiated  Negative distal margin, 3.5 cm  No perineural or lymphovascular invasion  Extensive treatment effect  18 negative lymph nodes, 1 with extensive treatment effect but no residual tumor  Benign appendix    PROCEDURES:    1/9/2025  Laparoscopic low anterior resection with creation of low pelvic coloproctostomy  Laparoscopic creation of diverting loop ileostomy  Laparoscopic mobilization of the splenic flexure  Flexible sigmoidoscopy  Laparoscopic appendectomy    LABORATORY SUMMARY:  1/16/2025  Magnesium 2.3  Glucose 119, BMP otherwise normal  WBC 7.8, hemoglobin 13.9, platelets 239    SUMMARY OF HOSPITAL COURSE:     Postoperative course was marked by postoperative ileus.  With conservative management this resolved and by discharge he was tolerating regular diet, having good ostomy function, and pain was minimal.  Last recorded vitals: Temp 97.1, pulse 98, respiratory rate 18, blood pressure 125/86  Exam on discharge day: Abdomen was soft, nondistended, nontender, incisions healing well, ostomy viable and functioning    DIET:  Regular    ACTIVITY:  Walking encouraged, no lifting or strenuous activity    MEDICATIONS:  No changes were made to preadmission medication list  Prescriptions given for:  Norco 5/325, #10  Imodium 2 mg p.o. before every meal and at bedtime (instructed to call if ostomy output greater than 1 L/day)    FOLLOW-UP:   Thursday, January 23 with Dr. Poncho Paul M.D.

## 2025-01-22 ENCOUNTER — HOME CARE VISIT (OUTPATIENT)
Dept: HOME HEALTH SERVICES | Facility: HOME HEALTHCARE | Age: 45
End: 2025-01-22

## 2025-01-22 ENCOUNTER — HOME CARE VISIT (OUTPATIENT)
Dept: HOME HEALTH SERVICES | Facility: HOME HEALTHCARE | Age: 45
End: 2025-01-22
Payer: COMMERCIAL

## 2025-01-22 VITALS
OXYGEN SATURATION: 99 % | TEMPERATURE: 97.8 F | SYSTOLIC BLOOD PRESSURE: 122 MMHG | DIASTOLIC BLOOD PRESSURE: 70 MMHG | HEART RATE: 101 BPM | RESPIRATION RATE: 18 BRPM

## 2025-01-22 PROCEDURE — G0299 HHS/HOSPICE OF RN EA 15 MIN: HCPCS

## 2025-01-22 NOTE — Clinical Note
"SOC Note: Patient had Malignant neoplasm of rectum and surgery to remove CA. Patient has an ileostomy; education provided and SN changed pouch. Patient is to have surgery in March to determine if the ileostomy will be reversed. Supplies in the home; SN ordered more today as well. Education provided about Immodium, increasing fluids, and diet.      Home Health ordered for: disciplines SN 2 wk 2; 1 wk 4    Reason for Hosp/Primary Dx/Co-morbidities: Malignant neoplasm of rectum     Focus of Care: Attention to Ostomy care    Patient's goal(s):\"to get back to normal and get rid of the pouch.\"    Current Functional status/mobility/DME: Patient has no assistive devices present in the home.     HB status/Living Arrangements: Patient is homebound and is currently staying with parents.     Skin Integrity/wound status: Incisions present on ABD; assessment completed; no issues.     Code Status: CPR    Fall Risk/Safety concerns: Patient is a low falls risk.     Educated on Emergency Plan, steps to take prior to going to the ER and when to Call Home Health First:  Education provided.     Medication issues/Concerns:Numerous medications; HIGH RISK MEDS; education provided about side effects and how to take.     Additional Problems/Concerns: Patient is developmentally delayed; has a readiness to learn and support in the home.    SDOH Barriers (i.e. caregiver concerns, social isolation, transportation, food insecurity, environment, income etc.)/Need for MSW: NA"

## 2025-01-22 NOTE — Clinical Note
Please order    4 boxes of ethan [id number] 57 mm 2 1/4 in  4 boxes of [employer] CeraRing 8805  4 boxes of CeraPlus Ref- [id number] 57 mm 44mm LOT [id number]  1 bottle of adhesive removed spray  4 boxes of barrier strips    Thank you!

## 2025-01-23 ENCOUNTER — OFFICE VISIT (OUTPATIENT)
Dept: SURGERY | Facility: CLINIC | Age: 45
End: 2025-01-23
Payer: COMMERCIAL

## 2025-01-23 VITALS
HEART RATE: 104 BPM | WEIGHT: 214.8 LBS | DIASTOLIC BLOOD PRESSURE: 78 MMHG | BODY MASS INDEX: 31.81 KG/M2 | HEIGHT: 69 IN | SYSTOLIC BLOOD PRESSURE: 120 MMHG | OXYGEN SATURATION: 97 %

## 2025-01-23 DIAGNOSIS — C20 RECTAL ADENOCARCINOMA: Primary | ICD-10-CM

## 2025-01-23 PROBLEM — Z93.2 ILEOSTOMY STATUS: Status: ACTIVE | Noted: 2025-01-23

## 2025-01-23 PROCEDURE — 99024 POSTOP FOLLOW-UP VISIT: CPT | Performed by: SURGERY

## 2025-01-23 NOTE — PROGRESS NOTES
Colorectal & General Surgery  Post - Op    Patient: Jc Brannon Jr.  YOB: 1980  MRN: [id number]      Assessment  Jc Brannon Jr. is a 44 y.o. male with stage III rectal adenocarcinoma status post total neoadjuvant therapy was recently undergone laparoscopic low anterior resection with diverting loop ileostomy.  He is recovering well from surgery.    We will plan to obtain water-soluble barium enema study in the next few weeks.  I will see him in the office in 2 weeks.  At that time, we will schedule ileostomy closure.    From an oncologic standpoint, his pathology demonstrates ypT2 N0 rectal adenocarcinoma.  He has appropriate follow-up with Dr. Pak and with radiation oncology.    History of Present Illness   Jc Brannon Jr. is a 44 y.o. male who presents in postoperative follow-up with no complaints today.  He is doing well with his ileostomy.  Tolerating a diet.    Vital Signs  Vitals:    01/23/25 0907   BP: 120/78   Pulse: 104   SpO2: 97%        Physical Exam  Constitutional: Resting comfortably, no acute distress  Neck: Supple, trachea midline  Respiratory: No increased work of breathing, Symmetric excursion  Cardiovascular: Well pefursed, no jugular venous distention evident   Abdominal: Incisions in good order.  Ileostomy pink and above the skin.  Soft, non-tender, non-distended  Lymphatics: No cervical or suprascapular adenopathy  Skin: Warm, dry, no rash on visualized skin surfaces  Musculoskeletal: Symmetric strength, no obvious gross abnormalities  Psychiatric: Alert and oriented ×3, normal affect       Pathology  I have personally reviewed pathology results with the patient demonstrating invasive rectal adenocarcinoma, ypT2 N0    See Isaac MD  Colorectal & General Surgery  [facility]    [address], [address]  [place], [place], [address]  P: [phone]  F: [phone]

## 2025-01-24 ENCOUNTER — PATIENT OUTREACH (OUTPATIENT)
Dept: OTHER | Facility: HOSPITAL | Age: 45
End: 2025-01-24
Payer: COMMERCIAL

## 2025-01-24 ENCOUNTER — HOME CARE VISIT (OUTPATIENT)
Dept: HOME HEALTH SERVICES | Facility: HOME HEALTHCARE | Age: 45
End: 2025-01-24
Payer: COMMERCIAL

## 2025-01-24 VITALS — HEART RATE: 99 BPM | OXYGEN SATURATION: 97 %

## 2025-01-24 PROCEDURE — G0299 HHS/HOSPICE OF RN EA 15 MIN: HCPCS

## 2025-01-24 NOTE — PROGRESS NOTES
Reviewed chart. Patient IP 1/9-1/19/25, developed ileus post op. Dc'd with [facility].  Patient met with Dr. Isaac yesterday. Per his note, patient with stage III rectal adenocarcinoma status post total neoadjuvant therapy was recently undergone laparoscopic low anterior resection with diverting loop ileostomy.  He is recovering well from surgery. His pathology demonstrates ypT2 N0 rectal adenocarcinoma.   Sees Dr. Isaac again 2/10/25, will discuss ileostomy closure.  Sees Dr. Nobles 2/20, Dr. Pak 3/4/25.  Ongoing visits with Jennie.     Called patient and his father.  We discussed his recent surgery. The patient states he is doing well with his ostomy. We discussed taking pain medication as needed with food. Patient verbalized understanding.  We discussed his 2/7/25 barium enema at [facility] and f/u with Dr. Isaac 2/10.  They are hoping his ostomy will be able to be reversed in the future.    The patient/father denies any questions/concerns or ongoing resource needs. Navigation will continue to follow; encouraged patient/father to call as needed.       Contacted Jacqueline since patient's in person appt with Jennie on 2/7 @  Jami is right before his barium enema at [facility].  Jacqueline will work on changing his appt with Jennie

## 2025-01-27 ENCOUNTER — HOME CARE VISIT (OUTPATIENT)
Dept: HOME HEALTH SERVICES | Facility: HOME HEALTHCARE | Age: 45
End: 2025-01-27
Payer: COMMERCIAL

## 2025-01-27 ENCOUNTER — TELEPHONE (OUTPATIENT)
Dept: SURGERY | Facility: CLINIC | Age: 45
End: 2025-01-27
Payer: COMMERCIAL

## 2025-01-27 VITALS
OXYGEN SATURATION: 98 % | HEART RATE: 90 BPM | RESPIRATION RATE: 17 BRPM | TEMPERATURE: 98.7 F | DIASTOLIC BLOOD PRESSURE: 76 MMHG | SYSTOLIC BLOOD PRESSURE: 128 MMHG

## 2025-01-27 PROCEDURE — G0299 HHS/HOSPICE OF RN EA 15 MIN: HCPCS

## 2025-01-27 NOTE — TELEPHONE ENCOUNTER
Called and spoke with patient, he stated that he is currently not experiencing any pain. Advised patient if he does start to experience any pain to try tylenol or ibuprofen, and if that doesn't work to give our office a call back to refill his pain medication. Patient verbalized understanding.

## 2025-01-27 NOTE — TELEPHONE ENCOUNTER
----- Message from Ori Isaac sent at 1/27/2025  8:15 AM EST -----  Regarding: FW: running low on pain meds  Do you all mind to check on him? I can send him some tramadol if needed but prefer he use tylenol and ibuprofen if he can handle that.

## 2025-01-29 ENCOUNTER — READMISSION MANAGEMENT (OUTPATIENT)
Dept: CALL CENTER | Facility: HOSPITAL | Age: 45
End: 2025-01-29
Payer: COMMERCIAL

## 2025-01-29 NOTE — OUTREACH NOTE
General Surgery Week 2 Survey      Flowsheet Row Responses   [facility] patient discharged from? [place]   Does the patient have one of the following disease processes/diagnoses(primary or secondary)? General Surgery   Week 2 attempt successful? No   Unsuccessful attempts Attempt 1            Aubree ALVARADO - Registered Nurse

## 2025-01-31 ENCOUNTER — HOME CARE VISIT (OUTPATIENT)
Dept: HOME HEALTH SERVICES | Facility: HOME HEALTHCARE | Age: 45
End: 2025-01-31
Payer: COMMERCIAL

## 2025-01-31 PROCEDURE — G0299 HHS/HOSPICE OF RN EA 15 MIN: HCPCS

## 2025-02-01 VITALS
HEART RATE: 94 BPM | DIASTOLIC BLOOD PRESSURE: 80 MMHG | TEMPERATURE: 98.6 F | RESPIRATION RATE: 15 BRPM | SYSTOLIC BLOOD PRESSURE: 128 MMHG | OXYGEN SATURATION: 95 %

## 2025-02-03 ENCOUNTER — READMISSION MANAGEMENT (OUTPATIENT)
Dept: CALL CENTER | Facility: HOSPITAL | Age: 45
End: 2025-02-03
Payer: COMMERCIAL

## 2025-02-03 ENCOUNTER — TELEPHONE (OUTPATIENT)
Dept: SURGERY | Facility: CLINIC | Age: 45
End: 2025-02-03
Payer: COMMERCIAL

## 2025-02-03 NOTE — OUTREACH NOTE
General Surgery Week 2 Survey      Flowsheet Row Responses   [facility] patient discharged from? [place]   Does the patient have one of the following disease processes/diagnoses(primary or secondary)? General Surgery   Week 2 attempt successful? Yes   Call start time 1438   Call end time 1442   Discharge diagnosis Rectal adenocarcinoma,  Laparoscopic, possible open, low anterior resection with diverting loop ileostomy and appendectomy   Meds reviewed with patient/caregiver? Yes   Is the patient having any side effects they believe may be caused by any medication additions or changes? No   Does the patient have all medications related to this admission filled (includes all antibiotics, pain medications, etc.) Yes   Is the patient taking all medications as directed (includes completed medication regime)? Yes   Does the patient have a follow up appointment scheduled with their surgeon? Yes   What is the Home health agency?  [facility]   Home health interventions Called Home Health agency   DME comments Has ostomy and supplies.   Psychosocial issues? No   Did the patient receive a copy of their discharge instructions? Yes   Nursing interventions Reviewed instructions with patient   What is the patient's perception of their health status since discharge? Improving   Nursing interventions Nurse provided patient education   Is the patient /caregiver able to teach back basic post-op care? Keep incision areas clean,dry and protected   Is the patient/caregiver able to teach back signs and symptoms of incisional infection? Increased redness, swelling or pain at the incisonal site, Increased drainage or bleeding, Incisional warmth, Pus or odor from incision, Fever   If the patient is a current smoker, are they able to teach back resources for cessation? Not a smoker   Is the patient/caregiver able to teach back the hierarchy of who to call/visit for symptoms/problems? PCP, Specialist, Home health nurse, Urgent Care, ED, 911  Yes   Week 2 call completed? Yes   Graduated Yes   Is the patient interested in additional calls from an ambulatory ? No   Would this patient benefit from a Referral to Amb Social Work? No   Wrap up additional comments Patient states improving. Following with oncology. No further calls needed.   Call end time 1442            YANIQUE HENSON - Registered Nurse

## 2025-02-03 NOTE — TELEPHONE ENCOUNTER
SPOKE TO THE PT'S FATHER, DEE  REGARDING THE BARIUM ENEMA SCHEDULED ON 2/7. HE WANTED TO GO OVER THE DETAILS. I CHECKED WITH DR REZA AND THIS IS TO BE DONE UN-PREPPED. INSTRUCTED HIS FATHER OF THIS AND ANSWERED ANY OTHER QUESTIONS.

## 2025-02-05 ENCOUNTER — HOME CARE VISIT (OUTPATIENT)
Dept: HOME HEALTH SERVICES | Facility: HOME HEALTHCARE | Age: 45
End: 2025-02-05
Payer: COMMERCIAL

## 2025-02-05 VITALS
SYSTOLIC BLOOD PRESSURE: 138 MMHG | DIASTOLIC BLOOD PRESSURE: 88 MMHG | TEMPERATURE: 98.6 F | OXYGEN SATURATION: 98 % | HEART RATE: 89 BPM | RESPIRATION RATE: 16 BRPM

## 2025-02-05 PROCEDURE — G0299 HHS/HOSPICE OF RN EA 15 MIN: HCPCS

## 2025-02-07 ENCOUNTER — HOSPITAL ENCOUNTER (OUTPATIENT)
Dept: GENERAL RADIOLOGY | Facility: HOSPITAL | Age: 45
Discharge: HOME | End: 2025-02-07
Admitting: SURGERY
Payer: COMMERCIAL

## 2025-02-07 ENCOUNTER — OFFICE VISIT (OUTPATIENT)
Dept: PSYCHIATRY | Facility: HOSPITAL | Age: 45
End: 2025-02-07
Payer: COMMERCIAL

## 2025-02-07 DIAGNOSIS — C20 RECTAL ADENOCARCINOMA: ICD-10-CM

## 2025-02-07 DIAGNOSIS — F43.23 ADJUSTMENT DISORDER WITH MIXED ANXIETY AND DEPRESSED MOOD: ICD-10-CM

## 2025-02-07 DIAGNOSIS — F90.1 ATTENTION DEFICIT HYPERACTIVITY DISORDER (ADHD), PREDOMINANTLY HYPERACTIVE TYPE: Primary | ICD-10-CM

## 2025-02-07 PROCEDURE — 74270 X-RAY XM COLON 1CNTRST STD: CPT

## 2025-02-07 PROCEDURE — 25510000002 DIATRIZOATE MEGLUMINE & SODIUM PER 1 ML: Performed by: SURGERY

## 2025-02-07 RX ORDER — ATOMOXETINE 25 MG/1
25 CAPSULE ORAL DAILY
Qty: 30 CAPSULE | Refills: 2 | Status: SHIPPED | OUTPATIENT
Start: 2025-02-07

## 2025-02-07 RX ORDER — GABAPENTIN 100 MG/1
100 CAPSULE ORAL NIGHTLY
Qty: 90 CAPSULE | Refills: 0 | Status: SHIPPED | OUTPATIENT
Start: 2025-02-07 | End: 2026-02-07

## 2025-02-07 RX ORDER — DIATRIZOATE MEGLUMINE AND DIATRIZOATE SODIUM 660; 100 MG/ML; MG/ML
480 SOLUTION ORAL; RECTAL
Status: COMPLETED | OUTPATIENT
Start: 2025-02-07 | End: 2025-02-07

## 2025-02-07 RX ADMIN — DIATRIZOATE MEGLUMINE AND DIATRIZOATE SODIUM 480 ML: 660; 100 LIQUID ORAL; RECTAL at 10:47

## 2025-02-07 NOTE — PROGRESS NOTES
Supportive Oncology Services  In Person Session    Subjective  Patient ID: Jc Brannon Jr. is a 44 y.o. male is seen face to face in the [facility] ([facility]) Clinic.    CC: Anxiety, ADHD    HPI/ Interval History:   Pt is seen in follow up regarding acute on chronic anxiety alongside recent surgery, continued treatment for colon cancer.    Physically is feeling alright. Continues to endorse some soreness, although doing well. Continues to stay with parents, appreciating this while missing independent living space. Acknowledges challenges with colostomy bag, specifically recognizing this wakes him up at night. Is eager to have this reversed. Appetite is appropriate. Continues to average appx 8 hours of sleep. Anxiety is stable, although reports running out of gabapentin and strattera with inability to get these filled. Has been out appx one week and is eager to resume.    Reports having various appointments upcoming with surg onc, med onc, and rad onc. Some anxiety noted due to transition in radiation oncologist. Uncertain regarding final recommendation for additional systemic treatment.    Continues to read, study [place]. Is eager to return to work once able.    Exam: As above. Speech remains pressured, patient baseline.    Recent Labs Reviewed:  Admission on 01/09/2025, Discharged on 01/19/2025   Component Date Value    Case Report 01/09/2025                      Value:Surgical Pathology Report                         Case: [id number]                                  Authorizing Provider:  Ori Isaac,   Collected:           01/09/2025 06:24 PM                                 MD                                                                           Ordering Location:     [facility]  Received:            01/09/2025 08:38 PM                                 MAIN OR                                                                      Pathologist:           Daryl Torres  MD GAMALIEL                                                         Specimens:   1) - Large Intestine, Rectum, staple line is distal                                                 2) - Large Intestine, Rectum, distal margin                                                         3) - Large Intestine, Appendix                                                             Final Diagnosis 01/09/2025                      Value:1.  Rectum, low anterior resection: Invasive adenocarcinoma   -A.  Histologic features:    I.  Histologic type: Invasive adenocarcinoma, NOS    II.  Grade: Moderately differentiated   -B.  Tumor size: Small areas of tumor are seen in 3 consecutive blocks (block method = 12 mm)   -C.  Margins of resection and extent:    I.  Tumor is seen in the muscularis propria but not through    II.  Tumor comes within 3.5 cm of the distal margin, 20 cm of the proximal margin and 2.5 cm of the circumferential radial margin.    III.  No perineural or lymphovascular invasion is identified    IV.  Extensive treatment effect is seen with only sparse residual tumor seen    V.  Tumor is seen below the peritoneal resection   -D.  Lymph nodes: 18 lymph nodes are identified with no evidence of metastatic disease    I.  1 lymph node   had extensive treatment effect with calcification and mucinous pools but no residual tumor.    2.  Rectum, distal margin, annular rim donut: Benign segment of colon adding an                           additional 1 cm to the distal margin    3.  Appendix, appendectomy: Benign appendix      Synoptic Checklist 01/09/2025                      Value:COLON AND RECTUM: Resection                            COLON AND RECTUM: RESECTION - All Specimens                            8th Edition - Protocol posted: 6/19/2024                                                        SPECIMEN                               Procedure:    Low anterior resection                                Macroscopic Evaluation of  Mesorectum:    Complete                                                         TUMOR                               Tumor Site:    Rectum                                  Rectal Tumor Location:    Entirely below anterior peritoneal reflection                                Histologic Type:    Adenocarcinoma                                Histologic Grade:    G2, moderately differentiated                                Tumor Size:    Greatest dimension (Centimeters): 1.2 cm                               Tumor Extent:    Invades into muscularis propria                                Macroscopic Tumor Perforation:    Not identified                                Lymphatic and / or Vascular Invasion:    Not identified                                Perineural Invasion:    Not identified                                Tumor Budding Score:    Low (0-4)                                Type of Polyp in which Invasive Carcinoma Arose:    None identified                                Treatment Effect:    Present, with single cells or rare small groups of cancer cells (near complete response, score 1)                                                         MARGINS                               Margin Status for Invasive Carcinoma:    All margins negative for invasive carcinoma                                  Closest Margin(s) to Invasive Carcinoma:    Radial (circumferential)                                  Distance from Invasive Carcinoma to Closest Margin:    2.5 cm                                 Distance from Invasive Carcinoma to Radial (Circumferential) Margin:    Distance already reported as closest margin                                  Distance from Invasive Carcinoma to Distal Margin:    4.5 cm                               Margin Status for Non-Invasive Tumor:    All margins negative for high-grade dysplasia / intramucosal carcinoma and low-grade dysplasia                                                          "REGIONAL LYMPH NODES                               Regional Lymph Node Status:                                     :    All regional lymph nodes negative for tumor                                  Number of Lymph Nodes Examined:    18                                Tumor Deposits:    Not identified                                                         pTNM CLASSIFICATION (AJCC 8th Edition)                               Reporting of pT, pN, and (when applicable) pM categories is based on information available to the pathologist at the time the report is issued. As per the AJCC (Chapter 1, 8th Ed.) it is the managing physician's responsibility to establish the final pathologic stage based upon all pertinent information, including but potentially not limited to this pathology report.                               Modified Classification:    y                                pT Category:    pT2                                pN Category:    pN0       Comment 01/09/2025                      Value:MSI testing was previously performed on case [id number] and will not be repeated.      Gross Description 01/09/2025                      Value:1. Large Intestine, Rectum.  Received in formalin labeled \"rectum, staple line is distal\" is a 25 cm segment of colon which is open at the proximal end and stapled at the distal end.  The mesorectum is complete.  The serosa is smooth tan-pink.  There is a moderate amount of perirectal fat.  There is a 2.0 x 2.0 cm centrally ulcerative tumor bed which lies below the anterior peritoneal reflection and comes to within 3.5 cm of the distal mucosal margin and 20 cm of the proximal mucosal margin.  The circumferential radial fat margin of the rectum is inked black and sectioning through the tumor bed reveals the tumor bed is a maximum depth of 0.3 cm, coming to within 0.1 cm of the muscularis propria and 2.5 cm of the circumferential radial fat margin.  The remaining mucosa is tan-pink and " "glistening with normal folds and the wall thickness averages 0.4 cm.  Following dissection of the pericolic fat reveals multiple probable lymph nodes ranging from 0.1 cm to 1 cm in greatest dimension.  The                           largest lymph node is bisected to reveal suspicious solid tan-white cut surfaces.    The tumor bed is entirely submitted and representative sections are submitted as follows:    1A-1E- entire tumor bed  1F- circumferential radial fat margin perpendicular  1G-1I- entire distal mucosal margin en face  1J- proximal mucosal margin en face  1K- random mucosa  1L-5 possible lymph nodes in toto  1M- 3 possible lymph nodes in toto  1N- 6 possible lymph nodes in toto  1O- 2 possible lymph nodes in toto  1P- largest possible lymph node bisected (?  Grossly positive)    tamara/uso/toña  2. Large Intestine, Rectum.  Received in formalin labeled \"distal margin, rectum\" is a 2.0 x 1.8 x 1.0 cm annular rim of bowel displaying a tan glistening mucosa and wall thickness averaging 0.5 cm.  Following removal of the staple line margin representative sections are submitted as 2A.    tamara/uso/toña    3. Large Intestine, Appendix.  Received in formalin labeled \"appendix \"is a 7.5 x 0.6 cm vermiform intact                           appendix with a smooth tan-pink serosa and a minimal amount of mesoappendix.  The tip is amputated and bisected to reveal tan mucosa.  The lumen ranges from 0.1 cm to 0.3 cm in diameter and contains tan fecal matter.  The wall thickness ranges from 0.1 cm to 0.2 cm.  Representative sections are submitted as follows:    3A- margin of resection en face and bisected tip  3B- proximal, mid and distal transverse sections    tamara/uso/toña          Microscopic Description 01/09/2025                      Value:Unless \"gross only\" is specified, the final diagnosis for each specimen is based on microscopic examination of tissue.      Glucose 01/10/2025 171 (H)     BUN 01/10/2025 10     Creatinine " 01/10/2025 1.04     Sodium 01/10/2025 139     Potassium 01/10/2025 3.4 (L)     Chloride 01/10/2025 100     CO2 01/10/2025 26.9     Calcium 01/10/2025 8.7     BUN/Creatinine Ratio 01/10/2025 9.6     Anion Gap 01/10/2025 12.1     eGFR 01/10/2025 89.7     WBC 01/10/2025 7.77     RBC 01/10/2025 3.98 (L)     Hemoglobin 01/10/2025 12.5 (L)     Hematocrit 01/10/2025 38.5     MCV 01/10/2025 96.7     MCH 01/10/2025 31.4     MCHC 01/10/2025 32.5     RDW 01/10/2025 11.7 (L)     RDW-SD 01/10/2025 41.4     MPV 01/10/2025 10.0     Platelets 01/10/2025 206     Neutrophil % 01/10/2025 92.2 (H)     Lymphocyte % 01/10/2025 2.7 (L)     Monocyte % 01/10/2025 4.6 (L)     Eosinophil % 01/10/2025 0.0 (L)     Basophil % 01/10/2025 0.1     Immature Grans % 01/10/2025 0.4     Neutrophils, Absolute 01/10/2025 7.16 (H)     Lymphocytes, Absolute 01/10/2025 0.21 (L)     Monocytes, Absolute 01/10/2025 0.36     Eosinophils, Absolute 01/10/2025 0.00     Basophils, Absolute 01/10/2025 0.01     Immature Grans, Absolute 01/10/2025 0.03     nRBC 01/10/2025 0.0     Potassium 01/10/2025 3.4 (L)     Potassium 01/11/2025 4.4     Phosphorus 01/14/2025 3.8     Magnesium 01/14/2025 2.3     Glucose 01/14/2025 153 (H)     BUN 01/14/2025 28 (H)     Creatinine 01/14/2025 1.04     Sodium 01/14/2025 137     Potassium 01/14/2025 3.9     Chloride 01/14/2025 97 (L)     CO2 01/14/2025 22.6     Calcium 01/14/2025 9.0     BUN/Creatinine Ratio 01/14/2025 26.9 (H)     Anion Gap 01/14/2025 17.4 (H)     eGFR 01/14/2025 89.7     WBC 01/14/2025 7.26     RBC 01/14/2025 4.63     Hemoglobin 01/14/2025 15.2     Hematocrit 01/14/2025 43.9     MCV 01/14/2025 94.8     MCH 01/14/2025 32.8     MCHC 01/14/2025 34.6     RDW 01/14/2025 11.6 (L)     RDW-SD 01/14/2025 40.0     MPV 01/14/2025 9.7     Platelets 01/14/2025 251     Phosphorus 01/15/2025 2.3 (L)     Magnesium 01/15/2025 2.3     Glucose 01/15/2025 131 (H)     BUN 01/15/2025 23 (H)     Creatinine 01/15/2025 0.88     Sodium  01/15/2025 136     Potassium 01/15/2025 3.9     Chloride 01/15/2025 101     CO2 01/15/2025 24.0     Calcium 01/15/2025 8.6     BUN/Creatinine Ratio 01/15/2025 26.1 (H)     Anion Gap 01/15/2025 11.0     eGFR 01/15/2025 107.4     WBC 01/15/2025 7.78     RBC 01/15/2025 4.29     Hemoglobin 01/15/2025 13.9     Hematocrit 01/15/2025 40.7     MCV 01/15/2025 94.9     MCH 01/15/2025 32.4     MCHC 01/15/2025 34.2     RDW 01/15/2025 11.5 (L)     RDW-SD 01/15/2025 39.6     MPV 01/15/2025 10.0     Platelets 01/15/2025 239     Glucose 01/16/2025 119 (H)     BUN 01/16/2025 21 (H)     Creatinine 01/16/2025 0.77     Sodium 01/16/2025 140     Potassium 01/16/2025 3.7     Chloride 01/16/2025 103     CO2 01/16/2025 27.0     Calcium 01/16/2025 8.7     BUN/Creatinine Ratio 01/16/2025 27.3 (H)     Anion Gap 01/16/2025 10.0     eGFR 01/16/2025 111.8     Magnesium 01/16/2025 2.3     Phosphorus 01/16/2025 2.4 (L)    Admission on 12/16/2024, Discharged on 12/16/2024   Component Date Value    Addendum 12/16/2024                      Value:Please see the completely scanned MSI by IHC report from [facility] below.     Please see the completely scanned MSI by PCR report from [facility] below.       Case Report 12/16/2024                      Value:Surgical Pathology Report                         Case: [id number]                                  Authorizing Provider:  Ori Isaac,   Collected:           12/16/2024 07:20 AM                                 MD                                                                           Ordering Location:     [facility]  Received:            12/16/2024 09:18 AM                                 ENDO SUITES                                                                  Pathologist:           Didi Villasenor MD                                                          Specimen:    Large Intestine, Rectum, rectal mass biopsies                                              Final  "Diagnosis 12/16/2024                      Value:1.  Rectum, biopsy: MINIMALLY REPRESENTED INVASIVE MODERATELY DIFFERENTIATED ADENOCARCINOMA.    SWM      Comment 12/16/2024                      Value:Representative H&E-stained slide was reviewed internally with Dr. Torres, who concurred.  MSI studies are pending and will be issued in addendum to this report.      Gross Description 12/16/2024                      Value:1. Large Intestine, Rectum.  The specimen is received in formalin labeled with the patient's name and further designated 'rectum biopsy' are multiple small fragments of gray-tan tissue. The specimen is submitted for embedding as received.        Special Stains 12/16/2024                      Value:Utilizing appropriate controls, multiple immunohistochemical stains were performed including CK7, CK20, CDX2 and villin.  Tumor cells are positive for CK20, CDX2 and villin and negative for CK7, supporting a colorectal primary.      Microscopic Description 12/16/2024                      Value:Unless \"gross only\" is specified, the final diagnosis for each specimen is based on microscopic examination of tissue.     Infusion on 12/09/2024   Component Date Value    Glucose 12/09/2024 150 (H)     BUN 12/09/2024 14     Creatinine 12/09/2024 0.94     Sodium 12/09/2024 141     Potassium 12/09/2024 3.8     Chloride 12/09/2024 102     CO2 12/09/2024 26.5     Calcium 12/09/2024 9.1     Total Protein 12/09/2024 7.1     Albumin 12/09/2024 4.6     ALT (SGPT) 12/09/2024 16     AST (SGOT) 12/09/2024 22     Alkaline Phosphatase 12/09/2024 85     Total Bilirubin 12/09/2024 0.4     Globulin 12/09/2024 2.5     A/G Ratio 12/09/2024 1.8     BUN/Creatinine Ratio 12/09/2024 14.9     Anion Gap 12/09/2024 12.5     eGFR 12/09/2024 101.2     WBC 12/09/2024 2.00 (L)     RBC 12/09/2024 3.97 (L)     Hemoglobin 12/09/2024 13.1     Hematocrit 12/09/2024 38.2     MCV 12/09/2024 96.2     MCH 12/09/2024 33.0     MCHC 12/09/2024 34.3     RDW " 12/09/2024 11.7 (L)     RDW-SD 12/09/2024 41.4     MPV 12/09/2024 9.6     Platelets 12/09/2024 202     Neutrophil % 12/09/2024 67.0     Lymphocyte % 12/09/2024 16.5 (L)     Monocyte % 12/09/2024 14.5 (H)     Eosinophil % 12/09/2024 1.0     Basophil % 12/09/2024 0.5     Immature Grans % 12/09/2024 0.5     Neutrophils, Absolute 12/09/2024 1.34 (L)     Lymphocytes, Absolute 12/09/2024 0.33 (L)     Monocytes, Absolute 12/09/2024 0.29     Eosinophils, Absolute 12/09/2024 0.02     Basophils, Absolute 12/09/2024 0.01     Immature Grans, Absolute 12/09/2024 0.01     nRBC 12/09/2024 0.0    Labs reviewed    Current Psychotropic Medications:  Strattera 18 mg daily  Gabapentin 100 mg q hs    Plan of Care/ Medical Decision Making  Continue gabapentin 100 mg q hs.  Increase strattera to 25 mg daily. Identified ongoing goals of slow titration, likely increasing to 40 mg daily at next visit.  Support patient in ongoing communication with medical providers and loved ones. Explored strengths, inclusive of organization and resilience.  FU scheduled in 4 weeks following scheduled medical care, hopeful for understanding of treatment plan moving forward.    Diagnoses and all orders for this visit:    1. Attention deficit hyperactivity disorder (ADHD), predominantly hyperactive type (Primary)    2. Adjustment disorder with mixed anxiety and depressed mood  -     gabapentin (Neurontin) 100 MG capsule; Take 1 capsule by mouth Every Night. Indications: Neuropathic Pain  Dispense: 90 capsule; Refill: 0    Other orders  -     atomoxetine (Strattera) 25 MG capsule; Take 1 capsule by mouth Daily. Indications: Attention Deficit Hyperactivity Disorder  Dispense: 30 capsule; Refill: 2    I spent 31 minutes caring for Jc on this date of service. This time includes time spent by me in the following activities: preparing for the visit, reviewing tests, obtaining and/or reviewing a separately obtained history, performing a medically appropriate  examination and/or evaluation, counseling and educating the patient/family/caregiver, ordering medications, tests, or procedures, and documenting information in the medical record.

## 2025-02-08 ENCOUNTER — HOME CARE VISIT (OUTPATIENT)
Dept: HOME HEALTH SERVICES | Facility: HOME HEALTHCARE | Age: 45
End: 2025-02-08
Payer: COMMERCIAL

## 2025-02-10 ENCOUNTER — OFFICE VISIT (OUTPATIENT)
Dept: SURGERY | Facility: CLINIC | Age: 45
End: 2025-02-10
Payer: COMMERCIAL

## 2025-02-10 ENCOUNTER — TELEPHONE (OUTPATIENT)
Dept: SURGERY | Facility: CLINIC | Age: 45
End: 2025-02-10

## 2025-02-10 VITALS
SYSTOLIC BLOOD PRESSURE: 158 MMHG | HEART RATE: 105 BPM | HEIGHT: 69 IN | DIASTOLIC BLOOD PRESSURE: 84 MMHG | OXYGEN SATURATION: 99 % | BODY MASS INDEX: 32.82 KG/M2 | WEIGHT: 221.6 LBS

## 2025-02-10 DIAGNOSIS — C20 RECTAL ADENOCARCINOMA: Primary | ICD-10-CM

## 2025-02-10 PROCEDURE — 99024 POSTOP FOLLOW-UP VISIT: CPT | Performed by: SURGERY

## 2025-02-10 RX ORDER — METRONIDAZOLE 500 MG/1
500 TABLET ORAL SEE ADMIN INSTRUCTIONS
Qty: 4 TABLET | Refills: 0 | Status: SHIPPED | OUTPATIENT
Start: 2025-02-10 | End: 2025-02-11

## 2025-02-10 RX ORDER — NEOMYCIN SULFATE 500 MG/1
TABLET ORAL
Qty: 4 TABLET | Refills: 0 | Status: SHIPPED | OUTPATIENT
Start: 2025-02-10

## 2025-02-10 NOTE — TELEPHONE ENCOUNTER
Called [facility] and requested that they see pt tomorrow due to issues with his stoma per Dr. Isaac. They have rescheduled his visit to tomorrow 2/11/25.

## 2025-02-10 NOTE — PROGRESS NOTES
Colorectal & General Surgery  Post - Op    Patient: Jc Brannon Jr.  YOB: 1980  MRN: [id number]      Assessment  Jc Brannon Jr. is a 44 y.o. male with stage III rectal adenocarcinoma status post chemoradiation and subsequent low anterior resection with creation of coloproctostomy and diverting loop ileostomy who presents in follow-up today.  He has recovered very well from surgery.  I reviewed his water-soluble enema study, which demonstrates no evidence of leak and appropriate caliber of colorectal anastomosis.  Plan to proceed with ileostomy closure next week.  We discussed the risk, benefits, terms the procedure.    From an oncologic standpoint, I will communicate with Dr. Pak to discuss further care.    History of Present Illness   Jc Brannon Jr. is a 44 y.o. male who presents in postoperative follow-up today.  He has no complaints and is doing quite well.    Vital Signs  Vitals:    02/10/25 1356   BP: 158/84   Pulse: 105   SpO2: 99%        Physical Exam  Constitutional: Resting comfortably, no acute distress  Neck: Supple, trachea midline  Respiratory: No increased work of breathing, Symmetric excursion  Cardiovascular: Well pefursed, no jugular venous distention evident   Abdominal: Ileostomy is pink and above skin.  Incisions healing well.  Soft, non-tender, non-distended  Lymphatics: No cervical or suprascapular adenopathy  Skin: Warm, dry, no rash on visualized skin surfaces  Musculoskeletal: Symmetric strength, no obvious gross abnormalities  Psychiatric: Alert and oriented ×3, normal affect       See Isaac MD  Colorectal & General Surgery  [facility]    [address] [place] [address], [address]  [place], [place], [facility]  P: [phone]  F: [phone]

## 2025-02-10 NOTE — H&P (VIEW-ONLY)
Colorectal & General Surgery  Post - Op    Patient: Jc Brannon Jr.  YOB: 1980  MRN: [id number]      Assessment  Jc Brannon Jr. is a 44 y.o. male with stage III rectal adenocarcinoma status post chemoradiation and subsequent low anterior resection with creation of coloproctostomy and diverting loop ileostomy who presents in follow-up today.  He has recovered very well from surgery.  I reviewed his water-soluble enema study, which demonstrates no evidence of leak and appropriate caliber of colorectal anastomosis.  Plan to proceed with ileostomy closure next week.  We discussed the risk, benefits, terms the procedure.    From an oncologic standpoint, I will communicate with Dr. Pak to discuss further care.    History of Present Illness   Jc Brannon Jr. is a 44 y.o. male who presents in postoperative follow-up today.  He has no complaints and is doing quite well.    Vital Signs  Vitals:    02/10/25 1356   BP: 158/84   Pulse: 105   SpO2: 99%        Physical Exam  Constitutional: Resting comfortably, no acute distress  Neck: Supple, trachea midline  Respiratory: No increased work of breathing, Symmetric excursion  Cardiovascular: Well pefursed, no jugular venous distention evident   Abdominal: Ileostomy is pink and above skin.  Incisions healing well.  Soft, non-tender, non-distended  Lymphatics: No cervical or suprascapular adenopathy  Skin: Warm, dry, no rash on visualized skin surfaces  Musculoskeletal: Symmetric strength, no obvious gross abnormalities  Psychiatric: Alert and oriented ×3, normal affect       See Isaac MD  Colorectal & General Surgery  [facility]    [address] [place] [address], [address]  [place], [place], [facility]  P: [phone]  F: [phone]

## 2025-02-11 ENCOUNTER — TELEPHONE (OUTPATIENT)
Dept: OTHER | Facility: HOSPITAL | Age: 45
End: 2025-02-11
Payer: COMMERCIAL

## 2025-02-11 ENCOUNTER — HOME CARE VISIT (OUTPATIENT)
Dept: HOME HEALTH SERVICES | Facility: HOME HEALTHCARE | Age: 45
End: 2025-02-11
Payer: COMMERCIAL

## 2025-02-11 ENCOUNTER — DOCUMENTATION (OUTPATIENT)
Dept: OTHER | Facility: HOSPITAL | Age: 45
End: 2025-02-11
Payer: COMMERCIAL

## 2025-02-11 PROCEDURE — G0299 HHS/HOSPICE OF RN EA 15 MIN: HCPCS

## 2025-02-11 NOTE — TELEPHONE ENCOUNTER
Oncology Social Work  Spoke to the patient and his father regarding the patient's medical bill for [facility].  Informed them both that the patient needed to submit documentation to reapply for hospital financial assistance. The patient was informed to submit 2 months of bank statements from both of his bank accounts, his last 2 paycheck stubs from work and his last 2 short term disability checks from work.  He was informed that information is needed showing when did he start receiving short term disability and when is his return to work date.  The patient was informed to provide this information to [facility] when it is obtained.  MAVIS Marie

## 2025-02-11 NOTE — PROGRESS NOTES
Oncology Social Work  The patient brought a copy of his [facility] bill for [facility] to assist with discussing his need for hospital financial assistance.  MAVIS Marie

## 2025-02-13 VITALS
OXYGEN SATURATION: 98 % | RESPIRATION RATE: 20 BRPM | DIASTOLIC BLOOD PRESSURE: 76 MMHG | HEART RATE: 88 BPM | TEMPERATURE: 98.2 F | SYSTOLIC BLOOD PRESSURE: 120 MMHG

## 2025-02-14 ENCOUNTER — TELEPHONE (OUTPATIENT)
Dept: SURGERY | Facility: CLINIC | Age: 45
End: 2025-02-14
Payer: COMMERCIAL

## 2025-02-14 ENCOUNTER — PRE-ADMISSION TESTING (OUTPATIENT)
Dept: PREADMISSION TESTING | Facility: HOSPITAL | Age: 45
End: 2025-02-14
Payer: COMMERCIAL

## 2025-02-14 VITALS
HEART RATE: 89 BPM | BODY MASS INDEX: 32.58 KG/M2 | OXYGEN SATURATION: 100 % | HEIGHT: 69 IN | WEIGHT: 220 LBS | RESPIRATION RATE: 20 BRPM | DIASTOLIC BLOOD PRESSURE: 80 MMHG | TEMPERATURE: 97.8 F | SYSTOLIC BLOOD PRESSURE: 130 MMHG

## 2025-02-14 DIAGNOSIS — C20 RECTAL ADENOCARCINOMA: ICD-10-CM

## 2025-02-14 LAB
ABO GROUP BLD: NORMAL
ANION GAP SERPL CALCULATED.3IONS-SCNC: 8.3 MMOL/L (ref 5–15)
BLD GP AB SCN SERPL QL: NEGATIVE
BUN SERPL-MCNC: 11 MG/DL (ref 6–20)
BUN/CREAT SERPL: 11 (ref 7–25)
CALCIUM SPEC-SCNC: 9.3 MG/DL (ref 8.6–10.5)
CHLORIDE SERPL-SCNC: 104 MMOL/L (ref 98–107)
CO2 SERPL-SCNC: 27.7 MMOL/L (ref 22–29)
CREAT SERPL-MCNC: 1 MG/DL (ref 0.76–1.27)
DEPRECATED RDW RBC AUTO: 45.2 FL (ref 37–54)
EGFRCR SERPLBLD CKD-EPI 2021: 94 ML/MIN/1.73
ERYTHROCYTE [DISTWIDTH] IN BLOOD BY AUTOMATED COUNT: 12.5 % (ref 12.3–15.4)
GLUCOSE SERPL-MCNC: 102 MG/DL (ref 65–99)
HCT VFR BLD AUTO: 36.3 % (ref 37.5–51)
HGB BLD-MCNC: 11.4 G/DL (ref 13–17.7)
MCH RBC QN AUTO: 31.1 PG (ref 26.6–33)
MCHC RBC AUTO-ENTMCNC: 31.4 G/DL (ref 31.5–35.7)
MCV RBC AUTO: 99.2 FL (ref 79–97)
PLATELET # BLD AUTO: 248 10*3/MM3 (ref 140–450)
PMV BLD AUTO: 9.3 FL (ref 6–12)
POTASSIUM SERPL-SCNC: 3.7 MMOL/L (ref 3.5–5.2)
RBC # BLD AUTO: 3.66 10*6/MM3 (ref 4.14–5.8)
RH BLD: NEGATIVE
SODIUM SERPL-SCNC: 140 MMOL/L (ref 136–145)
T&S EXPIRATION DATE: NORMAL
WBC NRBC COR # BLD AUTO: 2.83 10*3/MM3 (ref 3.4–10.8)

## 2025-02-14 PROCEDURE — 86850 RBC ANTIBODY SCREEN: CPT

## 2025-02-14 PROCEDURE — 86900 BLOOD TYPING SEROLOGIC ABO: CPT

## 2025-02-14 PROCEDURE — 85027 COMPLETE CBC AUTOMATED: CPT

## 2025-02-14 PROCEDURE — 36415 COLL VENOUS BLD VENIPUNCTURE: CPT

## 2025-02-14 PROCEDURE — 80048 BASIC METABOLIC PNL TOTAL CA: CPT

## 2025-02-14 PROCEDURE — 86901 BLOOD TYPING SEROLOGIC RH(D): CPT

## 2025-02-14 NOTE — TELEPHONE ENCOUNTER
Attempted to call patient, left a voicemail stating that the patients FMLA/Leave of absence paperwork has been completed. Did not know if they wanted to  the paperwork in person or just fax it to the company. Informed patient that it has been faxed and there is a copy here in office if they would like to pick it up and it has been scanned into their mychart as well.

## 2025-02-14 NOTE — DISCHARGE INSTRUCTIONS
Take the following medications the morning of surgery:    AMLODIPINE    HOLD STRATTERA THE DAY PRIOR TO SURGERY      If you are on prescription narcotic pain medication to control your pain you may also take that medication the morning of surgery.      General Instructions:           FOLLOW DR. LIBIA RAMIREZ PREP INSTRUCTIONS THE DAY PRIOR TO SURGERY  Clear liquids day of surgery are allowed but must be stopped at least two hours before your hospital arrival time.       Allowed clear liquids      Water, sodas, and tea or coffee with no cream or milk added.       12 to 20 ounces of a clear liquid that contains carbohydrates is recommended.  If non-diabetic, have Gatorade or Powerade.  If diabetic, have G2 or Powerade Zero.     Do not have liquids red in color.  Do not consume chicken, beef, pork or vegetable broth or bouillon cubes of any variety as they are not considered clear liquids and are not allowed.      Infants may have breast milk up to four hours before surgery.  Infants drinking formula may drink formula up to six hours before surgery.   Patients who avoid smoking, chewing tobacco and alcohol for 4 weeks prior to surgery have a reduced risk of post-operative complications.  Quit smoking as many days before surgery as you can.  Do not smoke, use chewing tobacco or drink alcohol the day of surgery.   If applicable bring your C-PAP/ BI-PAP machine in with you to preop day of surgery.  Bring any papers given to you in the doctor’s office.  Wear clean comfortable clothes.  Do not wear contact lenses, false eyelashes or make-up.  Bring a case for your glasses.   Bring crutches or walker if applicable.  Remove all piercings.  Leave jewelry and any other valuables at home.  Hair extensions with metal clips must be removed prior to surgery.  The Pre-Admission Testing nurse will instruct you to bring medications if unable to obtain an accurate list in Pre-Admission Testing.        If you were given a blood bank ID  arm band remember to bring it with you the day of surgery.    Preventing a Surgical Site Infection:  For 2 to 3 days before surgery, avoid shaving with a razor because the razor can irritate skin and make it easier to develop an infection.    Any areas of open skin can increase the risk of a post-operative wound infection by allowing bacteria to enter and travel throughout the body.  Notify your surgeon if you have any skin wounds / rashes even if it is not near the expected surgical site.  The area will need assessed to determine if surgery should be delayed until it is healed.  The night prior to surgery shower using a fresh bar of anti-bacterial soap (such as Dial) and clean washcloth.  Sleep in a clean bed with clean clothing.  Do not allow pets to sleep with you.  Shower on the morning of surgery using a fresh bar of anti-bacterial soap (such as Dial) and clean washcloth.  Dry with a clean towel and dress in clean clothing.  Ask your surgeon if you will be receiving antibiotics prior to surgery.  Make sure you, your family, and all healthcare providers clean their hands with soap and water or an alcohol based hand  before caring for you or your wound.    Day of surgery:  Your arrival time is approximately two hours before your scheduled surgery time.  Please note if you have an early arrival time the surgery doors do not open before 5:00 AM.  Upon arrival, a Pre-op nurse and Anesthesiologist will review your health history, obtain vital signs, and answer questions you may have.  The only belongings needed at this time will be a list of your home medications and if applicable your C-PAP/BI-PAP machine.  A Pre-op nurse will start an IV and you may receive medication in preparation for surgery, including something to help you relax.     Please be aware that surgery does come with discomfort.  We want to make every effort to control your discomfort so please discuss any uncontrolled symptoms with your  nurse.   Your doctor will most likely have prescribed pain medications.      If you are going home after surgery you will receive individualized written care instructions before being discharged.  A responsible adult must drive you to and from the hospital on the day of your surgery and ideally stay with you through the night.   .  Discharge prescriptions can be filled by the hospital pharmacy during regular pharmacy hours.  If you are having surgery late in the day/evening your prescription may be e-prescribed to your pharmacy.  Please verify your pharmacy hours or chose a 24 hour pharmacy to avoid not having access to your prescription because your pharmacy has closed for the day.    If you are staying overnight following surgery, you will be transported to your hospital room following the recovery period.  [facility] has all private rooms.    If you have any questions please call Pre-Admission Testing at [phone].  Deductibles and co-payments are collected on the day of service. Please be prepared to pay the required co-pay, deductible or deposit on the day of service as defined by your plan.    Call your surgeon immediately if you experience any of the following symptoms:  Sore Throat  Shortness of Breath or difficulty breathing  Cough  Chills  Body soreness or muscle pain  Headache  Fever  New loss of taste or smell  Do not arrive for your surgery ill.  Your procedure will need to be rescheduled to another time.  You will need to call your physician before the day of surgery to avoid any unnecessary exposure to hospital staff as well as other patients.      CHLORHEXIDINE CLOTH INSTRUCTIONS  The morning of surgery follow these instructions using the Chlorhexidine cloths you've been given.  These steps reduce bacteria on the body.  Do not use the cloths near your eyes, ears mouth, genitalia or on open wounds.  Throw the cloths away after use but do not try to flush them down a toilet.       Open and remove one cloth at a time from the package.    Leave the cloth unfolded and begin the bathing.  Massage the skin with the cloths using gentle pressure to remove bacteria.  Do not scrub harshly.   Follow the steps below with one 2% CHG cloth per area (6 total cloths).  One cloth for neck, shoulders and chest.  One cloth for both arms, hands, fingers and underarms (do underarms last).  One cloth for the abdomen followed by groin.  One cloth for right leg and foot including between the toes.  One cloth for left leg and foot including between the toes.  The last cloth is to be used for the back of the neck, back and buttocks.    Allow the CHG to air dry 3 minutes on the skin which will give it time to work and decrease the chance of irritation.  The skin may feel sticky until it is dry.  Do not rinse with water or any other liquid or you will lose the beneficial effects of the CHG.  If mild skin irritation occurs, do rinse the skin to remove the CHG.  Report this to the nurse at time of admission.  Do not apply lotions, creams, ointments, deodorants or perfumes after using the clothes. Dress in clean clothes before coming to the hospital.

## 2025-02-17 ENCOUNTER — DOCUMENTATION (OUTPATIENT)
Dept: OTHER | Facility: HOSPITAL | Age: 45
End: 2025-02-17
Payer: COMMERCIAL

## 2025-02-17 NOTE — PROGRESS NOTES
Oncology Social Work  The patient brought in documentation to apply for hospital financial assistance.  MAVIS Marie

## 2025-02-17 NOTE — PROGRESS NOTES
Oncology Social Work  The documentation that the patient submitted to apply for hospital financial assistance was provided to the financial  along with a request to inform OSW of additional documentation that will be needed and OSW will inform the patient.  MAVIS Marie

## 2025-02-19 ENCOUNTER — TELEPHONE (OUTPATIENT)
Dept: SURGERY | Facility: CLINIC | Age: 45
End: 2025-02-19
Payer: COMMERCIAL

## 2025-02-19 ENCOUNTER — HOME CARE VISIT (OUTPATIENT)
Dept: HOME HEALTH SERVICES | Facility: HOME HEALTHCARE | Age: 45
End: 2025-02-19
Payer: COMMERCIAL

## 2025-02-19 PROCEDURE — G0299 HHS/HOSPICE OF RN EA 15 MIN: HCPCS

## 2025-02-19 NOTE — TELEPHONE ENCOUNTER
Hub staff attempted to follow warm transfer process and was unsuccessful     Caller: Jc Brannon Jr.     Relationship to patient:  SELF     Best call back number: [phone]     Patient is needing: PATIENT HAD QUESTIONS REGARDING MEDICATIONS TAKEN BEFORE SURGERY

## 2025-02-20 VITALS
DIASTOLIC BLOOD PRESSURE: 86 MMHG | SYSTOLIC BLOOD PRESSURE: 150 MMHG | OXYGEN SATURATION: 96 % | RESPIRATION RATE: 16 BRPM | TEMPERATURE: 98.6 F | HEART RATE: 84 BPM

## 2025-02-21 ENCOUNTER — HOSPITAL ENCOUNTER (INPATIENT)
Facility: HOSPITAL | Age: 45
LOS: 4 days | Discharge: HOME HEALTH | DRG: 330 | End: 2025-02-25
Attending: SURGERY | Admitting: SURGERY
Payer: COMMERCIAL

## 2025-02-21 ENCOUNTER — ANESTHESIA (OUTPATIENT)
Dept: PERIOP | Facility: HOSPITAL | Age: 45
End: 2025-02-21
Payer: COMMERCIAL

## 2025-02-21 ENCOUNTER — ANESTHESIA EVENT (OUTPATIENT)
Dept: PERIOP | Facility: HOSPITAL | Age: 45
End: 2025-02-21
Payer: COMMERCIAL

## 2025-02-21 DIAGNOSIS — Z93.2 ILEOSTOMY STATUS: Primary | ICD-10-CM

## 2025-02-21 DIAGNOSIS — C20 RECTAL ADENOCARCINOMA: ICD-10-CM

## 2025-02-21 PROCEDURE — 88304 TISSUE EXAM BY PATHOLOGIST: CPT | Performed by: SURGERY

## 2025-02-21 PROCEDURE — 25010000002 MAGNESIUM SULFATE PER 500 MG OF MAGNESIUM: Performed by: NURSE ANESTHETIST, CERTIFIED REGISTERED

## 2025-02-21 PROCEDURE — 25010000002 CEFOXITIN PER 1 G: Performed by: SURGERY

## 2025-02-21 PROCEDURE — 88305 TISSUE EXAM BY PATHOLOGIST: CPT | Performed by: SURGERY

## 2025-02-21 PROCEDURE — 25010000002 LIDOCAINE 2% SOLUTION: Performed by: NURSE ANESTHETIST, CERTIFIED REGISTERED

## 2025-02-21 PROCEDURE — 25010000002 PROPOFOL 10 MG/ML EMULSION: Performed by: NURSE ANESTHETIST, CERTIFIED REGISTERED

## 2025-02-21 PROCEDURE — 25810000003 LACTATED RINGERS PER 1000 ML: Performed by: SURGERY

## 2025-02-21 PROCEDURE — 25010000002 METOCLOPRAMIDE PER 10 MG: Performed by: NURSE ANESTHETIST, CERTIFIED REGISTERED

## 2025-02-21 PROCEDURE — 25010000002 HYDROMORPHONE PER 4 MG: Performed by: SURGERY

## 2025-02-21 PROCEDURE — 25010000002 FENTANYL CITRATE (PF) 50 MCG/ML SOLUTION: Performed by: NURSE ANESTHETIST, CERTIFIED REGISTERED

## 2025-02-21 PROCEDURE — 25010000002 DEXAMETHASONE SODIUM PHOSPHATE 20 MG/5ML SOLUTION: Performed by: NURSE ANESTHETIST, CERTIFIED REGISTERED

## 2025-02-21 PROCEDURE — 25010000002 HYDROMORPHONE PER 4 MG: Performed by: NURSE ANESTHETIST, CERTIFIED REGISTERED

## 2025-02-21 PROCEDURE — 25010000002 ONDANSETRON PER 1 MG: Performed by: NURSE ANESTHETIST, CERTIFIED REGISTERED

## 2025-02-21 PROCEDURE — 25010000002 SUGAMMADEX 200 MG/2ML SOLUTION: Performed by: NURSE ANESTHETIST, CERTIFIED REGISTERED

## 2025-02-21 PROCEDURE — 0D180Z8 BYPASS SMALL INTESTINE TO SMALL INTESTINE, OPEN APPROACH: ICD-10-PCS | Performed by: SURGERY

## 2025-02-21 PROCEDURE — 44625 REPAIR BOWEL OPENING: CPT | Performed by: PHYSICIAN ASSISTANT

## 2025-02-21 PROCEDURE — 25010000002 LIDOCAINE PER 10 MG: Performed by: NURSE ANESTHETIST, CERTIFIED REGISTERED

## 2025-02-21 PROCEDURE — 25010000002 BUPIVACAINE (PF) 0.25 % SOLUTION: Performed by: ANESTHESIOLOGY

## 2025-02-21 PROCEDURE — 0DBB0ZZ EXCISION OF ILEUM, OPEN APPROACH: ICD-10-PCS | Performed by: SURGERY

## 2025-02-21 PROCEDURE — 25010000002 BUPIVACAINE LIPOSOME 1.3 % SUSPENSION: Performed by: ANESTHESIOLOGY

## 2025-02-21 PROCEDURE — 25810000003 LACTATED RINGERS PER 1000 ML: Performed by: STUDENT IN AN ORGANIZED HEALTH CARE EDUCATION/TRAINING PROGRAM

## 2025-02-21 PROCEDURE — 44625 REPAIR BOWEL OPENING: CPT | Performed by: SURGERY

## 2025-02-21 DEVICE — ENDOPATH ECHELON ENDOSCOPIC LINEAR CUTTER RELOADS, WHITE, 60MM
Type: IMPLANTABLE DEVICE | Site: ABDOMEN | Status: FUNCTIONAL
Brand: ECHELON ENDOPATH

## 2025-02-21 RX ORDER — MAGNESIUM SULFATE HEPTAHYDRATE 500 MG/ML
INJECTION, SOLUTION INTRAMUSCULAR; INTRAVENOUS AS NEEDED
Status: DISCONTINUED | OUTPATIENT
Start: 2025-02-21 | End: 2025-02-21 | Stop reason: SURG

## 2025-02-21 RX ORDER — SODIUM CHLORIDE, SODIUM LACTATE, POTASSIUM CHLORIDE, CALCIUM CHLORIDE 600; 310; 30; 20 MG/100ML; MG/100ML; MG/100ML; MG/100ML
50 INJECTION, SOLUTION INTRAVENOUS CONTINUOUS
Status: DISCONTINUED | OUTPATIENT
Start: 2025-02-21 | End: 2025-02-24

## 2025-02-21 RX ORDER — OXYCODONE HYDROCHLORIDE 5 MG/1
10 TABLET ORAL EVERY 4 HOURS PRN
Status: DISCONTINUED | OUTPATIENT
Start: 2025-02-21 | End: 2025-02-25 | Stop reason: HOSPADM

## 2025-02-21 RX ORDER — HYDROCHLOROTHIAZIDE 12.5 MG/1
12.5 TABLET ORAL DAILY
Status: DISCONTINUED | OUTPATIENT
Start: 2025-02-21 | End: 2025-02-25 | Stop reason: HOSPADM

## 2025-02-21 RX ORDER — SODIUM CHLORIDE 0.9 % (FLUSH) 0.9 %
3-10 SYRINGE (ML) INJECTION AS NEEDED
Status: DISCONTINUED | OUTPATIENT
Start: 2025-02-21 | End: 2025-02-21 | Stop reason: HOSPADM

## 2025-02-21 RX ORDER — PROPOFOL 10 MG/ML
VIAL (ML) INTRAVENOUS AS NEEDED
Status: DISCONTINUED | OUTPATIENT
Start: 2025-02-21 | End: 2025-02-21 | Stop reason: SURG

## 2025-02-21 RX ORDER — DIPHENHYDRAMINE HYDROCHLORIDE 50 MG/ML
25 INJECTION INTRAMUSCULAR; INTRAVENOUS EVERY 4 HOURS PRN
Status: DISCONTINUED | OUTPATIENT
Start: 2025-02-21 | End: 2025-02-21 | Stop reason: HOSPADM

## 2025-02-21 RX ORDER — NALOXONE HCL 0.4 MG/ML
0.4 VIAL (ML) INJECTION AS NEEDED
Status: DISCONTINUED | OUTPATIENT
Start: 2025-02-21 | End: 2025-02-21 | Stop reason: HOSPADM

## 2025-02-21 RX ORDER — OXYCODONE HYDROCHLORIDE 5 MG/1
5 TABLET ORAL EVERY 4 HOURS PRN
Status: DISCONTINUED | OUTPATIENT
Start: 2025-02-21 | End: 2025-02-25 | Stop reason: HOSPADM

## 2025-02-21 RX ORDER — KETAMINE HCL IN NACL, ISO-OSM 100MG/10ML
10 SYRINGE (ML) INJECTION
Status: DISCONTINUED | OUTPATIENT
Start: 2025-02-21 | End: 2025-02-21 | Stop reason: HOSPADM

## 2025-02-21 RX ORDER — FENTANYL CITRATE 50 UG/ML
INJECTION, SOLUTION INTRAMUSCULAR; INTRAVENOUS AS NEEDED
Status: DISCONTINUED | OUTPATIENT
Start: 2025-02-21 | End: 2025-02-21 | Stop reason: SURG

## 2025-02-21 RX ORDER — ATOMOXETINE 25 MG/1
25 CAPSULE ORAL DAILY
Status: DISCONTINUED | OUTPATIENT
Start: 2025-02-22 | End: 2025-02-25 | Stop reason: HOSPADM

## 2025-02-21 RX ORDER — GABAPENTIN 100 MG/1
100 CAPSULE ORAL NIGHTLY
Status: DISCONTINUED | OUTPATIENT
Start: 2025-02-21 | End: 2025-02-25 | Stop reason: HOSPADM

## 2025-02-21 RX ORDER — LIDOCAINE HYDROCHLORIDE 10 MG/ML
0.5 INJECTION, SOLUTION INFILTRATION; PERINEURAL ONCE AS NEEDED
Status: DISCONTINUED | OUTPATIENT
Start: 2025-02-21 | End: 2025-02-21 | Stop reason: HOSPADM

## 2025-02-21 RX ORDER — MAGNESIUM HYDROXIDE 1200 MG/15ML
LIQUID ORAL AS NEEDED
Status: DISCONTINUED | OUTPATIENT
Start: 2025-02-21 | End: 2025-02-21 | Stop reason: HOSPADM

## 2025-02-21 RX ORDER — MIDAZOLAM HYDROCHLORIDE 1 MG/ML
1 INJECTION, SOLUTION INTRAMUSCULAR; INTRAVENOUS
Status: DISCONTINUED | OUTPATIENT
Start: 2025-02-21 | End: 2025-02-21 | Stop reason: HOSPADM

## 2025-02-21 RX ORDER — SODIUM CHLORIDE, SODIUM LACTATE, POTASSIUM CHLORIDE, CALCIUM CHLORIDE 600; 310; 30; 20 MG/100ML; MG/100ML; MG/100ML; MG/100ML
9 INJECTION, SOLUTION INTRAVENOUS CONTINUOUS
Status: DISCONTINUED | OUTPATIENT
Start: 2025-02-21 | End: 2025-02-21

## 2025-02-21 RX ORDER — METOCLOPRAMIDE HYDROCHLORIDE 5 MG/ML
10 INJECTION INTRAMUSCULAR; INTRAVENOUS ONCE AS NEEDED
Status: COMPLETED | OUTPATIENT
Start: 2025-02-21 | End: 2025-02-21

## 2025-02-21 RX ORDER — LIDOCAINE HYDROCHLORIDE ANHYDROUS AND DEXTROSE MONOHYDRATE 5; 400 G/100ML; MG/100ML
INJECTION, SOLUTION INTRAVENOUS CONTINUOUS PRN
Status: DISCONTINUED | OUTPATIENT
Start: 2025-02-21 | End: 2025-02-21 | Stop reason: SURG

## 2025-02-21 RX ORDER — DIPHENHYDRAMINE HCL 25 MG
25 CAPSULE ORAL EVERY 4 HOURS PRN
Status: DISCONTINUED | OUTPATIENT
Start: 2025-02-21 | End: 2025-02-21 | Stop reason: HOSPADM

## 2025-02-21 RX ORDER — LIDOCAINE HYDROCHLORIDE 20 MG/ML
INJECTION, SOLUTION INFILTRATION; PERINEURAL AS NEEDED
Status: DISCONTINUED | OUTPATIENT
Start: 2025-02-21 | End: 2025-02-21 | Stop reason: SURG

## 2025-02-21 RX ORDER — HYDROMORPHONE HYDROCHLORIDE 1 MG/ML
0.25 INJECTION, SOLUTION INTRAMUSCULAR; INTRAVENOUS; SUBCUTANEOUS
Status: DISCONTINUED | OUTPATIENT
Start: 2025-02-21 | End: 2025-02-21 | Stop reason: HOSPADM

## 2025-02-21 RX ORDER — LABETALOL HYDROCHLORIDE 5 MG/ML
5 INJECTION, SOLUTION INTRAVENOUS
Status: DISCONTINUED | OUTPATIENT
Start: 2025-02-21 | End: 2025-02-21 | Stop reason: HOSPADM

## 2025-02-21 RX ORDER — BUPIVACAINE HYDROCHLORIDE 2.5 MG/ML
INJECTION, SOLUTION EPIDURAL; INFILTRATION; INTRACAUDAL
Status: COMPLETED | OUTPATIENT
Start: 2025-02-21 | End: 2025-02-21

## 2025-02-21 RX ORDER — ENOXAPARIN SODIUM 100 MG/ML
40 INJECTION SUBCUTANEOUS DAILY
Status: DISCONTINUED | OUTPATIENT
Start: 2025-02-22 | End: 2025-02-25 | Stop reason: HOSPADM

## 2025-02-21 RX ORDER — ONDANSETRON 4 MG/1
4 TABLET, ORALLY DISINTEGRATING ORAL EVERY 6 HOURS PRN
Status: DISCONTINUED | OUTPATIENT
Start: 2025-02-21 | End: 2025-02-25 | Stop reason: HOSPADM

## 2025-02-21 RX ORDER — BUPIVACAINE HYDROCHLORIDE 2.5 MG/ML
INJECTION, SOLUTION EPIDURAL; INFILTRATION; INTRACAUDAL
Status: COMPLETED
Start: 2025-02-21 | End: 2025-02-21

## 2025-02-21 RX ORDER — ONDANSETRON 2 MG/ML
INJECTION INTRAMUSCULAR; INTRAVENOUS AS NEEDED
Status: DISCONTINUED | OUTPATIENT
Start: 2025-02-21 | End: 2025-02-21 | Stop reason: SURG

## 2025-02-21 RX ORDER — ONDANSETRON 2 MG/ML
4 INJECTION INTRAMUSCULAR; INTRAVENOUS EVERY 6 HOURS PRN
Status: DISCONTINUED | OUTPATIENT
Start: 2025-02-21 | End: 2025-02-25 | Stop reason: HOSPADM

## 2025-02-21 RX ORDER — HYDROMORPHONE HYDROCHLORIDE 1 MG/ML
0.5 INJECTION, SOLUTION INTRAMUSCULAR; INTRAVENOUS; SUBCUTANEOUS
Status: DISCONTINUED | OUTPATIENT
Start: 2025-02-21 | End: 2025-02-25 | Stop reason: HOSPADM

## 2025-02-21 RX ORDER — ROCURONIUM BROMIDE 10 MG/ML
INJECTION, SOLUTION INTRAVENOUS AS NEEDED
Status: DISCONTINUED | OUTPATIENT
Start: 2025-02-21 | End: 2025-02-21 | Stop reason: SURG

## 2025-02-21 RX ORDER — AMLODIPINE BESYLATE 10 MG/1
10 TABLET ORAL DAILY
Status: DISCONTINUED | OUTPATIENT
Start: 2025-02-21 | End: 2025-02-25 | Stop reason: HOSPADM

## 2025-02-21 RX ORDER — FLUMAZENIL 0.1 MG/ML
0.2 INJECTION INTRAVENOUS AS NEEDED
Status: DISCONTINUED | OUTPATIENT
Start: 2025-02-21 | End: 2025-02-21 | Stop reason: HOSPADM

## 2025-02-21 RX ORDER — ACETAMINOPHEN 500 MG
1000 TABLET ORAL EVERY 6 HOURS
Status: DISCONTINUED | OUTPATIENT
Start: 2025-02-21 | End: 2025-02-25 | Stop reason: HOSPADM

## 2025-02-21 RX ORDER — METHOCARBAMOL 750 MG/1
750 TABLET, FILM COATED ORAL 4 TIMES DAILY
Status: DISCONTINUED | OUTPATIENT
Start: 2025-02-21 | End: 2025-02-25 | Stop reason: HOSPADM

## 2025-02-21 RX ORDER — HYDRALAZINE HYDROCHLORIDE 20 MG/ML
5 INJECTION INTRAMUSCULAR; INTRAVENOUS
Status: DISCONTINUED | OUTPATIENT
Start: 2025-02-21 | End: 2025-02-21 | Stop reason: HOSPADM

## 2025-02-21 RX ORDER — ONDANSETRON 2 MG/ML
4 INJECTION INTRAMUSCULAR; INTRAVENOUS ONCE AS NEEDED
Status: COMPLETED | OUTPATIENT
Start: 2025-02-21 | End: 2025-02-21

## 2025-02-21 RX ORDER — NALOXONE HCL 0.4 MG/ML
0.4 VIAL (ML) INJECTION
Status: DISCONTINUED | OUTPATIENT
Start: 2025-02-21 | End: 2025-02-21 | Stop reason: HOSPADM

## 2025-02-21 RX ORDER — DEXAMETHASONE SODIUM PHOSPHATE 4 MG/ML
INJECTION, SOLUTION INTRA-ARTICULAR; INTRALESIONAL; INTRAMUSCULAR; INTRAVENOUS; SOFT TISSUE AS NEEDED
Status: DISCONTINUED | OUTPATIENT
Start: 2025-02-21 | End: 2025-02-21 | Stop reason: SURG

## 2025-02-21 RX ORDER — SODIUM CHLORIDE 0.9 % (FLUSH) 0.9 %
3 SYRINGE (ML) INJECTION EVERY 12 HOURS SCHEDULED
Status: DISCONTINUED | OUTPATIENT
Start: 2025-02-21 | End: 2025-02-21 | Stop reason: HOSPADM

## 2025-02-21 RX ORDER — IPRATROPIUM BROMIDE AND ALBUTEROL SULFATE 2.5; .5 MG/3ML; MG/3ML
3 SOLUTION RESPIRATORY (INHALATION) ONCE AS NEEDED
Status: DISCONTINUED | OUTPATIENT
Start: 2025-02-21 | End: 2025-02-21 | Stop reason: HOSPADM

## 2025-02-21 RX ORDER — OXYCODONE HYDROCHLORIDE 5 MG/1
5 TABLET ORAL ONCE AS NEEDED
Status: DISCONTINUED | OUTPATIENT
Start: 2025-02-21 | End: 2025-02-21 | Stop reason: HOSPADM

## 2025-02-21 RX ADMIN — Medication 50 MG: at 15:38

## 2025-02-21 RX ADMIN — METHOCARBAMOL TABLETS 750 MG: 750 TABLET, COATED ORAL at 20:08

## 2025-02-21 RX ADMIN — Medication 10 MG: at 18:45

## 2025-02-21 RX ADMIN — HYDROCHLOROTHIAZIDE 12.5 MG: 12.5 TABLET ORAL at 20:08

## 2025-02-21 RX ADMIN — BUPIVACAINE HYDROCHLORIDE 60 ML: 2.5 INJECTION, SOLUTION EPIDURAL; INFILTRATION; INTRACAUDAL; PERINEURAL at 15:42

## 2025-02-21 RX ADMIN — BUPIVACAINE 20 ML: 13.3 INJECTION, SUSPENSION, LIPOSOMAL INFILTRATION at 15:42

## 2025-02-21 RX ADMIN — PROPOFOL 200 MG: 10 INJECTION, EMULSION INTRAVENOUS at 15:32

## 2025-02-21 RX ADMIN — Medication 10 MG: at 18:29

## 2025-02-21 RX ADMIN — CEFOXITIN SODIUM 2000 MG: 2 POWDER, FOR SOLUTION INTRAVENOUS at 15:17

## 2025-02-21 RX ADMIN — MAGNESIUM SULFATE HEPTAHYDRATE 2 G: 500 INJECTION, SOLUTION INTRAMUSCULAR; INTRAVENOUS at 15:37

## 2025-02-21 RX ADMIN — DEXAMETHASONE SODIUM PHOSPHATE 8 MG: 4 INJECTION, SOLUTION INTRAMUSCULAR; INTRAVENOUS at 15:37

## 2025-02-21 RX ADMIN — AMLODIPINE BESYLATE 10 MG: 10 TABLET ORAL at 20:08

## 2025-02-21 RX ADMIN — FENTANYL CITRATE 50 MCG: 50 INJECTION, SOLUTION INTRAMUSCULAR; INTRAVENOUS at 17:26

## 2025-02-21 RX ADMIN — SODIUM CHLORIDE, SODIUM LACTATE, POTASSIUM CHLORIDE, CALCIUM CHLORIDE 50 ML/HR: 20; 30; 600; 310 INJECTION, SOLUTION INTRAVENOUS at 20:07

## 2025-02-21 RX ADMIN — CEFOXITIN SODIUM 2 G: 2 POWDER, FOR SOLUTION INTRAVENOUS at 17:17

## 2025-02-21 RX ADMIN — HYDROMORPHONE HYDROCHLORIDE 0.25 MG: 1 INJECTION, SOLUTION INTRAMUSCULAR; INTRAVENOUS; SUBCUTANEOUS at 17:48

## 2025-02-21 RX ADMIN — FENTANYL CITRATE 50 MCG: 50 INJECTION, SOLUTION INTRAMUSCULAR; INTRAVENOUS at 15:29

## 2025-02-21 RX ADMIN — HYDROMORPHONE HYDROCHLORIDE 0.5 MG: 1 INJECTION, SOLUTION INTRAMUSCULAR; INTRAVENOUS; SUBCUTANEOUS at 20:08

## 2025-02-21 RX ADMIN — ROCURONIUM BROMIDE 40 MG: 10 INJECTION, SOLUTION INTRAVENOUS at 15:32

## 2025-02-21 RX ADMIN — HYDROMORPHONE HYDROCHLORIDE 0.5 MG: 1 INJECTION, SOLUTION INTRAMUSCULAR; INTRAVENOUS; SUBCUTANEOUS at 23:17

## 2025-02-21 RX ADMIN — ONDANSETRON 4 MG: 2 INJECTION, SOLUTION INTRAMUSCULAR; INTRAVENOUS at 17:54

## 2025-02-21 RX ADMIN — ROCURONIUM BROMIDE 20 MG: 10 INJECTION, SOLUTION INTRAVENOUS at 16:42

## 2025-02-21 RX ADMIN — ONDANSETRON 4 MG: 2 INJECTION, SOLUTION INTRAMUSCULAR; INTRAVENOUS at 17:09

## 2025-02-21 RX ADMIN — LIDOCAINE HYDROCHLORIDE 100 MG: 20 INJECTION, SOLUTION INFILTRATION; PERINEURAL at 15:31

## 2025-02-21 RX ADMIN — SUGAMMADEX 200 MG: 100 INJECTION, SOLUTION INTRAVENOUS at 17:15

## 2025-02-21 RX ADMIN — LIDOCAINE HYDROCHLORIDE 2 MG/MIN: 4 INJECTION, SOLUTION INTRAVENOUS at 15:35

## 2025-02-21 RX ADMIN — ACETAMINOPHEN 1000 MG: 500 TABLET, FILM COATED ORAL at 20:08

## 2025-02-21 RX ADMIN — SODIUM CHLORIDE, POTASSIUM CHLORIDE, SODIUM LACTATE AND CALCIUM CHLORIDE 9 ML/HR: 600; 310; 30; 20 INJECTION, SOLUTION INTRAVENOUS at 15:17

## 2025-02-21 RX ADMIN — METOCLOPRAMIDE 10 MG: 5 INJECTION, SOLUTION INTRAMUSCULAR; INTRAVENOUS at 18:27

## 2025-02-21 RX ADMIN — GABAPENTIN 100 MG: 100 CAPSULE ORAL at 20:08

## 2025-02-21 RX ADMIN — OXYCODONE HYDROCHLORIDE 5 MG: 5 TABLET ORAL at 21:58

## 2025-02-21 RX ADMIN — Medication 10 MG: at 19:21

## 2025-02-21 NOTE — OP NOTE
Colorectal & General Surgery  Operative Report    Patient: Jc Brannon Jr.  YOB: 1980  MRN: [id number]  DATE OF PROCEDURE: 02/21/25     PREOPERATIVE DIAGNOSIS:  Rectal adenocarcinoma  Ileostomy status    POSTOPERATIVE DIAGNOSIS:  Same    PROCEDURE:  Closure of diverting loop ileostomy with resection    FINDINGS:  Healthy appearing ileum.  1 area of dystrophic calcification of the lateral aspects of the ileum adjacent to its exit from the fascia.  This did require a little bit more extensive resection.  Side-to-side enteroenterostomy created with stapled common channel and 2 layer closure of the common enterotomy.    SURGEON:  See Isaac MD    ASSISTANT:  Assistant: Maegan Ramírez PA-C was responsible for performing the following activities: Retraction, Suction, Irrigation, and Placing Dressing and their skilled assistance was necessary for the success of this case.     ANESTHESIA:  General-endotracheal  Tap blocks    EBL:  25 mL    SPECIMEN:  Ileostomy  Abdominal wall lesion    OPERATIVE DESCRIPTION:  The patient was brought to the operating room under the care of the nursing staff.  The patient was placed on the operating room table in the supine position where anesthesia was induced.  The patient was then prepped and positioned in the usual sterile fashion.  A standardized timeout was then performed.    Elliptical incision was created around the ileostomy.  Subcutaneous tissue was dissected away from the ileum down to the level of fascia.  The fascia was then carefully dissected away from the ileum, which was relatively easy on the medial aspect and resulted in entry into the peritoneal cavity which had very minimal adhesions.  On the lateral aspect, the ileum was densely adherent to the fascia and there was dystrophic calcification in this area.  This did result in a few serosal tears of the distal ileal limb.  Eventually, the ileum was completely freed from all surrounding structures.   There was 1 bleeding vessel within the mesentery that was suture-ligated.  Doppler was then used to confirm triphasic arterial signal on both the proximal and distal limbs.  [place] 60 stapler was used to divide both limbs at the point of vascularity.  The corner of each staple line was then cut away with scissors.  60 mm [place] stapler with a white load was then used to create a common channel and a side-to-side fashion.  The anastomosis appeared patent and hemostatic.  The common enterotomy was then closed with 2 layers, first with a running 3-0 chromic suture and second with 2-0 silk Lembert sutures.  The anastomosis was patent.  The mesenteric defect was very tiny and not closed out of concern for reducing vascular supply.  The bowel was placed back within the abdominal cavity.  Posterior fascia was closed with running 0 PDS suture.  Anterior fascia was closed with interrupted 0 PDS suture.  Subcutaneous tissue was copiously irrigated.  Skin was reapproximated with surgical staples.    All needle, sponge, and instrument counts were correct at the end of the case.    The patient tolerated the procedure well and was transferred to the postanesthesia care unit in stable condition.    See Isaac M.D.  Colorectal & General Surgery  [facility]    [address], [address]  [place], [place], [address]  P: [phone]  F: [phone]

## 2025-02-21 NOTE — ANESTHESIA PROCEDURE NOTES
Peripheral Block      Patient reassessed immediately prior to procedure    Patient location during procedure: pre-op  Start time: 2/21/2025 3:35 PM  Stop time: 2/21/2025 3:42 PM  Reason for block: at surgeon's request and post-op pain management  Performed by  Anesthesiologist: Mohan Gillette MD  Preanesthetic Checklist  Completed: patient identified, risks and benefits discussed and monitors and equipment checked  Prep:  Pt Position: supine  Prep: ChloraPrep  Patient monitoring: blood pressure monitoring, continuous pulse oximetry and EKG  Procedure    Sedation: yes  Performed under: general  Guidance:ultrasound guided    ULTRASOUND INTERPRETATION.  Using ultrasound guidance a 21 G gauge needle was placed in close proximity to the nerve, at which point, under ultrasound guidance anesthetic was injected in the area of the nerve and spread of the anesthesia was seen on ultrasound in close proximity thereto.  There were no abnormalities seen on ultrasound; a digital image was taken; and the patient tolerated the procedure with no complications. Images:still images obtained, printed/placed on chart    Laterality:Bilateral  Block Type:TAP  Injection Technique:single-shot  Needle Type:short-bevel  Needle Gauge:21 G      Medications Used: bupivacaine liposome (EXPAREL) 1.3 % injection - Infiltration   20 mL - 2/21/2025 3:42:00 PM  bupivacaine PF (MARCAINE) 0.25 % injection - Injection   60 mL - 2/21/2025 3:42:00 PM      Post Assessment  Patient Tolerance:comfortable throughout block  Complications:no  Additional Notes  Ultrasound Interpretation:  Using ultrasound guidance the needle was placed under the fascia  the internal oblique and transversus abdominus muscles.  Local anesthetic was seen  the muscle from the fascia.  There were no abnormalities seen on ultrasound; a digital image was taken; and the patient tolerated the procedure with no complications.      Performed by: Mohan Gillette,  MD

## 2025-02-21 NOTE — ANESTHESIA PROCEDURE NOTES
Airway  Urgency: elective    Date/Time: 2/21/2025 3:34 PM  Airway not difficult    General Information and Staff    Patient location during procedure: OR  Anesthesiologist: Mohan Gillette MD  CRNA/CAA: Luan Cartagena CRNA    Indications and Patient Condition  Indications for airway management: airway protection    Preoxygenated: yes  Mask difficulty assessment: 2 - vent by mask + OA or adjuvant +/- NMBA    Final Airway Details  Final airway type: endotracheal airway      Successful airway: ETT  Cuffed: yes   Successful intubation technique: direct laryngoscopy  Endotracheal tube insertion site: oral  Blade: Lane  Blade size: 2  ETT size (mm): 8.0  Cormack-Lehane Classification: grade IIa - partial view of glottis  Placement verified by: chest auscultation and capnometry   Measured from: lips  Number of attempts at approach: 1  Assessment: lips, teeth, and gum same as pre-op and atraumatic intubation    Additional Comments  Pre 02 100%, SIVI, DL x1, atraumatic intubation, BLBS, Positive ETC02.

## 2025-02-21 NOTE — ANESTHESIA PREPROCEDURE EVALUATION
Anesthesia Evaluation     Patient summary reviewed and Nursing notes reviewed   no history of anesthetic complications:   NPO Solid Status: > 8 hours  NPO Liquid Status: > 2 hours           Airway   Mallampati: II  TM distance: >3 FB  Neck ROM: full  Dental      Pulmonary    Cardiovascular     (+) hypertension, hyperlipidemia      Neuro/Psych    ROS Comment: Intellectual disability  GI/Hepatic/Renal/Endo    (+) obesity    Musculoskeletal     Abdominal    Substance History      OB/GYN          Other   blood dyscrasia anemia,   history of cancer      Other Comment: Chemo induced anemia and neutropenia                 Anesthesia Plan    ASA 3     general     intravenous induction     Anesthetic plan, risks, benefits, and alternatives have been provided, discussed and informed consent has been obtained with: patient.      CODE STATUS:

## 2025-02-22 LAB
ANION GAP SERPL CALCULATED.3IONS-SCNC: 12 MMOL/L (ref 5–15)
BASOPHILS # BLD AUTO: 0.01 10*3/MM3 (ref 0–0.2)
BASOPHILS NFR BLD AUTO: 0.1 % (ref 0–1.5)
BUN SERPL-MCNC: 7 MG/DL (ref 6–20)
BUN/CREAT SERPL: 7.7 (ref 7–25)
CALCIUM SPEC-SCNC: 9 MG/DL (ref 8.6–10.5)
CHLORIDE SERPL-SCNC: 99 MMOL/L (ref 98–107)
CO2 SERPL-SCNC: 27 MMOL/L (ref 22–29)
CREAT SERPL-MCNC: 0.91 MG/DL (ref 0.76–1.27)
DEPRECATED RDW RBC AUTO: 44.1 FL (ref 37–54)
EGFRCR SERPLBLD CKD-EPI 2021: 105.3 ML/MIN/1.73
EOSINOPHIL # BLD AUTO: 0 10*3/MM3 (ref 0–0.4)
EOSINOPHIL NFR BLD AUTO: 0 % (ref 0.3–6.2)
ERYTHROCYTE [DISTWIDTH] IN BLOOD BY AUTOMATED COUNT: 12.6 % (ref 12.3–15.4)
GLUCOSE SERPL-MCNC: 127 MG/DL (ref 65–99)
HCT VFR BLD AUTO: 39.6 % (ref 37.5–51)
HGB BLD-MCNC: 12.7 G/DL (ref 13–17.7)
IMM GRANULOCYTES # BLD AUTO: 0.05 10*3/MM3 (ref 0–0.05)
IMM GRANULOCYTES NFR BLD AUTO: 0.7 % (ref 0–0.5)
LYMPHOCYTES # BLD AUTO: 0.3 10*3/MM3 (ref 0.7–3.1)
LYMPHOCYTES NFR BLD AUTO: 4 % (ref 19.6–45.3)
MAGNESIUM SERPL-MCNC: 2.2 MG/DL (ref 1.6–2.6)
MCH RBC QN AUTO: 30.7 PG (ref 26.6–33)
MCHC RBC AUTO-ENTMCNC: 32.1 G/DL (ref 31.5–35.7)
MCV RBC AUTO: 95.7 FL (ref 79–97)
MONOCYTES # BLD AUTO: 0.38 10*3/MM3 (ref 0.1–0.9)
MONOCYTES NFR BLD AUTO: 5.1 % (ref 5–12)
NEUTROPHILS NFR BLD AUTO: 6.78 10*3/MM3 (ref 1.7–7)
NEUTROPHILS NFR BLD AUTO: 90.1 % (ref 42.7–76)
NRBC BLD AUTO-RTO: 0 /100 WBC (ref 0–0.2)
PHOSPHATE SERPL-MCNC: 3.2 MG/DL (ref 2.5–4.5)
PLATELET # BLD AUTO: 288 10*3/MM3 (ref 140–450)
PMV BLD AUTO: 10.2 FL (ref 6–12)
POTASSIUM SERPL-SCNC: 3.8 MMOL/L (ref 3.5–5.2)
RBC # BLD AUTO: 4.14 10*6/MM3 (ref 4.14–5.8)
SODIUM SERPL-SCNC: 138 MMOL/L (ref 136–145)
WBC NRBC COR # BLD AUTO: 7.52 10*3/MM3 (ref 3.4–10.8)
WHOLE BLOOD HOLD SPECIMEN: NORMAL

## 2025-02-22 PROCEDURE — 84100 ASSAY OF PHOSPHORUS: CPT | Performed by: SURGERY

## 2025-02-22 PROCEDURE — 83735 ASSAY OF MAGNESIUM: CPT | Performed by: SURGERY

## 2025-02-22 PROCEDURE — 25810000003 LACTATED RINGERS PER 1000 ML: Performed by: SURGERY

## 2025-02-22 PROCEDURE — 85025 COMPLETE CBC W/AUTO DIFF WBC: CPT | Performed by: SURGERY

## 2025-02-22 PROCEDURE — 99024 POSTOP FOLLOW-UP VISIT: CPT | Performed by: SURGERY

## 2025-02-22 PROCEDURE — 80048 BASIC METABOLIC PNL TOTAL CA: CPT | Performed by: SURGERY

## 2025-02-22 PROCEDURE — 25010000002 ENOXAPARIN PER 10 MG: Performed by: SURGERY

## 2025-02-22 PROCEDURE — 25010000002 ONDANSETRON PER 1 MG: Performed by: SURGERY

## 2025-02-22 RX ADMIN — GABAPENTIN 100 MG: 100 CAPSULE ORAL at 21:00

## 2025-02-22 RX ADMIN — ACETAMINOPHEN 1000 MG: 500 TABLET, FILM COATED ORAL at 21:00

## 2025-02-22 RX ADMIN — ACETAMINOPHEN 1000 MG: 500 TABLET, FILM COATED ORAL at 02:37

## 2025-02-22 RX ADMIN — ACETAMINOPHEN 1000 MG: 500 TABLET, FILM COATED ORAL at 15:43

## 2025-02-22 RX ADMIN — ENOXAPARIN SODIUM 40 MG: 100 INJECTION SUBCUTANEOUS at 08:56

## 2025-02-22 RX ADMIN — ONDANSETRON 4 MG: 2 INJECTION, SOLUTION INTRAMUSCULAR; INTRAVENOUS at 04:23

## 2025-02-22 RX ADMIN — ACETAMINOPHEN 1000 MG: 500 TABLET, FILM COATED ORAL at 08:56

## 2025-02-22 RX ADMIN — HYDROCHLOROTHIAZIDE 12.5 MG: 12.5 TABLET ORAL at 08:56

## 2025-02-22 RX ADMIN — ATOMOXETINE 25 MG: 25 CAPSULE ORAL at 08:56

## 2025-02-22 RX ADMIN — METHOCARBAMOL TABLETS 750 MG: 750 TABLET, COATED ORAL at 08:56

## 2025-02-22 RX ADMIN — METHOCARBAMOL TABLETS 750 MG: 750 TABLET, COATED ORAL at 12:10

## 2025-02-22 RX ADMIN — SODIUM CHLORIDE, SODIUM LACTATE, POTASSIUM CHLORIDE, CALCIUM CHLORIDE 50 ML/HR: 20; 30; 600; 310 INJECTION, SOLUTION INTRAVENOUS at 13:35

## 2025-02-22 RX ADMIN — OXYCODONE HYDROCHLORIDE 5 MG: 5 TABLET ORAL at 02:37

## 2025-02-22 RX ADMIN — METHOCARBAMOL TABLETS 750 MG: 750 TABLET, COATED ORAL at 21:00

## 2025-02-22 RX ADMIN — METHOCARBAMOL TABLETS 750 MG: 750 TABLET, COATED ORAL at 17:13

## 2025-02-22 RX ADMIN — AMLODIPINE BESYLATE 10 MG: 10 TABLET ORAL at 08:56

## 2025-02-22 NOTE — ANESTHESIA POSTPROCEDURE EVALUATION
Patient: Jc Brannon Jr.    Procedure Summary       Date: 02/21/25 Room / Location: [facility] OR  / [facility] MAIN OR    Anesthesia Start: 1527 Anesthesia Stop: 1736    Procedure: Closure of diverting loop ileostomy (Abdomen) Diagnosis:       Rectal adenocarcinoma      (Rectal adenocarcinoma [C20])    Surgeons: Ori Isaac MD Provider: Mohan Gillette MD    Anesthesia Type: general ASA Status: 3            Anesthesia Type: general    Vitals  Vitals Value Taken Time   /83 02/21/25 1930   Temp 36.8 °C (98.2 °F) 02/21/25 1732   Pulse 111 02/21/25 1943   Resp 12 02/21/25 1732   SpO2 97 % 02/21/25 1943   Vitals shown include unfiled device data.        Post Anesthesia Care and Evaluation      Comments: No anesthesia complications reported to me

## 2025-02-22 NOTE — PLAN OF CARE
Goal Outcome Evaluation:         Patient A&Ox4. Calm, cooperative. Pain rated highest 6/10 upon arrival to unit. Oxycodone administered x2 per MAR. Hydromorphone administered x2 per MAR. Patient voiding spontaneously, urine clear yellow. Abdomen hypoactive bowel sounds, flat, tender to touch. Patient tachycardic and saturating >96% on RA. Patient and RN ambulated in [place]. Patient tolerated well. Clear liquid diet, 1 nausea episode treated with antiemetics.   This RN WTCM for remaining duration of shift.

## 2025-02-22 NOTE — PLAN OF CARE
Goal Outcome Evaluation:                 Pt A/Ox4, RA, up with SBA due to impulsiveness. Pt ambulated unit with staff and is tolerating clear liq diet. PRN pain meds offered but refused. ABD dressing CDI. Pt has still not passed gas. VSS, plan of care ongoing.

## 2025-02-22 NOTE — PROGRESS NOTES
Colorectal & General Surgery  Progress Note    Patient: Jc Brannon Jr.  YOB: 1980  MRN: [id number]      Assessment  Jc Brannon Jr. is a 44 y.o. male with rectal adenocarcinoma is now postoperative day 1 from closure of loop ileostomy.    He is afebrile.  He is tachycardic but this is shown to be his baseline.  Blood pressure in good control.  Abdominal exam is distended but otherwise benign.  He is not passing flatus or having a bowel function yet.  Tolerating clear liquids well.    Subjective  No significant events overnight.  Pain well-controlled today.  No flatus or bowel function.    Objective    Vitals:    02/22/25 0915   BP: 152/99   Pulse: 110   Resp:    Temp: 98.2 °F (36.8 °C)   SpO2: 98%       Physical Exam  Constitutional: Well-developed well-nourished, no acute distress  Neck: Supple, trachea midline  Respiratory: No increased work of breathing, Symmetric excursion  Cardiovascular: Well pefursed, no jugular venous distention evident   Abdominal: Soft, distended, appropriately tender.  Incision in good order.  Skin: Warm, dry, no rash on visualized skin surfaces  Psychiatric: Alert and oriented ×3, normal affect     Laboratory Results  I have personally reviewed CBC with WC 7, hemoglobin 12.7, platelets 288.  BMP with creatinine 0.91, bicarb 27, magnesium 2.2, phosphorus 3.2, potassium 3.8.    Radiology  None to review         See Isaac MD  Colorectal & General Surgery  [facility]    [address] [place] [address], [address]  [place], [place], [address]  P: [phone]  F: [phone]

## 2025-02-23 ENCOUNTER — HOME CARE VISIT (OUTPATIENT)
Dept: HOME HEALTH SERVICES | Facility: HOME HEALTHCARE | Age: 45
End: 2025-02-23
Payer: COMMERCIAL

## 2025-02-23 PROCEDURE — 25010000002 ENOXAPARIN PER 10 MG: Performed by: SURGERY

## 2025-02-23 PROCEDURE — 99024 POSTOP FOLLOW-UP VISIT: CPT | Performed by: SURGERY

## 2025-02-23 PROCEDURE — 25810000003 LACTATED RINGERS PER 1000 ML: Performed by: SURGERY

## 2025-02-23 PROCEDURE — 94799 UNLISTED PULMONARY SVC/PX: CPT

## 2025-02-23 RX ADMIN — METHOCARBAMOL TABLETS 750 MG: 750 TABLET, COATED ORAL at 22:00

## 2025-02-23 RX ADMIN — ACETAMINOPHEN 1000 MG: 500 TABLET, FILM COATED ORAL at 14:33

## 2025-02-23 RX ADMIN — SODIUM CHLORIDE, SODIUM LACTATE, POTASSIUM CHLORIDE, CALCIUM CHLORIDE 50 ML/HR: 20; 30; 600; 310 INJECTION, SOLUTION INTRAVENOUS at 12:14

## 2025-02-23 RX ADMIN — HYDROCHLOROTHIAZIDE 12.5 MG: 12.5 TABLET ORAL at 08:46

## 2025-02-23 RX ADMIN — METHOCARBAMOL TABLETS 750 MG: 750 TABLET, COATED ORAL at 08:45

## 2025-02-23 RX ADMIN — AMLODIPINE BESYLATE 10 MG: 10 TABLET ORAL at 08:45

## 2025-02-23 RX ADMIN — ENOXAPARIN SODIUM 40 MG: 100 INJECTION SUBCUTANEOUS at 08:45

## 2025-02-23 RX ADMIN — GABAPENTIN 100 MG: 100 CAPSULE ORAL at 22:00

## 2025-02-23 RX ADMIN — METHOCARBAMOL TABLETS 750 MG: 750 TABLET, COATED ORAL at 11:45

## 2025-02-23 RX ADMIN — ACETAMINOPHEN 1000 MG: 500 TABLET, FILM COATED ORAL at 08:46

## 2025-02-23 RX ADMIN — ACETAMINOPHEN 1000 MG: 500 TABLET, FILM COATED ORAL at 22:00

## 2025-02-23 RX ADMIN — SODIUM CHLORIDE, SODIUM LACTATE, POTASSIUM CHLORIDE, CALCIUM CHLORIDE 50 ML/HR: 20; 30; 600; 310 INJECTION, SOLUTION INTRAVENOUS at 10:17

## 2025-02-23 RX ADMIN — ATOMOXETINE 25 MG: 25 CAPSULE ORAL at 08:45

## 2025-02-23 RX ADMIN — METHOCARBAMOL TABLETS 750 MG: 750 TABLET, COATED ORAL at 17:14

## 2025-02-23 NOTE — PROGRESS NOTES
Colorectal & General Surgery  Progress Note    Patient: Jc Brannon Jr.  YOB: 1980  MRN: [id number]      Assessment  Jc Brannon Jr. is a 44 y.o. male with rectal adenocarcinoma who is now postoperative day 2 from closure of his diverting loop ileostomy.    Afebrile with some tachycardia, though this is usually his baseline.  Abdomen is soft, appropriately tender, mildly distended.  No flatus or bowel function yet.  Tolerating his clears well.    He had a significant ileus during his last admission, so ongoing to take it very slow during this admission.    Plan  Continue clear liquids until he passes flatus  Continue maintenance intravenous fluids  Continue pharmacologic DVT prophylaxis with Lovenox  Repeat labs in the morning      Subjective  No significant events.  Ambulating well and says he overall feels relatively well.  He has a emesis bag next to him but says he does not feel nauseated at all.  Pain is well-controlled.    Objective    Vitals:    02/23/25 0743   BP: 127/78   Pulse: 93   Resp: 18   Temp: 97.5 °F (36.4 °C)   SpO2: 99%       Physical Exam  Constitutional: Well-developed well-nourished, no acute distress  Neck: Supple, trachea midline  Respiratory: No increased work of breathing, Symmetric excursion  Cardiovascular: Well pefursed, no jugular venous distention evident   Abdominal: Incision in good order.  Soft, appropriately tender, mildly distended.    Skin: Warm, dry, no rash on visualized skin surfaces  Psychiatric: Alert and oriented ×3, normal affect          See Isaac MD  Colorectal & General Surgery  [facility]    [address], [address]  [place], [place], [facility]  P: [phone]  F: [phone]

## 2025-02-23 NOTE — Clinical Note
Transfer Summary  Pt transferred to [facility]  Reason for Transfer rectal adenocarcinoma  Report called to   Summary of Care treatment or services provided to the patient including disciplines seeing the patient: SN   Patient's progress towards goals ongoing  Communicable Disease If yes, type none

## 2025-02-23 NOTE — PLAN OF CARE
Goal Outcome Evaluation:  Plan of Care Reviewed With: patient        Progress: improving  Outcome Evaluation: Pleasant pt, calm and cooperative, states abd pain--especially when ambulating but, denies need for pain medication--walked around nurses station once with stand-by assist, keeps emesis bag near just in case but, no emesis or c/o nausea.  States no flatus over  night.

## 2025-02-23 NOTE — PLAN OF CARE
Goal Outcome Evaluation:      Pt A/Ox4, RA, up adlib. New IV placed in right forearm,  saline locked. Tolerating clear liq diet, pain controlled with Tylenol. Still no flatus. Plan of care ongoing, VSS.                                        Message was sent to patient to schedule follow up

## 2025-02-23 NOTE — HOME HEALTH
PATIENT IS CURRENT WITH [facility].  ADMITTED TO [facility] ON 2/21/25.  WE WILL CONTINUE TO FOLLOW AND RESUME CARE ONCE DISCHARGED HOME.

## 2025-02-24 LAB
CYTO UR: NORMAL
LAB AP CASE REPORT: NORMAL
PATH REPORT.FINAL DX SPEC: NORMAL
PATH REPORT.GROSS SPEC: NORMAL

## 2025-02-24 PROCEDURE — 99024 POSTOP FOLLOW-UP VISIT: CPT | Performed by: SURGERY

## 2025-02-24 PROCEDURE — 25010000002 ENOXAPARIN PER 10 MG: Performed by: SURGERY

## 2025-02-24 RX ADMIN — METHOCARBAMOL TABLETS 750 MG: 750 TABLET, COATED ORAL at 12:33

## 2025-02-24 RX ADMIN — ACETAMINOPHEN 1000 MG: 500 TABLET, FILM COATED ORAL at 14:27

## 2025-02-24 RX ADMIN — ACETAMINOPHEN 1000 MG: 500 TABLET, FILM COATED ORAL at 08:36

## 2025-02-24 RX ADMIN — METHOCARBAMOL TABLETS 750 MG: 750 TABLET, COATED ORAL at 20:41

## 2025-02-24 RX ADMIN — HYDROCHLOROTHIAZIDE 12.5 MG: 12.5 TABLET ORAL at 08:37

## 2025-02-24 RX ADMIN — METHOCARBAMOL TABLETS 750 MG: 750 TABLET, COATED ORAL at 18:22

## 2025-02-24 RX ADMIN — AMLODIPINE BESYLATE 10 MG: 10 TABLET ORAL at 08:37

## 2025-02-24 RX ADMIN — ATOMOXETINE 25 MG: 25 CAPSULE ORAL at 08:37

## 2025-02-24 RX ADMIN — METHOCARBAMOL TABLETS 750 MG: 750 TABLET, COATED ORAL at 08:38

## 2025-02-24 RX ADMIN — ACETAMINOPHEN 1000 MG: 500 TABLET, FILM COATED ORAL at 04:33

## 2025-02-24 RX ADMIN — ENOXAPARIN SODIUM 40 MG: 100 INJECTION SUBCUTANEOUS at 08:38

## 2025-02-24 RX ADMIN — GABAPENTIN 100 MG: 100 CAPSULE ORAL at 20:41

## 2025-02-24 NOTE — CONSULTS
"Nutrition Services    Patient Name:  Jc Brannon Jr.  YOB: 1980  MRN: [id number]  Admit Date:  2/21/2025    Assessment Date:  02/24/25    NUTRITION SCREENING      Reason for Encounter NPO/Clear Liquid Diet Status   Diagnosis/Problem Hx: rectal adenocarcinoma, ileostomy     Pt admitted to [facility] for closure of diverting loop ileostomy with resection. Pt tolerating clear liquid diet.        PO Diet Diet: Liquid; Clear Liquid; Fluid Consistency: Thin (IDDSI 0)   Supplements n/a   PO Intake % Minimal intake with nature of clear liquids        Medications MAR reviewed by RD   Labs  Listed below, reviewed   Physical Findings No documented edema    Room air   GI Function Awaiting return of bowel function    Skin Status Intact        Height  Weight  BMI  Weight Trend     Height: 175.3 cm (69.02\")  Weight: 103 kg (226 lb 13.7 oz) (02/21/25 2013)  Body mass index is 33.48 kg/m².  Other: current wt c/w range of wts recorded since October 2024       Nutrition Problem (PES) Inadequate oral intake related to clinical course as evidence by clear liquids since admission inadequately meeting pt's estimated needs.       Intervention/Plan Addition of Boost Breeze BID (provides 500 kcals, 18 g protein if consumed)     RD to follow up per protocol.     Results from last 7 days   Lab Units 02/22/25  0729   SODIUM mmol/L 138   POTASSIUM mmol/L 3.8   CHLORIDE mmol/L 99   CO2 mmol/L 27.0   BUN mg/dL 7   CREATININE mg/dL 0.91   CALCIUM mg/dL 9.0   GLUCOSE mg/dL 127*     Results from last 7 days   Lab Units 02/22/25  0729 02/22/25  0459   MAGNESIUM mg/dL 2.2  --    PHOSPHORUS mg/dL 3.2  --    HEMOGLOBIN g/dL  --  12.7*   HEMATOCRIT %  --  39.6     No results found for: \"HGBA1C\"      Electronically signed by:  Trish Sorto RD  02/24/25 13:49 EST    "

## 2025-02-24 NOTE — PLAN OF CARE
Goal Outcome Evaluation:      Patient alert and oriented, reporting less pain than yesterday, up ad maine, on room ai, no pain medications requested, see MAR for meds given. LR @50ml/hr,20g right forearm, clear liquid diet, not passing gas nor stool output. Plan of care is ongoing.

## 2025-02-24 NOTE — PLAN OF CARE
Goal Outcome Evaluation:         Patient is alert and oriented x 4, on room air, diet increase to GI and IVF's d/c'd.  Patient may possibly discharge on 2/25/25 per MD.  Patient has been ambulating today and passing gas.    TM

## 2025-02-24 NOTE — PROGRESS NOTES
Colorectal & General Surgery  Progress Note    Patient: Jc Brannon Jr.  YOB: 1980  MRN: [id number]      Assessment  Jc Brannon Jr. is a 44 y.o. male with rectal adenocarcinoma is now postoperative day 3 from loop ileostomy closure.  He is passing flatus and overall looks well.  We will advance to regular food and hopefully get him home tomorrow if he has a bowel movement.    Subjective  No significant events.  Feels well today.  Passing flatus.    Objective    Vitals:    02/24/25 0750   BP: 132/81   Pulse: 82   Resp: 16   Temp: 98.1 °F (36.7 °C)   SpO2: 98%       Physical Exam  Constitutional: Well-developed well-nourished, no acute distress  Neck: Supple, trachea midline  Respiratory: No increased work of breathing, Symmetric excursion  Cardiovascular: Well pefursed, no jugular venous distention evident   Abdominal: Incision in good order.  Soft, non-tender, non-distended  Skin: Warm, dry, no rash on visualized skin surfaces  Psychiatric: Alert and oriented ×3, normal affect     Laboratory Results  I have personally reviewed CBC with WC 7, hemoglobin 12, platelets 286.  BMP with creatinine 0.91, bicarb 27.    Radiology  None to review         See Isaac MD  Colorectal & General Surgery  [facility]    [address], [address]  [place], [place], [address]  P: [phone]  F: [phone]

## 2025-02-25 ENCOUNTER — TELEPHONE (OUTPATIENT)
Dept: SURGERY | Facility: CLINIC | Age: 45
End: 2025-02-25
Payer: COMMERCIAL

## 2025-02-25 ENCOUNTER — READMISSION MANAGEMENT (OUTPATIENT)
Dept: CALL CENTER | Facility: HOSPITAL | Age: 45
End: 2025-02-25
Payer: COMMERCIAL

## 2025-02-25 VITALS
HEART RATE: 86 BPM | OXYGEN SATURATION: 100 % | DIASTOLIC BLOOD PRESSURE: 60 MMHG | RESPIRATION RATE: 18 BRPM | SYSTOLIC BLOOD PRESSURE: 118 MMHG | WEIGHT: 226.85 LBS | BODY MASS INDEX: 33.6 KG/M2 | HEIGHT: 69 IN | TEMPERATURE: 97.8 F

## 2025-02-25 PROCEDURE — 99024 POSTOP FOLLOW-UP VISIT: CPT | Performed by: SURGERY

## 2025-02-25 PROCEDURE — 25010000002 ENOXAPARIN PER 10 MG: Performed by: SURGERY

## 2025-02-25 RX ORDER — ACETAMINOPHEN 500 MG
1000 TABLET ORAL EVERY 6 HOURS PRN
Start: 2025-02-25

## 2025-02-25 RX ORDER — OXYCODONE HYDROCHLORIDE 5 MG/1
5 TABLET ORAL EVERY 4 HOURS PRN
Qty: 15 TABLET | Refills: 0 | Status: SHIPPED | OUTPATIENT
Start: 2025-02-25 | End: 2025-02-28

## 2025-02-25 RX ORDER — METHOCARBAMOL 750 MG/1
750 TABLET, FILM COATED ORAL 4 TIMES DAILY
Qty: 28 TABLET | Refills: 0 | Status: SHIPPED | OUTPATIENT
Start: 2025-02-25 | End: 2025-03-04

## 2025-02-25 RX ADMIN — ACETAMINOPHEN 1000 MG: 500 TABLET, FILM COATED ORAL at 03:46

## 2025-02-25 RX ADMIN — METHOCARBAMOL TABLETS 750 MG: 750 TABLET, COATED ORAL at 12:19

## 2025-02-25 RX ADMIN — AMLODIPINE BESYLATE 10 MG: 10 TABLET ORAL at 08:53

## 2025-02-25 RX ADMIN — ATOMOXETINE 25 MG: 25 CAPSULE ORAL at 08:53

## 2025-02-25 RX ADMIN — ACETAMINOPHEN 1000 MG: 500 TABLET, FILM COATED ORAL at 08:53

## 2025-02-25 RX ADMIN — METHOCARBAMOL TABLETS 750 MG: 750 TABLET, COATED ORAL at 08:52

## 2025-02-25 RX ADMIN — ENOXAPARIN SODIUM 40 MG: 100 INJECTION SUBCUTANEOUS at 08:54

## 2025-02-25 RX ADMIN — HYDROCHLOROTHIAZIDE 12.5 MG: 12.5 TABLET ORAL at 08:53

## 2025-02-25 NOTE — DISCHARGE INSTRUCTIONS
POST OP RECOMMENDATIONS  Dr. See Isaac  [phone]  Discharge Instructions    Activity  You should get up and move about several times daily to reduce the risk of developing a blood clot in your legs.   No lifting more than 10 pounds for 6 weeks  Diet  Avoid raw fruits and vegetables  Avoid tough meats like steak and pork  If you have an ileostomy, avoid concentrated sugary drinks (Gatorade, soft drinks, milk products)  At your follow-up appointment, we can discuss returning to a normal diet  Dressings  The glue on your incisions will fall off on its own in 1-2 weeks.  You do not need to pack any of your incisions  Bathing  It's ok to shower anytime.   Do not submerge your incisions (bath, pool, lake, ocean, etc.) for 3 weeks.  You can wash your incisions with warm soapy water very gently and pat dry  Pain Management  Unless you have been told otherwise, it's ok to take Tylenol 1000 mg every 6 hours as needed.  I have provided you a prescription for a narcotic pain medication. Take this as needed.   I have also provided you a muscle relaxer if you were still using it in the hospital. Take this scheduled for a couple days, and then you can transition to taking it only as needed.   Please call the office if you have intense pain that is not relieved.   Blood clot prevention  You should be up walking multiple times each day to prevent blood clots from forming.   If I sent you home on a low-dose blood thinner shot, please take those every day until you run out.   Driving  Do not drive while taking narcotic pain medications.   Before driving, make sure that you are able to move and rotate appropriately to keep you and the rest of us safe on the road.   Follow up appointment  My office will call you with a follow up appointment if you do not have one already. It will be in 2-3 weeks.   If you do not hear from my office in 1 week, please call [phone] to ask about scheduling.   Remember to contact me for any of  the following:   Fever > 101 degrees  Severe pain that cannot be controlled by taking your pain pills  Severe nausea or vomiting that cannot be controlled by taking your nausea pills  Significant bleeding of your incisions  Drainage that has a bad smell or is yellow or green in appearance  Any other questions or concerns

## 2025-02-25 NOTE — OUTREACH NOTE
Prep Survey      Flowsheet Row Responses   [facility] patient discharged from? [place]   Is LACE score < 7 ? No   Eligibility [facility]   Date of Admission 02/21/25   Date of Discharge 02/25/25   Discharge diagnosis Closure of diverting loop ileostomy   Does the patient have one of the following disease processes/diagnoses(primary or secondary)? General Surgery   Does the patient have Home health ordered? Yes   What is the Home health agency?  [facility]   Prep survey completed? Yes            Chiqui ESCOBAR - Registered Nurse

## 2025-02-25 NOTE — TELEPHONE ENCOUNTER
----- Message from Ori Isaac sent at 2/25/2025 11:23 AM EST -----  Please make postoperative visit in 1 to 2 weeks.  Thank you.

## 2025-02-25 NOTE — CASE MANAGEMENT/SOCIAL WORK
Case Management Discharge Note      Final Note: Plan will be to DC to parent's home with [facility]. Family to transport.         Selected Continued Care - Admitted Since 2/21/2025       Destination    No services have been selected for the patient.                Durable Medical Equipment    No services have been selected for the patient.                Dialysis/Infusion    No services have been selected for the patient.                Home Medical Care       Service Provider Services Address Phone Fax Patient Preferred    [facility], Home Rehabilitation, Home Nursing [address] [facility] [address] [id number] [phone] --              Therapy    No services have been selected for the patient.                Community Resources    No services have been selected for the patient.                Community & DME    No services have been selected for the patient.                    Selected Continued Care - Prior Encounters Includes continued care and service providers with selected services from prior encounters from 11/23/2024 to 2/25/2025      Discharged on 1/19/2025 Admission date: 1/9/2025 - Discharge disposition: Home-Health Care c      Home Medical Care       Service Provider Services Address Phone Fax Patient Preferred     [facility], Home Nursing, Home Rehabilitation [address] [address] [phone] --                               Final Discharge Disposition Code: 06 - home with home health care

## 2025-02-25 NOTE — DISCHARGE SUMMARY
Colorectal & General Surgery  Discharge Summary    DATE OF ADMIT:    01/23/2025     DATE OF DISCHARGE:    02/25/25     DIAGNOSIS:    Rectal adenocarcinoma  Ileostomy status    PROCEDURES:    Closure of loop ostomy    FINAL PATHOLOGY:    Normal ileum    SUMMARY OF HOSPITAL COURSE:     He was admitted to the hospital.  It took a few days for him to have return of bowel function.  He tolerated regular diet.  Passing flatus and having bowel function on postoperative day 3.  Appropriate for discharge on postoperative day 4.    PHYSICAL EXAM  Constitutional: Resting comfortably, no acute distress  Neck: Supple, trachea midline  Respiratory: No increased work of breathing, Symmetric excursion  Cardiovascular: Well pefursed, no jugular venous distention evident   Abdominal: Good order with staples soft, non-tender, non-distended  Lymphatics: No cervical or suprascapular adenopathy  Skin: Warm, dry, no rash on visualized skin surfaces  Musculoskeletal: Symmetric strength, no obvious gross abnormalities  Psychiatric: Alert and oriented ×3, normal affect      DIET: Regular, as tolerated    ACTIVITY: No lifting more than 10 pounds for 6 weeks    MEDICATIONS: Refer to MAR    FOLLOW-UP: 2 weeks in the office    See Isaac MD  Colorectal & General Surgery  [facility]    [address], [address]  [place], [place], [address]  P: [phone]  F: [phone]

## 2025-02-25 NOTE — PLAN OF CARE
Goal Outcome Evaluation:      Patient is alert and oriented x 4, on room air, tolerating his diet and has had a bowel movement.  Patient ambulates independently.    Patient has been discharged.  Parents will arrive around 1300 to take him home.    WCTM until patient leaves unit.

## 2025-02-25 NOTE — CASE MANAGEMENT/SOCIAL WORK
Continued Stay Note  [facility]     Patient Name: Jc Branonn Jr.  MRN: [id number]  Today's Date: 2/25/2025    Admit Date: 2/21/2025    Plan: Plan will be to DC to parent's home with [facility]. Family to transport.   Discharge Plan       Row Name 02/25/25 1206       Plan    Plan Plan will be to DC to parent's home with [facility]. Family to transport.    Plan Comments DC orders noted. CCP met with pt at bedside to determine if he had any home needs. Pt denies needs at home. Pt states he is current with [facility] and is planning on returning to parent's home at DC. CCP verified with [facility] that pt is current. CCP placed orders for HH; MD to cosign. Plan will be to DC to parent's home with [facility]. Family to transport. RLutes RN/CCP    Final Discharge Disposition Code 06 - home with home health care    Final Note Plan will be to DC to parent's home with [facility]. Family to transport.                   Discharge Codes    No documentation.                 Expected Discharge Date and Time       Expected Discharge Date Expected Discharge Time    Feb 25, 2025               Bri Acuña RN

## 2025-02-25 NOTE — TELEPHONE ENCOUNTER
Attempted to reach patient to schedule a two week post op appointment with Dr. Isaac. No answer, left .    HUB OK TO READ/SCHD.

## 2025-02-25 NOTE — PLAN OF CARE
Goal Outcome Evaluation:      Patient alert and oriented, on room air, 20g to right f/a which is saline locked, oxycodone, dilaudid and tylenol all ordered for pain. Only tylenol was dosed, see MAR. GI soft, low irritant diet which pt is tolerating well. Up ad maine, Lovenox 40 order for DVT prophylaxis, hx rectal cancer, ileostomy closed on 02/21/25. Pt passing gas and reports having 7 small bowel movements. Plan of care is ongoing.

## 2025-02-25 NOTE — DISCHARGE PLACEMENT REQUEST
"Jc Riggs Jr. (44 y.o. Male)       Date of Birth   1980    Social Security Number       Address   [address] [place] [facility]    Home Phone   [phone]    MRN   [id number]       Yarsanism   Yarsanism    Marital Status   Single                            Admission Date   2/21/25    Admission Type   Elective    Admitting Provider   Ori Isaac MD    Attending Provider   Ori Isaac MD    Department, Room/Bed   [address]387/[address]       Discharge Date       Discharge Disposition   Home or Self Care    Discharge Destination                                 Attending Provider: Ori Isaac MD    Allergies: Latex    Isolation: None   Infection: None   Code Status: CPR    Ht: 175.3 cm (69.02\")   Wt: 103 kg (226 lb 13.7 oz)    Admission Cmt: None   Principal Problem: Rectal adenocarcinoma [C20]                   Active Insurance as of 2/21/2025       Primary Coverage       Payor Plan Insurance Group Employer/Plan Group    [facility] BLUE CROSS [facility] BLUE CROSS BLUE SHIELD O [id number]       Payor Plan Address Payor Plan Phone Number Payor Plan Fax Number Effective Dates    [address] [phone]  1/1/2025 - None Entered    [facility] [place] [address]         Subscriber Name Subscriber Birth Date Member ID       JC RIGGS JR. 1980 [id number]                     Emergency Contacts        (Rel.) Home Phone Work Phone Mobile Phone    Jc Riggs SR (Father) [phone] -- --                "

## 2025-02-25 NOTE — DISCHARGE INSTR - ACTIVITY
No lifting, pushing, shoving, scooting anything greater than 10 pounds (equals 1 gallon of milk) until cleared by the doctor.    No driving until no longer taking pain medication and/or cleared by the doctor.

## 2025-02-26 ENCOUNTER — HOME CARE VISIT (OUTPATIENT)
Dept: HOME HEALTH SERVICES | Facility: HOME HEALTHCARE | Age: 45
End: 2025-02-26
Payer: COMMERCIAL

## 2025-02-26 ENCOUNTER — TRANSITIONAL CARE MANAGEMENT TELEPHONE ENCOUNTER (OUTPATIENT)
Dept: CALL CENTER | Facility: HOSPITAL | Age: 45
End: 2025-02-26
Payer: COMMERCIAL

## 2025-02-26 NOTE — PAYOR COMM NOTE
"Jc Riggs Jr. (44 y.o. Male)          DC SUMMARY FOR [id number]      F# [phone]         Date of Birth   1980    Social Security Number       Address   [address] [place] [address]    Home Phone   [phone]    MRN   [id number]       Gnosticist   Catholic    Marital Status   Single                            Admission Date   2/21/25    Admission Type   Elective    Admitting Provider   Ori Isaac MD    Attending Provider       Department, Room/Bed   [address], P387/1       Discharge Date   2/25/2025    Discharge Disposition   Home or Self Care    Discharge Destination                                 Attending Provider: (none)   Allergies: Latex    Isolation: None   Infection: None   Code Status: Prior    Ht: 175.3 cm (69.02\")   Wt: 103 kg (226 lb 13.7 oz)    Admission Cmt: None   Principal Problem: Rectal adenocarcinoma [C20]                   Active Insurance as of 2/21/2025       Primary Coverage       Payor Plan Insurance Group Employer/Plan Group    [facility] [employer] [facility] [employer] [facility] PPO [id number]       Payor Plan Address Payor Plan Phone Number Payor Plan Fax Number Effective Dates    [address] [phone]  1/1/2025 - None Entered    [place] [address]         Subscriber Name Subscriber Birth Date Member ID       JC RIGGS JR. 1980 [id number]                     Emergency Contacts        (Rel.) Home Phone Work Phone Mobile Phone    Jc Riggs SR (Father) [phone] -- --                 Discharge Summary        Ori Isaac MD at 02/25/25 1122          Colorectal & General Surgery  Discharge Summary    DATE OF ADMIT:    01/23/2025     DATE OF DISCHARGE:    02/25/25     DIAGNOSIS:    Rectal adenocarcinoma  Ileostomy status    PROCEDURES:    Closure of loop ostomy    FINAL PATHOLOGY:    Normal ileum    SUMMARY OF HOSPITAL COURSE:     He was admitted to the hospital.  It took " a few days for him to have return of bowel function.  He tolerated regular diet.  Passing flatus and having bowel function on postoperative day 3.  Appropriate for discharge on postoperative day 4.    PHYSICAL EXAM  Constitutional: Resting comfortably, no acute distress  Neck: Supple, trachea midline  Respiratory: No increased work of breathing, Symmetric excursion  Cardiovascular: Well pefursed, no jugular venous distention evident   Abdominal: Good order with staples soft, non-tender, non-distended  Lymphatics: No cervical or suprascapular adenopathy  Skin: Warm, dry, no rash on visualized skin surfaces  Musculoskeletal: Symmetric strength, no obvious gross abnormalities  Psychiatric: Alert and oriented ×3, normal affect      DIET: Regular, as tolerated    ACTIVITY: No lifting more than 10 pounds for 6 weeks    MEDICATIONS: Refer to MAR    FOLLOW-UP: 2 weeks in the office    See Isaac MD  Colorectal & General Surgery  [facility]    [address], [address]  [place], [place], [address]  P: [phone]  F: [phone]       Electronically signed by Ori Isaac MD at 02/25/25 8333

## 2025-02-26 NOTE — OUTREACH NOTE
Call Center TCM Note      Flowsheet Row Responses   [facility] patient discharged from? [facility]   Does the patient have one of the following disease processes/diagnoses(primary or secondary)? General Surgery   TCM attempt successful? Yes   Call start time 1102   Call end time 1107   Discharge diagnosis Closure of diverting loop ileostomy   Person spoke with today (if not patient) and relationship Patient and father--SR Jc.   Medication alerts for this patient Tylenol, Robaxin, Oxycodone   Meds reviewed with patient/caregiver? Yes   Is the patient having any side effects they believe may be caused by any medication additions or changes? No   Does the patient have all medications related to this admission filled (includes all antibiotics, pain medications, etc.) Yes   Prescription comments No questions or concerns with medications.   Is the patient taking all medications as directed (includes completed medication regime)? Yes   Comments Assisted patient in scheduling a TCM PCP HOSPITAL f/u appt with WIL Mendoza. Appt scheduled for: 2/27/25 at 12:15 PM.   Does the patient have an appointment with their PCP within 7-14 days of discharge? No   Nursing Interventions Assisted patient with making appointment per protocol   What is the Home health agency?  [facility]   Has home health visited the patient within 72 hours of discharge? Call prior to 72 hours   Home health comments  is visiting today per father. Patient has  information, phone number if needed.   Psychosocial issues? No   Comments Patient's father is assisting patient. Patient states he is having BMs, pain well controlled.   Did the patient receive a copy of their discharge instructions? Yes   Nursing interventions Reviewed instructions with patient   What is the patient's perception of their health status since discharge? Improving   Nursing interventions Nurse provided patient education   Is the patient /caregiver able to teach  back basic post-op care? Practice 'cough and deep breath', Take showers only when approved by MD-sponge bathe until then, No tub bath, swimming, or hot tub until instructed by MD, Keep incision areas clean,dry and protected, Do not remove steri-strips, Lifting as instructed by MD in discharge instructions   Is the patient/caregiver able to teach back signs and symptoms of incisional infection? Increased redness, swelling or pain at the incisonal site, Increased drainage or bleeding, Incisional warmth, Pus or odor from incision, Fever   Is the patient/caregiver able to teach back steps to recovery at home? Set small, achievable goals for return to baseline health, Rest and rebuild strength, gradually increase activity   If the patient is a current smoker, are they able to teach back resources for cessation? Not a smoker   Is the patient/caregiver able to teach back the hierarchy of who to call/visit for symptoms/problems? PCP, Specialist, Home health nurse, Urgent Care, ED, 911 Yes   TCM call completed? Yes   Wrap up additional comments Assisted patient in scheduling a TCM [facility] f/u appt with WIL Mendoza. Appt scheduled for: 2/27/25 at 12:15 PM. Advised patient to call Dr. Isaac's office (Rectal Surgery) to schedule an appt for 2 weeks post op. Father states he will call and schedule appt.   Call end time 1107   Would this patient benefit from a Referral to [facility] Social Work? No   Is the patient interested in additional calls from an ambulatory ? No            Shayna Ortiz, RAKESH    2/26/2025, 11:11 BRYAN TELLO - Registered Nurse

## 2025-02-27 ENCOUNTER — OFFICE VISIT (OUTPATIENT)
Dept: FAMILY MEDICINE CLINIC | Facility: CLINIC | Age: 45
End: 2025-02-27
Payer: COMMERCIAL

## 2025-02-27 ENCOUNTER — HOME CARE VISIT (OUTPATIENT)
Dept: HOME HEALTH SERVICES | Facility: HOME HEALTHCARE | Age: 45
End: 2025-02-27
Payer: COMMERCIAL

## 2025-02-27 VITALS
TEMPERATURE: 97.8 F | WEIGHT: 213 LBS | BODY MASS INDEX: 31.55 KG/M2 | HEIGHT: 69 IN | SYSTOLIC BLOOD PRESSURE: 130 MMHG | OXYGEN SATURATION: 100 % | HEART RATE: 61 BPM | DIASTOLIC BLOOD PRESSURE: 90 MMHG

## 2025-02-27 DIAGNOSIS — Z09 HOSPITAL DISCHARGE FOLLOW-UP: ICD-10-CM

## 2025-02-27 DIAGNOSIS — Z98.890 HISTORY OF COLOSTOMY REVERSAL: Primary | ICD-10-CM

## 2025-02-27 NOTE — PROGRESS NOTES
Transitional Care Follow Up Visit  Subjective     Jc Brannon Jr. is a 44 y.o. male who presents for a transitional care management visit.    Within 48 business hours after discharge our office contacted him via telephone to coordinate his care and needs.      I reviewed and discussed the details of that call along with the discharge summary, hospital problems, inpatient lab results, inpatient diagnostic studies, and consultation reports with Jc.     Current outpatient and discharge medications have been reconciled for the patient.  Reviewed by: WIL Patino          2/25/2025     5:30 PM   Date of TCM Phone Call   [facility]   Date of Admission 2/21/2025   Date of Discharge 2/25/2025     Risk for Readmission (LACE) Score: 9 (2/25/2025  6:00 AM)      History of Present Illness  Mr. Brannon presents today to follow up on a recent hospital admission. He had a reversal of his colostomy.      Course During Hospital Stay:  stable     The following portions of the patient's history were reviewed and updated as appropriate: allergies, current medications, past family history, past medical history, past social history, past surgical history, and problem list.        Current Outpatient Medications:     acetaminophen (TYLENOL) 500 MG tablet, Take 2 tablets by mouth Every 6 (Six) Hours As Needed for Mild Pain. Indications: Pain, Disp: , Rfl:     amLODIPine (NORVASC) 10 MG tablet, Take 1 tablet by mouth Daily., Disp: 90 tablet, Rfl: 1    atomoxetine (Strattera) 25 MG capsule, Take 1 capsule by mouth Daily. Indications: Attention Deficit Hyperactivity Disorder, Disp: 30 capsule, Rfl: 2    gabapentin (Neurontin) 100 MG capsule, Take 1 capsule by mouth Every Night. Indications: Neuropathic Pain, Disp: 90 capsule, Rfl: 0    hydroCHLOROthiazide 12.5 MG tablet, TAKE 1 TABLET BY MOUTH DAILY, Disp: 30 tablet, Rfl: 5    methocarbamol (ROBAXIN) 750 MG tablet, Take 1 tablet by mouth 4 (Four) Times a  "Day for 7 days., Disp: 28 tablet, Rfl: 0    oxyCODONE (ROXICODONE) 5 MG immediate release tablet, Take 1 tablet by mouth Every 4 (Four) Hours As Needed for Moderate Pain., Disp: 15 tablet, Rfl: 0     Review of Systems   Constitutional: Negative.    Respiratory: Negative.     Cardiovascular: Negative.    Gastrointestinal:  Positive for diarrhea (once yesterday). Abdominal pain: controlled with the medications.  Genitourinary: Negative.    Psychiatric/Behavioral: Negative.     All other systems reviewed and are negative.      Objective   /90 (BP Location: Right arm, Patient Position: Sitting, Cuff Size: Adult)   Pulse 61   Temp 97.8 °F (36.6 °C) (Temporal)   Ht 175.3 cm (69.02\")   Wt 96.6 kg (213 lb)   SpO2 100%   BMI 31.44 kg/m²     Physical Exam  Vitals reviewed.   Cardiovascular:      Rate and Rhythm: Normal rate and regular rhythm.      Pulses: Normal pulses.      Heart sounds: Normal heart sounds.   Pulmonary:      Effort: Pulmonary effort is normal.      Breath sounds: Normal breath sounds.   Skin:     Comments: The abdominal incision is well approximated and the staples are still present.    Neurological:      Mental Status: He is alert.         Assessment & Plan   Diagnoses and all orders for this visit:    1. History of colostomy reversal (Primary)    2. Hospital discharge follow-up    Mr. Brannon appears to be doing well.  His incision is as noted above.  I have reviewed and reconciled his medications with him.  He is to continue all medications as prescribed.  F/u at his next scheduled appointment in April.                "

## 2025-02-28 ENCOUNTER — TELEPHONE (OUTPATIENT)
Dept: SURGERY | Facility: CLINIC | Age: 45
End: 2025-02-28
Payer: COMMERCIAL

## 2025-02-28 ENCOUNTER — HOME CARE VISIT (OUTPATIENT)
Dept: HOME HEALTH SERVICES | Facility: HOME HEALTHCARE | Age: 45
End: 2025-02-28
Payer: COMMERCIAL

## 2025-02-28 PROCEDURE — G0299 HHS/HOSPICE OF RN EA 15 MIN: HCPCS

## 2025-02-28 NOTE — Clinical Note
Resumption of Care Note: Pt discharged from [facility] s/p illiostomy reversal. Pt states he has some abdominal discomfot and feels tight and achy near the incision site. Incision appears healthy with no s/s of infection noted. Pt has not had a BM in two days and states he has been taking pain medication and immodium daily. SN educated pt on use of immodium as needed instead of scheduled, and when to use pain medication. Pt states he thought he was supposed to take pain medication every 4 hours regardless of what pain was. Pt pain has been managable and will start taking it only as needed.  called Dr. Moore office to verify these changes. SN educated pt on the care of incision site, activity and prevention of DVTs, expectation of daily BM, what symptoms to report to MD/HH and new medications. Pt states understanding.     Reason for hospitalization/new problems: Illiostomy reversal    EMMA [place] Findings: See EMMA note    New/changed medications: See reconciled medications     New/changed orders: Lifting restriction of 10 lb, medication changes/additions and incision care    Educated on Emergency Plan, steps to take prior to going to the ER and when to Call Home Health First:  Yes    Plan/Focus of Care and Skilled need: post surgical after care of illiostomy reveral

## 2025-02-28 NOTE — TELEPHONE ENCOUNTER
Dolores from home health called and stated patient had a recent ileostomy reversal on 02/21/25 and was prescribed loperamide. She stated the patient has not had a bowel movement in 2 days and wanted to know should the patient continue to take this medication or just PRN.

## 2025-03-01 VITALS
RESPIRATION RATE: 14 BRPM | SYSTOLIC BLOOD PRESSURE: 128 MMHG | WEIGHT: 220 LBS | BODY MASS INDEX: 31.5 KG/M2 | HEIGHT: 70 IN | TEMPERATURE: 98.5 F | DIASTOLIC BLOOD PRESSURE: 80 MMHG | HEART RATE: 78 BPM | OXYGEN SATURATION: 96 %

## 2025-03-01 NOTE — HOME HEALTH
Resumption of Care Note: Pt discharged from [facility] s/p illiostomy reversal. Pt states he has some abdominal discomfot and feels tight and achy near the incision site. Incision appears healthy with no s/s of infection noted. Pt has not had a BM in two days and states he has been taking pain medication and immodium daily. SN educated pt on use of immodium as needed instead of scheduled, and when to use pain medication. Pt states he thought he was supposed to take pain medication every 4 hours regardless of what pain was. Pt pain has been managable and will start taking it only as needed.  called Dr. Moore office to verify these changes. SN educated pt on the care of incision site, activity and prevention of DVTs, expectation of daily BM, what symptoms to report to MD/HH and new medications. Pt states understanding.     Reason for hospitalization/new problems: Illiostomy reversal    EMMA [place] Findings: See EMMA note    New/changed medications: See reconciled medications     New/changed orders: Lifting restriction of 10 lb, medication changes/additions and incision care    Educated on Emergency Plan, steps to take prior to going to the ER and when to Call [facility]:  Yes    Plan/Focus of Care and Skilled need: post surgical after care of illiostomy reveral

## 2025-03-02 ENCOUNTER — NURSE TRIAGE (OUTPATIENT)
Dept: CALL CENTER | Facility: HOSPITAL | Age: 45
End: 2025-03-02
Payer: COMMERCIAL

## 2025-03-02 NOTE — TELEPHONE ENCOUNTER
I had surgery had colostomy reversed, I have diarrhea. I have a question on the loperamide. I, took Loperamide today stomach cramped, told him not to take it unless is ok with surgeon DR. Isaac,  call his office in AM for this question.   Reason for Disposition   [1] Caller has NON-URGENT medicine question about med that PCP prescribed AND [2] triager unable to answer question    Additional Information   Negative: [1] Intentional drug overdose AND [2] suicidal thoughts or ideas   Negative: Drug overdose and triager unable to answer question   Negative: Caller requesting a renewal or refill of a medicine patient is currently taking   Negative: Caller requesting information unrelated to medicine   Negative: Caller requesting information about COVID-19 Vaccine   Negative: Caller requesting information about Emergency Contraception   Negative: Caller requesting information about Combined Birth Control Pills   Negative: Caller requesting information about Progestin Birth Control Pills   Negative: Caller requesting information about Post-Op pain or medicines   Negative: Caller requesting a prescription antibiotic (such as Penicillin) for Strep throat and has a positive culture result   Negative: Caller requesting a prescription anti-viral med (such as Tamiflu) and has influenza (flu) symptoms   Negative: Immunization reaction suspected   Negative: Rash while taking a medicine or within 3 days of stopping it   Negative: [1] Asthma and [2] having symptoms of asthma (cough, wheezing, etc.)   Negative: [1] Symptom of illness (e.g., headache, abdominal pain, earache, vomiting) AND [2] more than mild   Negative: Breastfeeding questions about mother's medicines and diet   Negative: MORE THAN A DOUBLE DOSE of a prescription or over-the-counter (OTC) drug   Negative: [1] DOUBLE DOSE (an extra dose or lesser amount) of prescription drug AND [2] any symptoms (e.g., dizziness, nausea, pain, sleepiness)   Negative: [1] DOUBLE DOSE  "(an extra dose or lesser amount) of over-the-counter (OTC) drug AND [2] any symptoms (e.g., dizziness, nausea, pain, sleepiness)   Negative: Took another person's prescription drug   Negative: [1] DOUBLE DOSE (an extra dose or lesser amount) of prescription drug AND [2] NO symptoms  (Exception: A double dose of antibiotics.)   Negative: Diabetes drug error or overdose (e.g., took wrong type of insulin or took extra dose)   Negative: [1] Prescription not at pharmacy AND [2] was prescribed by PCP recently (Exception: Triager has access to EMR and prescription is recorded there. Go to Home Care and confirm for pharmacy.)   Negative: [1] Pharmacy calling with prescription question AND [2] triager unable to answer question   Negative: [1] Caller has URGENT medicine question about med that PCP or specialist prescribed AND [2] triager unable to answer question   Negative: Medicine patch causing local rash or itching   Negative: [1] Caller has medicine question about med NOT prescribed by PCP AND [2] triager unable to answer question (e.g., compatibility with other med, storage)   Negative: Prescription request for new medicine (not a refill)   Negative: Caller wants to use a complementary or alternative medicine    Answer Assessment - Initial Assessment Questions  1. NAME of MEDICINE: \"What medicine(s) are you calling about?\"      Loperamide  2. QUESTION: \"What is your question?\" (e.g., double dose of medicine, side effect)      Can I take it  3. PRESCRIBER: \"Who prescribed the medicine?\" Reason: if prescribed by specialist, call should be referred to that group.      Not sure  4. SYMPTOMS: \"Do you have any symptoms?\" If Yes, ask: \"What symptoms are you having?\"  \"How bad are the symptoms (e.g., mild, moderate, severe)      diarrhea  5. PREGNANCY:  \"Is there any chance that you are pregnant?\" \"When was your last menstrual period?\"      no    Protocols used: Medication Question Call-ADULT-AH    "

## 2025-03-03 ENCOUNTER — TELEPHONE (OUTPATIENT)
Dept: SURGERY | Facility: CLINIC | Age: 45
End: 2025-03-03
Payer: COMMERCIAL

## 2025-03-03 DIAGNOSIS — I10 ESSENTIAL HYPERTENSION: ICD-10-CM

## 2025-03-03 RX ORDER — HYDROCHLOROTHIAZIDE 12.5 MG/1
12.5 TABLET ORAL DAILY
Qty: 30 TABLET | Refills: 5 | Status: SHIPPED | OUTPATIENT
Start: 2025-03-03

## 2025-03-03 NOTE — TELEPHONE ENCOUNTER
"Returned patients call and spoke with him and his father to inform them that  has stated, \"He should take the loperamide up to 4 times daily for diarrhea, but only if he is actively having diarrhea.\" They verbalized understanding.   "

## 2025-03-03 NOTE — TELEPHONE ENCOUNTER
"Patient called the nurse triage line stating, \"I have been having diarrhea alot, I had rectal surgery and had colostomy reversed, I have question about medicine what to take,  OTC Loperamide?\"  "

## 2025-03-03 NOTE — TELEPHONE ENCOUNTER
Provider: LIBIA    Caller: Jc Brannon SR    Relationship to Patient: Father    Phone Number: [phone]    Reason for Call: PATIENT FATHER STATES NEEDS RETURN TO WORK DATE FAXED TO [place]  [phone]    When was the patient last seen: 02/21/256

## 2025-03-03 NOTE — TELEPHONE ENCOUNTER
Caller: Jc Brannon Jr.    Relationship: Self    Best call back number: [phone]     Requested Prescriptions:   Requested Prescriptions     Pending Prescriptions Disp Refills    hydroCHLOROthiazide 12.5 MG tablet 30 tablet 5     Sig: Take 1 tablet by mouth Daily. Indications: Edema        Pharmacy where request should be sent: [facility] [id number] [place] [id number] AT [facility] [address] - [phone] [facility] [phone] FX     Last office visit with prescribing clinician: 2/27/2025   Last telemedicine visit with prescribing clinician: Visit date not found   Next office visit with prescribing clinician: 4/29/2025     Additional details provided by patient: PATIENT ONLY HAS 2 PILLS LEFT    Does the patient have less than a 3 day supply:  [x] Yes  [] No    Would you like a call back once the refill request has been completed: [] Yes [] No    If the office needs to give you a call back, can they leave a voicemail: [] Yes [] No    Hector Burciaga Rep   03/03/25 09:21 EST

## 2025-03-03 NOTE — PROGRESS NOTES
Follow-up    03/04/2025, Interval history  Since last clinic visit, he underwent LAR, and eventually ileostomy takedown.  He had excellent response to his neoadjuvant treatment.  He is here for follow-up.  He has recovered quite well from the surgery.  He denies any new concerns or complaints      HISTORY OF PRESENT ILLNESS:  The patient is a 44 y.o. year old male with recently diagnosed rectal adenocarcinoma who presents to our clinic to establish care.  Onc history is outlined below    Family history significant for prostate cancer in father at 63, paternal uncle with colon cancer at 57, other paternal uncle with metastatic cancer, type unknown in his 50s, other paternal uncle with possible colon cancer and DVT/PE in his 70s.    Oncology/Hematology History   Rectal adenocarcinoma   4/23/2024 Procedure         4/23/2024 [address]         5/2/2024 Imaging    CT CAP    A 1.7 cm right hepatic cyst is noted. Numerous small subcentimeter liver  low densities are present that are too small to characterize, could be  cysts, or potentially metastatic disease, interval follow-up can  characterize change.    No bowel obstruction. The sigmorectal mass is better characterized on  the MRI exam of same date (please see separate report). Some stranding  is apparent in the fat around the lesion, numerous surrounding lymph  nodes are present that could be metastatic lymph nodes. For example, on  axial image 165, a 1.9 x 1.6 cm pericolonic lymph node is present. As  another example, on axial image 170, left perirectal lymph node measures  0.7 x 1.2 cm.    IMPRESSION:        1. Sigmorectal mass with numerous surrounding lymph nodes, raising  suspicion for metastatic lymph nodes.     2. Hepatic cyst, and numerous liver low densities that are too small to  characterize; these lesions could also be cysts, but in this clinical  setting, possibility of any metastatic liver lesions is not excluded.  Interval follow-up is advised  to characterize change.     3. No pulmonary mass.     5/2/2024 Imaging    MRI Pelvis    FINDINGS: There is a circumferential solid low rectal mass which  measures approximately 4.5 cm in craniocaudad span and the inferior  margin is approximately 5 mm proximal to the anorectal junction.     The tumor extends approximately 8 mm into the mesorectal fat  predominantly along the right wall of the mass. There appears to be  extension to the mesorectal fascia along the right wall and abutment of  the right levator ani muscles. There are several enlarged perirectal  nodes and the largest is presacral measuring approximately 2.0 x 1.2 cm.  There are at least 4 enhancing suspicious nodes. There is no definite  extramural vascular invasion.     IMPRESSION:  1. Primary Tumor Location: Low rectal     2. MRI Stage: T3 N2 MRF positive     3. No definite sphincter involvement     5/3/2024 Initial Diagnosis    Rectal adenocarcinoma     5/16/2024 Imaging    MRI abdo    IMPRESSION:  T2 hyperintense nonenhancing liver lesions are consistent with hepatic  cysts and biliary cystic hamartomas. No convincing liver metastasis  identified     5/24/2024 Procedure    PORT placement (Dr Isaac)     6/3/2024 - 8/21/2024 Chemotherapy    OP COLORECTAL FOLFIRINOX (Fluorouracil cont. Infusion / Leucovorin / OXALIplatin / Irinotecan)     6/5/2024 -  Chemotherapy    OP CENTRAL VENOUS ACCESS DEVICE Access, Care, and Maintenance (CVAD)     9/9/2024 - 9/9/2024 Chemotherapy    OP COLORECTAL Fluorouracil CIV over 168H + XRT     9/9/2024 - 10/18/2024 Chemotherapy    Concurrent radiation to the rectum completed 9/9/24 - 10/18/24 (Dr Noyola)  OP COLORECTAL Fluorouracil CIV over 96 H + XRT     12/3/2024 Imaging    MRI pelvis with and without contrast  FINDINGS: No definitive residual low rectal mass is seen. Stable  appearance of the perirectal soft tissues and there is no mesorectal  lymphadenopathy. Correlation with endoscopy is recommended.    CT chest  abdomen pelvis with contrast  IMPRESSION:  1. No interval change or convincing evidence for metastatic disease to  the chest, abdomen, pelvis.  2. Multiple hepatic cysts as well as hepatic low-density foci which are  too small to characterize.  3. There appear to be multiple exostoses demonstrated involving the  proximal femora, left scapula, left iliac wing without interval change.  3. MRI of the pelvis has been completed and is reported separately     1/9/2025 Cancer Staged    Staging form: Colon And Rectum, AJCC 8th Edition  - Pathologic stage from 1/9/2025: Stage I (ypT2, pN0, cM0) - Signed by Sonali Pak MD on 3/3/2025     1/9/2025 Surgery    Surgery       Procedure:  LAR      Final Diagnosis   1.  Rectum, low anterior resection: Invasive adenocarcinoma               -A.  Histologic features:                            I.  Histologic type: Invasive adenocarcinoma, NOS                            II.  Grade: Moderately differentiated               -B.  Tumor size: Small areas of tumor are seen in 3 consecutive blocks (block method = 12 mm)               -C.  Margins of resection and extent:                            I.  Tumor is seen in the muscularis propria but not through                            II.  Tumor comes within 3.5 cm of the distal margin, 20 cm of the proximal margin and 2.5 cm of the circumferential radial margin.                            III.  No perineural or lymphovascular invasion is identified                            IV.  Extensive treatment effect is seen with only sparse residual tumor seen                            V.  Tumor is seen below the peritoneal resection               -D.  Lymph nodes: 18 lymph nodes are identified with no evidence of metastatic disease                            I.  1 lymph node   had extensive treatment effect with calcification and mucinous pools but no residual tumor.     2.  Rectum, distal margin, annular rim donut: Benign segment of colon adding an  "additional 1 cm to the distal margin     3.  Appendix, appendectomy: Benign appendix          2/21/2025 Surgery    Surgery       Final Diagnosis   1.  Ileostomy, takedown:               A.  Enterocutaneous segment with focal erosion, mixed inflammation and foreign body giant cells.     2.  Soft tissue, abdominal wall, excision:               A.  Skeletal muscle and soft tissue with fat necrosis and osseous metaplasia.              The current medication list and allergy list were reviewed and reconciled.      Past Medical History, Past Surgical History, Social History, Family History have been reviewed and are without significant changes except as mentioned.      REVIEW OF SYSTEMS:  See interval history    Objective:       Vitals:    03/04/25 0913   BP: 143/88   Pulse: 109   Resp: 16   Temp: 97.4 °F (36.3 °C)   TempSrc: Oral   SpO2: 98%   Weight: 97.3 kg (214 lb 8 oz)   Height: 175.3 cm (69.02\")   PainSc: 0-No pain                   12/9/2024     9:35 AM   Current Status   ECOG score 0      PHYSICAL EXAM:    CONSTITUTIONAL:  Vital signs reviewed.  No distress, looks comfortable.  RESPIRATORY: Bilateral lungs clear to auscultation.  No wheezing or rhonchi   CARDIOVASCULAR: Normal S1-S2.  No murmur rubs or gallop   GASTROINTESTINAL: Soft, nontender, bowel sounds present.  Well-healing surgical scar.  Staples in place  NEURO:  No focal deficits.  Appears to have equal strength all 4 extremities.    I have reexamined the patient and the results are consistent with the previously documented exam. Sonali Pak MD        Diagnostic data  CBC:      Lab 03/04/25  0927   WBC 3.33*   HEMOGLOBIN 11.4*   HEMATOCRIT 34.8*   PLATELETS 303   NEUTROS ABS 2.11   IMMATURE GRANS (ABS) 0.01   LYMPHS ABS 0.49*   MONOS ABS 0.39   EOS ABS 0.32   MCV 95.6       Pathology  Tissue Pathology Exam (02/21/2025 16:31)   Tissue Pathology Exam (01/09/2025 18:24)     Assessment & Plan     *Stage I (cT3 cN2 cM0; ypT2 ypN0) rectal adenocarcinoma, " LUCAS, TMB low  4/23/2024 rectal biopsy-invasive moderately differentiated adenocarcinoma, LUCAS.  5/2/2024 MRI pelvis: Approximately 4.5 cm circumferential low rectal mass, approximately 5 mm proximal to anorectal junction, extending 8 mm in the mesorectal fat.  Appears to be extension to MRF along the right wall and abutment of right levator ani muscles.  Several enlarged perirectal nodes, largest 2 x 1.2 cm.  At least 4 enhancing suspicious nodes.  cT3 cN2 MRF positive.  No definitive sphincter involvement  5/2/2024 CT chest abdomen pelvis: 1.7 cm right hepatic cyst, and numerous low-density liver lesions-cyst versus possible metastatic lesions.  No lung mass  5/16/2024 MRI abdomen: T2 hyperintense nonenhancing liver lesions consistent with liver cyst and biliary cystic hamartomas.  No evidence of liver mets  Tumor genomic profile-APC (I269fs), APC (P8005qz), FANCD2 (Q823), TP53. TMB 1 mut/Mb. LUCAS  6/3/2024 baseline CEA: 1.89  6/3/2024 - 08/19/2024: s/p 6 cycles of  FOLFIRINOX (5-FU bolus eliminated to prevent further myelosuppression given baseline neutropenia; 25% dose reduction starting cycle 3) with G-CSF support  8/27/2024 MRI pelvis-good response.  No clear measurable rectal mass.  Decrease in the size of perirectal lymph node, measuring less than 5 mm; decrease in the size of enlarged hide perirectal lymph nodes/mary metastases.  08/28/2024 CT CAP: Apparent decrease size of rectal mass, decreased pericolonic and perirectal lymph nodes.  1 axillary lymph node and 1 liver low-density (too small to characterize) measures slightly larger, uncertain clinical significance.  Plan for total neoadjuvant chemotherapy with FOLFIRINOX followed by chemoradiation  9/9/2024 - 10/14/2024: s/p 6 cycles of concurrent chemoradiation with 5-FU.  5-FU dose reduced by 50% due to neutropenia starting cycle 2  12/3/2024 end of treatment scans (CT chest abdomen pelvis and MRI pelvis)-complete response  1/9/2024 status post LAR (with  Dr. Isaac)-invasive adenocarcinoma, moderately differentiated.  Small areas of tumor seen in 3 consecutive blocks.  Tumor is seen in muscularis propria but not through.  Margins negative.  No PNI or LVI.  Extensive treatment effect seen with only sparse residual tumor.  Tumor seen below peritoneal resection.  0 out of 18 lymph nodes 1 lymph node had extensive treatment effect with calcification and mucinous pools but no residual tumor.  ypT2 ypN0  3/4/2025: I reviewed the surgical pathology.  He had excellent response to neoadjuvant treatment.  He will now enter surveillance per NCCN guidelines.  History physical CEA every 3 to 6 months for 2 years and then every 6 months for a total of 5 years.  Scans annually for a total of 5 years.  Colonoscopy in 1 year after surgery    *Hepatic cysts and low-density hepatic foci  8/28/2024 seen on CT chest abdomen pelvis, too small to characterize.  12/3/2024 CT chest abdomen pelvis-multiple hepatic cyst as well as hepatic low-density foci which are too small to characterize.    *Benign ethnic neutropenia  White cell count ranged between 2.6-3.2 since at least July 2019.    12/9/2024: WBC 2, ANC 1.34.  No interventions needed.  3/4/2025:3.3, ANC 2.11    Plan:   Reviewed surgical pathology.  Reviewed  NCCN guidelines for surveillance  Imaging in Jan 2026  MD visit in 3 months with CBC, CMP and CEA      Sonali Pak MD  03/04/2025

## 2025-03-04 ENCOUNTER — TELEPHONE (OUTPATIENT)
Dept: OTHER | Facility: HOSPITAL | Age: 45
End: 2025-03-04
Payer: COMMERCIAL

## 2025-03-04 ENCOUNTER — DOCUMENTATION (OUTPATIENT)
Dept: OTHER | Facility: HOSPITAL | Age: 45
End: 2025-03-04
Payer: COMMERCIAL

## 2025-03-04 ENCOUNTER — OFFICE VISIT (OUTPATIENT)
Dept: ONCOLOGY | Facility: CLINIC | Age: 45
End: 2025-03-04
Payer: COMMERCIAL

## 2025-03-04 ENCOUNTER — LAB (OUTPATIENT)
Dept: ONCOLOGY | Facility: HOSPITAL | Age: 45
End: 2025-03-04
Payer: COMMERCIAL

## 2025-03-04 ENCOUNTER — HOME CARE VISIT (OUTPATIENT)
Dept: HOME HEALTH SERVICES | Facility: HOME HEALTHCARE | Age: 45
End: 2025-03-04
Payer: COMMERCIAL

## 2025-03-04 VITALS
RESPIRATION RATE: 16 BRPM | HEART RATE: 109 BPM | WEIGHT: 214.5 LBS | SYSTOLIC BLOOD PRESSURE: 143 MMHG | TEMPERATURE: 97.4 F | BODY MASS INDEX: 31.77 KG/M2 | DIASTOLIC BLOOD PRESSURE: 88 MMHG | HEIGHT: 69 IN | OXYGEN SATURATION: 98 %

## 2025-03-04 DIAGNOSIS — C20 RECTAL ADENOCARCINOMA: Primary | ICD-10-CM

## 2025-03-04 DIAGNOSIS — I10 ESSENTIAL HYPERTENSION: ICD-10-CM

## 2025-03-04 LAB
ALBUMIN SERPL-MCNC: 4.3 G/DL (ref 3.5–5.2)
ALBUMIN/GLOB SERPL: 1.4 G/DL
ALP SERPL-CCNC: 86 U/L (ref 39–117)
ALT SERPL W P-5'-P-CCNC: 34 U/L (ref 1–41)
ANION GAP SERPL CALCULATED.3IONS-SCNC: 11 MMOL/L (ref 5–15)
AST SERPL-CCNC: 19 U/L (ref 1–40)
BASOPHILS # BLD AUTO: 0.01 10*3/MM3 (ref 0–0.2)
BASOPHILS NFR BLD AUTO: 0.3 % (ref 0–1.5)
BILIRUB SERPL-MCNC: 0.3 MG/DL (ref 0–1.2)
BUN SERPL-MCNC: 11 MG/DL (ref 6–20)
BUN/CREAT SERPL: 13.8 (ref 7–25)
CALCIUM SPEC-SCNC: 9.4 MG/DL (ref 8.6–10.5)
CEA SERPL-MCNC: 1.23 NG/ML
CHLORIDE SERPL-SCNC: 101 MMOL/L (ref 98–107)
CO2 SERPL-SCNC: 28 MMOL/L (ref 22–29)
CREAT SERPL-MCNC: 0.8 MG/DL (ref 0.76–1.27)
DEPRECATED RDW RBC AUTO: 44.6 FL (ref 37–54)
EGFRCR SERPLBLD CKD-EPI 2021: 110.5 ML/MIN/1.73
EOSINOPHIL # BLD AUTO: 0.32 10*3/MM3 (ref 0–0.4)
EOSINOPHIL NFR BLD AUTO: 9.6 % (ref 0.3–6.2)
ERYTHROCYTE [DISTWIDTH] IN BLOOD BY AUTOMATED COUNT: 12.7 % (ref 12.3–15.4)
GLOBULIN UR ELPH-MCNC: 3 GM/DL
GLUCOSE SERPL-MCNC: 115 MG/DL (ref 65–99)
HCT VFR BLD AUTO: 34.8 % (ref 37.5–51)
HGB BLD-MCNC: 11.4 G/DL (ref 13–17.7)
IMM GRANULOCYTES # BLD AUTO: 0.01 10*3/MM3 (ref 0–0.05)
IMM GRANULOCYTES NFR BLD AUTO: 0.3 % (ref 0–0.5)
LYMPHOCYTES # BLD AUTO: 0.49 10*3/MM3 (ref 0.7–3.1)
LYMPHOCYTES NFR BLD AUTO: 14.7 % (ref 19.6–45.3)
MCH RBC QN AUTO: 31.3 PG (ref 26.6–33)
MCHC RBC AUTO-ENTMCNC: 32.8 G/DL (ref 31.5–35.7)
MCV RBC AUTO: 95.6 FL (ref 79–97)
MONOCYTES # BLD AUTO: 0.39 10*3/MM3 (ref 0.1–0.9)
MONOCYTES NFR BLD AUTO: 11.7 % (ref 5–12)
NEUTROPHILS NFR BLD AUTO: 2.11 10*3/MM3 (ref 1.7–7)
NEUTROPHILS NFR BLD AUTO: 63.4 % (ref 42.7–76)
NRBC BLD AUTO-RTO: 0 /100 WBC (ref 0–0.2)
PLATELET # BLD AUTO: 303 10*3/MM3 (ref 140–450)
PMV BLD AUTO: 9.5 FL (ref 6–12)
POTASSIUM SERPL-SCNC: 3.4 MMOL/L (ref 3.5–5.2)
PROT SERPL-MCNC: 7.3 G/DL (ref 6–8.5)
RBC # BLD AUTO: 3.64 10*6/MM3 (ref 4.14–5.8)
SODIUM SERPL-SCNC: 140 MMOL/L (ref 136–145)
WBC NRBC COR # BLD AUTO: 3.33 10*3/MM3 (ref 3.4–10.8)

## 2025-03-04 PROCEDURE — 85025 COMPLETE CBC W/AUTO DIFF WBC: CPT

## 2025-03-04 PROCEDURE — 80053 COMPREHEN METABOLIC PANEL: CPT | Performed by: STUDENT IN AN ORGANIZED HEALTH CARE EDUCATION/TRAINING PROGRAM

## 2025-03-04 PROCEDURE — 82378 CARCINOEMBRYONIC ANTIGEN: CPT | Performed by: STUDENT IN AN ORGANIZED HEALTH CARE EDUCATION/TRAINING PROGRAM

## 2025-03-04 PROCEDURE — G0299 HHS/HOSPICE OF RN EA 15 MIN: HCPCS

## 2025-03-04 PROCEDURE — 36591 DRAW BLOOD OFF VENOUS DEVICE: CPT

## 2025-03-04 PROCEDURE — 25010000002 HEPARIN LOCK FLUSH PER 10 UNITS: Performed by: NURSE PRACTITIONER

## 2025-03-04 PROCEDURE — 36415 COLL VENOUS BLD VENIPUNCTURE: CPT

## 2025-03-04 RX ORDER — HEPARIN SODIUM (PORCINE) LOCK FLUSH IV SOLN 100 UNIT/ML 100 UNIT/ML
500 SOLUTION INTRAVENOUS AS NEEDED
OUTPATIENT
Start: 2025-03-04

## 2025-03-04 RX ORDER — HYDROCHLOROTHIAZIDE 12.5 MG/1
12.5 TABLET ORAL DAILY
Qty: 30 TABLET | Refills: 5 | OUTPATIENT
Start: 2025-03-04

## 2025-03-04 RX ORDER — SODIUM CHLORIDE 0.9 % (FLUSH) 0.9 %
10 SYRINGE (ML) INJECTION AS NEEDED
Status: DISCONTINUED | OUTPATIENT
Start: 2025-03-04 | End: 2025-03-04 | Stop reason: HOSPADM

## 2025-03-04 RX ORDER — HEPARIN SODIUM (PORCINE) LOCK FLUSH IV SOLN 100 UNIT/ML 100 UNIT/ML
500 SOLUTION INTRAVENOUS AS NEEDED
Status: DISCONTINUED | OUTPATIENT
Start: 2025-03-04 | End: 2025-03-04 | Stop reason: HOSPADM

## 2025-03-04 RX ORDER — SODIUM CHLORIDE 0.9 % (FLUSH) 0.9 %
10 SYRINGE (ML) INJECTION AS NEEDED
OUTPATIENT
Start: 2025-03-04

## 2025-03-04 RX ADMIN — Medication 500 UNITS: at 09:22

## 2025-03-04 RX ADMIN — Medication 10 ML: at 09:22

## 2025-03-04 NOTE — TELEPHONE ENCOUNTER
Oncology Social Work  Spoke to the patient's father and he was requested to inform the patient to contact the Anesthesia office where he received the bill to provide them with a copy of his letter from [facility] showing that he was approved for hospital financial assistance to see if they will accept that letter.  MAVIS Marie

## 2025-03-04 NOTE — TELEPHONE ENCOUNTER
Oncology Social Work  Spoke to the patient and he was informed the patient to contact the Anesthesia office where he received the bill to provide them with a copy of his letter from [facility] showing that he was approved for hospital financial assistance to see if they will accept that letter.  The patient was requested to inform OSW if he needed another copy of the acceptance letter.  MAVIS Marie

## 2025-03-04 NOTE — TELEPHONE ENCOUNTER
Oncology Social Work  Voicemail message was left for the patient to return call to OSW. MAVIS Marie

## 2025-03-04 NOTE — PROGRESS NOTES
Oncology Social Work  [facility] to inquire what to do as he received a bill from the Anesthesia office from when he had a surgery.  The patient was provided with the contact number for the Anesthesia office and informed to contact them.  OSW will follow up with the patient to inform him additionally to provide them with a copy of his letter from [facility] showing that he was approved for hospital financial assistance to see if they will accept that letter.  The patient will be requested to inform OSW if he needs another copy of his approval letter.  MAVIS Marie

## 2025-03-04 NOTE — TELEPHONE ENCOUNTER
Called and spoke with patients father to inform him that we faxed over the [facility] papers on 2/14/25 with the return to work date of 04/22/25. He stated the company informed him they had not received the paperwork. Informed him I will re-fax the paperwork today to the fax number provided.

## 2025-03-06 ENCOUNTER — OFFICE VISIT (OUTPATIENT)
Dept: SURGERY | Facility: CLINIC | Age: 45
End: 2025-03-06
Payer: COMMERCIAL

## 2025-03-06 VITALS
DIASTOLIC BLOOD PRESSURE: 72 MMHG | OXYGEN SATURATION: 99 % | BODY MASS INDEX: 32.14 KG/M2 | HEIGHT: 69 IN | WEIGHT: 217 LBS | HEART RATE: 101 BPM | SYSTOLIC BLOOD PRESSURE: 120 MMHG

## 2025-03-06 DIAGNOSIS — C20 RECTAL ADENOCARCINOMA: Primary | ICD-10-CM

## 2025-03-06 DIAGNOSIS — Z93.2 ILEOSTOMY STATUS: ICD-10-CM

## 2025-03-06 PROCEDURE — 99024 POSTOP FOLLOW-UP VISIT: CPT | Performed by: SURGERY

## 2025-03-06 NOTE — PROGRESS NOTES
Colorectal & General Surgery  Post - Op    Patient: Jc Brannon Jr.  YOB: 1980  MRN: [id number]      Assessment  Jc Brannon Jr. is a 44 y.o. male with clinical stage III rectal adenocarcinoma status post total neoadjuvant therapy now status post low anterior resection with ypT2 N0 rectal adenocarcinoma with negative margins who presents today in follow-up after undergoing closure of his diverting loop ileostomy.    Overall, he is doing very well.  He is experiencing some constipation, so I have prescribed him a daily dose of MiraLAX in addition to his twice daily Benefiber.  I also encouraged him to drink 64 ounces water daily and stick to a healthy diet.    I will plan to see him back in 3 to 4 weeks.    History of Present Illness   Jc Brannon Jr. is a 44 y.o. male who presents in postoperative follow-up.  His only complaint is that he feels little constipated sometimes.  He is tolerating a diet, his pain is well-controlled.  He is passing flatus.  He does have bowel function but it is relatively small quantity.    Vital Signs  Vitals:    03/06/25 1348   BP: 120/72   Pulse: 101   SpO2: 99%        Physical Exam  Constitutional: Resting comfortably, no acute distress  Neck: Supple, trachea midline  Respiratory: No increased work of breathing, Symmetric excursion  Cardiovascular: Well pefursed, no jugular venous distention evident   Abdominal: Incisions are in good order.  Soft, non-tender, non-distended  Lymphatics: No cervical or suprascapular adenopathy  Skin: Warm, dry, no rash on visualized skin surfaces  Musculoskeletal: Symmetric strength, no obvious gross abnormalities  Psychiatric: Alert and oriented ×3, normal affect       Pathology  I have personally reviewed pathology results with the patient demonstrating benign ileum.    See Isaac MD  Colorectal & General Surgery  [facility]    [address], [address]  [place], [place], [address]  P: [phone]  F:  [phone]

## 2025-03-07 ENCOUNTER — PATIENT OUTREACH (OUTPATIENT)
Dept: OTHER | Facility: HOSPITAL | Age: 45
End: 2025-03-07
Payer: COMMERCIAL

## 2025-03-07 VITALS
OXYGEN SATURATION: 97 % | TEMPERATURE: 98.6 F | HEART RATE: 89 BPM | RESPIRATION RATE: 16 BRPM | SYSTOLIC BLOOD PRESSURE: 162 MMHG | DIASTOLIC BLOOD PRESSURE: 80 MMHG

## 2025-03-07 NOTE — PROGRESS NOTES
Reviewed chart. Patient saw Dr. Isaac yesterday. Patient with clinical stage III rectal adenocarcinoma diagnosed 4/23/24 status post total neoadjuvant therapy (s/p 6 cycles of FOLFIRINOX 6/3/2024 - 08/19/2024), s/p 6 cycles of concurrent chemoradiation with 5-FU 9/9/2024 - 10/14/2024, status post low anterior resection 1/9/25 with ypT2 N0 rectal adenocarcinoma with negative margins. S/p closure of his diverting loop ileostomy  2/21/25.  Per Dr. Pak's 3/4/25 note, He will now enter surveillance per NCCN guidelines.  History physical CEA every 3 to 6 months for 2 years and then every 6 months for a total of 5 years.  Scans annually for a total of 5 years.  Colonoscopy in 1 year after surgery. Hoda [place]     Called patient. Left message that I was just touching base to see how he was doing following surgery. Wanted to know if he had any questions/concerns after his recent appts or resource/supportive care needs; requested cb

## 2025-03-10 ENCOUNTER — TELEPHONE (OUTPATIENT)
Dept: OTHER | Facility: HOSPITAL | Age: 45
End: 2025-03-10
Payer: COMMERCIAL

## 2025-03-10 ENCOUNTER — OFFICE VISIT (OUTPATIENT)
Dept: PSYCHIATRY | Facility: HOSPITAL | Age: 45
End: 2025-03-10
Payer: COMMERCIAL

## 2025-03-10 DIAGNOSIS — F41.1 GENERALIZED ANXIETY DISORDER: ICD-10-CM

## 2025-03-10 DIAGNOSIS — Z12.11 ENCOUNTER FOR SCREENING FOR MALIGNANT NEOPLASM OF COLON: ICD-10-CM

## 2025-03-10 DIAGNOSIS — F90.1 ATTENTION DEFICIT HYPERACTIVITY DISORDER (ADHD), PREDOMINANTLY HYPERACTIVE TYPE: Primary | ICD-10-CM

## 2025-03-10 RX ORDER — ATOMOXETINE 40 MG/1
40 CAPSULE ORAL DAILY
Qty: 30 CAPSULE | Refills: 2 | Status: SHIPPED | OUTPATIENT
Start: 2025-03-10

## 2025-03-10 NOTE — TELEPHONE ENCOUNTER
Oncology Social Work  Inbound call from the patient requesting assistance with medical bills that he has received.  He was requested to bring copies to OSW in order for further assistance or direction to be provided.  MAVIS Marie

## 2025-03-12 ENCOUNTER — HOME CARE VISIT (OUTPATIENT)
Dept: HOME HEALTH SERVICES | Facility: HOME HEALTHCARE | Age: 45
End: 2025-03-12
Payer: COMMERCIAL

## 2025-03-13 ENCOUNTER — HOME CARE VISIT (OUTPATIENT)
Dept: HOME HEALTH SERVICES | Facility: HOME HEALTHCARE | Age: 45
End: 2025-03-13
Payer: COMMERCIAL

## 2025-03-13 NOTE — CASE COMMUNICATION
RC from pt around 2130 with complaints of a little cramping and passing a few small stools after eating a subway sandwich tonight. Pt was just calling to make sure that was normal following a colostomy take down. Pt denies nausea or vomiting, states he has had constipation off and on since and taking miralax daily. Pt had a reversal on 2/21. Suggested some natural remedies for constipation if pt felt he needed them. Pt states he has a n otis coming to see him tomorrow and will wait for her eval before taking anything else for constipation.  Pt was reassured.

## 2025-03-14 ENCOUNTER — TELEPHONE (OUTPATIENT)
Dept: RADIATION ONCOLOGY | Facility: HOSPITAL | Age: 45
End: 2025-03-14
Payer: COMMERCIAL

## 2025-03-14 ENCOUNTER — HOME CARE VISIT (OUTPATIENT)
Dept: HOME HEALTH SERVICES | Facility: HOME HEALTHCARE | Age: 45
End: 2025-03-14
Payer: COMMERCIAL

## 2025-03-14 NOTE — CASE COMMUNICATION
Pt reached out after hours to ask if eating peanuts would be ok following his surgery. Education provided.

## 2025-03-17 ENCOUNTER — DOCUMENTATION (OUTPATIENT)
Dept: OTHER | Facility: HOSPITAL | Age: 45
End: 2025-03-17
Payer: COMMERCIAL

## 2025-03-17 ENCOUNTER — OFFICE VISIT (OUTPATIENT)
Dept: RADIATION ONCOLOGY | Facility: HOSPITAL | Age: 45
End: 2025-03-17
Payer: COMMERCIAL

## 2025-03-17 ENCOUNTER — PATIENT OUTREACH (OUTPATIENT)
Dept: OTHER | Facility: HOSPITAL | Age: 45
End: 2025-03-17
Payer: COMMERCIAL

## 2025-03-17 VITALS
DIASTOLIC BLOOD PRESSURE: 89 MMHG | OXYGEN SATURATION: 99 % | BODY MASS INDEX: 31.14 KG/M2 | WEIGHT: 211 LBS | HEART RATE: 88 BPM | SYSTOLIC BLOOD PRESSURE: 137 MMHG

## 2025-03-17 DIAGNOSIS — I10 PRIMARY HYPERTENSION: ICD-10-CM

## 2025-03-17 DIAGNOSIS — C20 RECTAL ADENOCARCINOMA: Primary | ICD-10-CM

## 2025-03-17 PROCEDURE — G0463 HOSPITAL OUTPT CLINIC VISIT: HCPCS | Performed by: RADIOLOGY

## 2025-03-17 RX ORDER — AMLODIPINE BESYLATE 10 MG/1
10 TABLET ORAL DAILY
Qty: 90 TABLET | Refills: 1 | Status: SHIPPED | OUTPATIENT
Start: 2025-03-17

## 2025-03-17 NOTE — PROGRESS NOTES
Oncology Social Work     Patient stopped by the supportive oncology office to speak with an OSW. Patient was inquiring about a medical bill he had received. OSW will look into this and follow up with the patient. He was approved for  Financial Aid. OSW confirm that patient received a letter saying he was approved.     Rossana Swenson PRAVIN

## 2025-03-17 NOTE — PROGRESS NOTES
[facility] prior to his appt with Dr. Nobles.  He states he is doing well post surgery.  He plans to return to work 6-8 weeks after surgery.    Helped the patient navigate through [email or web address] and request refill if his amlodipine.     We discussed his follow up appt with Dr. Isaac on 4/27.    Rossana met with patient today regarding his anesthesia bill.    Navigation will continue to follow. Encouraged patient to call as needed.

## 2025-03-17 NOTE — PROGRESS NOTES
Cancer Staging   cT3, cN2, cM0  Stage I (ypT2, pN0, cM0)                                 S:    I had the pleasure of seeing Jc Brannon  today in [facility].  He is a new patient to me today, previously treated by Dr. Kolton Noyola.  He is a pleasant 44 year old gentleman with cT3N2 rectal adenocarcinoma diagnosed in May 2024.  He received total neoadjuvant therapy consisting of chemotherapy followed by concurrent chemo/radiation which he completed 10/18/24.  He received a dose of 5040cGy to the pelvis followed by a 360cGy boost to the primary tumor for a total dose of 5400cgy.     He had a CT chest, abdomen and pelvis on 12/3/24 which showed multiple hepatic cysts as well as hepatic low-density foci which are too small to characterize. There appear to be multiple exostoses demonstrated involving theproximal femora, left scapula, left iliac wing without interval change.  No evidence of metastatic disease.     He had a mri of the pelvis on 12/3/24 which showed no definitive residual low rectal mass is seen. Stable appearance of the perirectal soft tissues and there is no mesorectal lymphadenopathy.     He underwent LAR with Dr. Isaac on 1/9/25 with pathology revealing moderately differentiated rectal adenocarcinoma, measuring 1.2cm with no pni or lvsi with extensive treatment effect.  Eighteen lymph nodes were negative for metastatic disease however one node had extensive treatment effect.  The closest radial margin was 2.5cm.  ypT2N0.      He has recovered well from his surgery.  He underwent ileostomy takedown on 2/21/25.  He reports occasional constipation and hard stools but is using miralax which helps.  He denies any urinary complaints.       Review of Systems   Constitutional: Negative.    HENT: Negative.     Respiratory: Negative.     Gastrointestinal:  Positive for anal bleeding and constipation. Negative for blood in stool, diarrhea, nausea and rectal pain.   Genitourinary: Negative.         Past Medical History:   Diagnosis Date    Anemia     History of cancer chemotherapy     JUNE TO SEPT 2024    History of radiation therapy     2024    History of seizure     STATES AS A CHILD: UNCLEAR   NO NEURO    Hyperlipidemia     Hypertension     Ileostomy present     Ileostomy status 01/23/2025    Mental disability without special needs     NEEDS REINFORCEMENT WITH INSTRUCTIONS, EAGER TO LEARN.  STUTTERS    Rectal adenocarcinoma 2024         Past Surgical History:   Procedure Laterality Date    COLON RESECTION N/A 1/9/2025    Procedure: Laparoscopic, possible open, low anterior resection with diverting loop ileostomy and appendectomy;  Surgeon: Ori Isaac MD;  Location: [facility] OR;  Service: General;  Laterality: N/A;    COLONOSCOPY N/A 4/23/2024    Procedure: COLONOSCOPY into cecum with biopsy;  Surgeon: David Tena MD;  Location: [facility] ENDOSCOPY;  Service: Gastroenterology;  Laterality: N/A;  rectal mass    ILEOSTOMY CLOSURE N/A 2/21/2025    Procedure: Closure of diverting loop ileostomy;  Surgeon: Ori Isaac MD;  Location: [facility] OR;  Service: General;  Laterality: N/A;    LEG SURGERY Left     ACL    SIGMOIDOSCOPY N/A 12/16/2024    Procedure: FLEXIBLE SIGMOIDOSCOPY WITH BIOPSIES cold with tattoo injection w/ scleratherapy needle;  Surgeon: Ori Isaac MD;  Location: [facility] ENDOSCOPY;  Service: General;  Laterality: N/A;  pre monitor site(ca) s/p chemo radiation post remnant of tumor/needs tx    VENOUS ACCESS DEVICE (PORT) INSERTION N/A 5/24/2024    Procedure: INSERTION VENOUS ACCESS DEVICE;  Surgeon: Ori Isaac MD;  Location: [facility];  Service: General;  Laterality: N/A;         Social History     Socioeconomic History    Marital status: Single   Tobacco Use    Smoking status: Never     Passive exposure: Never    Smokeless tobacco: Never   Vaping Use    Vaping status: Never Used   Substance and Sexual Activity    Alcohol  use: No    Drug use: No    Sexual activity: Defer         Family History   Problem Relation Age of Onset    Cancer Mother         Stomach    Cancer Father         prostate    Glaucoma Father     Hypertension Maternal Aunt     Heart disease Maternal Aunt     Hypertension Maternal Uncle     Glaucoma Paternal Grandfather     Malig Hyperthermia Neg Hx           Objective    Physical Exam  Constitutional:       Appearance: Normal appearance.   Eyes:      Extraocular Movements: Extraocular movements intact.   Abdominal:      General: Abdomen is flat.   Neurological:      Mental Status: He is alert.   Psychiatric:         Mood and Affect: Mood normal.         Behavior: Behavior normal.         Current Outpatient Medications on File Prior to Visit   Medication Sig Dispense Refill    acetaminophen (TYLENOL) 500 MG tablet Take 2 tablets by mouth Every 6 (Six) Hours As Needed for Mild Pain. Indications: Pain (Patient not taking: Reported on 3/6/2025)      amLODIPine (NORVASC) 10 MG tablet Take 1 tablet by mouth Daily. 90 tablet 1    atomoxetine (Strattera) 40 MG capsule Take 1 capsule by mouth Daily. Indications: Attention Deficit Hyperactivity Disorder 30 capsule 2    gabapentin (Neurontin) 100 MG capsule Take 1 capsule by mouth Every Night. Indications: Neuropathic Pain 90 capsule 0    hydroCHLOROthiazide 12.5 MG tablet Take 1 tablet by mouth Daily. Indications: Edema 30 tablet 5    loperamide (IMODIUM) 2 MG capsule Take 1 capsule by mouth 4 (Four) Times a Day As Needed for Diarrhea. Indications: Diarrhea (Patient not taking: Reported on 3/6/2025)       No current facility-administered medications on file prior to visit.       ALLERGIES:    Allergies   Allergen Reactions    Latex Rash       There were no vitals taken for this visit.         12/9/2024     9:35 AM   Current Status   ECOG score 0         Assessment & Plan   44 year old male with cT3N2 rectal adenocarcinoma s/p neoadjuvant chemo/radiation followed by LAR with  ypT2N0 residual disease.  He is now 5 months out from concurrent chemo radiation and 2 months out from resection and doing well.  He continues regular follow up with Dr. Pak his medical oncologist and Dr. Isaac his surgeon.  In accordance with NCCN guidelines Dr. Pak has recommended repeat imaging in January 2026.  I will see him back in January 2026 after imaging.  He voiced understanding and was given a follow up appointment for January.    I personally spent greater than 35 minutes today assessing, managing, discussing and documenting my visit with the patient. That time includes review of records, imaging and pathology reports, obtaining my own history, performing a medically appropriate evaluation, counseling and educating the patient, discussing goals, logistics, alternatives and risks of my recommendations, surveillance options and potential outcomes. It also includes the time documenting the clinical information in the EMR and communicating my recommendations to the other involved physicians.            Thank you very much for allowing me to participate in the care of this very pleasant patient.    Sincerely,      Danielle Nobles MD

## 2025-03-18 ENCOUNTER — HOME CARE VISIT (OUTPATIENT)
Dept: HOME HEALTH SERVICES | Facility: HOME HEALTHCARE | Age: 45
End: 2025-03-18
Payer: COMMERCIAL

## 2025-03-18 PROCEDURE — G0299 HHS/HOSPICE OF RN EA 15 MIN: HCPCS

## 2025-03-18 NOTE — PROGRESS NOTES
Supportive Oncology Services  In Person Session    Subjective  Patient ID: Jc Brannon Jr. is a 44 y.o. male is seen face to face in the [facility] ([facility].    CC: Anxiety    HPI/ Interval History:   Patient is seen in follow-up regarding symptoms of anxiety and ADHD in continued survivorship of colon cancer.    Continues to heal well from previous treatment, surgeries, etc.  Celebrates findings, response to treatment, recommendations for surveillance without need for additional treatment at this time.  Remains off work, living with parents.  Anticipates staying here through necessary recovery while eager to get back to work. Continues to get engage around home as able while missing independence.  Remains invested in studies of [place].    Medications reviewed; patient continues to report benefit to anxiety with gabapentin nightly.  Remains adherent to Strattera, appreciating conversations with others de stigmatizing this medication and with eagerness to continue.  Denies any side effects at this time.  Sleep remains acceptable, energy acceptable, appetite appropriate. Bowel function is returning, discussing this as needed with medical team.    Exam: As above    Recent Labs Reviewed:  Lab on 03/04/2025   Component Date Value    Glucose 03/04/2025 115 (H)     BUN 03/04/2025 11     Creatinine 03/04/2025 0.80     Sodium 03/04/2025 140     Potassium 03/04/2025 3.4 (L)     Chloride 03/04/2025 101     CO2 03/04/2025 28.0     Calcium 03/04/2025 9.4     Total Protein 03/04/2025 7.3     Albumin 03/04/2025 4.3     ALT (SGPT) 03/04/2025 34     AST (SGOT) 03/04/2025 19     Alkaline Phosphatase 03/04/2025 86     Total Bilirubin 03/04/2025 0.3     Globulin 03/04/2025 3.0     A/G Ratio 03/04/2025 1.4     BUN/Creatinine Ratio 03/04/2025 13.8     Anion Gap 03/04/2025 11.0     eGFR 03/04/2025 110.5     WBC 03/04/2025 3.33 (L)     RBC 03/04/2025 3.64 (L)     Hemoglobin 03/04/2025 11.4 (L)     Hematocrit  03/04/2025 34.8 (L)     MCV 03/04/2025 95.6     MCH 03/04/2025 31.3     MCHC 03/04/2025 32.8     RDW 03/04/2025 12.7     RDW-SD 03/04/2025 44.6     MPV 03/04/2025 9.5     Platelets 03/04/2025 303     Neutrophil % 03/04/2025 63.4     Lymphocyte % 03/04/2025 14.7 (L)     Monocyte % 03/04/2025 11.7     Eosinophil % 03/04/2025 9.6 (H)     Basophil % 03/04/2025 0.3     Immature Grans % 03/04/2025 0.3     Neutrophils, Absolute 03/04/2025 2.11     Lymphocytes, Absolute 03/04/2025 0.49 (L)     Monocytes, Absolute 03/04/2025 0.39     Eosinophils, Absolute 03/04/2025 0.32     Basophils, Absolute 03/04/2025 0.01     Immature Grans, Absolute 03/04/2025 0.01     nRBC 03/04/2025 0.0     CEA 03/04/2025 1.23    Admission on 02/21/2025, Discharged on 02/25/2025   Component Date Value    Case Report 02/21/2025                      Value:Surgical Pathology Report                         Case: [id number]                                  Authorizing Provider:  Ori Isaac,   Collected:           02/21/2025 04:31 PM                                 MD                                                                           Ordering Location:     [facility]  Received:            02/21/2025 10:38 PM                                 MAIN OR                                                                      Pathologist:           Van Pereira MD                                                          Specimens:   1) - Small Intestine, Ileostomy                                                                     2) - Peritoneum, abdominal wall lesion                                                     Final Diagnosis 02/21/2025                      Value:1.  Ileostomy, takedown:   A.  Enterocutaneous segment with focal erosion, mixed inflammation and foreign body giant cells.    2.  Soft tissue, abdominal wall, excision:   A.  Skeletal muscle and soft tissue with fat necrosis and osseous metaplasia.      Gross  "Description 02/21/2025                      Value:1. Small Intestine.  Received in formalin labeled \"ileostomy\" is a 13.9 cm in length by 3 cm in diameter segment of small bowel that is anastomosed to a 5.2 x 2 cm gray-tan wrinkled skin ellipse.  Protruding through the ostomy site is a 2.5 x 1.5 cm portion of red erythemic mucosa.  Near the staple lines the serosa is remarkable for a 1.5 and 2 cm possible transmural defect.  The remaining mucosa is is pink-tan with a normal amount of folds.  There are no mucosal lesions grossly identified.  Representative sections are submitted as follows:    1A-1B.  Ostomy site  1C.  Possible serosal transmural defects    kls/uso/[facility]    2. Peritoneum.  Received in formalin labeled \"abdominal wall lesion\" is a 2 x 1.5 x 0.9 cm yellow-white fibrous tissue nodule.  The outer surfaces are inked black.  Sectioning reveals white to yellow-tan firm suspicious cut surfaces with focal areas of calcification.  The specimen is entirely submitted as 2A-2B following decalcification with                           Immunocal.    kls/uso/[facility]        Microscopic Description 02/21/2025                      Value:Unless \"gross only\" is specified, the final diagnosis for each specimen is based on microscopic examination of tissue.      Glucose 02/22/2025 127 (H)     BUN 02/22/2025 7     Creatinine 02/22/2025 0.91     Sodium 02/22/2025 138     Potassium 02/22/2025 3.8     Chloride 02/22/2025 99     CO2 02/22/2025 27.0     Calcium 02/22/2025 9.0     BUN/Creatinine Ratio 02/22/2025 7.7     Anion Gap 02/22/2025 12.0     eGFR 02/22/2025 105.3     Magnesium 02/22/2025 2.2     Phosphorus 02/22/2025 3.2     WBC 02/22/2025 7.52     RBC 02/22/2025 4.14     Hemoglobin 02/22/2025 12.7 (L)     Hematocrit 02/22/2025 39.6     MCV 02/22/2025 95.7     MCH 02/22/2025 30.7     MCHC 02/22/2025 32.1     RDW 02/22/2025 12.6     RDW-SD 02/22/2025 44.1     MPV 02/22/2025 10.2     Platelets 02/22/2025 288     Neutrophil % " 02/22/2025 90.1 (H)     Lymphocyte % 02/22/2025 4.0 (L)     Monocyte % 02/22/2025 5.1     Eosinophil % 02/22/2025 0.0 (L)     Basophil % 02/22/2025 0.1     Immature Grans % 02/22/2025 0.7 (H)     Neutrophils, Absolute 02/22/2025 6.78     Lymphocytes, Absolute 02/22/2025 0.30 (L)     Monocytes, Absolute 02/22/2025 0.38     Eosinophils, Absolute 02/22/2025 0.00     Basophils, Absolute 02/22/2025 0.01     Immature Grans, Absolute 02/22/2025 0.05     nRBC 02/22/2025 0.0     Extra Tube 02/22/2025 hold for add-on    Pre-Admission Testing on 02/14/2025   Component Date Value    Glucose 02/14/2025 102 (H)     BUN 02/14/2025 11     Creatinine 02/14/2025 1.00     Sodium 02/14/2025 140     Potassium 02/14/2025 3.7     Chloride 02/14/2025 104     CO2 02/14/2025 27.7     Calcium 02/14/2025 9.3     BUN/Creatinine Ratio 02/14/2025 11.0     Anion Gap 02/14/2025 8.3     eGFR 02/14/2025 94.0     WBC 02/14/2025 2.83 (L)     RBC 02/14/2025 3.66 (L)     Hemoglobin 02/14/2025 11.4 (L)     Hematocrit 02/14/2025 36.3 (L)     MCV 02/14/2025 99.2 (H)     MCH 02/14/2025 31.1     MCHC 02/14/2025 31.4 (L)     RDW 02/14/2025 12.5     RDW-SD 02/14/2025 45.2     MPV 02/14/2025 9.3     Platelets 02/14/2025 248     ABO Type 02/14/2025 O     RH type 02/14/2025 Negative     Antibody Screen 02/14/2025 Negative     T&S Expiration Date 02/14/2025 2/28/2025 11:59:00 PM    Admission on 01/09/2025, Discharged on 01/19/2025   Component Date Value    Case Report 01/09/2025                      Value:Surgical Pathology Report                         Case: [id number]                                  Authorizing Provider:  Ori Isaac,   Collected:           01/09/2025 06:24 PM                                 MD                                                                           Ordering Location:     [facility]  Received:            01/09/2025 08:38 PM                                 MAIN OR                                                                       Pathologist:           Daryl Torres MD                                                         Specimens:   1) - Large Intestine, Rectum, staple line is distal                                                 2) - Large Intestine, Rectum, distal margin                                                         3) - Large Intestine, Appendix                                                             Final Diagnosis 01/09/2025                      Value:1.  Rectum, low anterior resection: Invasive adenocarcinoma   -A.  Histologic features:    I.  Histologic type: Invasive adenocarcinoma, NOS    II.  Grade: Moderately differentiated   -B.  Tumor size: Small areas of tumor are seen in 3 consecutive blocks (block method = 12 mm)   -C.  Margins of resection and extent:    I.  Tumor is seen in the muscularis propria but not through    II.  Tumor comes within 3.5 cm of the distal margin, 20 cm of the proximal margin and 2.5 cm of the circumferential radial margin.    III.  No perineural or lymphovascular invasion is identified    IV.  Extensive treatment effect is seen with only sparse residual tumor seen    V.  Tumor is seen below the peritoneal resection   -D.  Lymph nodes: 18 lymph nodes are identified with no evidence of metastatic disease    I.  1 lymph node   had extensive treatment effect with calcification and mucinous pools but no residual tumor.    2.  Rectum, distal margin, annular rim donut: Benign segment of colon adding an                           additional 1 cm to the distal margin    3.  Appendix, appendectomy: Benign appendix      Synoptic Checklist 01/09/2025                      Value:COLON AND RECTUM: Resection                            COLON AND RECTUM: RESECTION - All Specimens                            8th Edition - Protocol posted: 6/19/2024                                                        SPECIMEN                               Procedure:    Low anterior  resection                                Macroscopic Evaluation of Mesorectum:    Complete                                                         TUMOR                               Tumor Site:    Rectum                                  Rectal Tumor Location:    Entirely below anterior peritoneal reflection                                Histologic Type:    Adenocarcinoma                                Histologic Grade:    G2, moderately differentiated                                Tumor Size:    Greatest dimension (Centimeters): 1.2 cm                               Tumor Extent:    Invades into muscularis propria                                Macroscopic Tumor Perforation:    Not identified                                Lymphatic and / or Vascular Invasion:    Not identified                                Perineural Invasion:    Not identified                                Tumor Budding Score:    Low (0-4)                                Type of Polyp in which Invasive Carcinoma Arose:    None identified                                Treatment Effect:    Present, with single cells or rare small groups of cancer cells (near complete response, score 1)                                                         MARGINS                               Margin Status for Invasive Carcinoma:    All margins negative for invasive carcinoma                                  Closest Margin(s) to Invasive Carcinoma:    Radial (circumferential)                                  Distance from Invasive Carcinoma to Closest Margin:    2.5 cm                                 Distance from Invasive Carcinoma to Radial (Circumferential) Margin:    Distance already reported as closest margin                                  Distance from Invasive Carcinoma to Distal Margin:    4.5 cm                               Margin Status for Non-Invasive Tumor:    All margins negative for high-grade dysplasia / intramucosal carcinoma and low-grade  "dysplasia                                                         REGIONAL LYMPH NODES                               Regional Lymph Node Status:                                     :    All regional lymph nodes negative for tumor                                  Number of Lymph Nodes Examined:    18                                Tumor Deposits:    Not identified                                                         pTNM CLASSIFICATION (AJCC 8th Edition)                               Reporting of pT, pN, and (when applicable) pM categories is based on information available to the pathologist at the time the report is issued. As per the AJCC (Chapter 1, 8th Ed.) it is the managing physician's responsibility to establish the final pathologic stage based upon all pertinent information, including but potentially not limited to this pathology report.                               Modified Classification:    y                                pT Category:    pT2                                pN Category:    pN0       Comment 01/09/2025                      Value:MSI testing was previously performed on case [id number] and will not be repeated.      Gross Description 01/09/2025                      Value:1. Large Intestine, Rectum.  Received in formalin labeled \"rectum, staple line is distal\" is a 25 cm segment of colon which is open at the proximal end and stapled at the distal end.  The mesorectum is complete.  The serosa is smooth tan-pink.  There is a moderate amount of perirectal fat.  There is a 2.0 x 2.0 cm centrally ulcerative tumor bed which lies below the anterior peritoneal reflection and comes to within 3.5 cm of the distal mucosal margin and 20 cm of the proximal mucosal margin.  The circumferential radial fat margin of the rectum is inked black and sectioning through the tumor bed reveals the tumor bed is a maximum depth of 0.3 cm, coming to within 0.1 cm of the muscularis propria and 2.5 cm of the " "circumferential radial fat margin.  The remaining mucosa is tan-pink and glistening with normal folds and the wall thickness averages 0.4 cm.  Following dissection of the pericolic fat reveals multiple probable lymph nodes ranging from 0.1 cm to 1 cm in greatest dimension.  The                           largest lymph node is bisected to reveal suspicious solid tan-white cut surfaces.    The tumor bed is entirely submitted and representative sections are submitted as follows:    1A-1E- entire tumor bed  1F- circumferential radial fat margin perpendicular  1G-1I- entire distal mucosal margin en face  1J- proximal mucosal margin en face  1K- random mucosa  1L-5 possible lymph nodes in toto  1M- 3 possible lymph nodes in toto  1N- 6 possible lymph nodes in toto  1O- 2 possible lymph nodes in toto  1P- largest possible lymph node bisected (?  Grossly positive)    tamara/uso/toña  2. Large Intestine, Rectum.  Received in formalin labeled \"distal margin, rectum\" is a 2.0 x 1.8 x 1.0 cm annular rim of bowel displaying a tan glistening mucosa and wall thickness averaging 0.5 cm.  Following removal of the staple line margin representative sections are submitted as 2A.    tamara/uso/toña    3. Large Intestine, Appendix.  Received in formalin labeled \"appendix \"is a 7.5 x 0.6 cm vermiform intact                           appendix with a smooth tan-pink serosa and a minimal amount of mesoappendix.  The tip is amputated and bisected to reveal tan mucosa.  The lumen ranges from 0.1 cm to 0.3 cm in diameter and contains tan fecal matter.  The wall thickness ranges from 0.1 cm to 0.2 cm.  Representative sections are submitted as follows:    3A- margin of resection en face and bisected tip  3B- proximal, mid and distal transverse sections    tamara/uso/toña          Microscopic Description 01/09/2025                      Value:Unless \"gross only\" is specified, the final diagnosis for each specimen is based on microscopic examination of tissue.   "    Glucose 01/10/2025 171 (H)     BUN 01/10/2025 10     Creatinine 01/10/2025 1.04     Sodium 01/10/2025 139     Potassium 01/10/2025 3.4 (L)     Chloride 01/10/2025 100     CO2 01/10/2025 26.9     Calcium 01/10/2025 8.7     BUN/Creatinine Ratio 01/10/2025 9.6     Anion Gap 01/10/2025 12.1     eGFR 01/10/2025 89.7     WBC 01/10/2025 7.77     RBC 01/10/2025 3.98 (L)     Hemoglobin 01/10/2025 12.5 (L)     Hematocrit 01/10/2025 38.5     MCV 01/10/2025 96.7     MCH 01/10/2025 31.4     MCHC 01/10/2025 32.5     RDW 01/10/2025 11.7 (L)     RDW-SD 01/10/2025 41.4     MPV 01/10/2025 10.0     Platelets 01/10/2025 206     Neutrophil % 01/10/2025 92.2 (H)     Lymphocyte % 01/10/2025 2.7 (L)     Monocyte % 01/10/2025 4.6 (L)     Eosinophil % 01/10/2025 0.0 (L)     Basophil % 01/10/2025 0.1     Immature Grans % 01/10/2025 0.4     Neutrophils, Absolute 01/10/2025 7.16 (H)     Lymphocytes, Absolute 01/10/2025 0.21 (L)     Monocytes, Absolute 01/10/2025 0.36     Eosinophils, Absolute 01/10/2025 0.00     Basophils, Absolute 01/10/2025 0.01     Immature Grans, Absolute 01/10/2025 0.03     nRBC 01/10/2025 0.0     Potassium 01/10/2025 3.4 (L)     Potassium 01/11/2025 4.4     Phosphorus 01/14/2025 3.8     Magnesium 01/14/2025 2.3     Glucose 01/14/2025 153 (H)     BUN 01/14/2025 28 (H)     Creatinine 01/14/2025 1.04     Sodium 01/14/2025 137     Potassium 01/14/2025 3.9     Chloride 01/14/2025 97 (L)     CO2 01/14/2025 22.6     Calcium 01/14/2025 9.0     BUN/Creatinine Ratio 01/14/2025 26.9 (H)     Anion Gap 01/14/2025 17.4 (H)     eGFR 01/14/2025 89.7     WBC 01/14/2025 7.26     RBC 01/14/2025 4.63     Hemoglobin 01/14/2025 15.2     Hematocrit 01/14/2025 43.9     MCV 01/14/2025 94.8     MCH 01/14/2025 32.8     MCHC 01/14/2025 34.6     RDW 01/14/2025 11.6 (L)     RDW-SD 01/14/2025 40.0     MPV 01/14/2025 9.7     Platelets 01/14/2025 251     Phosphorus 01/15/2025 2.3 (L)     Magnesium 01/15/2025 2.3     Glucose 01/15/2025 131 (H)     BUN  01/15/2025 23 (H)     Creatinine 01/15/2025 0.88     Sodium 01/15/2025 136     Potassium 01/15/2025 3.9     Chloride 01/15/2025 101     CO2 01/15/2025 24.0     Calcium 01/15/2025 8.6     BUN/Creatinine Ratio 01/15/2025 26.1 (H)     Anion Gap 01/15/2025 11.0     eGFR 01/15/2025 107.4     WBC 01/15/2025 7.78     RBC 01/15/2025 4.29     Hemoglobin 01/15/2025 13.9     Hematocrit 01/15/2025 40.7     MCV 01/15/2025 94.9     MCH 01/15/2025 32.4     MCHC 01/15/2025 34.2     RDW 01/15/2025 11.5 (L)     RDW-SD 01/15/2025 39.6     MPV 01/15/2025 10.0     Platelets 01/15/2025 239     Glucose 01/16/2025 119 (H)     BUN 01/16/2025 21 (H)     Creatinine 01/16/2025 0.77     Sodium 01/16/2025 140     Potassium 01/16/2025 3.7     Chloride 01/16/2025 103     CO2 01/16/2025 27.0     Calcium 01/16/2025 8.7     BUN/Creatinine Ratio 01/16/2025 27.3 (H)     Anion Gap 01/16/2025 10.0     eGFR 01/16/2025 111.8     Magnesium 01/16/2025 2.3     Phosphorus 01/16/2025 2.4 (L)    Labs reviewed    Current Psychotropic Medications:  Strattera 25 mg q AM   Gabapentin 100 mg q hs    Plan of Care/ Medical Decision Making  Continue gabapentin as written.  Increase Strattera to 40 mg daily  Support continued communication with medical team, investment in personal strategies to manage organization and independence.  Support allowed recovery with parents to assist with complete recovery prior to return to independent living.  Follow-up scheduled in clinic in 6 weeks.    Diagnoses and all orders for this visit:    1. Attention deficit hyperactivity disorder (ADHD), predominantly hyperactive type (Primary)    2. Generalized anxiety disorder    3. Encounter for screening for malignant neoplasm of colon    Other orders  -     atomoxetine (Strattera) 40 MG capsule; Take 1 capsule by mouth Daily. Indications: Attention Deficit Hyperactivity Disorder  Dispense: 30 capsule; Refill: 2

## 2025-03-19 VITALS
HEART RATE: 78 BPM | RESPIRATION RATE: 16 BRPM | TEMPERATURE: 98.5 F | DIASTOLIC BLOOD PRESSURE: 80 MMHG | SYSTOLIC BLOOD PRESSURE: 124 MMHG | OXYGEN SATURATION: 99 %

## 2025-03-21 ENCOUNTER — HOME CARE VISIT (OUTPATIENT)
Dept: HOME HEALTH SERVICES | Facility: HOME HEALTHCARE | Age: 45
End: 2025-03-21
Payer: COMMERCIAL

## 2025-03-24 ENCOUNTER — TELEPHONE (OUTPATIENT)
Dept: SURGERY | Facility: CLINIC | Age: 45
End: 2025-03-24
Payer: COMMERCIAL

## 2025-03-24 NOTE — TELEPHONE ENCOUNTER
Hub staff attempted to follow warm transfer process and was unsuccessful     Caller: Jc Brannon Jr.    Relationship to patient: Self    Best call back number: [phone]    Patient is needing: PATIENT HAS NOT HAD A REGULAR BOWEL MOVEMENT IN PAST 5 DAYS.  HE WANTS TO KNOW IF THIS IS AN ISSUE AND WHAT WOULD BE CONSIDERED NORMAL FOR HIM AT THIS POINT.  PLEASE ADVISE.

## 2025-03-25 ENCOUNTER — TELEPHONE (OUTPATIENT)
Dept: OTHER | Facility: HOSPITAL | Age: 45
End: 2025-03-25
Payer: COMMERCIAL

## 2025-03-25 NOTE — TELEPHONE ENCOUNTER
"Called pt and Relayed Dr. Isaac's advice \" Lets have him take a dose of milk of magnesia and MiraLAX 17 g twice a day.\" Pt stated that he is having BM now after taking Miralax. He stated he will call if have any question.   "

## 2025-03-25 NOTE — TELEPHONE ENCOUNTER
Oncology Social Work     OSW received a call from the patient stating he received two bills one from [employer] and One from [facility]. OSW told patient to bring those bills with him to his appointment with Jennie DE LA TORRE so OSW could look at the bills. Patient was approved for  FA.   Will make Hoda GAMBLE aware.     Rossana GAMBLE

## 2025-03-26 ENCOUNTER — TELEPHONE (OUTPATIENT)
Dept: SURGERY | Facility: CLINIC | Age: 45
End: 2025-03-26
Payer: COMMERCIAL

## 2025-03-26 NOTE — TELEPHONE ENCOUNTER
Spoke with patient and advised it is not uncommon to have some bleeding after a bowel movement that is hard and painful.  Recommended he take Miralax and a stool softener daily.  He voiced understanding.

## 2025-03-26 NOTE — TELEPHONE ENCOUNTER
Hub staff attempted to follow warm transfer process and was unsuccessful     Caller: Jc Brannon Jr.     Relationship to patient: SELF     Best call back number: [phone]     Patient is needing: PATIENT HAD PAINFUL STOOL AND NOTICED BLOOD AFTER TAKING MIRALAX. PATIENT HAS CONCERNS AND WONDERED IF THIS WAS NORMAL

## 2025-04-01 ENCOUNTER — OFFICE VISIT (OUTPATIENT)
Dept: SURGERY | Facility: CLINIC | Age: 45
End: 2025-04-01
Payer: COMMERCIAL

## 2025-04-01 VITALS
WEIGHT: 214 LBS | SYSTOLIC BLOOD PRESSURE: 146 MMHG | OXYGEN SATURATION: 99 % | HEART RATE: 111 BPM | HEIGHT: 69 IN | DIASTOLIC BLOOD PRESSURE: 86 MMHG | BODY MASS INDEX: 31.7 KG/M2

## 2025-04-01 DIAGNOSIS — C20 RECTAL ADENOCARCINOMA: Primary | ICD-10-CM

## 2025-04-01 PROCEDURE — 99024 POSTOP FOLLOW-UP VISIT: CPT | Performed by: SURGERY

## 2025-04-02 ENCOUNTER — TELEPHONE (OUTPATIENT)
Dept: SURGERY | Facility: CLINIC | Age: 45
End: 2025-04-02
Payer: COMMERCIAL

## 2025-04-02 NOTE — TELEPHONE ENCOUNTER
Spoke with pt's father who is on the verbal. I answered all his questions and I let him know that I filled out his son's fmla paperwork. I let him know that pt wanted the original copy back. I said I'd leave it at the  and they can come grab it anytime.

## 2025-04-02 NOTE — TELEPHONE ENCOUNTER
Caller: Jc Brannon SR    Relationship: Father    Best call back number: [phone]    What is the best time to reach you: ANYTIME    Who are you requesting to speak with (clinical staff, provider,  specific staff member): DR. REZA OR NURSE    PATIENT'S FATHER CALLED IN AND STATED THAT HE HAS SOME QUESTIONS TO ASK REGARDING MEDICATION AND THINGS. PLEASE CALL.

## 2025-04-02 NOTE — PROGRESS NOTES
Colorectal & General Surgery  Post - Op    Patient: Jc Brannon Jr.  YOB: 1980  MRN: [id number]      Assessment  Jc Brannon Jr. is a 44 y.o. male with clinical stage III mid rectal adenocarcinoma status post total neoadjuvant therapy followed by low anterior resection who presents in follow-up today.    He is having significant diarrhea and fecal urgency, consistent with low anterior resection syndrome.  I recommended that he take Imodium 4 times a day, fiber 3 times a day, and stick to a high-fiber diet with significant water intake.    I will see him back in the office in a few weeks to keep a close eye on him.    History of Present Illness   Jc Brannon Jr. is a 44 y.o. male with significant who presents in follow-up today.  Overall he says he is doing relatively well.  He does have significant diarrhea after he eats and is having more than 10 bowel movements a day.    Vital Signs  Vitals:    04/01/25 1024   BP: 146/86   Pulse: 111   SpO2: 99%        Physical Exam  Constitutional: Resting comfortably, no acute distress  Neck: Supple, trachea midline  Respiratory: No increased work of breathing, Symmetric excursion  Cardiovascular: Well pefursed, no jugular venous distention evident   Abdominal: Incisions in good order.  Soft, non-tender, non-distended  Lymphatics: No cervical or suprascapular adenopathy  Skin: Warm, dry, no rash on visualized skin surfaces  Musculoskeletal: Symmetric strength, no obvious gross abnormalities  Psychiatric: Alert and oriented ×3, normal affect     See Isaac MD  Colorectal & General Surgery  [facility]    [address] [place] [address], [address]  [place], [place], [facility]  P: [phone]  F: [phone]

## 2025-04-03 ENCOUNTER — DOCUMENTATION (OUTPATIENT)
Dept: OTHER | Facility: HOSPITAL | Age: 45
End: 2025-04-03
Payer: COMMERCIAL

## 2025-04-03 ENCOUNTER — TELEPHONE (OUTPATIENT)
Dept: OTHER | Facility: HOSPITAL | Age: 45
End: 2025-04-03
Payer: COMMERCIAL

## 2025-04-03 NOTE — PROGRESS NOTES
Oncology Social Work   One of the bills provided by the patient was actually his father's bill.  The patient was informed and apologized. MAVIS Marie

## 2025-04-03 NOTE — PROGRESS NOTES
Oncology Social Work  The patient was informed that for his bill for [employer] he needed to text the word compassion to [phone] in order to receive information about their financial assistance program. The patient was informed of this information as well.  MAVIS Marie

## 2025-04-03 NOTE — PROGRESS NOTES
Oncology Social Work  For the patient's [facility] bill his letter of acceptance for the [facility] financial assistance program was sent to them at [email or web address] number [email or web address].  The patient was informed of that also.  MAVIS Marie

## 2025-04-03 NOTE — PROGRESS NOTES
Oncology Social Work  A voicemail message was left for [employer] [phone] on the patient's behalf to inquire if they will accept a faxed copy of the patient's approval for [facility] financial assistance program in order to cover the patient's bill with their company.  MAVIS Marie

## 2025-04-03 NOTE — TELEPHONE ENCOUNTER
Oncology Social Work  Spoke to the patient who requested assistance with some of his bills.  The patient wanted assistance with finding out if his bills could be covered by his financial assistance funds through [employer].  The patient requested OSW to contact the company's to assist him.  MAVIS Marie

## 2025-04-09 ENCOUNTER — TELEPHONE (OUTPATIENT)
Dept: ONCOLOGY | Facility: CLINIC | Age: 45
End: 2025-04-09

## 2025-04-09 NOTE — TELEPHONE ENCOUNTER
Caller: [employer]    Relationship: MAGALIE Chaney call back number: [phone]    What form or medical record are you requesting: DISABILITY     Who is requesting this form or medical record from you: [facility]    How would you like to receive the form or medical records (pick-up, mail, fax): FAX    If fax, what is the fax number: [phone]    Timeframe paperwork needed: CALLING TO CHECK THE STATUS OF DISABILITY PAPERWORK

## 2025-04-14 ENCOUNTER — OFFICE VISIT (OUTPATIENT)
Dept: PSYCHIATRY | Facility: HOSPITAL | Age: 45
End: 2025-04-14
Payer: COMMERCIAL

## 2025-04-14 ENCOUNTER — DOCUMENTATION (OUTPATIENT)
Dept: OTHER | Facility: HOSPITAL | Age: 45
End: 2025-04-14
Payer: COMMERCIAL

## 2025-04-14 DIAGNOSIS — C20 RECTAL ADENOCARCINOMA: ICD-10-CM

## 2025-04-14 DIAGNOSIS — F90.1 ATTENTION DEFICIT HYPERACTIVITY DISORDER (ADHD), PREDOMINANTLY HYPERACTIVE TYPE: Primary | ICD-10-CM

## 2025-04-14 DIAGNOSIS — F41.1 GENERALIZED ANXIETY DISORDER: ICD-10-CM

## 2025-04-14 RX ORDER — ATOMOXETINE 60 MG/1
60 CAPSULE ORAL DAILY
Qty: 30 CAPSULE | Refills: 2 | Status: CANCELLED | OUTPATIENT
Start: 2025-04-14 | End: 2026-04-14

## 2025-04-14 NOTE — PROGRESS NOTES
Oncology Social Work  Per the patient's request OSW provided the patient's father with the contact phone number and case number to assist the patient in contacting [employer] to discuss financial assistance for the patient's bill.  The patient's father was reminded to ask them what documentation will need to be submitted with the financial assistance application and a fax number to send the documentation and application to when completed.  The patient's father was informed that OSW can assist in faxing the information if needed.  OSW will remain available to assist as needed.  MAVIS Marie

## 2025-04-14 NOTE — PROGRESS NOTES
Supportive Oncology Services  In Person Session    Subjective  Patient ID: Jc Brannon Jr. is a 44 y.o. male is seen face to face in the Supportive Oncology Services ([facility]OS) Clinic.    CC: Anxiety outside of normal environment    HPI/ Interval History:   Pt is seen in follow up regarding ongoing anxiety, ADHD in survivorship of colon cancer.    Pt presents with mother and father. Reports healing as expected from recent surgery, continuing to live with parents. Is grateful for this, while looking forward to returning to independent apartment and going back to work. Continues to endorse bowel changes, requiring immodium and benefiber. Denies any pain. Bowels are controlled. Denies nausea. Appetite is good. Reports walking regularly. Continues to await final recommendation per surgeon to assist with return to work, eager to return to independent living.    Is sleeping well, although waking intentionally to stay on scheduled immodium q 6 hours. Energy during the day is good. Reports staying occupied, waking walks, listening to YouTube, reading. Not studying Cambodian at this time, feeling somewhat drowsy at times and relating this to immodium.     Continues on Strattera, appreciating benefit to this. Rpeorts ability to complete tasks,     Exam: As above; pt continues to talk fast as times, while able to communicate appropriately, eloquently, and as needed. Improved as compared to baseline.    Recent Labs Reviewed:  Lab on 03/04/2025   Component Date Value    Glucose 03/04/2025 115 (H)     BUN 03/04/2025 11     Creatinine 03/04/2025 0.80     Sodium 03/04/2025 140     Potassium 03/04/2025 3.4 (L)     Chloride 03/04/2025 101     CO2 03/04/2025 28.0     Calcium 03/04/2025 9.4     Total Protein 03/04/2025 7.3     Albumin 03/04/2025 4.3     ALT (SGPT) 03/04/2025 34     AST (SGOT) 03/04/2025 19     Alkaline Phosphatase 03/04/2025 86     Total Bilirubin 03/04/2025 0.3     Globulin 03/04/2025 3.0     A/G Ratio 03/04/2025 1.4      BUN/Creatinine Ratio 03/04/2025 13.8     Anion Gap 03/04/2025 11.0     eGFR 03/04/2025 110.5     WBC 03/04/2025 3.33 (L)     RBC 03/04/2025 3.64 (L)     Hemoglobin 03/04/2025 11.4 (L)     Hematocrit 03/04/2025 34.8 (L)     MCV 03/04/2025 95.6     MCH 03/04/2025 31.3     MCHC 03/04/2025 32.8     RDW 03/04/2025 12.7     RDW-SD 03/04/2025 44.6     MPV 03/04/2025 9.5     Platelets 03/04/2025 303     Neutrophil % 03/04/2025 63.4     Lymphocyte % 03/04/2025 14.7 (L)     Monocyte % 03/04/2025 11.7     Eosinophil % 03/04/2025 9.6 (H)     Basophil % 03/04/2025 0.3     Immature Grans % 03/04/2025 0.3     Neutrophils, Absolute 03/04/2025 2.11     Lymphocytes, Absolute 03/04/2025 0.49 (L)     Monocytes, Absolute 03/04/2025 0.39     Eosinophils, Absolute 03/04/2025 0.32     Basophils, Absolute 03/04/2025 0.01     Immature Grans, Absolute 03/04/2025 0.01     nRBC 03/04/2025 0.0     CEA 03/04/2025 1.23    Admission on 02/21/2025, Discharged on 02/25/2025   Component Date Value    Case Report 02/21/2025                      Value:Surgical Pathology Report                         Case: [id number]                                  Authorizing Provider:  Ori Isaac,   Collected:           02/21/2025 04:31 PM                                 MD                                                                           [facility]  Received:            02/21/2025 10:38 PM                                 MAIN OR                                                                      Pathologist:           Van Pereira MD                                                          Specimens:   1) - Small Intestine, Ileostomy                                                                     2) - Peritoneum, abdominal wall lesion                                                     Final Diagnosis 02/21/2025                      Value:1.  Ileostomy, takedown:   A.  Enterocutaneous segment with  "focal erosion, mixed inflammation and foreign body giant cells.    2.  Soft tissue, abdominal wall, excision:   A.  Skeletal muscle and soft tissue with fat necrosis and osseous metaplasia.      Gross Description 02/21/2025                      Value:1. Small Intestine.  Received in formalin labeled \"ileostomy\" is a 13.9 cm in length by 3 cm in diameter segment of small bowel that is anastomosed to a 5.2 x 2 cm gray-tan wrinkled skin ellipse.  Protruding through the ostomy site is a 2.5 x 1.5 cm portion of red erythemic mucosa.  Near the staple lines the serosa is remarkable for a 1.5 and 2 cm possible transmural defect.  The remaining mucosa is is pink-tan with a normal amount of folds.  There are no mucosal lesions grossly identified.  Representative sections are submitted as follows:    1A-1B.  Ostomy site  1C.  Possible serosal transmural defects    kls/uso/[facility]    2. Peritoneum.  Received in formalin labeled \"abdominal wall lesion\" is a 2 x 1.5 x 0.9 cm yellow-white fibrous tissue nodule.  The outer surfaces are inked black.  Sectioning reveals white to yellow-tan firm suspicious cut surfaces with focal areas of calcification.  The specimen is entirely submitted as 2A-2B following decalcification with                           Immunocal.    kls/uso/[facility]        Microscopic Description 02/21/2025                      Value:Unless \"gross only\" is specified, the final diagnosis for each specimen is based on microscopic examination of tissue.      Glucose 02/22/2025 127 (H)     BUN 02/22/2025 7     Creatinine 02/22/2025 0.91     Sodium 02/22/2025 138     Potassium 02/22/2025 3.8     Chloride 02/22/2025 99     CO2 02/22/2025 27.0     Calcium 02/22/2025 9.0     BUN/Creatinine Ratio 02/22/2025 7.7     Anion Gap 02/22/2025 12.0     eGFR 02/22/2025 105.3     Magnesium 02/22/2025 2.2     Phosphorus 02/22/2025 3.2     WBC 02/22/2025 7.52     RBC 02/22/2025 4.14     Hemoglobin 02/22/2025 12.7 (L)     Hematocrit 02/22/2025 " 39.6     MCV 02/22/2025 95.7     MCH 02/22/2025 30.7     MCHC 02/22/2025 32.1     RDW 02/22/2025 12.6     RDW-SD 02/22/2025 44.1     MPV 02/22/2025 10.2     Platelets 02/22/2025 288     Neutrophil % 02/22/2025 90.1 (H)     Lymphocyte % 02/22/2025 4.0 (L)     Monocyte % 02/22/2025 5.1     Eosinophil % 02/22/2025 0.0 (L)     Basophil % 02/22/2025 0.1     Immature Grans % 02/22/2025 0.7 (H)     Neutrophils, Absolute 02/22/2025 6.78     Lymphocytes, Absolute 02/22/2025 0.30 (L)     Monocytes, Absolute 02/22/2025 0.38     Eosinophils, Absolute 02/22/2025 0.00     Basophils, Absolute 02/22/2025 0.01     Immature Grans, Absolute 02/22/2025 0.05     nRBC 02/22/2025 0.0     Extra Tube 02/22/2025 hold for add-on    Labs reviewed    Current Psychotropic Medications:  Strattera 40 mg daily  Gabapentin 100 mg q hs    Plan of Care/ Medical Decision Making  Reviewed medications, specifically ongoing use of strattera with more recent concerns for fatigue. Pt and parents seem certain this is related to GI medications. Will continue to revisit, and will not recommend increase at this time.  Continue gabapentin as has been helpful for sleep and anxiety.  Supportive counseling continued supporting goals of independence, benefits of routine, effective communication with medical providers.  Pt and parents deny additional needs, questions, or concerns at this time.  FU scheduled in 4 weeks.    Diagnoses and all orders for this visit:    1. Attention deficit hyperactivity disorder (ADHD), predominantly hyperactive type (Primary)    2. Generalized anxiety disorder    3. Rectal adenocarcinoma    Other orders  -     atomoxetine (Strattera) 40 MG capsule; Take 1 capsule by mouth Daily.  Dispense: 30 capsule; Refill: 2    I spent 34 minutes caring for Jc on this date of service. This time includes time spent by me in the following activities: preparing for the visit, reviewing tests, obtaining and/or reviewing a separately obtained history,  performing a medically appropriate examination and/or evaluation, counseling and educating the patient/family/caregiver, and documenting information in the medical record.

## 2025-04-14 NOTE — PROGRESS NOTES
Oncology Social Work  Met with the patient who inquired about assistance for his bill for [employer] DNA testing.  The patient was reminded about texting them for their financial assistance application.  The patient stated that he will have difficulty in doing that and was informed to call them to request a financial assistance application.  The patient requested for OSW to provide that information to his father who will assist him.  MAVIS Marie

## 2025-04-24 DIAGNOSIS — E78.00 ELEVATED LDL CHOLESTEROL LEVEL: Primary | ICD-10-CM

## 2025-04-26 RX ORDER — ATOMOXETINE 40 MG/1
40 CAPSULE ORAL DAILY
Qty: 30 CAPSULE | Refills: 2 | Status: SHIPPED | OUTPATIENT
Start: 2025-04-26 | End: 2026-04-26

## 2025-04-29 ENCOUNTER — PATIENT OUTREACH (OUTPATIENT)
Dept: OTHER | Facility: HOSPITAL | Age: 45
End: 2025-04-29
Payer: COMMERCIAL

## 2025-04-29 ENCOUNTER — OFFICE VISIT (OUTPATIENT)
Dept: FAMILY MEDICINE CLINIC | Facility: CLINIC | Age: 45
End: 2025-04-29
Payer: COMMERCIAL

## 2025-04-29 VITALS
OXYGEN SATURATION: 99 % | TEMPERATURE: 98 F | BODY MASS INDEX: 31.25 KG/M2 | WEIGHT: 211 LBS | HEART RATE: 101 BPM | SYSTOLIC BLOOD PRESSURE: 126 MMHG | DIASTOLIC BLOOD PRESSURE: 86 MMHG | HEIGHT: 69 IN

## 2025-04-29 DIAGNOSIS — Z23 NEED FOR PNEUMOCOCCAL 20-VALENT CONJUGATE VACCINATION: ICD-10-CM

## 2025-04-29 DIAGNOSIS — Z00.00 ANNUAL PHYSICAL EXAM: Primary | ICD-10-CM

## 2025-04-29 LAB
CHOLEST SERPL-MCNC: 168 MG/DL (ref 100–199)
CHOLEST/HDLC SERPL: 3.7 RATIO (ref 0–5)
HDLC SERPL-MCNC: 45 MG/DL
LDLC SERPL CALC-MCNC: 109 MG/DL (ref 0–99)
TRIGL SERPL-MCNC: 74 MG/DL (ref 0–149)
VLDLC SERPL CALC-MCNC: 14 MG/DL (ref 5–40)

## 2025-04-29 NOTE — PROGRESS NOTES
"Chief Complaint  Annual Exam (Annual physical)    Subjective        Jc Brannon Jr. presents to [facility]  History of Present Illness  Mr. Brannon is here today for annual physical exam with lab review.        I have reviewed the patient's medical history in detail and updated the computerized patient record.       Current Outpatient Medications:     amLODIPine (NORVASC) 10 MG tablet, Take 1 tablet by mouth Daily. Indications: High Blood Pressure, Disp: 90 tablet, Rfl: 1    atomoxetine (Strattera) 40 MG capsule, Take 1 capsule by mouth Daily., Disp: 30 capsule, Rfl: 2    gabapentin (Neurontin) 100 MG capsule, Take 1 capsule by mouth Every Night. Indications: Neuropathic Pain, Disp: 90 capsule, Rfl: 0    hydroCHLOROthiazide 12.5 MG tablet, Take 1 tablet by mouth Daily. Indications: Edema, Disp: 30 tablet, Rfl: 5    loperamide (IMODIUM) 2 MG capsule, Take 1 capsule by mouth 4 (Four) Times a Day As Needed for Diarrhea., Disp: , Rfl:      Review of Systems   Constitutional: Negative.    HENT: Negative.     Eyes: Negative.    Respiratory: Negative.     Cardiovascular: Negative.    Gastrointestinal: Negative.    Genitourinary: Negative.    Musculoskeletal: Negative.    Skin: Negative.    Neurological: Negative.       Objective   Vital Signs:  /86 (BP Location: Right arm, Patient Position: Sitting, Cuff Size: Adult)   Pulse 101   Temp 98 °F (36.7 °C) (Temporal)   Ht 175.3 cm (69.02\")   Wt 95.7 kg (211 lb)   SpO2 99%   BMI 31.14 kg/m²   Estimated body mass index is 31.14 kg/m² as calculated from the following:    Height as of this encounter: 175.3 cm (69.02\").    Weight as of this encounter: 95.7 kg (211 lb).      Physical Exam  Vitals reviewed.   Constitutional:       Appearance: Normal appearance.   Neck:      Vascular: No carotid bruit.   Cardiovascular:      Rate and Rhythm: Normal rate and regular rhythm.      Pulses: Normal pulses.      Heart sounds: Normal heart sounds. "   Pulmonary:      Effort: Pulmonary effort is normal.      Breath sounds: Normal breath sounds.   Abdominal:      General: Bowel sounds are normal.      Palpations: Abdomen is soft.   Musculoskeletal:      Cervical back: Normal range of motion.   Skin:     General: Skin is warm and dry.   Neurological:      Mental Status: He is alert and oriented to person, place, and time.   Psychiatric:      Comments: No acute distress        Result Review :    Lipid Panel          4/28/2025    13:38   Lipid Panel   Total Cholesterol 168    Triglycerides 74    HDL Cholesterol 45    VLDL Cholesterol 14    LDL Cholesterol  109                Assessment and Plan   Diagnoses and all orders for this visit:    1. Annual physical exam (Primary)    2. Need for pneumococcal 20-valent conjugate vaccination  -     Pneumococcal Conjugate Vaccine 20-Valent All    Mr. Brannon appears to be doing well today.  Her/His physical exam is unremarkable. I have reviewed her/his labs with her today.   We have discussed age related healthy behaviors.   He did receive a pneumonia vaccine today.       Follow Up   Return in about 1 year (around 4/29/2026), or if symptoms worsen or fail to improve, for Annual physical, Fasting labs 1 week prior to next f/u.  Patient was given instructions and counseling regarding his condition or for health maintenance advice. Please see specific information pulled into the AVS if appropriate.

## 2025-04-29 NOTE — PROGRESS NOTES
Reviewed chart. Patient with clinical stage III rectal adenocarcinoma diagnosed 4/23/24 status post total neoadjuvant therapy (s/p 6 cycles of FOLFIRINOX 6/3/2024 - 08/19/2024), s/p 6 cycles of concurrent chemoradiation with 5-FU 9/9/2024 - 10/14/2024, status post low anterior resection 1/9/25 with ypT2 N0 rectal adenocarcinoma with negative margins. S/p closure of his diverting loop ileostomy 2/21/25. Sees Dr. Isaac 5/6, Jennie 5/12, Dr. Pak 6/410, Dr. Nobles 1/30/26.     Follow up call placed to patient.  Left voice mail with my contact information asking that he call back at her convenience.

## 2025-05-06 ENCOUNTER — OFFICE VISIT (OUTPATIENT)
Dept: SURGERY | Facility: CLINIC | Age: 45
End: 2025-05-06
Payer: COMMERCIAL

## 2025-05-06 ENCOUNTER — PATIENT OUTREACH (OUTPATIENT)
Dept: OTHER | Facility: HOSPITAL | Age: 45
End: 2025-05-06
Payer: COMMERCIAL

## 2025-05-06 VITALS
BODY MASS INDEX: 31.73 KG/M2 | WEIGHT: 214.2 LBS | HEART RATE: 118 BPM | DIASTOLIC BLOOD PRESSURE: 84 MMHG | SYSTOLIC BLOOD PRESSURE: 140 MMHG | OXYGEN SATURATION: 98 % | HEIGHT: 69 IN

## 2025-05-06 DIAGNOSIS — C20 RECTAL ADENOCARCINOMA: Primary | ICD-10-CM

## 2025-05-06 PROCEDURE — 99024 POSTOP FOLLOW-UP VISIT: CPT | Performed by: SURGERY

## 2025-05-06 NOTE — PROGRESS NOTES
Reviewed chart.  Patient with stage ypT2 N0 rectal adenocarcinoma s/p total neoadjuvant therapy and low anterior resection in January 2025. Saw Dr. Isaac today.  In surveillance. Sees Dr. Pak 6/10, Dr. Isaac 7/16, Dr. Nobles 1/30/26.  Ongoing visits with nya Schneider appt 5/12    [facility].  He states he is doing well post surgery with 2-3 BMs per day.  The patient just met with Dr. Isaac today and will be going back to work soon.  Introduced patient/family to Cristine colorectal nurse navigator.     Hoda has been following for resource needs.  He continues to see Jennie as well.     We discussed his upcoming appts.     Since he is stable post surgery with no active cancer treatment, will close his case to navigation.  Reminded patient and family that he can access the services in the [facility] even with his navigation case being closed.  Encouraged patient to call in the future if the need arises. Patient verbalized understanding.

## 2025-05-06 NOTE — PROGRESS NOTES
Colorectal & General Surgery  Follow - Up    Patient: Jc Brannon Jr.  YOB: 1980  MRN: [id number]      Assessment  Jc Brannon Jr. is a 44 y.o. male with stage ypT2 N0 rectal adenocarcinoma s/p total neoadjuvant therapy and low anterior resection in January 2025.    From a functional standpoint, his low anterior resection syndrome is currently very well-managed with Benefiber and Imodium.  He is okay to return to work and has no restrictions from my standpoint.    From an oncologic standpoint, no adjuvant therapy indicated.  Surveillance schedule as outlined below:    Surveillance schedule  Labs due: June 2025, including circulating tumor DNA  CT CAP due: July 2025  Colonoscopy due: Fall 2025 (has not had complete colonoscopy since April 2024)  Next office visit: July 2025    Chief Complaint: Rectal cancer follow-up    History of Present Illness   Jc Brannon Jr. is a 44 y.o. male who presents in follow-up today.  He feels much better.  His bowel movements are much better controlled having 2 or 3 a day.  Taking Imodium and Benefiber.  Tolerating diet.  Hoping to go back to work and drive.    Most recent colonoscopy: 2024    Past Medical History   Past Medical History:   Diagnosis Date    Anemia     History of cancer chemotherapy     JUNE TO SEPT 2024    History of radiation therapy     2024    History of seizure     STATES AS A CHILD: UNCLEAR   NO NEURO    Hyperlipidemia     Hypertension     Ileostomy present     Ileostomy status 01/23/2025    Mental disability without special needs     NEEDS REINFORCEMENT WITH INSTRUCTIONS, EAGER TO LEARN.  STUTTERS    Rectal adenocarcinoma 2024        Past Surgical History   Past Surgical History:   Procedure Laterality Date    COLON RESECTION N/A 1/9/2025    Procedure: Laparoscopic, possible open, low anterior resection with diverting loop ileostomy and appendectomy;  Surgeon: Ori Isaac MD;  Location: [facility];  Service: General;   Laterality: N/A;    COLONOSCOPY N/A 4/23/2024    Procedure: COLONOSCOPY into cecum with biopsy;  Surgeon: David Tena MD;  Location: [facility] ENDOSCOPY;  Service: Gastroenterology;  Laterality: N/A;  rectal mass    ILEOSTOMY CLOSURE N/A 2/21/2025    Procedure: Closure of diverting loop ileostomy;  Surgeon: Ori Isaac MD;  Location: [facility] MAIN OR;  Service: General;  Laterality: N/A;    LEG SURGERY Left     ACL    SIGMOIDOSCOPY N/A 12/16/2024    Procedure: FLEXIBLE SIGMOIDOSCOPY WITH BIOPSIES cold with tattoo injection w/ scleratherapy needle;  Surgeon: Ori Isaac MD;  Location: [facility] ENDOSCOPY;  Service: General;  Laterality: N/A;  pre monitor site(ca) s/p chemo radiation post remnant of tumor/needs tx    VENOUS ACCESS DEVICE (PORT) INSERTION N/A 5/24/2024    Procedure: INSERTION VENOUS ACCESS DEVICE;  Surgeon: Ori Isaac MD;  Location: [facility] MAIN OR;  Service: General;  Laterality: N/A;       Social History  Social History     Socioeconomic History    Marital status: Single   Tobacco Use    Smoking status: Never     Passive exposure: Never    Smokeless tobacco: Never   Vaping Use    Vaping status: Never Used   Substance and Sexual Activity    Alcohol use: No    Drug use: No    Sexual activity: Defer       Family History  Family History   Problem Relation Age of Onset    Cancer Mother         Stomach    Cancer Father         prostate    Glaucoma Father     Hypertension Maternal Aunt     Heart disease Maternal Aunt     Hypertension Maternal Uncle     Glaucoma Paternal Grandfather     Malig Hyperthermia Neg Hx        Colorectal cancer family history: None    Review of Systems  Negative except as documented in the HPI.     Allergies  Allergies   Allergen Reactions    Latex Rash       Medications    Current Outpatient Medications:     amLODIPine (NORVASC) 10 MG tablet, Take 1 tablet by mouth Daily. Indications: High Blood Pressure, Disp: 90 tablet, Rfl: 1     atomoxetine (Strattera) 40 MG capsule, Take 1 capsule by mouth Daily., Disp: 30 capsule, Rfl: 2    gabapentin (Neurontin) 100 MG capsule, Take 1 capsule by mouth Every Night. Indications: Neuropathic Pain, Disp: 90 capsule, Rfl: 0    hydroCHLOROthiazide 12.5 MG tablet, Take 1 tablet by mouth Daily. Indications: Edema, Disp: 30 tablet, Rfl: 5    loperamide (IMODIUM) 2 MG capsule, Take 1 capsule by mouth 4 (Four) Times a Day As Needed for Diarrhea. Indications: Diarrhea, Disp: , Rfl:     Vital Signs  Vitals:    05/06/25 0850   BP: 140/84   Pulse: 118   SpO2: 98%        Physical Exam  Constitutional: Resting comfortably, no acute distress  Neck: Supple, trachea midline  Respiratory: No increased work of breathing, Symmetric excursion  Cardiovascular: Well pefursed, no jugular venous distention evident   Abdominal: Incisions in good order.  Soft, non-tender, non-distended  Lymphatics: No cervical or suprascapular adenopathy  Skin: Warm, dry, no rash on visualized skin surfaces  Musculoskeletal: Symmetric strength, no obvious gross abnormalities  Psychiatric: Alert and oriented ×3, normal affect      Laboratory Results  No new labs to review    Radiology  None to review    Endoscopy  None to review         See Isaac MD  Colorectal & General Surgery  [facility]    [address], [address]  [place], [place], [address]  P: [phone]  F: [phone]

## 2025-05-12 ENCOUNTER — OFFICE VISIT (OUTPATIENT)
Dept: PSYCHIATRY | Facility: HOSPITAL | Age: 45
End: 2025-05-12
Payer: COMMERCIAL

## 2025-05-12 DIAGNOSIS — F90.1 ATTENTION DEFICIT HYPERACTIVITY DISORDER (ADHD), PREDOMINANTLY HYPERACTIVE TYPE: ICD-10-CM

## 2025-05-12 DIAGNOSIS — F43.23 ADJUSTMENT DISORDER WITH MIXED ANXIETY AND DEPRESSED MOOD: ICD-10-CM

## 2025-05-12 DIAGNOSIS — C20 RECTAL ADENOCARCINOMA: Primary | ICD-10-CM

## 2025-05-12 RX ORDER — ATOMOXETINE 60 MG/1
60 CAPSULE ORAL DAILY
Qty: 30 CAPSULE | Refills: 2 | Status: SHIPPED | OUTPATIENT
Start: 2025-05-12 | End: 2026-05-12

## 2025-05-12 RX ORDER — GABAPENTIN 100 MG/1
100 CAPSULE ORAL TAKE AS DIRECTED
Qty: 60 CAPSULE | Refills: 2 | Status: SHIPPED | OUTPATIENT
Start: 2025-05-12 | End: 2026-05-12

## 2025-05-12 NOTE — PROGRESS NOTES
[facility]  In Person Session    Subjective  Patient ID: Jc Brannon Jr. is a 44 y.o. male is seen face to face in the [facility] ([facility].    CC: Anxiety, difficulty with information processing    HPI/ Interval History:   Patient seen in follow-up regarding ongoing management of anxiety symptoms, intrusive behavior symptoms alongside continued recovery from surgery/treatment for colon cancer.    Patient reports appreciated return to independent lifestyle.  Is living in apartment by himself, and returned to work last Wednesday.  Reports succeeding, as previously. Does report being out of gabapentin, last taking two days ago.  Did find this was helpful, and hopes to re add.  Previously taking 100 mg q hs. Is sleeping well, while recognizing need to wake every 6 hours for scheduling of other medications. Denies any pain. Denies neuropathy, while adjusting some to adjusted sensation with bowel movements, and feeling this is to be as expected.     Has continued on atemoxetine, running out of this and not having available to take Saturday and Sunday.  Generally reports perceived benefit, continuing to explore impacts of diagnosed ADHD on cognitive abilities, processing, implication and work and social environments.  Despite this, acknowledges successful workaround's, independence.  States parents remain cautiously supportive, while suspecting he could discontinue this medication.  Patient is hopeful to continue.  Finds energy during the day is acceptable. Reports feeling more activated at times, more sluggish on others. Denies any fatigue interfering with functional abilities. Appetite is good. Mood is good. Continues to read often, and continues to learn Singaporean. Continues to feel he is thriving     Exam: As above; patient interactions persist at baseline.  Anxiety is elevated.  Does become fixated on certain ideas or concerns, benefited by calm voice, explanation, and  redirection.    Recent Labs Reviewed:  Orders Only on 04/24/2025   Component Date Value    Total Cholesterol 04/28/2025 168     Triglycerides 04/28/2025 74     HDL Cholesterol 04/28/2025 45     VLDL Cholesterol [place] 04/28/2025 14     LDL Chol Calc ([facility]) 04/28/2025 109 (H)     Chol/HDL Ratio 04/28/2025 3.7    Labs reviewed    Current Psychotropic Medications:  Atamoetine 40 mg daily  Gabapentin 100 mg q hs     Plan of Care/ Medical Decision Making  Increase atemoxetine to 60 mg daily for residual sx of ADHD. Questions answered regarding diagnosis of ADHD, need for long term medication mgmt. Pt hopes to continue on medication, reporting benefit. Parents remain cautiously supportive, per patient.  Reviewed with patient option to repeat neuropsychiatric testing, as this was last completed as an adolescent, and patient is interested in considering this.  Continue gabapentin 100 mg q hs; may take additional 100 mg PRN anxiety.  Reviewed non pharmacological strategies for managing residual sx, including structure and rotuine and supporting pt desired return to independent living.   Introduced concept of massage therapy to patient, and he has discussed with Belen Klein, massage therapist.  FU scheduled in 4-6 weeks.    Diagnoses and all orders for this visit:    1. Rectal adenocarcinoma (Primary)    2. Adjustment disorder with mixed anxiety and depressed mood  -     gabapentin (Neurontin) 100 MG capsule; Take 1 capsule by mouth Take As Directed. 1 capsule PO q hs. May take additional capsule once daily PRN anxiety.  Indications: Neuropathic Pain  Dispense: 60 capsule; Refill: 2    3. Attention deficit hyperactivity disorder (ADHD), predominantly hyperactive type    Other orders  -     atomoxetine (STRATTERA) 60 MG capsule; Take 1 capsule by mouth Daily.  Dispense: 30 capsule; Refill: 2    I spent 35 minutes caring for Jc on this date of service. This time includes time spent by me in the following activities: preparing for  the visit, reviewing tests, obtaining and/or reviewing a separately obtained history, performing a medically appropriate examination and/or evaluation, counseling and educating the patient/family/caregiver, ordering medications, tests, or procedures, and documenting information in the medical record.

## 2025-05-21 ENCOUNTER — TELEPHONE (OUTPATIENT)
Dept: SURGERY | Facility: CLINIC | Age: 45
End: 2025-05-21
Payer: COMMERCIAL

## 2025-05-21 NOTE — TELEPHONE ENCOUNTER
I called both pt and pt's father and left a message on both their voice mails letting them know I have some [facility] paperwork that looks like it's requesting a full year of either intermittent or continuous leave. I said  that this paperwork would have to go to pt's oncologist. I asked them to call me back to see how they want to get this paperwork back.

## 2025-05-26 ENCOUNTER — NURSE TRIAGE (OUTPATIENT)
Dept: CALL CENTER | Facility: HOSPITAL | Age: 45
End: 2025-05-26
Payer: COMMERCIAL

## 2025-05-26 NOTE — TELEPHONE ENCOUNTER
"Patient is calling to make sure he is okay after wiping twice and noticed a few small spots of blood on the toilet paper, after a BM. He said he had colorectal surgery with Dr Isaac in February. He thinks it is just irritated, but wants to make sure he is okay.   Triage completed and patient advised to follow up with his colorectal surgeon within the next 2 weeks. I suggested calling the office tomorrow , when they are open to see if Dr Isaac has any further advice. He said that he will follow this advice.   Reason for Disposition   [1] Rectal bleeding is minimal (e.g., blood just on toilet paper, few drops, streaks on surface of normal formed BM) AND [2] bleeding recurs 3 or more times on treatment    Additional Information   Negative: Shock suspected (e.g., cold/pale/clammy skin, too weak to stand, low BP, rapid pulse)   Negative: Difficult to awaken or acting confused (e.g., disoriented, slurred speech)   Negative: Passed out (i.e., lost consciousness, collapsed and was not responding)   Negative: [1] Vomiting AND [2] contains red blood or black (\"coffee ground\") material  (Exception: Few red streaks in vomit that only happened once.)   Negative: Sounds like a life-threatening emergency to the triager   Negative: Diarrhea is main symptom   Negative: Stool color other than brown or tan is main concern  (no bleeding and no melena)   Negative: SEVERE rectal bleeding (large blood clots; constant or on and off bleeding)   Negative: SEVERE dizziness (e.g., unable to stand, requires support to walk, feels like passing out now)   Negative: [1] MODERATE rectal bleeding (small blood clots, passing blood without stool, or toilet water turns red) AND [2] more than once a day   Negative: Pale skin (pallor) of new-onset or worsening   Negative: Black or tarry bowel movements  (Exception: Chronic-unchanged black-grey BMs AND is taking iron pills or Pepto-Bismol.)   Negative: [1] Constant abdominal pain AND [2] present > 2 " "hours   Negative: Rectal foreign body (i.e., now or within past week;  inserted or swallowed)   Negative: High-risk adult (e.g., prior surgery on aorta, abdominal aortic aneurysm)   Negative: Taking Coumadin (warfarin) or other strong blood thinner, or known bleeding disorder (e.g., thrombocytopenia)   Negative: Known cirrhosis of the liver (or history of liver failure or ascites)   Negative: [1] Colonoscopy AND [2] in past 72 hours   Negative: Patient sounds very sick or weak to the triager   Negative: MODERATE rectal bleeding (small blood clots, passing blood without stool, or toilet water turns red)   Negative: MILD rectal bleeding (more than just a few drops or streaks)   Negative: Cancer of rectum or intestines (colon)   Negative: Radiation therapy to lower abdomen or pelvis    Answer Assessment - Initial Assessment Questions  1. APPEARANCE of BLOOD: \"What color is it?\" \"Is it passed separately, on the surface of the stool, or mixed in with the stool?\"       A few bright red spot when he wipes  2. AMOUNT: \"How much blood was passed?\"       A fes spots on toilet paper when wiping   3. FREQUENCY: \"How many times has blood been passed with the stools?\"       Once   4. ONSET: \"When was the blood first seen in the stools?\" (Days or weeks)       Saturday   5. DIARRHEA: \"Is there also some diarrhea?\" If Yes, ask: \"How many diarrhea stools in the past 24 hours?\"       No   6. CONSTIPATION: \"Do you have constipation?\" If Yes, ask: \"How bad is it?\"      no  7. RECURRENT SYMPTOMS: \"Have you had blood in your stools before?\" If Yes, ask: \"When was the last time?\" and \"What happened that time?\"       yes  8. BLOOD THINNERS: \"Do you take any blood thinners?\" (e.g., Coumadin/warfarin, Pradaxa/dabigatran, aspirin)      No   9. OTHER SYMPTOMS: \"Do you have any other symptoms?\"  (e.g., abdomen pain, vomiting, dizziness, fever)      No   10. PREGNANCY: \"Is there any chance you are pregnant?\" \"When was your last menstrual " "period?\"        NA    Protocols used: Rectal Bleeding-ADULT-AH    "

## 2025-05-30 ENCOUNTER — OFFICE VISIT (OUTPATIENT)
Dept: FAMILY MEDICINE CLINIC | Facility: CLINIC | Age: 45
End: 2025-05-30
Payer: COMMERCIAL

## 2025-05-30 VITALS
WEIGHT: 210 LBS | BODY MASS INDEX: 31.1 KG/M2 | HEIGHT: 69 IN | HEART RATE: 93 BPM | SYSTOLIC BLOOD PRESSURE: 120 MMHG | OXYGEN SATURATION: 95 % | TEMPERATURE: 98.6 F | DIASTOLIC BLOOD PRESSURE: 82 MMHG

## 2025-05-30 DIAGNOSIS — K62.5 RECTAL BLEEDING: Primary | ICD-10-CM

## 2025-05-30 PROCEDURE — 99213 OFFICE O/P EST LOW 20 MIN: CPT | Performed by: NURSE PRACTITIONER

## 2025-05-30 NOTE — PATIENT INSTRUCTIONS
For the next 3 days only take the Benafiber twice a day. Then take the Benafiber as you have been taking it 3 times a day.  You need to drink at least 4,  8 ounce glasses of water when taking any fiber supplement.     Only take the imodium if you are having Diarrhea.

## 2025-05-30 NOTE — PROGRESS NOTES
"Chief Complaint  OTHER (Blood in stool x 7 days)    Subjective        Jc Brannon Jr. presents to [facility]  History of Present Illness  Went to the ER on 5/26/ 25  for rectal bleeding. He had has blood on the toilet paper so his dad took him to the ER. He was told that he had irritated his rectum. He reports being constipated prior to this episode.       I have reviewed the patient's medical history in detail and updated the computerized patient record.       Current Outpatient Medications:     amLODIPine (NORVASC) 10 MG tablet, Take 1 tablet by mouth Daily. Indications: High Blood Pressure, Disp: 90 tablet, Rfl: 1    atomoxetine (STRATTERA) 60 MG capsule, Take 1 capsule by mouth Daily., Disp: 30 capsule, Rfl: 2    gabapentin (Neurontin) 100 MG capsule, Take 1 capsule by mouth Take As Directed. 1 capsule PO q hs. May take additional capsule once daily PRN anxiety.  Indications: Neuropathic Pain, Disp: 60 capsule, Rfl: 2    hydroCHLOROthiazide 12.5 MG tablet, Take 1 tablet by mouth Daily. Indications: Edema, Disp: 30 tablet, Rfl: 5    loperamide (IMODIUM) 2 MG capsule, Take 1 capsule by mouth 4 (Four) Times a Day As Needed for Diarrhea. Indications: Diarrhea, Disp: , Rfl:        Objective   Vital Signs:  /82 (BP Location: Left arm, Patient Position: Sitting, Cuff Size: Large Adult)   Pulse 93   Temp 98.6 °F (37 °C) (Temporal)   Ht 175.3 cm (69.02\")   Wt 95.3 kg (210 lb)   SpO2 95%   BMI 31.00 kg/m²   Estimated body mass index is 31 kg/m² as calculated from the following:    Height as of this encounter: 175.3 cm (69.02\").    Weight as of this encounter: 95.3 kg (210 lb).          Physical Exam  Vitals reviewed.   Constitutional:       Appearance: Normal appearance.   Cardiovascular:      Rate and Rhythm: Normal rate and regular rhythm.      Pulses: Normal pulses.      Heart sounds: Normal heart sounds.   Pulmonary:      Effort: Pulmonary effort is normal.      Breath " sounds: Normal breath sounds.   Skin:     General: Skin is warm and dry.   Neurological:      Mental Status: He is alert and oriented to person, place, and time.   Psychiatric:         Mood and Affect: Mood is anxious.         Speech: Speech is rapid and pressured.         Behavior: Behavior is hyperactive. Behavior is cooperative.         Cognition and Memory: Cognition is impaired.        Result Review :                Assessment and Plan   Diagnoses and all orders for this visit:    1. Rectal bleeding (Primary)    Mr. Brannon does not appear to be in any acute distress. He is very anxious which is his normal behavior.  We discussed his recent ER visit. It sounds that he was constipated and that had caused rectal irritation and bleeding. While discussing his symptoms, he reports that he has been taking the Imodium three times a day every day. I have explained to Mr. Brannon that the Imodium is intended to be taken only when he has watery diarrhea and not 3 times a day every day. He seems to understand but I have provided him with written instructions.          Follow Up   Return if symptoms worsen or fail to improve, for Next scheduled follow up.  Patient was given instructions and counseling regarding his condition or for health maintenance advice. Please see specific information pulled into the AVS if appropriate.

## 2025-06-05 ENCOUNTER — TELEPHONE (OUTPATIENT)
Dept: FAMILY MEDICINE CLINIC | Facility: CLINIC | Age: 45
End: 2025-06-05
Payer: COMMERCIAL

## 2025-06-05 NOTE — TELEPHONE ENCOUNTER
Patient called wanting advice for using hydrocortisone on his butt.  He said WIL Mendoza told him to use it but couldn't give me specifics if it was for itching or what.  I told him to call back in the morning during a work break when Elvira Bhatti will be in the office so I can discuss with them before explaining any issue.

## 2025-06-06 ENCOUNTER — TELEPHONE (OUTPATIENT)
Dept: FAMILY MEDICINE CLINIC | Facility: CLINIC | Age: 45
End: 2025-06-06
Payer: COMMERCIAL

## 2025-06-09 NOTE — PROGRESS NOTES
Follow-up    06/10/2025, Interval history  Here for follow up. Doing well. No new issues.     HISTORY OF PRESENT ILLNESS:  The patient is a 44 y.o. year old male with recently diagnosed rectal adenocarcinoma who presents to our clinic to establish care.  Onc history is outlined below    Family history significant for prostate cancer in father at 63, paternal uncle with colon cancer at 57, other paternal uncle with metastatic cancer, type unknown in his 50s, other paternal uncle with possible colon cancer and DVT/PE in his 70s.    Oncology/Hematology History   Rectal adenocarcinoma   4/23/2024 Procedure         [facility]/23/2024 [facility]         5/2/2024 Imaging    CT CAP    A 1.7 cm right hepatic cyst is noted. Numerous small subcentimeter liver  low densities are present that are too small to characterize, could be  cysts, or potentially metastatic disease, interval follow-up can  characterize change.    No bowel obstruction. The sigmorectal mass is better characterized on  the MRI exam of same date (please see separate report). Some stranding  is apparent in the fat around the lesion, numerous surrounding lymph  nodes are present that could be metastatic lymph nodes. For example, on  axial image 165, a 1.9 x 1.6 cm pericolonic lymph node is present. As  another example, on axial image 170, left perirectal lymph node measures  0.7 x 1.2 cm.    IMPRESSION:        1. Sigmorectal mass with numerous surrounding lymph nodes, raising  suspicion for metastatic lymph nodes.     2. Hepatic cyst, and numerous liver low densities that are too small to  characterize; these lesions could also be cysts, but in this clinical  setting, possibility of any metastatic liver lesions is not excluded.  Interval follow-up is advised to characterize change.     3. No pulmonary mass.     5/2/2024 Imaging    MRI Pelvis    FINDINGS: There is a circumferential solid low rectal mass which  measures approximately 4.5 cm in craniocaudad span  and the inferior  margin is approximately 5 mm proximal to the anorectal junction.     The tumor extends approximately 8 mm into the mesorectal fat  predominantly along the right wall of the mass. There appears to be  extension to the mesorectal fascia along the right wall and abutment of  the right levator ani muscles. There are several enlarged perirectal  nodes and the largest is presacral measuring approximately 2.0 x 1.2 cm.  There are at least 4 enhancing suspicious nodes. There is no definite  extramural vascular invasion.     IMPRESSION:  1. Primary Tumor Location: Low rectal     2. MRI Stage: T3 N2 MRF positive     3. No definite sphincter involvement     5/3/2024 Initial Diagnosis    Rectal adenocarcinoma     5/16/2024 Imaging    MRI abdo    IMPRESSION:  T2 hyperintense nonenhancing liver lesions are consistent with hepatic  cysts and biliary cystic hamartomas. No convincing liver metastasis  identified     5/24/2024 Procedure    PORT placement (Dr Isaac)     6/3/2024 - 8/21/2024 Chemotherapy    OP COLORECTAL FOLFIRINOX (Fluorouracil cont. Infusion / Leucovorin / OXALIplatin / Irinotecan)     6/5/2024 -  Chemotherapy    OP CENTRAL VENOUS ACCESS DEVICE Access, Care, and Maintenance (CVAD)     9/9/2024 - 9/9/2024 Chemotherapy    OP COLORECTAL Fluorouracil CIV over 168H + XRT     9/9/2024 - 10/18/2024 Chemotherapy    Concurrent radiation to the rectum completed 9/9/24 - 10/18/24 (Dr Noyola)  OP COLORECTAL Fluorouracil CIV over 96 H + XRT     12/3/2024 Imaging    MRI pelvis with and without contrast  FINDINGS: No definitive residual low rectal mass is seen. Stable  appearance of the perirectal soft tissues and there is no mesorectal  lymphadenopathy. Correlation with endoscopy is recommended.    CT chest abdomen pelvis with contrast  IMPRESSION:  1. No interval change or convincing evidence for metastatic disease to  the chest, abdomen, pelvis.  2. Multiple hepatic cysts as well as hepatic low-density foci  which are  too small to characterize.  3. There appear to be multiple exostoses demonstrated involving the  proximal femora, left scapula, left iliac wing without interval change.  3. MRI of the pelvis has been completed and is reported separately     1/9/2025 Cancer Staged    Staging form: Colon And Rectum, AJCC 8th Edition  - Pathologic stage from 1/9/2025: Stage I (ypT2, pN0, cM0) - Signed by Sonali Pak MD on 3/3/2025     1/9/2025 Surgery    Surgery       Procedure:  LAR      Final Diagnosis   1.  Rectum, low anterior resection: Invasive adenocarcinoma               -A.  Histologic features:                            I.  Histologic type: Invasive adenocarcinoma, NOS                            II.  Grade: Moderately differentiated               -B.  Tumor size: Small areas of tumor are seen in 3 consecutive blocks (block method = 12 mm)               -C.  Margins of resection and extent:                            I.  Tumor is seen in the muscularis propria but not through                            II.  Tumor comes within 3.5 cm of the distal margin, 20 cm of the proximal margin and 2.5 cm of the circumferential radial margin.                            III.  No perineural or lymphovascular invasion is identified                            IV.  Extensive treatment effect is seen with only sparse residual tumor seen                            V.  Tumor is seen below the peritoneal resection               -D.  Lymph nodes: 18 lymph nodes are identified with no evidence of metastatic disease                            I.  1 lymph node   had extensive treatment effect with calcification and mucinous pools but no residual tumor.     2.  Rectum, distal margin, annular rim donut: Benign segment of colon adding an additional 1 cm to the distal margin     3.  Appendix, appendectomy: Benign appendix          2/21/2025 Surgery    Surgery       Final Diagnosis   1.  Ileostomy, takedown:               A.  Enterocutaneous  "segment with focal erosion, mixed inflammation and foreign body giant cells.     2.  Soft tissue, abdominal wall, excision:               A.  Skeletal muscle and soft tissue with fat necrosis and osseous metaplasia.              The current medication list and allergy list were reviewed and reconciled.      Past Medical History, Past Surgical History, Social History, Family History have been reviewed and are without significant changes except as mentioned.      REVIEW OF SYSTEMS:  See interval history    Objective:       Vitals:    06/10/25 1057   BP: 130/79   Pulse: 88   Temp: 98.1 °F (36.7 °C)   TempSrc: Oral   SpO2: 100%   Weight: 95.1 kg (209 lb 9.6 oz)   Height: 175.3 cm (69.02\")   PainSc: 0-No pain                     6/10/2025    10:59 AM   Current Status   ECOG score 0      PHYSICAL EXAM:    CONSTITUTIONAL:  Vital signs reviewed.  No distress, looks comfortable.  RESPIRATORY: Bilateral lungs clear to auscultation.  No wheezing or rhonchi   CARDIOVASCULAR: Normal S1-S2.  No murmur rubs or gallop   GASTROINTESTINAL: Soft, nontender, bowel sounds present.  NEURO:  No focal deficits.  Appears to have equal strength all 4 extremities.    I have reexamined the patient and the results are consistent with the previously documented exam. Sonali Pak MD        Diagnostic data  CBC:      Lab 06/10/25  1032   WBC 2.75*   HEMOGLOBIN 13.1   HEMATOCRIT 40.9   PLATELETS 243   NEUTROS ABS 1.62*   IMMATURE GRANS (ABS) 0.01   LYMPHS ABS 0.68*   MONOS ABS 0.33   EOS ABS 0.09   MCV 96.5         Pathology  Tissue Pathology Exam (02/21/2025 16:31)   Tissue Pathology Exam (01/09/2025 18:24)     Assessment & Plan     *Stage I (cT3 cN2 cM0; ypT2 ypN0) rectal adenocarcinoma, LUCAS, TMB low  4/23/2024 rectal biopsy-invasive moderately differentiated adenocarcinoma, LUCAS.  5/2/2024 MRI pelvis: Approximately 4.5 cm circumferential low rectal mass, approximately 5 mm proximal to anorectal junction, extending 8 mm in the mesorectal fat.  " Appears to be extension to MRF along the right wall and abutment of right levator ani muscles.  Several enlarged perirectal nodes, largest 2 x 1.2 cm.  At least 4 enhancing suspicious nodes.  cT3 cN2 MRF positive.  No definitive sphincter involvement  5/2/2024 CT chest abdomen pelvis: 1.7 cm right hepatic cyst, and numerous low-density liver lesions-cyst versus possible metastatic lesions.  No lung mass  5/16/2024 MRI abdomen: T2 hyperintense nonenhancing liver lesions consistent with liver cyst and biliary cystic hamartomas.  No evidence of liver mets  Tumor genomic profile-APC (I269fs), APC ([id number]), FANCD2 (Q823), TP53. TMB 1 mut/Mb. LUCAS  6/3/2024 baseline CEA: 1.89  6/3/2024 - 08/19/2024: s/p 6 cycles of  FOLFIRINOX (5-FU bolus eliminated to prevent further myelosuppression given baseline neutropenia; 25% dose reduction starting cycle 3) with G-CSF support  8/27/2024 MRI pelvis-good response.  No clear measurable rectal mass.  Decrease in the size of perirectal lymph node, measuring less than 5 mm; decrease in the size of enlarged hide perirectal lymph nodes/mary metastases.  08/28/2024 CT CAP: Apparent decrease size of rectal mass, decreased pericolonic and perirectal lymph nodes.  1 axillary lymph node and 1 liver low-density (too small to characterize) measures slightly larger, uncertain clinical significance.  Plan for total neoadjuvant chemotherapy with FOLFIRINOX followed by chemoradiation  9/9/2024 - 10/14/2024: s/p 6 cycles of concurrent chemoradiation with 5-FU.  5-FU dose reduced by 50% due to neutropenia starting cycle 2  12/3/2024 end of treatment scans (CT chest abdomen pelvis and MRI pelvis)-complete response  1/9/2024 status post LAR (with Dr. Isaac)-invasive adenocarcinoma, moderately differentiated.  Small areas of tumor seen in 3 consecutive blocks.  Tumor is seen in muscularis propria but not through.  Margins negative.  No PNI or LVI.  Extensive treatment effect seen with only sparse  residual tumor.  Tumor seen below peritoneal resection.  0 out of 18 lymph nodes 1 lymph node had extensive treatment effect with calcification and mucinous pools but no residual tumor.  ypT2 ypN0  3/4/2025: I reviewed the surgical pathology.  He had excellent response to neoadjuvant treatment.  He will now enter surveillance per NCCN guidelines.  History physical CEA every 3 to 6 months for 2 years and then every 6 months for a total of 5 years.  Scans annually for a total of 5 years.  Colonoscopy in 1 year after surgery  6/10/2025: CEA and Signatera CT DNA pending.    *Hepatic cysts and low-density hepatic foci  8/28/2024 seen on CT chest abdomen pelvis, too small to characterize.  12/3/2024 CT chest abdomen pelvis-multiple hepatic cyst as well as hepatic low-density foci which are too small to characterize.    *Benign ethnic neutropenia  White cell count ranged between 2.6-3.2 since at least July 2019.    6/10/2025 WBC 2.75, ANC 1.62    Plan:   CEA and Signatera CT DNA currently pending.  Will call with results  Reviewed  NCCN guidelines for surveillance - History physical CEA every 3 to 6 months for 2 years and then every 6 months for a total of 5 years.  Scans annually for a total of 5 years.  Colonoscopy in 1 year after surgery  Surveillance imaging in Jan 2026  MD visit in 6 months with CBC, CMP and CEA      Sonali Pak MD  06/10/2025

## 2025-06-10 ENCOUNTER — LAB (OUTPATIENT)
Dept: LAB | Facility: HOSPITAL | Age: 45
End: 2025-06-10
Payer: COMMERCIAL

## 2025-06-10 ENCOUNTER — TELEPHONE (OUTPATIENT)
Dept: FAMILY MEDICINE CLINIC | Facility: CLINIC | Age: 45
End: 2025-06-10
Payer: COMMERCIAL

## 2025-06-10 ENCOUNTER — OFFICE VISIT (OUTPATIENT)
Dept: ONCOLOGY | Facility: CLINIC | Age: 45
End: 2025-06-10
Payer: COMMERCIAL

## 2025-06-10 VITALS
TEMPERATURE: 98.1 F | OXYGEN SATURATION: 100 % | BODY MASS INDEX: 31.04 KG/M2 | DIASTOLIC BLOOD PRESSURE: 79 MMHG | SYSTOLIC BLOOD PRESSURE: 130 MMHG | HEIGHT: 69 IN | WEIGHT: 209.6 LBS | HEART RATE: 88 BPM

## 2025-06-10 DIAGNOSIS — C20 RECTAL ADENOCARCINOMA: Primary | ICD-10-CM

## 2025-06-10 DIAGNOSIS — C20 RECTAL ADENOCARCINOMA: ICD-10-CM

## 2025-06-10 LAB
ALBUMIN SERPL-MCNC: 5 G/DL (ref 3.5–5.2)
ALBUMIN/GLOB SERPL: 2.1 G/DL
ALP SERPL-CCNC: 106 U/L (ref 39–117)
ALT SERPL W P-5'-P-CCNC: 22 U/L (ref 1–41)
ANION GAP SERPL CALCULATED.3IONS-SCNC: 11.6 MMOL/L (ref 5–15)
AST SERPL-CCNC: 22 U/L (ref 1–40)
BASOPHILS # BLD AUTO: 0.02 10*3/MM3 (ref 0–0.2)
BASOPHILS NFR BLD AUTO: 0.7 % (ref 0–1.5)
BILIRUB SERPL-MCNC: 0.4 MG/DL (ref 0–1.2)
BUN SERPL-MCNC: 13.8 MG/DL (ref 6–20)
BUN/CREAT SERPL: 13.4 (ref 7–25)
CALCIUM SPEC-SCNC: 9.3 MG/DL (ref 8.6–10.5)
CEA SERPL-MCNC: 1.9 NG/ML
CHLORIDE SERPL-SCNC: 99 MMOL/L (ref 98–107)
CO2 SERPL-SCNC: 28.4 MMOL/L (ref 22–29)
CREAT SERPL-MCNC: 1.03 MG/DL (ref 0.76–1.27)
DEPRECATED RDW RBC AUTO: 46.5 FL (ref 37–54)
EGFRCR SERPLBLD CKD-EPI 2021: 90.7 ML/MIN/1.73
EOSINOPHIL # BLD AUTO: 0.09 10*3/MM3 (ref 0–0.4)
EOSINOPHIL NFR BLD AUTO: 3.3 % (ref 0.3–6.2)
ERYTHROCYTE [DISTWIDTH] IN BLOOD BY AUTOMATED COUNT: 13.2 % (ref 12.3–15.4)
GLOBULIN UR ELPH-MCNC: 2.4 GM/DL
GLUCOSE SERPL-MCNC: 103 MG/DL (ref 65–99)
HCT VFR BLD AUTO: 40.9 % (ref 37.5–51)
HGB BLD-MCNC: 13.1 G/DL (ref 13–17.7)
IMM GRANULOCYTES # BLD AUTO: 0.01 10*3/MM3 (ref 0–0.05)
IMM GRANULOCYTES NFR BLD AUTO: 0.4 % (ref 0–0.5)
LYMPHOCYTES # BLD AUTO: 0.68 10*3/MM3 (ref 0.7–3.1)
LYMPHOCYTES NFR BLD AUTO: 24.7 % (ref 19.6–45.3)
MCH RBC QN AUTO: 30.9 PG (ref 26.6–33)
MCHC RBC AUTO-ENTMCNC: 32 G/DL (ref 31.5–35.7)
MCV RBC AUTO: 96.5 FL (ref 79–97)
MONOCYTES # BLD AUTO: 0.33 10*3/MM3 (ref 0.1–0.9)
MONOCYTES NFR BLD AUTO: 12 % (ref 5–12)
NEUTROPHILS NFR BLD AUTO: 1.62 10*3/MM3 (ref 1.7–7)
NEUTROPHILS NFR BLD AUTO: 58.9 % (ref 42.7–76)
NRBC BLD AUTO-RTO: 0 /100 WBC (ref 0–0.2)
PLATELET # BLD AUTO: 243 10*3/MM3 (ref 140–450)
PMV BLD AUTO: 9.4 FL (ref 6–12)
POTASSIUM SERPL-SCNC: 3.7 MMOL/L (ref 3.5–5.2)
PROT SERPL-MCNC: 7.4 G/DL (ref 6–8.5)
RBC # BLD AUTO: 4.24 10*6/MM3 (ref 4.14–5.8)
SODIUM SERPL-SCNC: 139 MMOL/L (ref 136–145)
WBC NRBC COR # BLD AUTO: 2.75 10*3/MM3 (ref 3.4–10.8)

## 2025-06-10 PROCEDURE — 80053 COMPREHEN METABOLIC PANEL: CPT

## 2025-06-10 PROCEDURE — 36415 COLL VENOUS BLD VENIPUNCTURE: CPT

## 2025-06-10 PROCEDURE — 85025 COMPLETE CBC W/AUTO DIFF WBC: CPT

## 2025-06-10 PROCEDURE — 82378 CARCINOEMBRYONIC ANTIGEN: CPT | Performed by: STUDENT IN AN ORGANIZED HEALTH CARE EDUCATION/TRAINING PROGRAM

## 2025-06-10 NOTE — TELEPHONE ENCOUNTER
Patient returned my call from 6/6/25. Per Elvira Crooks he id to use the cirtisone cream 2 times a day for itching.

## 2025-06-13 ENCOUNTER — OFFICE VISIT (OUTPATIENT)
Dept: PSYCHIATRY | Facility: HOSPITAL | Age: 45
End: 2025-06-13
Payer: COMMERCIAL

## 2025-06-13 DIAGNOSIS — F90.1 ATTENTION DEFICIT HYPERACTIVITY DISORDER (ADHD), PREDOMINANTLY HYPERACTIVE TYPE: Primary | ICD-10-CM

## 2025-06-13 DIAGNOSIS — F41.1 GENERALIZED ANXIETY DISORDER: ICD-10-CM

## 2025-06-13 RX ORDER — ATOMOXETINE 80 MG/1
80 CAPSULE ORAL DAILY
Qty: 30 CAPSULE | Refills: 2 | Status: SHIPPED | OUTPATIENT
Start: 2025-06-13 | End: 2026-06-13

## 2025-06-13 NOTE — PROGRESS NOTES
"[facility]  In Person Session    Subjective  Patient ID: Jc Brannon Jr. is a 44 y.o. male is seen face to face in the [facility].    CC: Anxiety, ADHD    HPI/ Interval History:   Pt is seen in follow up regarding ongoing sx of ADHD, anxiety in survivorship of colon cancer.    Patient has returned home following staying with parents alongside surgery and recovery.  Enjoys this greatly.  Continues to maintain ability to manage home, finances, etc. independently.  Is back to work full-time, enjoying this.  Reports to be feeling well. Bowels generally acceptably, although had diarrhea last night and disrupted sleep. More often is sleeping well. Energy during day is good. Feels confident and relaxed. Goes to [employer] on weekend, going to events and enjoying this. Continues to report angst related to news, and is somewhat nervous when out in public.  Reports ability to go to grocery store, but comes home quickly.  Appreciates engagement with parents and at work.  Is looking forward to watching games this weekend. Continues to spend some time of Mong[place]ian, enjoying this. Reads often, going to library often.     Mood is \"alright.\" Does report some feelings of irritation from time to time when things dont go his way or reflects on previous insults.  Generally able to cope with these well.  Reports appreciated independence, currently looking forward to growing hair out. Explores interest in connecting with others, meeting friends. Does report increasing communication with others at work.    Medications reviewed; continues on atomoxetine, feeling this has been helpful regarding management of cognitive processing, task completion.  Is hopeful for increase.  Remains interested in neuropsych evaluation to confirm diagnosis.    Exam: As above    Recent Labs Reviewed:  Lab on 06/10/2025   Component Date Value    Glucose 06/10/2025 103 (H)     BUN 06/10/2025 13.8     Creatinine " 06/10/2025 1.03     Sodium 06/10/2025 139     Potassium 06/10/2025 3.7     Chloride 06/10/2025 99     CO2 06/10/2025 28.4     Calcium 06/10/2025 9.3     Total Protein 06/10/2025 7.4     Albumin 06/10/2025 5.0     ALT (SGPT) 06/10/2025 22     AST (SGOT) 06/10/2025 22     Alkaline Phosphatase 06/10/2025 106     Total Bilirubin 06/10/2025 0.4     Globulin 06/10/2025 2.4     A/G Ratio 06/10/2025 2.1     BUN/Creatinine Ratio 06/10/2025 13.4     Anion Gap 06/10/2025 11.6     eGFR 06/10/2025 90.7     CEA 06/10/2025 1.90     WBC 06/10/2025 2.75 (L)     RBC 06/10/2025 4.24     Hemoglobin 06/10/2025 13.1     Hematocrit 06/10/2025 40.9     MCV 06/10/2025 96.5     MCH 06/10/2025 30.9     MCHC 06/10/2025 32.0     RDW 06/10/2025 13.2     RDW-SD 06/10/2025 46.5     MPV 06/10/2025 9.4     Platelets 06/10/2025 243     Neutrophil % 06/10/2025 58.9     Lymphocyte % 06/10/2025 24.7     Monocyte % 06/10/2025 12.0     Eosinophil % 06/10/2025 3.3     Basophil % 06/10/2025 0.7     Immature Grans % 06/10/2025 0.4     Neutrophils, Absolute 06/10/2025 1.62 (L)     Lymphocytes, Absolute 06/10/2025 0.68 (L)     Monocytes, Absolute 06/10/2025 0.33     Eosinophils, Absolute 06/10/2025 0.09     Basophils, Absolute 06/10/2025 0.02     Immature Grans, Absolute 06/10/2025 0.01     nRBC 06/10/2025 0.0    Orders Only on 04/24/2025   Component Date Value    Total Cholesterol 04/28/2025 168     Triglycerides 04/28/2025 74     HDL Cholesterol 04/28/2025 45     VLDL Cholesterol [place] 04/28/2025 14     LDL Chol Calc (NIH) 04/28/2025 109 (H)     Chol/HDL Ratio 04/28/2025 3.7    Labs reviewed    Current Psychotropic Medications:  Atemoxetine 60 mg daily    Plan of Care/ Medical Decision Making  Increase atometexetine to 80 mg daily.  Referral placed for neuropsychiatric testing.  Continue gabapentin 100 mg q hs.  Ongoing counseling continued supporting patient in independence, maximization of ability, and continue engagement in work and social environments.  FU  scheduled in 4 weeks.    Diagnoses and all orders for this visit:    1. Attention deficit hyperactivity disorder (ADHD), predominantly hyperactive type (Primary)  -     Ambulatory Referral to Neuropsychology    2. Generalized anxiety disorder    Other orders  -     atomoxetine (STRATTERA) 80 MG capsule; Take 1 capsule by mouth Daily.  Dispense: 30 capsule; Refill: 2    I spent 34 minutes caring for Jc on this date of service. This time includes time spent by me in the following activities: preparing for the visit, reviewing tests, obtaining and/or reviewing a separately obtained history, performing a medically appropriate examination and/or evaluation, counseling and educating the patient/family/caregiver, ordering medications, tests, or procedures, and documenting information in the medical record.

## 2025-06-19 LAB
NTRA SIGNATERA MTM READOUT: 0 MTM/ML
NTRA SIGNATERA TEST RESULT: NEGATIVE

## 2025-07-02 ENCOUNTER — TELEPHONE (OUTPATIENT)
Dept: SURGERY | Facility: CLINIC | Age: 45
End: 2025-07-02
Payer: COMMERCIAL

## 2025-07-02 NOTE — TELEPHONE ENCOUNTER
I called the [employer] and they said his claim has been approved and the paperwork that I have is for [employer]. It looks like I filled out this same form on 4/2/25 which leads me to believe that this is a duplicate.

## 2025-07-02 NOTE — TELEPHONE ENCOUNTER
Called pt to see if his FMLA was approved or if I needed to fill out this paperwork again. Pt did not answer, left message for pt to call back.

## 2025-07-02 NOTE — TELEPHONE ENCOUNTER
Spoke with pt's father who said pt went back to work. He said they ran into an issue where pt went back to work earlier than expected and [employer] over paid pt. Pts father said they are resulting this issue now.

## 2025-07-03 ENCOUNTER — OFFICE VISIT (OUTPATIENT)
Age: 45
End: 2025-07-03
Payer: COMMERCIAL

## 2025-07-03 VITALS — HEIGHT: 69 IN | BODY MASS INDEX: 30.96 KG/M2 | WEIGHT: 209 LBS | TEMPERATURE: 98.2 F

## 2025-07-03 DIAGNOSIS — M25.562 ACUTE PAIN OF LEFT KNEE: Primary | ICD-10-CM

## 2025-07-03 DIAGNOSIS — M17.12 ARTHRITIS OF LEFT KNEE: ICD-10-CM

## 2025-07-03 DIAGNOSIS — D16.9 OSTEOCHONDROMATOSIS: ICD-10-CM

## 2025-07-08 ENCOUNTER — TELEPHONE (OUTPATIENT)
Dept: FAMILY MEDICINE CLINIC | Facility: CLINIC | Age: 45
End: 2025-07-08
Payer: COMMERCIAL

## 2025-07-10 NOTE — PROGRESS NOTES
Patient: Jc Brannon Jr.    YOB: 1980    Medical Record Number: [id number]    Chief Complaints:  Left knee pain    History of Present Illness:     45 y.o. male patient who presents for evaluation of his left knee.  Reports he hyperextended the knee while carrying a load of laundry down some stairs yesterday.  Localizes the majority of his pain to the posterior knee.  Describes his pain as mild, constant, and aching.  Rest has helped somewhat.  Reports history of osteochondromatosis and is very concerned about possible injury to the bone.  Denies any numbness or tingling down the leg.    Allergies:   Allergies   Allergen Reactions    Latex Rash       Home Medications:      Current Outpatient Medications:     amLODIPine (NORVASC) 10 MG tablet, Take 1 tablet by mouth Daily. Indications: High Blood Pressure, Disp: 90 tablet, Rfl: 1    atomoxetine (STRATTERA) 80 MG capsule, Take 1 capsule by mouth Daily., Disp: 30 capsule, Rfl: 2    gabapentin (Neurontin) 100 MG capsule, Take 1 capsule by mouth Take As Directed. 1 capsule PO q hs. May take additional capsule once daily PRN anxiety.  Indications: Neuropathic Pain, Disp: 60 capsule, Rfl: 2    hydroCHLOROthiazide 12.5 MG tablet, Take 1 tablet by mouth Daily. Indications: Edema, Disp: 30 tablet, Rfl: 5    loperamide (IMODIUM) 2 MG capsule, Take 1 capsule by mouth 4 (Four) Times a Day As Needed for Diarrhea., Disp: , Rfl:     Past Medical History:   Diagnosis Date    Anemia     History of cancer chemotherapy     JUNE TO SEPT 2024    History of radiation therapy     2024    History of seizure     STATES AS A CHILD: UNCLEAR   NO NEURO    Hyperlipidemia     Hypertension     Ileostomy present     Ileostomy status 01/23/2025    Mental disability without special needs     NEEDS REINFORCEMENT WITH INSTRUCTIONS, EAGER TO LEARN.  STUTTERS    Rectal adenocarcinoma 2024       Past Surgical History:   Procedure Laterality Date    COLON RESECTION N/A 1/9/2025     Procedure: Laparoscopic, possible open, low anterior resection with diverting loop ileostomy and appendectomy;  Surgeon: Ori Isaac MD;  Location: [facility] MAIN OR;  Service: General;  Laterality: N/A;    COLONOSCOPY N/A 4/23/2024    Procedure: COLONOSCOPY into cecum with biopsy;  Surgeon: David Tena MD;  Location: [facility] ENDOSCOPY;  Service: Gastroenterology;  Laterality: N/A;  rectal mass    ILEOSTOMY CLOSURE N/A 2/21/2025    Procedure: Closure of diverting loop ileostomy;  Surgeon: Ori Isaac MD;  Location: [facility] MAIN OR;  Service: General;  Laterality: N/A;    LEG SURGERY Left     ACL    SIGMOIDOSCOPY N/A 12/16/2024    Procedure: FLEXIBLE SIGMOIDOSCOPY WITH BIOPSIES cold with tattoo injection w/ scleratherapy needle;  Surgeon: Ori Isaac MD;  Location: [facility] ENDOSCOPY;  Service: General;  Laterality: N/A;  pre monitor site(ca) s/p chemo radiation post remnant of tumor/needs tx    VENOUS ACCESS DEVICE (PORT) INSERTION N/A 5/24/2024    Procedure: INSERTION VENOUS ACCESS DEVICE;  Surgeon: Ori Isaac MD;  Location: [facility] MAIN OR;  Service: General;  Laterality: N/A;       Social History     Occupational History     Employer: [employer]   Tobacco Use    Smoking status: Never     Passive exposure: Never    Smokeless tobacco: Never   Vaping Use    Vaping status: Never Used   Substance and Sexual Activity    Alcohol use: No    Drug use: No    Sexual activity: Defer      Social History     Social History Narrative    Not on file       Family History   Problem Relation Age of Onset    Cancer Mother         Stomach    Cancer Father         prostate    Glaucoma Father     Hypertension Maternal Aunt     Heart disease Maternal Aunt     Hypertension Maternal Uncle     Glaucoma Paternal Grandfather     Malig Hyperthermia Neg Hx        Review of Systems:      Constitutional: Denies fever, shaking or chills   Musculoskeletal: Denies numbness, tingling or loss of  "motor function except as above    Physical Exam: 45 y.o. male    Vitals:    07/03/25 1553   Temp: 98.2 °F (36.8 °C)   Weight: 94.8 kg (209 lb)   Height: 175.3 cm (69.02\")       General:  Patient is awake and alert.  Appears in no acute distress or discomfort.    Psych:  Affect and demeanor are appropriate.    Lymph:  No palpable masses or adenopathy in the affected extremity    Extremities:  Left knee: Surgical scar is well-healed and benign appearing.  Skin is benign.  No gross abnormalities on inspection.  No palpable masses or adenopathy.  No effusion.  Mild posterior tenderness at the gastrocnemius insertion.  Full motion.  No evident instability.  Good strength with hip flexion, knee extension, ankle and toe plantarflexion and dorsiflexion.  Sensation is intact distally.  Brisk capillary refill in the toes with good skin turgor.         Radiology:   Bilateral standing AP views, bilateral merchants views, and a lateral view of the left knee are ordered by myself and reviewed to evaluate the patient's complaint.  No comparison films are immediately available.  The x-rays show mild degenerative arthritis with joint space narrowing and osteophyte formation.  There is evidence of osteochondromatosis consistent with his medical history.  There is retained hardware consistent with his ACL reconstruction.  There are no acute findings noted.      Assessment/Plan:  1.  Acute left knee pain, suspected strain  2.  Left knee mild arthritis  3.  History of osteochondromatosis    We reviewed the x-rays together.  I reassured him there are no acute findings.  I suspect he has a strained the gastrocnemius causing some posterior pain.  He has some mild arthritis as well, but this is less likely to be causing his pain.  I assured him with rest, ice, and Tylenol his symptoms should improve with time.  He may continue working.  He may continue other activities as tolerated.  Going forward, he may follow-up with me as " needed.    Jenelle Davis, WIL    07/03/2025    CC to Elvira Crooks, APRN

## 2025-07-15 ENCOUNTER — OFFICE VISIT (OUTPATIENT)
Dept: FAMILY MEDICINE CLINIC | Facility: CLINIC | Age: 45
End: 2025-07-15
Payer: COMMERCIAL

## 2025-07-15 VITALS
DIASTOLIC BLOOD PRESSURE: 74 MMHG | HEART RATE: 86 BPM | TEMPERATURE: 98.4 F | HEIGHT: 69 IN | SYSTOLIC BLOOD PRESSURE: 122 MMHG | OXYGEN SATURATION: 96 % | BODY MASS INDEX: 30.14 KG/M2 | WEIGHT: 203.5 LBS

## 2025-07-15 DIAGNOSIS — S86.912D KNEE STRAIN, LEFT, SUBSEQUENT ENCOUNTER: Primary | ICD-10-CM

## 2025-07-15 NOTE — PROGRESS NOTES
"Chief Complaint  Leg Swelling (Left leg swelling no pain X 2 weeks)    Subjective        Jc Brannon Jr. presents to [facility]  History of Present Illness  Went to the ER last week due to knee pain. He had a twisting injury to the left knee. He was told to rest and ice his knee for now. He did go to orthopedics and was told to rest and ice his leg. He is also concerned that his right calve has a hard large knot. He states that the lump has been there his whole life. He states he was told it was cancer and is to have imaging done.       I have reviewed the patient's medical history in detail and updated the computerized patient record.       Current Outpatient Medications:     amLODIPine (NORVASC) 10 MG tablet, Take 1 tablet by mouth Daily. Indications: High Blood Pressure, Disp: 90 tablet, Rfl: 1    atomoxetine (STRATTERA) 80 MG capsule, Take 1 capsule by mouth Daily., Disp: 30 capsule, Rfl: 2    gabapentin (Neurontin) 100 MG capsule, Take 1 capsule by mouth Take As Directed. 1 capsule PO q hs. May take additional capsule once daily PRN anxiety.  Indications: Neuropathic Pain, Disp: 60 capsule, Rfl: 2    hydroCHLOROthiazide 12.5 MG tablet, Take 1 tablet by mouth Daily. Indications: Edema, Disp: 30 tablet, Rfl: 5    loperamide (IMODIUM) 2 MG capsule, Take 1 capsule by mouth 4 (Four) Times a Day As Needed for Diarrhea. Indications: Diarrhea, Disp: , Rfl:     Ferrous Fumarate 325 (106 Fe) MG tablet, Take  by mouth., Disp: , Rfl:     hydrocortisone 0.5 % cream, Apply 1 Application topically to the appropriate area as directed 2 (Two) Times a Day., Disp: , Rfl:     Inulin-Corn Fiber (BENEFIBER PREBIOTIC FIBER PO), Take  by mouth., Disp: , Rfl:        Objective   Vital Signs:  /74 (BP Location: Right arm, Patient Position: Sitting, Cuff Size: Adult)   Pulse 86   Temp 98.4 °F (36.9 °C) (Temporal)   Ht 175.3 cm (69.02\")   Wt 92.3 kg (203 lb 8 oz)   SpO2 96%   BMI 30.04 kg/m² " "  Estimated body mass index is 30.04 kg/m² as calculated from the following:    Height as of this encounter: 175.3 cm (69.02\").    Weight as of this encounter: 92.3 kg (203 lb 8 oz).          Physical Exam  Vitals reviewed.   Constitutional:       Appearance: Normal appearance.   Cardiovascular:      Rate and Rhythm: Normal rate and regular rhythm.      Pulses: Normal pulses.      Heart sounds: Normal heart sounds.   Pulmonary:      Effort: Pulmonary effort is normal.      Breath sounds: Normal breath sounds.   Musculoskeletal:         General: No swelling or tenderness. Normal range of motion.   Skin:     General: Skin is warm and dry.   Neurological:      Mental Status: He is alert and oriented to person, place, and time.   Psychiatric:      Comments: No acute distress        Result Review :                Assessment and Plan   Diagnoses and all orders for this visit:    1. Knee strain, left, subsequent encounter (Primary)    Mr. Brannon does not appear to be in any acute distress.  We discussed his symptoms.  He is concerned that the mass he has in his right calve is cancer.  He is scheduled for imaging next week.  We discussed his concerns and reassurance was given to him.          Follow Up   No follow-ups on file.  Patient was given instructions and counseling regarding his condition or for health maintenance advice. Please see specific information pulled into the AVS if appropriate.             "

## 2025-07-16 ENCOUNTER — OFFICE VISIT (OUTPATIENT)
Dept: SURGERY | Facility: CLINIC | Age: 45
End: 2025-07-16
Payer: COMMERCIAL

## 2025-07-16 ENCOUNTER — PREP FOR SURGERY (OUTPATIENT)
Dept: OTHER | Facility: HOSPITAL | Age: 45
End: 2025-07-16
Payer: COMMERCIAL

## 2025-07-16 VITALS
SYSTOLIC BLOOD PRESSURE: 130 MMHG | BODY MASS INDEX: 30.07 KG/M2 | WEIGHT: 203 LBS | DIASTOLIC BLOOD PRESSURE: 84 MMHG | HEART RATE: 87 BPM | OXYGEN SATURATION: 99 % | HEIGHT: 69 IN

## 2025-07-16 DIAGNOSIS — C20 RECTAL ADENOCARCINOMA: Primary | ICD-10-CM

## 2025-07-16 RX ORDER — DIAPER,BRIEF,INFANT-TODD,DISP
1 EACH MISCELLANEOUS 2 TIMES DAILY
COMMUNITY

## 2025-07-17 NOTE — H&P (VIEW-ONLY)
ASSESSMENT/PLAN:    45-year-old gentleman with stage ypT2 N0 rectal adenocarcinoma status post total neoadjuvant therapy and low anterior resection in January 2025.    CT chest, abdomen, pelvis ordered per Dr. Isaac.  Colonoscopy ordered.    Labs reviewed from June 2025.    CC:     Chief Complaint   Patient presents with    Follow-up        HPI:    He reports he is doing well today.  He is taking fiber and Imodium as needed.  Bowel movements are well-controlled.  He has noticed a couple times where he has had more frequent bowel movements that there has been a little bit of blood on the toilet paper when he is wiping.    ENDOSCOPY:   Updated endoscopy    RADIOLOGY:   CT chest, abdomen, pelvis ordered    LABS:    Signatera negative on 6/10/2025, CEA 1.9 on 6/10/2025, CBC and CMP reviewed from same date    SOCIAL HISTORY:   Social Drivers of Health     Tobacco Use: Low Risk  (7/16/2025)    Patient History     Smoking Tobacco Use: Never     Smokeless Tobacco Use: Never     Passive Exposure: Never   Alcohol Use: Not At Risk (2/21/2025)    AUDIT-C     Frequency of Alcohol Consumption: Never     Average Number of Drinks: Patient does not drink     Frequency of Binge Drinking: Never   Financial Resource Strain: Not on file   Food Insecurity: Not on file   Transportation Needs: No Transportation Needs (3/21/2025)    OASIS : Transportation     Lack of Transportation (Medical): No     Lack of Transportation (Non-Medical): No     Patient Unable or Declines to Respond: No   Physical Activity: Patient Declined (1/10/2025)    Exercise Vital Sign     Days of Exercise per Week: Patient declined     Minutes of Exercise per Session: Patient declined   Stress: Not on file   Social Connections: Feeling Socially Integrated (3/21/2025)    OASIS : Social Isolation     Frequency of experiencing loneliness or isolation: Never   Interpersonal Safety: Not At Risk (6/10/2025)    Abuse Screen     Unsafe at Home or Work/School: no      Feels Threatened by Someone?: no     Does Anyone Keep You from Contacting Others or Doint Things Outside the Home?: no     Physical Sign of Abuse Present: no   Depression: At risk (6/13/2025)    PHQ-2     PHQ-2 Score: 4   Housing Stability: Unknown (2/21/2025)    Housing Stability     Current Living Arrangements: home     Potentially Unsafe Housing Conditions: Not on file   Utilities: Not on file   Health Literacy: Adequate Health Literacy (3/21/2025)    OASIS : Health Literacy     Frequency of needing help to read materials from doctor or pharmacy: Rarely   Recent Concern: Health Literacy - Inadequate Health Literacy (2/28/2025)    OASIS : Health Literacy     Frequency of needing help to read materials from doctor or pharmacy: Often   Employment: Not on file   Disabilities: Not At Risk (2/21/2025)    Disabilities     Concentrating, Remembering, or Making Decisions Difficulty: no     Doing Errands Independently Difficulty: no        FAMILY HISTORY:    Family History   Problem Relation Age of Onset    Cancer Mother         Stomach    Cancer Father         prostate    Glaucoma Father     Hypertension Maternal Aunt     Heart disease Maternal Aunt     Hypertension Maternal Uncle     Glaucoma Paternal Grandfather     Malig Hyperthermia Neg Hx         OTHER SURGERY:  Past Surgical History:   Procedure Laterality Date    COLON RESECTION N/A 1/9/2025    Procedure: Laparoscopic, possible open, low anterior resection with diverting loop ileostomy and appendectomy;  Surgeon: Ori Isaac MD;  Location: [facility] OR;  Service: General;  Laterality: N/A;    COLONOSCOPY N/A 4/23/2024    Procedure: COLONOSCOPY into cecum with biopsy;  Surgeon: David Tena MD;  Location: [facility] ENDOSCOPY;  Service: Gastroenterology;  Laterality: N/A;  rectal mass    ILEOSTOMY CLOSURE N/A 2/21/2025    Procedure: Closure of diverting loop ileostomy;  Surgeon: Ori Isaac MD;  Location: [facility] OR;   Service: General;  Laterality: N/A;    LEG SURGERY Left     ACL    SIGMOIDOSCOPY N/A 12/16/2024    Procedure: FLEXIBLE SIGMOIDOSCOPY WITH BIOPSIES cold with tattoo injection w/ scleratherapy needle;  Surgeon: Ori Isaac MD;  Location: [facility] ENDOSCOPY;  Service: General;  Laterality: N/A;  pre monitor site(ca) s/p chemo radiation post remnant of tumor/needs tx    VENOUS ACCESS DEVICE (PORT) INSERTION N/A 5/24/2024    Procedure: INSERTION VENOUS ACCESS DEVICE;  Surgeon: Ori Isaac MD;  Location: [facility] MAIN OR;  Service: General;  Laterality: N/A;        PAST MEDICAL HISTORY:    Past Medical History:   Diagnosis Date    Anemia     History of cancer chemotherapy     JUNE TO SEPT 2024    History of radiation therapy     2024    History of seizure     STATES AS A CHILD: UNCLEAR   NO NEURO    Hyperlipidemia     Hypertension     Ileostomy present     Ileostomy status 01/23/2025    Mental disability without special needs     NEEDS REINFORCEMENT WITH INSTRUCTIONS, EAGER TO LEARN.  STUTTERS    Rectal adenocarcinoma 2024        MEDICATIONS:   Current Outpatient Medications on File Prior to Visit   Medication Sig Dispense Refill    amLODIPine (NORVASC) 10 MG tablet Take 1 tablet by mouth Daily. Indications: High Blood Pressure 90 tablet 1    atomoxetine (STRATTERA) 80 MG capsule Take 1 capsule by mouth Daily. 30 capsule 2    Ferrous Fumarate 325 (106 Fe) MG tablet Take  by mouth.      gabapentin (Neurontin) 100 MG capsule Take 1 capsule by mouth Take As Directed. 1 capsule PO q hs. May take additional capsule once daily PRN anxiety.  Indications: Neuropathic Pain 60 capsule 2    hydroCHLOROthiazide 12.5 MG tablet Take 1 tablet by mouth Daily. Indications: Edema 30 tablet 5    hydrocortisone 0.5 % cream Apply 1 Application topically to the appropriate area as directed 2 (Two) Times a Day.      Inulin-Corn Fiber (BENEFIBER PREBIOTIC FIBER PO) Take  by mouth.      loperamide (IMODIUM) 2 MG capsule Take  "1 capsule by mouth 4 (Four) Times a Day As Needed for Diarrhea. Indications: Diarrhea       No current facility-administered medications on file prior to visit.        ALLERGIES:   Allergies   Allergen Reactions    Latex Rash        Vitals:    07/16/25 0848   BP: 130/84   Pulse: 87   SpO2: 99%     Body mass index is 29.96 kg/m².  175.3 cm (69.02\")  92.1 kg (203 lb)    PHYSICAL EXAM:   Constitutional: Well-developed, well-nourished, no acute distress  Gastrointestinal: Soft, nontender, incisions have healed well, no evidence of hernia at any incision site      Juni Haynes M.D.  General and Endoscopic Surgery  [facility]    [address], [address]  [place], [place], [address]  P: [phone]  F: [phone]     "

## 2025-07-17 NOTE — PROGRESS NOTES
ASSESSMENT/PLAN:    45-year-old gentleman with stage ypT2 N0 rectal adenocarcinoma status post total neoadjuvant therapy and low anterior resection in January 2025.    CT chest, abdomen, pelvis ordered per Dr. Isaac.  Colonoscopy ordered.    Labs reviewed from June 2025.    CC:     Chief Complaint   Patient presents with    Follow-up        HPI:    He reports he is doing well today.  He is taking fiber and Imodium as needed.  Bowel movements are well-controlled.  He has noticed a couple times where he has had more frequent bowel movements that there has been a little bit of blood on the toilet paper when he is wiping.    ENDOSCOPY:   Updated endoscopy    RADIOLOGY:   CT chest, abdomen, pelvis ordered    LABS:    Signatera negative on 6/10/2025, CEA 1.9 on 6/10/2025, CBC and CMP reviewed from same date    SOCIAL HISTORY:   Social Drivers of Health     Tobacco Use: Low Risk  (7/16/2025)    Patient History     Smoking Tobacco Use: Never     Smokeless Tobacco Use: Never     Passive Exposure: Never   Alcohol Use: Not At Risk (2/21/2025)    AUDIT-C     Frequency of Alcohol Consumption: Never     Average Number of Drinks: Patient does not drink     Frequency of Binge Drinking: Never   Financial Resource Strain: Not on file   Food Insecurity: Not on file   Transportation Needs: No Transportation Needs (3/21/2025)    OASIS : Transportation     Lack of Transportation (Medical): No     Lack of Transportation (Non-Medical): No     Patient Unable or Declines to Respond: No   Physical Activity: Patient Declined (1/10/2025)    Exercise Vital Sign     Days of Exercise per Week: Patient declined     Minutes of Exercise per Session: Patient declined   Stress: Not on file   Social Connections: Feeling Socially Integrated (3/21/2025)    OASIS : Social Isolation     Frequency of experiencing loneliness or isolation: Never   Interpersonal Safety: Not At Risk (6/10/2025)    Abuse Screen     Unsafe at Home or Work/School: no      Feels Threatened by Someone?: no     Does Anyone Keep You from Contacting Others or Doint Things Outside the Home?: no     Physical Sign of Abuse Present: no   Depression: At risk (6/13/2025)    PHQ-2     PHQ-2 Score: 4   Housing Stability: Unknown (2/21/2025)    Housing Stability     Current Living Arrangements: home     Potentially Unsafe Housing Conditions: Not on file   Utilities: Not on file   Health Literacy: Adequate Health Literacy (3/21/2025)    OASIS : Health Literacy     Frequency of needing help to read materials from doctor or pharmacy: Rarely   Recent Concern: Health Literacy - Inadequate Health Literacy (2/28/2025)    OASIS : Health Literacy     Frequency of needing help to read materials from doctor or pharmacy: Often   Employment: Not on file   Disabilities: Not At Risk (2/21/2025)    Disabilities     Concentrating, Remembering, or Making Decisions Difficulty: no     Doing Errands Independently Difficulty: no        FAMILY HISTORY:    Family History   Problem Relation Age of Onset    Cancer Mother         Stomach    Cancer Father         prostate    Glaucoma Father     Hypertension Maternal Aunt     Heart disease Maternal Aunt     Hypertension Maternal Uncle     Glaucoma Paternal Grandfather     Malig Hyperthermia Neg Hx         OTHER SURGERY:  Past Surgical History:   Procedure Laterality Date    COLON RESECTION N/A 1/9/2025    Procedure: Laparoscopic, possible open, low anterior resection with diverting loop ileostomy and appendectomy;  Surgeon: Ori Isaac MD;  Location: [facility] OR;  Service: General;  Laterality: N/A;    COLONOSCOPY N/A 4/23/2024    Procedure: COLONOSCOPY into cecum with biopsy;  Surgeon: David Tena MD;  Location: [facility] ENDOSCOPY;  Service: Gastroenterology;  Laterality: N/A;  rectal mass    ILEOSTOMY CLOSURE N/A 2/21/2025    Procedure: Closure of diverting loop ileostomy;  Surgeon: Ori Isaac MD;  Location: [facility] OR;   Service: General;  Laterality: N/A;    LEG SURGERY Left     ACL    SIGMOIDOSCOPY N/A 12/16/2024    Procedure: FLEXIBLE SIGMOIDOSCOPY WITH BIOPSIES cold with tattoo injection w/ scleratherapy needle;  Surgeon: Ori Isaac MD;  Location: [facility] ENDOSCOPY;  Service: General;  Laterality: N/A;  pre monitor site(ca) s/p chemo radiation post remnant of tumor/needs tx    VENOUS ACCESS DEVICE (PORT) INSERTION N/A 5/24/2024    Procedure: INSERTION VENOUS ACCESS DEVICE;  Surgeon: Ori Isaac MD;  Location: [facility] MAIN OR;  Service: General;  Laterality: N/A;        PAST MEDICAL HISTORY:    Past Medical History:   Diagnosis Date    Anemia     History of cancer chemotherapy     JUNE TO SEPT 2024    History of radiation therapy     2024    History of seizure     STATES AS A CHILD: UNCLEAR   NO NEURO    Hyperlipidemia     Hypertension     Ileostomy present     Ileostomy status 01/23/2025    Mental disability without special needs     NEEDS REINFORCEMENT WITH INSTRUCTIONS, EAGER TO LEARN.  STUTTERS    Rectal adenocarcinoma 2024        MEDICATIONS:   Current Outpatient Medications on File Prior to Visit   Medication Sig Dispense Refill    amLODIPine (NORVASC) 10 MG tablet Take 1 tablet by mouth Daily. Indications: High Blood Pressure 90 tablet 1    atomoxetine (STRATTERA) 80 MG capsule Take 1 capsule by mouth Daily. 30 capsule 2    Ferrous Fumarate 325 (106 Fe) MG tablet Take  by mouth.      gabapentin (Neurontin) 100 MG capsule Take 1 capsule by mouth Take As Directed. 1 capsule PO q hs. May take additional capsule once daily PRN anxiety.  Indications: Neuropathic Pain 60 capsule 2    hydroCHLOROthiazide 12.5 MG tablet Take 1 tablet by mouth Daily. Indications: Edema 30 tablet 5    hydrocortisone 0.5 % cream Apply 1 Application topically to the appropriate area as directed 2 (Two) Times a Day.      Inulin-Corn Fiber (BENEFIBER PREBIOTIC FIBER PO) Take  by mouth.      loperamide (IMODIUM) 2 MG capsule Take  "1 capsule by mouth 4 (Four) Times a Day As Needed for Diarrhea. Indications: Diarrhea       No current facility-administered medications on file prior to visit.        ALLERGIES:   Allergies   Allergen Reactions    Latex Rash        Vitals:    07/16/25 0848   BP: 130/84   Pulse: 87   SpO2: 99%     Body mass index is 29.96 kg/m².  175.3 cm (69.02\")  92.1 kg (203 lb)    PHYSICAL EXAM:   Constitutional: Well-developed, well-nourished, no acute distress  Gastrointestinal: Soft, nontender, incisions have healed well, no evidence of hernia at any incision site      Juni Haynes M.D.  General and Endoscopic Surgery  [facility]    [address], [address]  [place], [place], [address]  P: [phone]  F: [phone]     "

## 2025-07-24 ENCOUNTER — TELEPHONE (OUTPATIENT)
Dept: SURGERY | Facility: CLINIC | Age: 45
End: 2025-07-24
Payer: COMMERCIAL

## 2025-07-24 NOTE — TELEPHONE ENCOUNTER
Hub staff attempted to follow warm transfer process and was unsuccessful     Caller: Jc Brannon Jr.    Relationship to patient: Self    Best call back number: [phone]    Patient is needing: PATIENT ASKS FOR [facility] PW FROM MARCH/APRIL TO BE SENT AGAIN TO HIS [facility]

## 2025-07-28 ENCOUNTER — OFFICE VISIT (OUTPATIENT)
Dept: PSYCHIATRY | Facility: HOSPITAL | Age: 45
End: 2025-07-28
Payer: COMMERCIAL

## 2025-07-28 DIAGNOSIS — F43.23 ADJUSTMENT DISORDER WITH MIXED ANXIETY AND DEPRESSED MOOD: ICD-10-CM

## 2025-07-28 DIAGNOSIS — F90.1 ATTENTION DEFICIT HYPERACTIVITY DISORDER (ADHD), PREDOMINANTLY HYPERACTIVE TYPE: Primary | ICD-10-CM

## 2025-07-28 PROCEDURE — 99214 OFFICE O/P EST MOD 30 MIN: CPT | Performed by: NURSE PRACTITIONER

## 2025-07-28 RX ORDER — ATOMOXETINE 80 MG/1
80 CAPSULE ORAL DAILY
Qty: 90 CAPSULE | Refills: 0 | Status: SHIPPED | OUTPATIENT
Start: 2025-07-28 | End: 2026-07-28

## 2025-07-28 RX ORDER — GABAPENTIN 100 MG/1
100 CAPSULE ORAL NIGHTLY
Qty: 30 CAPSULE | Refills: 2 | Status: SHIPPED | OUTPATIENT
Start: 2025-07-28 | End: 2026-07-28

## 2025-07-28 NOTE — PROGRESS NOTES
[facility]  In Person Session    Subjective  Patient ID: Jc Brannon Jr. is a 45 y.o. male is seen face to face in the [facility].    CC: ADHD    HPI/ Interval History:   Pt is seen in follow up regarding management of chronic anxiety, ADHD in survivorship of colon cancer.    Shares recent complications, specifically stating his AC has been out for last three weeks. Has window unit and fan, taking cold showers. Denies physical symptoms related to overheating. Is remaining hydrated. Bowels are moving acceptably. Does recognize sleeping more, feeling more tired during current period of heat. Is sleeping well. Continues to appreciate ADHD medicine, feeling this is working better with increasing dose. Feels unable to study or read, stating it is simply too hot. Has been to library, although not going here regularly. Unable to stay with parents or or do anything to manage this, noting increased time in car (specifically to get to work from parent's house) is not feasible. Financial impact of cancer remains complicated, while reporting ability to stay on top of bills as needed.     Does report pain in knee, concerned this is cancer.  She has congenital osteoandroma, concerned due to working pain, and noting increase in anxiety with consideration of this.    Continues to work full time; maintains mastery, while this too is hot. Continues to go to [employer], and is swimming once a week. Fortunately feels well able to approach necessary appointments, remain engaged in medical care, and approach concerns and health as needed. Denies concerns interfering wit ability to attend appointments or manage physical health concerns at this time.     Exam: As above    Recent Labs Reviewed:  Lab on 06/10/2025   Component Date Value    Glucose 06/10/2025 103 (H)     BUN 06/10/2025 13.8     Creatinine 06/10/2025 1.03     Sodium 06/10/2025 139     Potassium 06/10/2025 3.7     Chloride  06/10/2025 99     CO2 06/10/2025 28.4     Calcium 06/10/2025 9.3     Total Protein 06/10/2025 7.4     Albumin 06/10/2025 5.0     ALT (SGPT) 06/10/2025 22     AST (SGOT) 06/10/2025 22     Alkaline Phosphatase 06/10/2025 106     Total Bilirubin 06/10/2025 0.4     Globulin 06/10/2025 2.4     A/G Ratio 06/10/2025 2.1     BUN/Creatinine Ratio 06/10/2025 13.4     Anion Gap 06/10/2025 11.6     eGFR 06/10/2025 90.7     CEA 06/10/2025 1.90     WBC 06/10/2025 2.75 (L)     RBC 06/10/2025 4.24     Hemoglobin 06/10/2025 13.1     Hematocrit 06/10/2025 40.9     MCV 06/10/2025 96.5     MCH 06/10/2025 30.9     MCHC 06/10/2025 32.0     RDW 06/10/2025 13.2     RDW-SD 06/10/2025 46.5     MPV 06/10/2025 9.4     Platelets 06/10/2025 243     Neutrophil % 06/10/2025 58.9     Lymphocyte % 06/10/2025 24.7     Monocyte % 06/10/2025 12.0     Eosinophil % 06/10/2025 3.3     Basophil % 06/10/2025 0.7     Immature Grans % 06/10/2025 0.4     Neutrophils, Absolute 06/10/2025 1.62 (L)     Lymphocytes, Absolute 06/10/2025 0.68 (L)     Monocytes, Absolute 06/10/2025 0.33     Eosinophils, Absolute 06/10/2025 0.09     Basophils, Absolute 06/10/2025 0.02     Immature Grans, Absolute 06/10/2025 0.01     nRBC 06/10/2025 0.0     SIGNATERA TEST RESULT 06/10/2025 NEGATIVE     SIGNATERA MTM READOUT 06/10/2025 0    Labs reviewed    Current Psychotropic Medications:  Atomoxetine 80 mg daily  Gabapentin 100 mg q hs    Plan of Care/ Medical Decision Making  Continue Atomoxetine 80 mg daily, gabapentin 100 mg q hs.  Supportive counseling continued supporting patient's strengths, specifically organization, dedication to work and appointments.  Patient denies any issues meeting these demands at this time.  Additional education providing surrounding hydration, places with air conditioning, etc.  Attempted to continue conversation regarding current health concerns, knee, as it is obviously contributing to angst.  Reassurance provided as able, while supporting follow-up  with medical professionals as scheduled.  FU scheduled in 4 weeks.    Diagnoses and all orders for this visit:    1. Attention deficit hyperactivity disorder (ADHD), predominantly hyperactive type (Primary)    2. Adjustment disorder with mixed anxiety and depressed mood  -     gabapentin (Neurontin) 100 MG capsule; Take 1 capsule by mouth Every Night. 1 capsule PO q hs.  Indications: Neuropathic Pain  Dispense: 30 capsule; Refill: 2    Other orders  -     atomoxetine (STRATTERA) 80 MG capsule; Take 1 capsule by mouth Daily.  Dispense: 90 capsule; Refill: 0    I spent 39 minutes caring for Jc on this date of service. This time includes time spent by me in the following activities: preparing for the visit, reviewing tests, obtaining and/or reviewing a separately obtained history, performing a medically appropriate examination and/or evaluation, counseling and educating the patient/family/caregiver, ordering medications, tests, or procedures, and documenting information in the medical record.

## 2025-07-29 ENCOUNTER — HOSPITAL ENCOUNTER (OUTPATIENT)
Dept: PET IMAGING | Facility: HOSPITAL | Age: 45
Discharge: HOME | End: 2025-07-29
Admitting: SURGERY
Payer: COMMERCIAL

## 2025-07-29 DIAGNOSIS — C20 RECTAL ADENOCARCINOMA: ICD-10-CM

## 2025-07-29 LAB — CREAT BLDA-MCNC: 1.1 MG/DL (ref 0.6–1.3)

## 2025-07-29 PROCEDURE — 25510000001 IOPAMIDOL PER 1 ML: Performed by: SURGERY

## 2025-07-29 PROCEDURE — 71260 CT THORAX DX C+: CPT

## 2025-07-29 PROCEDURE — 82565 ASSAY OF CREATININE: CPT

## 2025-07-29 PROCEDURE — 74177 CT ABD & PELVIS W/CONTRAST: CPT

## 2025-07-29 RX ORDER — IOPAMIDOL 755 MG/ML
100 INJECTION, SOLUTION INTRAVASCULAR
Status: COMPLETED | OUTPATIENT
Start: 2025-07-29 | End: 2025-07-29

## 2025-07-29 RX ADMIN — IOPAMIDOL 85 ML: 755 INJECTION, SOLUTION INTRAVENOUS at 16:38

## 2025-08-01 ENCOUNTER — ANESTHESIA (OUTPATIENT)
Dept: GASTROENTEROLOGY | Facility: HOSPITAL | Age: 45
End: 2025-08-01
Payer: COMMERCIAL

## 2025-08-01 ENCOUNTER — ANESTHESIA EVENT (OUTPATIENT)
Dept: GASTROENTEROLOGY | Facility: HOSPITAL | Age: 45
End: 2025-08-01
Payer: COMMERCIAL

## 2025-08-01 ENCOUNTER — HOSPITAL ENCOUNTER (OUTPATIENT)
Facility: HOSPITAL | Age: 45
Setting detail: HOSPITAL OUTPATIENT SURGERY
Discharge: HOME | End: 2025-08-01
Attending: SURGERY | Admitting: SURGERY
Payer: COMMERCIAL

## 2025-08-01 VITALS
WEIGHT: 202 LBS | SYSTOLIC BLOOD PRESSURE: 143 MMHG | DIASTOLIC BLOOD PRESSURE: 101 MMHG | BODY MASS INDEX: 29.92 KG/M2 | HEART RATE: 88 BPM | HEIGHT: 69 IN | OXYGEN SATURATION: 100 % | RESPIRATION RATE: 10 BRPM

## 2025-08-01 DIAGNOSIS — C20 RECTAL ADENOCARCINOMA: ICD-10-CM

## 2025-08-01 PROCEDURE — 25010000002 PROPOFOL 10 MG/ML EMULSION: Performed by: NURSE ANESTHETIST, CERTIFIED REGISTERED

## 2025-08-01 PROCEDURE — 88305 TISSUE EXAM BY PATHOLOGIST: CPT | Performed by: SURGERY

## 2025-08-01 PROCEDURE — 25010000002 LIDOCAINE PF 2% 2 % SOLUTION: Performed by: NURSE ANESTHETIST, CERTIFIED REGISTERED

## 2025-08-01 PROCEDURE — 25810000003 LACTATED RINGERS PER 1000 ML: Performed by: SURGERY

## 2025-08-01 PROCEDURE — 88342 IMHCHEM/IMCYTCHM 1ST ANTB: CPT | Performed by: SURGERY

## 2025-08-01 PROCEDURE — 45380 COLONOSCOPY AND BIOPSY: CPT | Performed by: SURGERY

## 2025-08-01 PROCEDURE — 88341 IMHCHEM/IMCYTCHM EA ADD ANTB: CPT | Performed by: SURGERY

## 2025-08-01 DEVICE — REPLAY HEMOSTASIS CLIP, 16MM SPAN
Type: IMPLANTABLE DEVICE | Site: RECTUM | Status: FUNCTIONAL
Brand: REPLAY

## 2025-08-01 RX ORDER — SODIUM CHLORIDE 0.9 % (FLUSH) 0.9 %
10 SYRINGE (ML) INJECTION AS NEEDED
Status: DISCONTINUED | OUTPATIENT
Start: 2025-08-01 | End: 2025-08-01 | Stop reason: HOSPADM

## 2025-08-01 RX ORDER — SODIUM CHLORIDE, SODIUM LACTATE, POTASSIUM CHLORIDE, CALCIUM CHLORIDE 600; 310; 30; 20 MG/100ML; MG/100ML; MG/100ML; MG/100ML
1000 INJECTION, SOLUTION INTRAVENOUS CONTINUOUS
Status: DISCONTINUED | OUTPATIENT
Start: 2025-08-01 | End: 2025-08-01 | Stop reason: HOSPADM

## 2025-08-01 RX ORDER — PROPOFOL 10 MG/ML
VIAL (ML) INTRAVENOUS AS NEEDED
Status: DISCONTINUED | OUTPATIENT
Start: 2025-08-01 | End: 2025-08-01 | Stop reason: SURG

## 2025-08-01 RX ORDER — LIDOCAINE HYDROCHLORIDE 20 MG/ML
INJECTION, SOLUTION EPIDURAL; INFILTRATION; INTRACAUDAL; PERINEURAL AS NEEDED
Status: DISCONTINUED | OUTPATIENT
Start: 2025-08-01 | End: 2025-08-01 | Stop reason: SURG

## 2025-08-01 RX ADMIN — PROPOFOL 70 MG: 10 INJECTION, EMULSION INTRAVENOUS at 14:09

## 2025-08-01 RX ADMIN — PROPOFOL 200 MCG/KG/MIN: 10 INJECTION, EMULSION INTRAVENOUS at 14:09

## 2025-08-01 RX ADMIN — LIDOCAINE HYDROCHLORIDE 60 MG: 20 INJECTION, SOLUTION EPIDURAL; INFILTRATION; INTRACAUDAL; PERINEURAL at 14:09

## 2025-08-01 RX ADMIN — SODIUM CHLORIDE, POTASSIUM CHLORIDE, SODIUM LACTATE AND CALCIUM CHLORIDE 1000 ML: 600; 310; 30; 20 INJECTION, SOLUTION INTRAVENOUS at 13:23

## 2025-08-01 NOTE — DISCHARGE INSTRUCTIONS
If you were to get an MRI in the next 90 days, let them know about the clip.For the next 24 hours patient needs to be with a responsible adult.    For THE REST OF TODAY DO NOT drive, operate machinery, appliances, drink alcohol, make important decisions or sign legal documents.    Start with a light or bland diet if you are feeling sick to your stomach otherwise advance to regular diet as tolerated.    Follow recommendations on procedure report if provided by your doctor.    Call  *** for problems [phone] ***    Problems may include but not limited to: large amounts of bleeding, trouble breathing, repeated vomiting, severe unrelieved pain, fever or chills.      If biopsies or polyps were taken, MD will call you with the results in about 7 days. If you don't hear from the MD in 2 weeks, call the number above.For the next 24 hours patient needs to be with a responsible adult.    For THE REST OF TODAY DO NOT drive, operate machinery, appliances, drink alcohol, make important decisions or sign legal documents.    Start with a light or bland diet if you are feeling sick to your stomach otherwise advance to regular diet as tolerated.    Follow recommendations on procedure report if provided by your doctor.    Call Dr Isaac for problems [phone]     Problems may include but not limited to: large amounts of bleeding, trouble breathing, repeated vomiting, severe unrelieved pain, fever or chills.      If biopsies or polyps were taken, MD will call you with the results in about 7 days. If you don't hear from the MD in 2 weeks, call the number above.

## 2025-08-01 NOTE — ANESTHESIA POSTPROCEDURE EVALUATION
"Patient: Jc Brannon Jr.    Procedure Summary       Date: 08/01/25 Room / [facility]:  [facility]    Anesthesia Start: 1405 Anesthesia Stop: 1442    Procedure: COLONOSCOPY INTO CECUM WITH BIOPSIES Diagnosis:       Rectal adenocarcinoma      (Rectal adenocarcinoma [C20])    Surgeons: Ori Isaac MD Provider: Vijay Delaney MD    Anesthesia Type: MAC ASA Status: 2            Anesthesia Type: MAC    Vitals  No vitals data found for the desired time range.          Post Anesthesia Care and Evaluation    Patient location during evaluation: bedside  Patient participation: complete - patient participated  Level of consciousness: sleepy but conscious  Pain score: 0  Pain management: adequate    Airway patency: patent  Anesthetic complications: No anesthetic complications    Cardiovascular status: acceptable  Respiratory status: acceptable  Hydration status: acceptable    Comments: /86 (BP Location: Left arm, Patient Position: Lying)   Pulse 80   Resp 19   Ht 175.3 cm (69\")   Wt 91.6 kg (202 lb)   SpO2 99%   BMI 29.83 kg/m²       "

## 2025-08-01 NOTE — OP NOTE
Colorectal & General Surgery  Operative Report    Patient: Jc Brannon Jr.  YOB: 1980  MRN: [id number]  DATE OF PROCEDURE: 08/01/25     PREOPERATIVE DIAGNOSIS:  History of cT3N2c rectal adenocarcinoma status post total neoadjuvant therapy and low anterior resection (ypT2N0)    POSTOPERATIVE DIAGNOSIS:  Poor bowel preparation, inadequate examination for purposes of colorectal cancer surveillance  Mucosal lesion at colorectal anastomosis, biopsied    PROCEDURE:  Colonoscopy to cecum with cold forcep biopsy    FINDINGS:  Poor bowel preparation that was an adequate examination for purposes of colorectal cancer surveillance  No obvious large masses throughout the colon or rectum  There was a 1 cm mucosal lesion at the colorectal anastomosis that appeared to be inflammatory tissue.  Biopsy obtained.  Clip placed.    RECOMMENDATIONS:   Await pathology results  Anticipate repeat colonoscopy within 3 months.    SURGEON:  See Isaac MD    ANESTHESIA:  Monitored anesthesia care    EBL:  None    SPECIMEN:  Colorectal anastomosis biopsy    OPERATIVE DESCRIPTION:  The patient was brought to the endoscopy suite under the care of the nursing staff.  A preoperative timeout was performed.  Monitored anesthesia care was initiated.  A digital rectal examination was performed.  The endoscope was inserted into the anus and advanced carefully to the cecum.  This was quite difficult as there was a very large amount of liquid and solid stool throughout the colon.  The endoscope was then withdrawn and the entire mucosal surface of the colon and rectum were visualized to the best of our ability given significant limitations from massive amounts of stool burden especially in the transverse and descending colon.  Biopsy obtained of a small mucosal lesion at the colorectal anastomosis.  The scope was then withdrawn.    The patient tolerated the procedure well and was transferred to the postanesthesia care unit in stable  condition.    See Isaac M.D.  Colorectal & General Surgery  [facility]    [address], [address]  [place], [place], [address]  P: [phone]  F: [phone]

## 2025-08-01 NOTE — ANESTHESIA PREPROCEDURE EVALUATION
Anesthesia Evaluation     Patient summary reviewed and Nursing notes reviewed   NPO Solid Status: > 8 hours  NPO Liquid Status: > 4 hours           Airway   Mallampati: II  Neck ROM: full  No difficulty expected  Dental - normal exam     Pulmonary     breath sounds clear to auscultation  Cardiovascular     Rhythm: regular    (+) hypertension, hyperlipidemia      Neuro/Psych  GI/Hepatic/Renal/Endo      Musculoskeletal     Abdominal    Substance History      OB/GYN          Other      history of cancer                Anesthesia Plan    ASA 2     MAC     (Pleasant cooperative, may need extra explanation)  intravenous induction     Anesthetic plan, risks, benefits, and alternatives have been provided, discussed and informed consent has been obtained with: patient.    CODE STATUS:

## 2025-08-05 ENCOUNTER — PREP FOR SURGERY (OUTPATIENT)
Dept: OTHER | Facility: HOSPITAL | Age: 45
End: 2025-08-05
Payer: COMMERCIAL

## 2025-08-05 DIAGNOSIS — C20 RECTAL ADENOCARCINOMA: Primary | ICD-10-CM

## 2025-08-05 LAB
CYTO UR: NORMAL
DX PRELIMINARY: NORMAL
LAB AP CASE REPORT: NORMAL
LAB AP INTRADEPARTMENTAL CONSULT: NORMAL
LAB AP SPECIAL STAINS: NORMAL
PATH REPORT.FINAL DX SPEC: NORMAL
PATH REPORT.GROSS SPEC: NORMAL

## 2025-08-08 ENCOUNTER — TELEPHONE (OUTPATIENT)
Dept: SURGERY | Facility: CLINIC | Age: 45
End: 2025-08-08
Payer: COMMERCIAL

## 2025-08-25 ENCOUNTER — OFFICE VISIT (OUTPATIENT)
Dept: PSYCHIATRY | Facility: HOSPITAL | Age: 45
End: 2025-08-25
Payer: COMMERCIAL

## 2025-08-25 DIAGNOSIS — C20 RECTAL ADENOCARCINOMA: ICD-10-CM

## 2025-08-25 DIAGNOSIS — F90.1 ATTENTION DEFICIT HYPERACTIVITY DISORDER (ADHD), PREDOMINANTLY HYPERACTIVE TYPE: Primary | ICD-10-CM

## 2025-08-25 DIAGNOSIS — F41.1 GENERALIZED ANXIETY DISORDER: ICD-10-CM

## 2025-08-25 PROCEDURE — 99214 OFFICE O/P EST MOD 30 MIN: CPT | Performed by: NURSE PRACTITIONER

## (undated) DEVICE — CVR PROB 96IN LF STRL

## (undated) DEVICE — ANTIBACTERIAL UNDYED BRAIDED (POLYGLACTIN 910), SYNTHETIC ABSORBABLE SUTURE: Brand: COATED VICRYL

## (undated) DEVICE — TUBING, SUCTION, 1/4" X 10', STRAIGHT: Brand: MEDLINE

## (undated) DEVICE — JELLY,LUBE,STERILE,FLIP TOP,TUBE,4-OZ: Brand: MEDLINE

## (undated) DEVICE — DBD-DRAPE,LAP,CHOLE,W/TROUGHS,STERILE: Brand: MEDLINE

## (undated) DEVICE — SUT SILK 3/0 TIES 18IN A184H

## (undated) DEVICE — SUT MNCRYL 2/0 SH 27IN Y417H

## (undated) DEVICE — LN SMPL CO2 SHTRM SD STREAM W/M LUER

## (undated) DEVICE — SYR LUERLOK 30CC

## (undated) DEVICE — SUT VIC 3/0 CTI 36IN J944H

## (undated) DEVICE — NDL BLNT 18G 1 1/2IN

## (undated) DEVICE — CANN O2 ETCO2 FITS ALL CONN CO2 SMPL A/ 7IN DISP LF

## (undated) DEVICE — NDL HYPO PRECISIONGLIDE REG 25G 1 1/2

## (undated) DEVICE — 3M™ IOBAN™ 2 ANTIMICROBIAL INCISE DRAPE 6650EZ: Brand: IOBAN™ 2

## (undated) DEVICE — SYR LL TP 10ML STRL

## (undated) DEVICE — SUT SILK 0 TIES 18IN SA66G

## (undated) DEVICE — APPL CHLORAPREP HI/LITE 26ML ORNG

## (undated) DEVICE — YANKAUER,BULB TIP,W/O VENT,RIGID,STERILE: Brand: MEDLINE

## (undated) DEVICE — PK PROC MAJ 40

## (undated) DEVICE — SENSR O2 OXIMAX FNGR A/ 18IN NONSTR

## (undated) DEVICE — ADAPT CLN BIOGUARD AIR/H2O DISP

## (undated) DEVICE — SUT PDS 0 CT1 36IN Z346H

## (undated) DEVICE — ECHELON FLEX POWERED PLUS ARTICULATING ENDOSCOPIC LINEAR CUTTER , 60MM: Brand: ECHELON FLEX

## (undated) DEVICE — ENDOPATH PNEUMONEEDLE INSUFFLATION NEEDLES WITH LUER LOCK CONNECTORS 120MM: Brand: ENDOPATH

## (undated) DEVICE — PENCL SMOKE/EVAC MEGADYNE TELESCP 10FT

## (undated) DEVICE — GLV SURG PREMIERPRO ORTHO LTX PF SZ7 BRN

## (undated) DEVICE — TOWEL,OR,DSP,ST,BLUE,STD,4/PK,20PK/CS: Brand: MEDLINE

## (undated) DEVICE — SUT GUT CHRM 3/0 SH 36IN G172H

## (undated) DEVICE — PATIENT RETURN ELECTRODE, SINGLE-USE, CONTACT QUALITY MONITORING, ADULT, WITH 9FT CORD, FOR PATIENTS WEIGING OVER 33LBS. (15KG): Brand: MEGADYNE

## (undated) DEVICE — ENDOCUT SCISSOR TIP, DISPOSABLE: Brand: RENEW

## (undated) DEVICE — KT ORCA ORCAPOD DISP STRL

## (undated) DEVICE — DEV SUT GRSPR CLOSUR 15CM 14G

## (undated) DEVICE — GLV SURG SENSICARE PI PF LF 7 GRN STRL

## (undated) DEVICE — ENDOPOUCH RETRIEVER SPECIMEN RETRIEVAL BAGS: Brand: ENDOPOUCH RETRIEVER

## (undated) DEVICE — LOU MINOR PROCEDURE: Brand: MEDLINE INDUSTRIES, INC.

## (undated) DEVICE — ADHS SKIN SURG TISS VISC PREMIERPRO EXOFIN HI/VISC 1ML

## (undated) DEVICE — SUT VIC 0 UR6 27IN VCP603H

## (undated) DEVICE — GLV SURG SENSICARE PI MIC PF SZ6.5 LF STRL

## (undated) DEVICE — SUT SILK 2/0 TIES 18IN A185H

## (undated) DEVICE — SOL NACL 0.9PCT 1000ML

## (undated) DEVICE — WOUND RETRACTOR AND PROTECTOR: Brand: ALEXIS WOUND PROTECTOR-RETRACTOR

## (undated) DEVICE — THE STERILE LIGHT HANDLE COVER IS USED WITH STERIS SURGICAL LIGHTING AND VISUALIZATION SYSTEMS.

## (undated) DEVICE — SUT PDS 2/0 SH 27IN Z317H

## (undated) DEVICE — ECHELON FLEX45 ENDOPATH STAPLER, ARTICULATING ENDOSCOPIC LINEAR CUTTER (NO CARTRIDGE): Brand: ECHELON ENDOPATH

## (undated) DEVICE — LAPAROVUE VISIBILITY SYSTEM LAPAROSCOPIC SOLUTIONS: Brand: LAPAROVUE

## (undated) DEVICE — GLV SURG SENSICARE PI MIC PF SZ7 LF STRL

## (undated) DEVICE — UNDRGLV SURG BIOGEL PUNCTUREINDICATION SZ7 PF STRL

## (undated) DEVICE — SUT PDS 3/0 SH 27IN DYED Z316H

## (undated) DEVICE — ELECTRD BLD MEGADYNE EZCLEAN STD 2.75IN XLNG

## (undated) DEVICE — TROCAR: Brand: KII FIOS FIRST ENTRY

## (undated) DEVICE — SUT PROLN 2/0 SH 36IN 8523H

## (undated) DEVICE — THE CARR-LOCKE INJECTION NEEDLE IS A SINGLE USE, DISPOSABLE, FLEXIBLE SHEATH INJECTION NEEDLE USED FOR THE INJECTION OF VARIOUS TYPES OF MEDIA THROUGH FLEXIBLE ENDOSCOPES.

## (undated) DEVICE — SUT SILK 2/0 SH CR8 18IN CR8 C012D

## (undated) DEVICE — COVER,MAYO STAND,STERILE: Brand: MEDLINE

## (undated) DEVICE — SUT MNCRYL PLS ANTIB UD 4/0 PS2 18IN

## (undated) DEVICE — TROCAR: Brand: KII SLEEVE

## (undated) DEVICE — SINGLE-USE BIOPSY FORCEPS: Brand: RADIAL JAW 4

## (undated) DEVICE — GOWN,NON-REINFORCED,SIRUS,SET IN SLV,XL: Brand: MEDLINE

## (undated) DEVICE — LAPAROSCOPIC SMOKE FILTRATION SYSTEM: Brand: PALL LAPAROSHIELD® PLUS LAPAROSCOPIC SMOKE FILTRATION SYSTEM

## (undated) DEVICE — 1LYRTR 16FR10ML100%SIL UMS SNP: Brand: MEDLINE INDUSTRIES, INC.

## (undated) DEVICE — DISPOSABLE GRASPER CARTRIDGE: Brand: DIRECT DRIVE REPOSABLE GRASPERS

## (undated) DEVICE — ECHELON FLEX 60 ENDOPATH STAPLER COMPACT ARTICULATING ENDOSCOPIC LINEAR CUTTER: Brand: ECHELON  ENDOPATH

## (undated) DEVICE — INSUFFLATION TUBING SET, ENDOFLATOR 50: Brand: N.A.

## (undated) DEVICE — STPLR SKIN VISISTAT WD 35CT

## (undated) DEVICE — SYR LUERLOK 20CC BX/50

## (undated) DEVICE — TROCAR: Brand: KII OPTICAL ACCESS SYSTEM

## (undated) DEVICE — INTENDED FOR TISSUE SEPARATION, AND OTHER PROCEDURES THAT REQUIRE A SHARP SURGICAL BLADE TO PUNCTURE OR CUT.: Brand: BARD-PARKER ® CARBON RIB-BACK BLADES

## (undated) DEVICE — ADHS SKIN SURG TISS VISC PREMIERPRO EXOFIN HI/VISC FAST/DRY

## (undated) DEVICE — SYRINGE, LUER LOCK, 60ML: Brand: MEDLINE

## (undated) DEVICE — COVER,C-ARM,41X74: Brand: MEDLINE

## (undated) DEVICE — LOU LAP SIGMOID COLON: Brand: MEDLINE INDUSTRIES, INC.

## (undated) DEVICE — Device: Brand: BLACK EYE